# Patient Record
Sex: FEMALE | Race: WHITE | NOT HISPANIC OR LATINO | Employment: UNEMPLOYED | URBAN - METROPOLITAN AREA
[De-identification: names, ages, dates, MRNs, and addresses within clinical notes are randomized per-mention and may not be internally consistent; named-entity substitution may affect disease eponyms.]

---

## 2019-12-26 ENCOUNTER — HOSPITAL ENCOUNTER (INPATIENT)
Facility: HOSPITAL | Age: 64
LOS: 12 days | Discharge: HOME HEALTH | DRG: 493 | End: 2020-01-08
Attending: EMERGENCY MEDICINE
Payer: COMMERCIAL

## 2019-12-26 DIAGNOSIS — E87.5 HYPERKALEMIA: ICD-10-CM

## 2019-12-26 DIAGNOSIS — N17.9 AKI (ACUTE KIDNEY INJURY): ICD-10-CM

## 2019-12-26 DIAGNOSIS — S82.841A BIMALLEOLAR FRACTURE, RIGHT, CLOSED, INITIAL ENCOUNTER: ICD-10-CM

## 2019-12-26 DIAGNOSIS — M25.571 RIGHT ANKLE PAIN: ICD-10-CM

## 2019-12-26 DIAGNOSIS — S82.851A TRIMALLEOLAR FRACTURE OF ANKLE, CLOSED, RIGHT, INITIAL ENCOUNTER: Primary | ICD-10-CM

## 2019-12-26 DIAGNOSIS — Z01.818 PRE-OP EVALUATION: ICD-10-CM

## 2019-12-26 LAB
ALBUMIN SERPL BCP-MCNC: 2.5 G/DL (ref 3.5–5.2)
ALP SERPL-CCNC: 108 U/L (ref 55–135)
ALT SERPL W/O P-5'-P-CCNC: 10 U/L (ref 10–44)
ANION GAP SERPL CALC-SCNC: 13 MMOL/L (ref 8–16)
AST SERPL-CCNC: 8 U/L (ref 10–40)
BILIRUB SERPL-MCNC: 0.3 MG/DL (ref 0.1–1)
BUN SERPL-MCNC: 53 MG/DL (ref 8–23)
CALCIUM SERPL-MCNC: 8.5 MG/DL (ref 8.7–10.5)
CHLORIDE SERPL-SCNC: 110 MMOL/L (ref 95–110)
CO2 SERPL-SCNC: 17 MMOL/L (ref 23–29)
CREAT SERPL-MCNC: 2.8 MG/DL (ref 0.5–1.4)
ERYTHROCYTE [DISTWIDTH] IN BLOOD BY AUTOMATED COUNT: 16.9 % (ref 11.5–14.5)
EST. GFR  (AFRICAN AMERICAN): 20 ML/MIN/1.73 M^2
EST. GFR  (NON AFRICAN AMERICAN): 18 ML/MIN/1.73 M^2
GLUCOSE SERPL-MCNC: 188 MG/DL (ref 70–110)
HCT VFR BLD AUTO: 34.1 % (ref 37–48.5)
HGB BLD-MCNC: 10.5 G/DL (ref 12–16)
MCH RBC QN AUTO: 26.3 PG (ref 27–31)
MCHC RBC AUTO-ENTMCNC: 30.8 G/DL (ref 32–36)
MCV RBC AUTO: 86 FL (ref 82–98)
PLATELET # BLD AUTO: 482 K/UL (ref 150–350)
PMV BLD AUTO: 10.2 FL (ref 9.2–12.9)
POCT GLUCOSE: 156 MG/DL (ref 70–110)
POTASSIUM SERPL-SCNC: 5.6 MMOL/L (ref 3.5–5.1)
PROT SERPL-MCNC: 6.4 G/DL (ref 6–8.4)
RBC # BLD AUTO: 3.99 M/UL (ref 4–5.4)
SODIUM SERPL-SCNC: 140 MMOL/L (ref 136–145)
WBC # BLD AUTO: 6 K/UL (ref 3.9–12.7)

## 2019-12-26 PROCEDURE — G0378 HOSPITAL OBSERVATION PER HR: HCPCS

## 2019-12-26 PROCEDURE — 25000003 PHARM REV CODE 250: Performed by: EMERGENCY MEDICINE

## 2019-12-26 PROCEDURE — 99900035 HC TECH TIME PER 15 MIN (STAT)

## 2019-12-26 PROCEDURE — 82962 GLUCOSE BLOOD TEST: CPT

## 2019-12-26 PROCEDURE — 80053 COMPREHEN METABOLIC PANEL: CPT

## 2019-12-26 PROCEDURE — 93010 ELECTROCARDIOGRAM REPORT: CPT | Mod: ,,, | Performed by: INTERNAL MEDICINE

## 2019-12-26 PROCEDURE — 27810 TREATMENT OF ANKLE FRACTURE: CPT | Mod: RT

## 2019-12-26 PROCEDURE — 96374 THER/PROPH/DIAG INJ IV PUSH: CPT

## 2019-12-26 PROCEDURE — 99285 EMERGENCY DEPT VISIT HI MDM: CPT | Mod: 25

## 2019-12-26 PROCEDURE — 63600175 PHARM REV CODE 636 W HCPCS: Performed by: EMERGENCY MEDICINE

## 2019-12-26 PROCEDURE — 93010 EKG 12-LEAD: ICD-10-PCS | Mod: ,,, | Performed by: INTERNAL MEDICINE

## 2019-12-26 PROCEDURE — 85027 COMPLETE CBC AUTOMATED: CPT

## 2019-12-26 PROCEDURE — 93005 ELECTROCARDIOGRAM TRACING: CPT

## 2019-12-26 RX ORDER — PROPOFOL 10 MG/ML
30 VIAL (ML) INTRAVENOUS
Status: COMPLETED | OUTPATIENT
Start: 2019-12-26 | End: 2019-12-26

## 2019-12-26 RX ORDER — KETAMINE HCL IN 0.9 % NACL 50 MG/5 ML
30 SYRINGE (ML) INTRAVENOUS
Status: COMPLETED | OUTPATIENT
Start: 2019-12-26 | End: 2019-12-26

## 2019-12-26 RX ORDER — MORPHINE SULFATE 2 MG/ML
2 INJECTION, SOLUTION INTRAMUSCULAR; INTRAVENOUS
Status: COMPLETED | OUTPATIENT
Start: 2019-12-26 | End: 2019-12-26

## 2019-12-26 RX ADMIN — Medication 25 MG: at 08:12

## 2019-12-26 RX ADMIN — PROPOFOL 30 MG: 10 INJECTION, EMULSION INTRAVENOUS at 08:12

## 2019-12-26 RX ADMIN — MORPHINE SULFATE 2 MG: 2 INJECTION, SOLUTION INTRAMUSCULAR; INTRAVENOUS at 11:12

## 2019-12-26 NOTE — ED NOTES
Patient presents to ER broke ankle 1 week ago, fell 12/24/19 at home, seen Dr. Sotomayor Orthopedic today and was told to come to ER for further eval, pt took Ibuprofen this am with minimal relief, 6/10 on pain scale. Patient has a cast wrapped with ace warp noted. Patient denies CP and SOB

## 2019-12-26 NOTE — ED NOTES
APPEARANCE: Alert, oriented and in no acute distress.  CARDIAC: Normal rate and rhythm, no murmur heard.   PERIPHERAL VASCULAR: peripheral pulses present. Normal cap refill. No edema. Warm to touch.    RESPIRATORY:Normal rate and effort, breath sounds clear bilaterally throughout chest. Respirations are equal and unlabored no obvious signs of distress.  GASTRO: soft, bowel sounds normal, no tenderness, no abdominal distention.  MUSC: Full ROM. bony tenderness and soft tissue tenderness to right lower extremity.  SKIN: Skin is warm and dry, normal skin turgor, mucous membranes moist.  NEURO: 5/5 strength major flexors/extensors bilaterally. Sensory intact to light touch bilaterally. Stephanie coma scale: eyes open spontaneously-4, oriented & converses-5, obeys commands-6. No neurological abnormalities.   MENTAL STATUS: awake, alert and aware of environment.  EYE: PERRL, both eyes: pupils brisk and reactive to light. Normal size.  ENT: EARS: no obvious drainage. NOSE: no active bleeding.

## 2019-12-26 NOTE — ED PROVIDER NOTES
"Encounter Date: 12/26/2019    SCRIBE #1 NOTE: I, Michelle Ahumada, am scribing for, and in the presence of,  Dr. Garcia. I have scribed the entire note.       History     Chief Complaint   Patient presents with    Foot Pain     pt states she broke right foot 1 week ago and unable to get in to see ortho, came to ER for pain control and to get " booked for ortho surgery"      Bina Daniels is a 63 y.o. female who  has a past medical history of Cancer and Diabetes mellitus.    The patient presents to the ED due to R ankle pain after fall 1 week ago. She reports she lost her balance and twisted her ankle. She thought it was just sprained, but was evaluated by Dr. Sotomayor at [facility] today, and was referred to the ED for further evaluation for a fracture. She presents with a splint in place. She endorses persistent pain and tingling to the R foot, but denies any additional injury, pain to leg, knee, or hip, or any other complaints. She has been taking pain medication at home with only some improvement.     The history is provided by the patient.     Review of patient's allergies indicates:   Allergen Reactions    Sulfa (sulfonamide antibiotics) Hives     Past Medical History:   Diagnosis Date    Cancer     Diabetes mellitus      Past Surgical History:   Procedure Laterality Date    BREAST SURGERY Left     lumpectomy    CHOLECYSTECTOMY      HYSTERECTOMY      TONSILLECTOMY       Family History   Problem Relation Age of Onset    Breast cancer Maternal Grandmother      Social History     Tobacco Use    Smoking status: Former Smoker     Start date: 7/8/2003    Smokeless tobacco: Never Used   Substance Use Topics    Alcohol use: No    Drug use: No     Review of Systems   Constitutional: Negative for chills and fever.   HENT: Negative for sore throat.    Respiratory: Negative for cough and shortness of breath.    Cardiovascular: Negative for chest pain.   Gastrointestinal: Negative for nausea and " vomiting.   Genitourinary: Negative for dysuria, frequency and urgency.   Musculoskeletal: Positive for arthralgias and myalgias. Negative for back pain, neck pain and neck stiffness.   Skin: Negative for rash and wound.   Neurological: Negative for syncope and weakness.   Hematological: Does not bruise/bleed easily.   Psychiatric/Behavioral: Negative for agitation, behavioral problems and confusion.   All other systems reviewed and are negative.      Physical Exam     Initial Vitals   BP Pulse Resp Temp SpO2   12/26/19 1619 12/26/19 1619 12/26/19 1619 12/26/19 1618 12/26/19 1619   (!) 154/75 88 18 98.7 °F (37.1 °C) 99 %      MAP       --                Physical Exam    Nursing note and vitals reviewed.  Constitutional: She appears well-developed and well-nourished. She is not diaphoretic. No distress.   HENT:   Head: Normocephalic and atraumatic.   Mouth/Throat: Oropharynx is clear and moist.   Eyes: EOM are normal. Pupils are equal, round, and reactive to light.   Neck: No tracheal deviation present.   Cardiovascular: Normal rate, regular rhythm, normal heart sounds and intact distal pulses.   Diminished DP and PT pulses to the right foot.   Pulmonary/Chest: Breath sounds normal. No stridor. No respiratory distress. She has no wheezes.   Abdominal: Soft. Bowel sounds are normal. She exhibits no distension and no mass. There is no tenderness.   Musculoskeletal: She exhibits edema.        Right ankle: She exhibits decreased range of motion, swelling and deformity. She exhibits no laceration and normal pulse. Tenderness. Lateral malleolus and medial malleolus tenderness found.   Obvious right ankle deformity. Pitting edema to the right foot.   Neurological: She is alert and oriented to person, place, and time. She has normal strength. No cranial nerve deficit or sensory deficit.   Skin: Skin is warm and dry. Capillary refill takes less than 2 seconds. No pallor.   Capillary refill brisk to right foot.   Psychiatric:  She has a normal mood and affect. Her behavior is normal. Thought content normal.         ED Course   Orthopedic Injury  Date/Time: 2019 6:11 PM  Performed by: Torey Garcia MD  Authorized by: Torey Garcia MD     Consent Done?:  Yes  Universal Protocol:     Verbal consent obtained?: Yes      Written consent obtained?: Yes      Risks and benefits: Risks, benefits and alternatives were discussed      Consent given by:  Patient    Patient states understanding of procedure being performed: Yes      Patient's understanding of procedure matches consent: Yes      Procedure consent matches procedure scheduled: Yes      Relevant documents present and verified: Yes      Test results available and properly labeled: Yes      Imaging studies available: Yes      Required items: Required blood products, implants, devices and special equipment avialable      Patient identity confirmed:  , MRN and name    Time Out: Immediately prior to the procedure a time out was called    Injury:     Injury location:  Ankle    Injury type:  Fracture-dislocation    Fracture type: bimalleolar        Pre-procedure assessment:     Neurovascular status: Neurovascularly intact      Distal perfusion: normal      Neurological function: normal      Range of motion: reduced      Patient sedated?: Yes      ASA Class:  Class 2 - Mild Illness without functional impairment.    Mallampati Score:  Class 1 - Visualization of the soft palate, fauces, uvula, and anterior/posterior pillars.  Date/Time of last solid:  2019 11:00 AM    Date/Time of last fluid:  2019 11:00 AM    Patient/Family history of anesthesia or sedation complications: No      Sedation type: moderate (conscious) sedation      Sedation:  Propofol    Analgesia:  Ketamine    Sedation start:  2019 8:26 PM    Sedation end:  2019 8:40 PM    Vital signs: Vital signs monitored during sedation        Selections made in this section will also lock the Injury type  section above.:     Manipulation performed?: Yes      Skeletal traction used?: Yes      Reduction successful?: No      Confirmation: Reduction confirmed by x-ray      Immobilization:  Splint    Splint type:  Ankle stirrup (stirrup and posterior slab)  Post-procedure assessment:     Distal perfusion: normal      Neurological function: normal      Range of motion: splinted      Patient tolerance:  Patient tolerated the procedure well with no immediate complications     Improved alignment but persistent deformity on repeat x-ray.  Splint Application  Date/Time: 12/26/2019 8:42 PM  Performed by: Torey Garcia MD  Authorized by: Torey Garcia MD   Consent Done: Yes  Location details: right leg  Splint type: ankle stirrup (stirrup and posterior slab)  Supplies used: plaster  Post-procedure: The splinted body part was neurovascularly unchanged following the procedure.  Patient tolerance: Patient tolerated the procedure well with no immediate complications        Labs Reviewed   CBC WITHOUT DIFFERENTIAL - Abnormal; Notable for the following components:       Result Value    RBC 3.99 (*)     Hemoglobin 10.5 (*)     Hematocrit 34.1 (*)     Mean Corpuscular Hemoglobin 26.3 (*)     Mean Corpuscular Hemoglobin Conc 30.8 (*)     RDW 16.9 (*)     Platelets 482 (*)     All other components within normal limits   COMPREHENSIVE METABOLIC PANEL - Abnormal; Notable for the following components:    Potassium 5.6 (*)     CO2 17 (*)     Glucose 188 (*)     BUN, Bld 53 (*)     Creatinine 2.8 (*)     Calcium 8.5 (*)     Albumin 2.5 (*)     AST 8 (*)     eGFR if  20 (*)     eGFR if non  18 (*)     All other components within normal limits   POCT GLUCOSE - Abnormal; Notable for the following components:    POCT Glucose 156 (*)     All other components within normal limits     EKG Readings: (Independently Interpreted)   Initial Reading: No STEMI. Rhythm: Normal Sinus Rhythm.   Normal sinus rhythm, rate 95,  ST depressions inferior and lateral leads, no reciprocal elevations or other signs of ischemia, normal intervals.  No prior EKG for comparison.     ECG Results          EKG 12-lead (Final result)  Result time 12/27/19 10:23:29    Final result by Interface, Lab In [facility] (12/27/19 10:23:29)                 Narrative:    Test Reason : Z01.818,    Vent. Rate : 095 BPM     Atrial Rate : 095 BPM     P-R Int : 138 ms          QRS Dur : 076 ms      QT Int : 346 ms       P-R-T Axes : 056 033 161 degrees     QTc Int : 434 ms    Normal sinus rhythm  Septal infarct ,age undetermined  T wave abnormality, consider lateral ischemia  Abnormal ECG  No previous ECGs available  Confirmed by Lalo Evangelista MD ([id number]) on 12/27/2019 10:23:21 AM    Referred By: JASMEET   SELF           Confirmed By:Lalo Evangelista MD                            X-Rays:   Independently Interpreted Readings:   Other Readings:  Reviewed by myself, read by radiology.    Imaging Results          X-Ray Chest AP Portable (Final result)  Result time 12/26/19 23:26:10    Final result by Brigida Maldonado MD (12/26/19 23:26:10)                 Impression:      No acute intrathoracic abnormality detected.      Electronically signed by: Brigida Maldonado  Date:    12/26/2019  Time:    23:26             Narrative:    EXAMINATION:  AP PORTABLE CHEST    CLINICAL HISTORY:  pre op evaluation;    TECHNIQUE:  AP portable chest radiograph was submitted.    COMPARISON:  None.    FINDINGS:  AP portable chest radiograph demonstrates a cardiac silhouette within normal limits.  There is no focal consolidation, pneumothorax, or pleural effusion.  There is tracheal chondrocalcinosis.                               CT Ankle (Including Hindfoot) Without Contrast Right (Final result)  Result time 12/26/19 23:18:24    Final result by Brigida Maldonado MD (12/26/19 23:18:24)                 Impression:      As above described.      Electronically signed by: Brigida  Joel  Date:    12/26/2019  Time:    23:18             Narrative:    EXAMINATION:  CT RIGHT ANKLE    CLINICAL HISTORY:  Fracture, ankle;    TECHNIQUE:  1.25 mm axial images were obtained through the right ankle.  Coronal sagittal reformatted images are provided.    COMPARISON:  None.    FINDINGS:  There is overlying cast material.  The foot is edematous.  Posterior tibiotalar dislocation is noted.  The talus demonstrates lateral tilt.  There is a acute comminuted fracture of the distal fibula with the largest distal fragment posteriorly angulated with overlap.  There is a comminuted fracture of the medial malleolus.  There are fractures involving the posterior malleolus and the anterior lip of the tibia.  Bones are diffusely osteopenic.                               X-Ray Ankle Complete Right (Final result)  Result time 12/26/19 22:27:04    Final result by Brigida Maldonado MD (12/26/19 22:27:04)                 Impression:      Improvement in distal fibular fracture.  Persistent posterior dislocation at the tibiotalar joint.  Acute medial malleolar fracture.      Electronically signed by: Brigida Maldonado  Date:    12/26/2019  Time:    22:27             Narrative:    EXAMINATION:  THREE VIEWS OF THE RIGHT ANKLE    CLINICAL HISTORY:  Displaced bimalleolar fracture of right lower leg, initial encounter for closed fracture    TECHNIQUE:  AP, lateral and oblique views of the right ankle    COMPARISON:  12/26/2018 at 18:06    FINDINGS:  Overlying cast material obscures bony detail.  Three views of the right ankle demonstrate improvement in position and alignment of a distal fibular fracture.  There is persistent posterior displacement the tibial talar joint.  There is redemonstration of a medial malleolar fracture.  The fragmentation the anterior aspect of the anterior tibial lip is not well seen through the cast like material.                               X-Ray Ankle Complete Right (Final result)  Result time  12/26/19 18:10:50    Final result by Roverto Parada MD (12/26/19 18:10:50)                 Impression:      1. Distal tibial and fibular fractures with associated tibiotalar disruption as above.      Electronically signed by: Roverto Parada MD  Date:    12/26/2019  Time:    18:10             Narrative:    EXAMINATION:  XR ANKLE COMPLETE 3 VIEW RIGHT    CLINICAL HISTORY:  Pain in right ankle and joints of right foot    TECHNIQUE:  AP, lateral, and oblique images of the right ankle were performed.    COMPARISON:  None    FINDINGS:  Three views.    There is a comminuted displaced and angulated fracture of the distal right fibula.  There is a displaced comminuted fracture of the medial malleolus.  There is subluxation of the tibiotalar articulation posteromedially.  The talus appears grossly intact.  There is diffuse edema about the ankle.  The posterior aspect of the tibia appears intact although somewhat limited evaluation.  There is fragmentation of the anterior aspect of the tibia.  There is vascular calcification.                              Medical Decision Making:   History:   Old Medical Records: I decided to obtain old medical records.  Old Records Summarized: other records.  Initial Assessment:   62 yo F with ankle pain after fall 1 week ago.   Exam with obvious deformity.  Will obtain x-rays and anticipate need for conscious sedation and closed reduction.  Differential Diagnosis:   Differential Diagnosis includes, but is not limited to:  Fracture, dislocation, compartment syndrome, nerve injury/palsy, vascular injury, rhabdomyolysis, hemarthrosis, septic joint, bursitis, muscle strain, ligament tear/sprain, laceration with foreign body, abrasion, soft tissue contusion, osteoarthritis.  Clinical Tests:   Radiological Study: Ordered and Reviewed  ED Management:  Conscious sedation, closed reduction, splint application performed at bedside by ED physician.  Patient admitted to Orthopedic Surgery service  for operative repair.    Pre-op screening labs, EKG ordered.  Labs with Cr 2.8, K 5.6, unknown baseline.  Hospitalist service consulted for medical management.    On re-evaluation, the patient's status has remained stable.  At this time, I believe the patient should be admitted to the hospital for further evaluation and management of bimalleolar fracture.  Orthopedic Surgery service was contacted and the case was discussed.   The consulting physician/team agrees with plan and will admit under their service.   The patient and family were updated with test results, overall impression, and further plan of care. All questions were answered. The patient expressed understanding and agrees with the current plan.                     ED Course as of Dec 27 0039   Thu Dec 26, 2019   1853 X-ray reveals bimalleolar fracture/dislocation.  Will consent for conscious sedation and closed reduction at bedside.    [SS]   2225 Repeat post-reduction x-ray with persistent malalignment, concerning for unstable ankle fracture.  Discussed with Dr. Medina, Orthopedics, who agrees with plan for admission and surgical repair.   [SS]      ED Course User Index  [SS] Torey Garcia MD                Clinical Impression:     1. Hyperkalemia    2. Right ankle pain    3. Bimalleolar fracture, right, closed, initial encounter    4. Pre-op evaluation    5. JENNI (acute kidney injury)    6. Trimalleolar fracture of ankle, closed, right, initial encounter        Disposition:   Disposition: Placed in Observation  Condition: Stable      I, Dr. Torey Garcia, personally performed the services described in this documentation. All medical record entries made by the scribe were at my direction and in my presence.  I have reviewed the chart and agree that the record reflects my personal performance and is accurate and complete.     Torey Garcia MD.           Torey Garcia MD  12/28/19 0223

## 2019-12-27 ENCOUNTER — ANESTHESIA EVENT (OUTPATIENT)
Dept: SURGERY | Facility: HOSPITAL | Age: 64
DRG: 493 | End: 2019-12-27
Payer: COMMERCIAL

## 2019-12-27 ENCOUNTER — ANESTHESIA (OUTPATIENT)
Dept: SURGERY | Facility: HOSPITAL | Age: 64
DRG: 493 | End: 2019-12-27
Payer: COMMERCIAL

## 2019-12-27 PROBLEM — N18.30 ANEMIA DUE TO STAGE 3 CHRONIC KIDNEY DISEASE: Status: ACTIVE | Noted: 2019-12-27

## 2019-12-27 PROBLEM — D63.1 ANEMIA DUE TO STAGE 3 CHRONIC KIDNEY DISEASE: Status: ACTIVE | Noted: 2019-12-27

## 2019-12-27 PROBLEM — N18.4 CKD (CHRONIC KIDNEY DISEASE), STAGE IV: Chronic | Status: ACTIVE | Noted: 2019-12-27

## 2019-12-27 PROBLEM — E13.40 NEUROPATHY DUE TO SECONDARY DIABETES MELLITUS: Status: ACTIVE | Noted: 2019-12-27

## 2019-12-27 PROBLEM — E11.49 TYPE 2 DIABETES MELLITUS WITH NEUROLOGIC COMPLICATION, WITHOUT LONG-TERM CURRENT USE OF INSULIN: Status: ACTIVE | Noted: 2019-12-27

## 2019-12-27 PROBLEM — E13.40 NEUROPATHY DUE TO SECONDARY DIABETES MELLITUS: Chronic | Status: ACTIVE | Noted: 2019-12-27

## 2019-12-27 PROBLEM — S82.851A TRIMALLEOLAR FRACTURE OF ANKLE, CLOSED, RIGHT, INITIAL ENCOUNTER: Status: ACTIVE | Noted: 2019-12-26

## 2019-12-27 PROBLEM — S93.04XA ANKLE DISLOCATION, RIGHT, INITIAL ENCOUNTER: Status: ACTIVE | Noted: 2019-12-27

## 2019-12-27 PROBLEM — I25.10 CORONARY ARTERY DISEASE INVOLVING NATIVE HEART WITHOUT ANGINA PECTORIS: Chronic | Status: ACTIVE | Noted: 2019-12-27

## 2019-12-27 PROBLEM — I25.10 CORONARY ARTERY DISEASE INVOLVING NATIVE HEART WITHOUT ANGINA PECTORIS: Status: ACTIVE | Noted: 2019-12-27

## 2019-12-27 PROBLEM — E11.49 TYPE 2 DIABETES MELLITUS WITH NEUROLOGIC COMPLICATION, WITHOUT LONG-TERM CURRENT USE OF INSULIN: Chronic | Status: ACTIVE | Noted: 2019-12-27

## 2019-12-27 PROBLEM — N18.4 CKD (CHRONIC KIDNEY DISEASE), STAGE IV: Status: ACTIVE | Noted: 2019-12-27

## 2019-12-27 LAB
ABO + RH BLD: NORMAL
ANION GAP SERPL CALC-SCNC: 11 MMOL/L (ref 8–16)
APTT BLDCRRT: 30.6 SEC (ref 21–32)
BASOPHILS # BLD AUTO: 0.02 K/UL (ref 0–0.2)
BASOPHILS # BLD AUTO: 0.02 K/UL (ref 0–0.2)
BASOPHILS NFR BLD: 0.2 % (ref 0–1.9)
BASOPHILS NFR BLD: 0.3 % (ref 0–1.9)
BLD GP AB SCN CELLS X3 SERPL QL: NORMAL
BUN SERPL-MCNC: 49 MG/DL (ref 8–23)
CALCIUM SERPL-MCNC: 7.8 MG/DL (ref 8.7–10.5)
CHLORIDE SERPL-SCNC: 114 MMOL/L (ref 95–110)
CO2 SERPL-SCNC: 14 MMOL/L (ref 23–29)
CREAT SERPL-MCNC: 2.4 MG/DL (ref 0.5–1.4)
DIFFERENTIAL METHOD: ABNORMAL
DIFFERENTIAL METHOD: ABNORMAL
EOSINOPHIL # BLD AUTO: 0.1 K/UL (ref 0–0.5)
EOSINOPHIL # BLD AUTO: 0.1 K/UL (ref 0–0.5)
EOSINOPHIL NFR BLD: 1.1 % (ref 0–8)
EOSINOPHIL NFR BLD: 1.3 % (ref 0–8)
ERYTHROCYTE [DISTWIDTH] IN BLOOD BY AUTOMATED COUNT: 16.5 % (ref 11.5–14.5)
ERYTHROCYTE [DISTWIDTH] IN BLOOD BY AUTOMATED COUNT: 17 % (ref 11.5–14.5)
EST. GFR  (AFRICAN AMERICAN): 24 ML/MIN/1.73 M^2
EST. GFR  (NON AFRICAN AMERICAN): 21 ML/MIN/1.73 M^2
GLUCOSE SERPL-MCNC: 164 MG/DL (ref 70–110)
HCT VFR BLD AUTO: 23.3 % (ref 37–48.5)
HCT VFR BLD AUTO: 30.1 % (ref 37–48.5)
HGB BLD-MCNC: 7 G/DL (ref 12–16)
HGB BLD-MCNC: 9.2 G/DL (ref 12–16)
INR PPP: 1.2 (ref 0.8–1.2)
LACTATE SERPL-SCNC: 0.9 MMOL/L (ref 0.5–2.2)
LYMPHOCYTES # BLD AUTO: 0.9 K/UL (ref 1–4.8)
LYMPHOCYTES # BLD AUTO: 1.2 K/UL (ref 1–4.8)
LYMPHOCYTES NFR BLD: 10.2 % (ref 18–48)
LYMPHOCYTES NFR BLD: 20.4 % (ref 18–48)
MCH RBC QN AUTO: 25.5 PG (ref 27–31)
MCH RBC QN AUTO: 26.2 PG (ref 27–31)
MCHC RBC AUTO-ENTMCNC: 30 G/DL (ref 32–36)
MCHC RBC AUTO-ENTMCNC: 30.6 G/DL (ref 32–36)
MCV RBC AUTO: 85 FL (ref 82–98)
MCV RBC AUTO: 86 FL (ref 82–98)
MONOCYTES # BLD AUTO: 0.7 K/UL (ref 0.3–1)
MONOCYTES # BLD AUTO: 0.9 K/UL (ref 0.3–1)
MONOCYTES NFR BLD: 11.4 % (ref 4–15)
MONOCYTES NFR BLD: 9.8 % (ref 4–15)
NEUTROPHILS # BLD AUTO: 4 K/UL (ref 1.8–7.7)
NEUTROPHILS # BLD AUTO: 7.1 K/UL (ref 1.8–7.7)
NEUTROPHILS NFR BLD: 66.6 % (ref 38–73)
NEUTROPHILS NFR BLD: 78.7 % (ref 38–73)
PLATELET # BLD AUTO: 410 K/UL (ref 150–350)
PLATELET # BLD AUTO: 445 K/UL (ref 150–350)
PMV BLD AUTO: 9.4 FL (ref 9.2–12.9)
PMV BLD AUTO: 9.8 FL (ref 9.2–12.9)
POCT GLUCOSE: 117 MG/DL (ref 70–110)
POCT GLUCOSE: 161 MG/DL (ref 70–110)
POCT GLUCOSE: 163 MG/DL (ref 70–110)
POCT GLUCOSE: 163 MG/DL (ref 70–110)
POTASSIUM SERPL-SCNC: 5 MMOL/L (ref 3.5–5.1)
PROTHROMBIN TIME: 12.7 SEC (ref 9–12.5)
RBC # BLD AUTO: 2.75 M/UL (ref 4–5.4)
RBC # BLD AUTO: 3.51 M/UL (ref 4–5.4)
SODIUM SERPL-SCNC: 139 MMOL/L (ref 136–145)
WBC # BLD AUTO: 5.97 K/UL (ref 3.9–12.7)
WBC # BLD AUTO: 8.98 K/UL (ref 3.9–12.7)

## 2019-12-27 PROCEDURE — 63600175 PHARM REV CODE 636 W HCPCS: Performed by: ORTHOPAEDIC SURGERY

## 2019-12-27 PROCEDURE — 85610 PROTHROMBIN TIME: CPT

## 2019-12-27 PROCEDURE — 63600175 PHARM REV CODE 636 W HCPCS: Performed by: ANESTHESIOLOGY

## 2019-12-27 PROCEDURE — 85730 THROMBOPLASTIN TIME PARTIAL: CPT

## 2019-12-27 PROCEDURE — 94761 N-INVAS EAR/PLS OXIMETRY MLT: CPT

## 2019-12-27 PROCEDURE — 36000709 HC OR TIME LEV III EA ADD 15 MIN

## 2019-12-27 PROCEDURE — 64445 NJX AA&/STRD SCIATIC NRV IMG: CPT | Performed by: STUDENT IN AN ORGANIZED HEALTH CARE EDUCATION/TRAINING PROGRAM

## 2019-12-27 PROCEDURE — 63600175 PHARM REV CODE 636 W HCPCS: Performed by: NURSE PRACTITIONER

## 2019-12-27 PROCEDURE — 80048 BASIC METABOLIC PNL TOTAL CA: CPT

## 2019-12-27 PROCEDURE — 37000009 HC ANESTHESIA EA ADD 15 MINS

## 2019-12-27 PROCEDURE — 11000001 HC ACUTE MED/SURG PRIVATE ROOM

## 2019-12-27 PROCEDURE — 25000003 PHARM REV CODE 250

## 2019-12-27 PROCEDURE — P9021 RED BLOOD CELLS UNIT: HCPCS

## 2019-12-27 PROCEDURE — 86901 BLOOD TYPING SEROLOGIC RH(D): CPT

## 2019-12-27 PROCEDURE — 25000242 PHARM REV CODE 250 ALT 637 W/ HCPCS: Performed by: NURSE PRACTITIONER

## 2019-12-27 PROCEDURE — 36415 COLL VENOUS BLD VENIPUNCTURE: CPT

## 2019-12-27 PROCEDURE — 63600175 PHARM REV CODE 636 W HCPCS

## 2019-12-27 PROCEDURE — C1713 ANCHOR/SCREW BN/BN,TIS/BN: HCPCS

## 2019-12-27 PROCEDURE — 36000708 HC OR TIME LEV III 1ST 15 MIN

## 2019-12-27 PROCEDURE — 27201423 OPTIME MED/SURG SUP & DEVICES STERILE SUPPLY

## 2019-12-27 PROCEDURE — 25000003 PHARM REV CODE 250: Performed by: NURSE PRACTITIONER

## 2019-12-27 PROCEDURE — 37000008 HC ANESTHESIA 1ST 15 MINUTES

## 2019-12-27 PROCEDURE — 83605 ASSAY OF LACTIC ACID: CPT

## 2019-12-27 PROCEDURE — 36430 TRANSFUSION BLD/BLD COMPNT: CPT

## 2019-12-27 PROCEDURE — 96376 TX/PRO/DX INJ SAME DRUG ADON: CPT

## 2019-12-27 PROCEDURE — 71000033 HC RECOVERY, INTIAL HOUR

## 2019-12-27 PROCEDURE — 86920 COMPATIBILITY TEST SPIN: CPT

## 2019-12-27 PROCEDURE — 94640 AIRWAY INHALATION TREATMENT: CPT

## 2019-12-27 PROCEDURE — C1769 GUIDE WIRE: HCPCS

## 2019-12-27 PROCEDURE — 85025 COMPLETE CBC W/AUTO DIFF WBC: CPT

## 2019-12-27 DEVICE — SCREW CANN 4.0MM 30MM.: Type: IMPLANTABLE DEVICE | Site: ANKLE | Status: FUNCTIONAL

## 2019-12-27 DEVICE — SCREW CORTEX 3.5 X 30: Type: IMPLANTABLE DEVICE | Site: ANKLE | Status: FUNCTIONAL

## 2019-12-27 DEVICE — PLATE W COLLAR: Type: IMPLANTABLE DEVICE | Site: ANKLE | Status: FUNCTIONAL

## 2019-12-27 DEVICE — SCREW CORTEX 3.5MM X 12MM: Type: IMPLANTABLE DEVICE | Site: ANKLE | Status: FUNCTIONAL

## 2019-12-27 DEVICE — SCREW CANN 4.0MM 40MM: Type: IMPLANTABLE DEVICE | Site: ANKLE | Status: FUNCTIONAL

## 2019-12-27 DEVICE — SCREW LOCKING 3.5MM X 14MM.: Type: IMPLANTABLE DEVICE | Site: ANKLE | Status: FUNCTIONAL

## 2019-12-27 DEVICE — SCREW LOCKING 3.5MM X 16MM.: Type: IMPLANTABLE DEVICE | Site: ANKLE | Status: FUNCTIONAL

## 2019-12-27 RX ORDER — LISINOPRIL 20 MG/1
20 TABLET ORAL DAILY
Status: DISCONTINUED | OUTPATIENT
Start: 2019-12-28 | End: 2020-01-05

## 2019-12-27 RX ORDER — HYDROMORPHONE HYDROCHLORIDE 2 MG/ML
0.2 INJECTION, SOLUTION INTRAMUSCULAR; INTRAVENOUS; SUBCUTANEOUS
Status: DISCONTINUED | OUTPATIENT
Start: 2019-12-27 | End: 2020-01-04

## 2019-12-27 RX ORDER — MIDAZOLAM HYDROCHLORIDE 1 MG/ML
INJECTION, SOLUTION INTRAMUSCULAR; INTRAVENOUS
Status: DISCONTINUED | OUTPATIENT
Start: 2019-12-27 | End: 2019-12-27

## 2019-12-27 RX ORDER — SODIUM CHLORIDE, SODIUM LACTATE, POTASSIUM CHLORIDE, CALCIUM CHLORIDE 600; 310; 30; 20 MG/100ML; MG/100ML; MG/100ML; MG/100ML
INJECTION, SOLUTION INTRAVENOUS CONTINUOUS PRN
Status: DISCONTINUED | OUTPATIENT
Start: 2019-12-27 | End: 2019-12-27

## 2019-12-27 RX ORDER — LISINOPRIL 20 MG/1
20 TABLET ORAL DAILY
Status: ON HOLD | COMMUNITY
End: 2020-01-08 | Stop reason: HOSPADM

## 2019-12-27 RX ORDER — FLUTICASONE PROPIONATE 50 MCG
1 SPRAY, SUSPENSION (ML) NASAL 2 TIMES DAILY
Status: DISCONTINUED | OUTPATIENT
Start: 2019-12-27 | End: 2020-01-08 | Stop reason: HOSPADM

## 2019-12-27 RX ORDER — ESCITALOPRAM OXALATE 10 MG/1
10 TABLET ORAL DAILY
COMMUNITY

## 2019-12-27 RX ORDER — OXYCODONE HYDROCHLORIDE 5 MG/1
5 TABLET ORAL EVERY 4 HOURS PRN
Status: DISCONTINUED | OUTPATIENT
Start: 2019-12-27 | End: 2020-01-08 | Stop reason: HOSPADM

## 2019-12-27 RX ORDER — CEFAZOLIN SODIUM 2 G/50ML
2 SOLUTION INTRAVENOUS
Status: COMPLETED | OUTPATIENT
Start: 2019-12-27 | End: 2019-12-27

## 2019-12-27 RX ORDER — NAPROXEN SODIUM 220 MG/1
81 TABLET, FILM COATED ORAL DAILY
COMMUNITY

## 2019-12-27 RX ORDER — GLYBURIDE-METFORMIN HYDROCHLORIDE 2.5; 5 MG/1; MG/1
1 TABLET ORAL
Status: DISCONTINUED | OUTPATIENT
Start: 2019-12-28 | End: 2019-12-27

## 2019-12-27 RX ORDER — METFORMIN HYDROCHLORIDE 500 MG/1
500 TABLET ORAL
Status: DISCONTINUED | OUTPATIENT
Start: 2019-12-28 | End: 2019-12-28

## 2019-12-27 RX ORDER — HYDROCODONE BITARTRATE AND ACETAMINOPHEN 500; 5 MG/1; MG/1
TABLET ORAL
Status: DISCONTINUED | OUTPATIENT
Start: 2019-12-27 | End: 2019-12-29

## 2019-12-27 RX ORDER — ROPIVACAINE HYDROCHLORIDE 5 MG/ML
INJECTION, SOLUTION EPIDURAL; INFILTRATION; PERINEURAL
Status: DISCONTINUED | OUTPATIENT
Start: 2019-12-27 | End: 2019-12-27

## 2019-12-27 RX ORDER — METOPROLOL TARTRATE 50 MG/1
50 TABLET ORAL 2 TIMES DAILY
Status: ON HOLD | COMMUNITY
End: 2020-02-16 | Stop reason: HOSPADM

## 2019-12-27 RX ORDER — PANTOPRAZOLE SODIUM 40 MG/1
40 TABLET, DELAYED RELEASE ORAL DAILY
Status: DISCONTINUED | OUTPATIENT
Start: 2019-12-28 | End: 2020-01-08 | Stop reason: HOSPADM

## 2019-12-27 RX ORDER — PHENYLEPHRINE HYDROCHLORIDE 10 MG/ML
INJECTION INTRAVENOUS
Status: DISCONTINUED | OUTPATIENT
Start: 2019-12-27 | End: 2019-12-27

## 2019-12-27 RX ORDER — SODIUM CHLORIDE, SODIUM LACTATE, POTASSIUM CHLORIDE, CALCIUM CHLORIDE 600; 310; 30; 20 MG/100ML; MG/100ML; MG/100ML; MG/100ML
INJECTION, SOLUTION INTRAVENOUS CONTINUOUS
Status: DISCONTINUED | OUTPATIENT
Start: 2019-12-27 | End: 2019-12-28

## 2019-12-27 RX ORDER — ACETAMINOPHEN 325 MG/1
650 TABLET ORAL EVERY 4 HOURS PRN
Status: DISCONTINUED | OUTPATIENT
Start: 2019-12-27 | End: 2020-01-08 | Stop reason: HOSPADM

## 2019-12-27 RX ORDER — PROPOFOL 10 MG/ML
VIAL (ML) INTRAVENOUS CONTINUOUS PRN
Status: DISCONTINUED | OUTPATIENT
Start: 2019-12-27 | End: 2019-12-27

## 2019-12-27 RX ORDER — GLUCAGON 1 MG
1 KIT INJECTION
Status: DISCONTINUED | OUTPATIENT
Start: 2019-12-27 | End: 2019-12-31

## 2019-12-27 RX ORDER — SODIUM CHLORIDE 0.9 % (FLUSH) 0.9 %
10 SYRINGE (ML) INJECTION
Status: DISCONTINUED | OUTPATIENT
Start: 2019-12-27 | End: 2020-01-08 | Stop reason: HOSPADM

## 2019-12-27 RX ORDER — IBUPROFEN 800 MG/1
800 TABLET ORAL EVERY 6 HOURS PRN
Status: ON HOLD | COMMUNITY
End: 2020-01-08 | Stop reason: HOSPADM

## 2019-12-27 RX ORDER — ESCITALOPRAM OXALATE 10 MG/1
10 TABLET ORAL DAILY
Status: DISCONTINUED | OUTPATIENT
Start: 2019-12-28 | End: 2020-01-08 | Stop reason: HOSPADM

## 2019-12-27 RX ORDER — GLYBURIDE 2.5 MG/1
2.5 TABLET ORAL
Status: DISCONTINUED | OUTPATIENT
Start: 2019-12-28 | End: 2019-12-28

## 2019-12-27 RX ORDER — HYDROCODONE BITARTRATE AND ACETAMINOPHEN 7.5; 325 MG/1; MG/1
1 TABLET ORAL EVERY 6 HOURS PRN
Status: ON HOLD | COMMUNITY
End: 2020-01-08 | Stop reason: HOSPADM

## 2019-12-27 RX ORDER — ONDANSETRON 2 MG/ML
4 INJECTION INTRAMUSCULAR; INTRAVENOUS EVERY 6 HOURS PRN
Status: DISCONTINUED | OUTPATIENT
Start: 2019-12-27 | End: 2020-01-08 | Stop reason: HOSPADM

## 2019-12-27 RX ORDER — MORPHINE SULFATE 2 MG/ML
2 INJECTION, SOLUTION INTRAMUSCULAR; INTRAVENOUS EVERY 4 HOURS PRN
Status: DISCONTINUED | OUTPATIENT
Start: 2019-12-27 | End: 2020-01-01

## 2019-12-27 RX ORDER — METOPROLOL TARTRATE 50 MG/1
50 TABLET ORAL 2 TIMES DAILY
Status: DISCONTINUED | OUTPATIENT
Start: 2019-12-27 | End: 2020-01-08 | Stop reason: HOSPADM

## 2019-12-27 RX ORDER — ALBUTEROL SULFATE 2.5 MG/.5ML
10 SOLUTION RESPIRATORY (INHALATION) ONCE
Status: COMPLETED | OUTPATIENT
Start: 2019-12-27 | End: 2019-12-27

## 2019-12-27 RX ORDER — LIDOCAINE HCL/PF 100 MG/5ML
SYRINGE (ML) INTRAVENOUS
Status: DISCONTINUED | OUTPATIENT
Start: 2019-12-27 | End: 2019-12-27

## 2019-12-27 RX ORDER — PROPOFOL 10 MG/ML
VIAL (ML) INTRAVENOUS
Status: DISCONTINUED | OUTPATIENT
Start: 2019-12-27 | End: 2019-12-27

## 2019-12-27 RX ADMIN — METOPROLOL TARTRATE 50 MG: 50 TABLET ORAL at 01:12

## 2019-12-27 RX ADMIN — SODIUM CHLORIDE, SODIUM LACTATE, POTASSIUM CHLORIDE, AND CALCIUM CHLORIDE: .6; .31; .03; .02 INJECTION, SOLUTION INTRAVENOUS at 12:12

## 2019-12-27 RX ADMIN — MORPHINE SULFATE 2 MG: 2 INJECTION, SOLUTION INTRAMUSCULAR; INTRAVENOUS at 07:12

## 2019-12-27 RX ADMIN — PROPOFOL 50 MCG/KG/MIN: 10 INJECTION, EMULSION INTRAVENOUS at 12:12

## 2019-12-27 RX ADMIN — PROPOFOL 30 MG: 10 INJECTION, EMULSION INTRAVENOUS at 01:12

## 2019-12-27 RX ADMIN — FLUTICASONE PROPIONATE 50 MCG: 50 SPRAY, METERED NASAL at 06:12

## 2019-12-27 RX ADMIN — LIDOCAINE HYDROCHLORIDE 80 MG: 20 INJECTION, SOLUTION INTRAVENOUS at 12:12

## 2019-12-27 RX ADMIN — PROPOFOL 30 MG: 10 INJECTION, EMULSION INTRAVENOUS at 12:12

## 2019-12-27 RX ADMIN — PHENYLEPHRINE HYDROCHLORIDE 100 MCG: 10 INJECTION INTRAVENOUS at 01:12

## 2019-12-27 RX ADMIN — OXYCODONE HYDROCHLORIDE 5 MG: 5 TABLET ORAL at 08:12

## 2019-12-27 RX ADMIN — FLUTICASONE PROPIONATE 50 MCG: 50 SPRAY, METERED NASAL at 08:12

## 2019-12-27 RX ADMIN — MIDAZOLAM 3 MG: 1 INJECTION INTRAMUSCULAR; INTRAVENOUS at 12:12

## 2019-12-27 RX ADMIN — METOPROLOL TARTRATE 50 MG: 50 TABLET ORAL at 08:12

## 2019-12-27 RX ADMIN — CEFAZOLIN SODIUM 2 G: 2 SOLUTION INTRAVENOUS at 12:12

## 2019-12-27 RX ADMIN — ALBUTEROL SULFATE 10 MG: 2.5 SOLUTION RESPIRATORY (INHALATION) at 07:12

## 2019-12-27 RX ADMIN — SODIUM CHLORIDE, SODIUM LACTATE, POTASSIUM CHLORIDE, AND CALCIUM CHLORIDE: .6; .31; .03; .02 INJECTION, SOLUTION INTRAVENOUS at 10:12

## 2019-12-27 RX ADMIN — ROPIVACAINE HYDROCHLORIDE 40 ML: 5 INJECTION, SOLUTION EPIDURAL; INFILTRATION; PERINEURAL at 01:12

## 2019-12-27 NOTE — PLAN OF CARE
Progress notes reviewed. Evening rounds completed. Introduced self as VN for this shift. Admit questions and med rec completed . Educated pt on VN's role in patient's care.  Plan of care reviewed with patient. Opportunity given for pt's questions. No questions or concerns expressed at this time. VN to continue to monitor.  Hospitalist paged for additional orders awaiting call back.

## 2019-12-27 NOTE — ED NOTES
Pt. Using bed pan to urinate. Tolerating well. Perineal care performed. Pt. Placed in hospital gown and belongings placed in pt. Belonging bag and at the bedside.

## 2019-12-27 NOTE — ED NOTES
Patient awake and verbal. Nurse asked how she was feeling and pain level. Patient shook head no to pain but did state she felt stinging just a little bit.

## 2019-12-27 NOTE — TRANSFER OF CARE
"Anesthesia Transfer of Care Note    Patient: Bina Daniels    Procedure(s) Performed: Procedure(s) (LRB):  OPEN REDUCTION INTERNAL FIXATION-ANKLE (Right)    Patient location: PACU    Anesthesia Type: MAC and regional    Transport from OR: Transported from OR on room air with adequate spontaneous ventilation    Post pain: adequate analgesia    Post assessment: no apparent anesthetic complications and tolerated procedure well    Post vital signs: stable    Level of consciousness: awake, alert and oriented    Nausea/Vomiting: no nausea/vomiting    Complications: none    Transfer of care protocol was followed      Last vitals:   Visit Vitals  BP (!) 72/49   Pulse 66   Temp 36.3 °C (97.3 °F)   Resp 16   Ht 5' 4" (1.626 m)   Wt 59.6 kg (131 lb 6.3 oz)   SpO2 100%   Breastfeeding? No   BMI 22.55 kg/m²     "

## 2019-12-27 NOTE — ANESTHESIA POSTPROCEDURE EVALUATION
Anesthesia Post Evaluation    Patient: Bina Daniels    Procedure(s) Performed: Procedure(s) (LRB):  OPEN REDUCTION INTERNAL FIXATION-ANKLE (Right)    Final Anesthesia Type: regional    Patient location during evaluation: OPS  Patient participation: Yes- Able to Participate  Level of consciousness: awake and alert and oriented  Post-procedure vital signs: reviewed and stable  Pain management: adequate  Airway patency: patent    PONV status at discharge: No PONV  Anesthetic complications: no      Cardiovascular status: blood pressure returned to baseline  Respiratory status: unassisted  Hydration status: euvolemic  Follow-up not needed.          Vitals Value Taken Time   /57 12/27/2019  2:56 PM   Temp 36.3 °C (97.3 °F) 12/27/2019  2:15 PM   Pulse 66 12/27/2019  3:00 PM   Resp 14 12/27/2019  3:00 PM   SpO2 97 % 12/27/2019  2:59 PM   Vitals shown include unvalidated device data.      No case tracking events are documented in the log.      Pain/Gloria Score: Pain Rating Prior to Med Admin: 4 (12/27/2019  7:09 AM)  Gloria Score: 8 (12/27/2019  2:25 PM)

## 2019-12-27 NOTE — ED NOTES
Pt. Has a skin tear to the left hand and left forearm. Vasoline gauze and kerlex applied to site.

## 2019-12-27 NOTE — ED TRIAGE NOTES
Pt. Care assumed. Pt. Is awake, alert and oriented. Skin is PWD. Pt. C/o having a fall on 12/12 and was seen in urgent care today and was diagnosed with a broken ankle. Swelling and bruising noted to right ankle. Pt. States she has been in bed for the past two weeks trying to stay off her ankle. Bed in the low position, side rails elevated and call light at the bedside. Will continue to monitor.

## 2019-12-27 NOTE — ED NOTES
Sedation start. Dr. Garcia pushed 25 mg each of Propofol and Ketamine. Catalina with Respiratory and 2 nurses (BOONE Mcgregor) at bedside to assist. Patient stable.

## 2019-12-27 NOTE — BRIEF OP NOTE
[facility]  Brief Operative Note    SUMMARY     Surgery Date: 12/27/2019     Surgeon(s) and Role:     * Cooper Medina MD - Primary    Assisting Surgeon: None    Pre-op Diagnosis:  Trimalleolar fracture of ankle, closed, right, initial encounter [S82.851A]  Closed dislocation of ankle, right, initial encounter [S93.04XA]    Post-op Diagnosis:  Post-Op Diagnosis Codes:     * Trimalleolar fracture of ankle, closed, right, initial encounter [S82.851A]     * Closed dislocation of ankle, right, initial encounter [S93.04XA]    Procedure(s) (LRB):  OPEN REDUCTION INTERNAL FIXATION-ANKLE (Right)    Anesthesia: Spinal/Epidural    Description of Procedure: ORIF bimalleolar ankle fracture dislocation    Description of the findings of the procedure: Marked intra-osseous bleeding in spite of increasing tourniquet  Surgery aborted and wound packed open    Estimated Blood Loss: 500 mL         Specimens:   Specimen (12h ago, onward)    None

## 2019-12-27 NOTE — ANESTHESIA PREPROCEDURE EVALUATION
12/27/2019  Bina Daniels is a 63 y.o., female with right ankle fracture here for repair    Past Medical History:   Diagnosis Date    Cancer     Diabetes mellitus      Past Surgical History:   Procedure Laterality Date    BREAST SURGERY Left     lumpectomy    CHOLECYSTECTOMY      HYSTERECTOMY      TONSILLECTOMY       Lab Results   Component Value Date    WBC 6.00 12/26/2019    HGB 10.5 (L) 12/26/2019    HCT 34.1 (L) 12/26/2019     (H) 12/26/2019    ALT 10 12/26/2019    AST 8 (L) 12/26/2019     12/27/2019    K 5.0 12/27/2019     (H) 12/27/2019    CREATININE 2.4 (H) 12/27/2019    BUN 49 (H) 12/27/2019    CO2 14 (L) 12/27/2019         Anesthesia Evaluation    I have reviewed the Patient Summary Reports.    I have reviewed the Nursing Notes.   I have reviewed the Medications.     Review of Systems  Anesthesia Hx:  No problems with previous Anesthesia Denies Hx of Anesthetic complications  Denies Family Hx of Anesthesia complications.   Denies Personal Hx of Anesthesia complications.   Social:  Non-Smoker, No Alcohol Use    Hematology/Oncology:         -- Cancer in past history:   EENT/Dental:EENT/Dental Normal   Cardiovascular:  Cardiovascular Normal     Pulmonary:  Pulmonary Normal    Renal/:  Renal/ Normal     Hepatic/GI:  Hepatic/GI Normal    Neurological:  Neurology Normal    Endocrine:   Diabetes    Psych:  Psychiatric Normal           Physical Exam  General:  Well nourished    Airway/Jaw/Neck:  Airway Findings: Mouth Opening: Normal Tongue: Normal  Mallampati: II  TM Distance: Normal, at least 6 cm  Jaw/Neck Findings:  Neck ROM: Normal ROM      Dental:  Dental Findings: Periodontal disease, Severe   Chest/Lungs:  Chest/Lungs Findings: Clear to auscultation     Heart/Vascular:  Heart Findings: Rate: Normal  Rhythm: Regular Rhythm  Sounds: Normal        Mental Status:  Mental  Status Findings:  Alert and Oriented, Cooperative         Anesthesia Plan  Type of Anesthesia, risks & benefits discussed:  Anesthesia Type:  MAC, regional, general  Patient's Preference:   Intra-op Monitoring Plan: standard ASA monitors  Intra-op Monitoring Plan Comments:   Post Op Pain Control Plan: per primary service following discharge from PACU  Post Op Pain Control Plan Comments:   Induction:   IV  Beta Blocker:         Informed Consent: Patient understands risks and agrees with Anesthesia plan.  Questions answered. Anesthesia consent signed with patient.  ASA Score: 2     Day of Surgery Review of History & Physical:            Ready For Surgery From Anesthesia Perspective.

## 2019-12-27 NOTE — ANESTHESIA PROCEDURE NOTES
Peripheral Block    Patient location during procedure: pre-op   Block not for primary anesthetic.  Reason for block: at surgeon's request and post-op pain management   Post-op Pain Location: Right Ankle   Start time: 12/27/2019 12:25 PM  Timeout: 12/27/2019 12:22 PM   End time: 12/27/2019 12:32 PM    Staffing  Authorizing Provider: Genaro Solares MD  Performing Provider: Torey Sainz MD    Preanesthetic Checklist  Completed: patient identified, site marked, surgical consent, pre-op evaluation, timeout performed, IV checked, risks and benefits discussed and monitors and equipment checked  Peripheral Block  Patient position: supine  Prep: ChloraPrep  Patient monitoring: heart rate, continuous pulse ox and frequent blood pressure checks  Block type: popliteal and saphenous  Laterality: right  Injection technique: single shot  Needle  Needle type: Echogenic   Needle gauge: 21 G  Needle length: 5 in  Needle localization: ultrasound guidance     Assessment  Injection assessment: negative aspiration and negative parasthesia  Paresthesia pain: none  Heart rate change: no  Slow fractionated injection: yes

## 2019-12-27 NOTE — H&P
CHIEF COMPLAINT:  Right ankle pain.    HISTORY OF PRESENT ILLNESS:  Ms. Daniels is a 63-year-old woman who presented   to [facility] last night with a deformed painful right ankle   fracture that she apparently sustained 6 to 7 days ago.  She states she thought   that she had simply sprained it; however, she did not present to a physician   immediately.  She states she fell again on Justice day and developed more   deformity.  She presented to her orthopedic surgeon's office on 12/26/2019   afternoon.  His name is Dr. Sotomayor.  She states that he attempted to reduce her   ankle, was unable to and told her to go to the Emergency Room immediately to   have another orthopedic surgeon to take care of her.  After presenting to   [facility], Dr. Coleman attempted to reduce the fracture with some   improvement.  He placed her in a splint and call me for consultation.  He also   told me that the patient had no significant other medical problems and was   healthy.  I asked him to keep her splinted, elevate, admit and keep her n.p.o.   and I would see her this morning.  Upon my assessment this morning, the patient   is lying in the hospital bed, comfortable, in no significant distress and family   members at bedside.  She is not complaining of any numbness or tingling.  Pain   is minimal as long as she does not move her ankle.  She does report history of   hypertension, diabetes, coronary artery disease and she states she has diabetic   peripheral neuropathy and has a small ulcer on the back of her leg.  When   questioned why she did not present for further treatment sooner, she states she   did not think it was that bad.    PAST MEDICAL HISTORY:  As noted above is positive for diabetes mellitus type 2,   history of cancer, hypertension, coronary artery disease with history of   coronary stents.    PAST SURGICAL HISTORY:  Include lumpectomy of breast cancer, cholecystectomy,   hysterectomy and  tonsillectomy.    FAMILY HISTORY:  Positive for breast cancer with her grandmother.    SOCIAL HISTORY:  She has a history of smoking, states she stopped 10 years ago.    She does not drink any alcohol.  She has no history of social diseases such as   HIV or hepatitis and reports no drug use.    HOME MEDICATIONS:  Include aspirin, atorvastatin, BuSpar, Prolia, Lexapro,   Neurontin, Glucovance, Norco, Levsin, Motrin, Zestril, Cozaar, Zofran, Protonix,   Toujeo and Ultram.    REVIEW OF SYSTEMS:  CONSTITUTIONAL:  No recent weight gain, weight loss, fever or chills.  HEENT:  Poor dentition, otherwise normal.  RESPIRATORY:  No cough or shortness of breath.  CARDIOVASCULAR:  No chest pain.  GASTROINTESTINAL:  No nausea, vomiting or diarrhea.  GENITOURINARY:  No dysuria or hematuria.  MUSCULOSKELETAL:  Generalized joint aches including right ankle.  SKIN:  She notes a wound about the right ankle and heel.  NEUROLOGIC:  Generalized numbness in lower extremities.  HEMATOLOGY:  No bruising or bleeding.  PSYCH:  No homicidal or suicidal ideations.  ENDOCRINE:  Positive for diabetes.    PHYSICAL EXAMINATION:  VITAL SIGNS:  Temperature 98.2, pulse 80, respirations 18, blood pressure   147/73.  GENERAL:  The patient is a pleasant, older than stated age appearing, in no   significant distress, lying in the hospital bed.  HEENT:  Poor dentition.  NECK:  Supple, full range of motion, nontender.  No carotid bruits.  No   thyromegaly.  HEART:  Regular.  No murmurs or gallops.  LUNGS:  Clear.  ABDOMEN:  Soft, nontender.  EXTREMITIES:  Full range of motion of all joints, both upper and left lower   extremity with no pain or deformity.  Right lower extremity, nontender about the   hip, mild tenderness medial joint line and tibia.  No gross instability or   deformity of the right knee or swelling.  Right lower extremity in a splint.    Dorsalis pedis and posterior tibial are 2+.  She is able to wiggle her toes   without pain.  Sensory is  intact to light touch and pinch.  She has decreased   position sense, normal capillary refill to the toes.    X-rays of the ankle, pre and post-reduction are reviewed.  The patient has a   trimalleolar right ankle fracture dislocation, which is displaced.    Post-reduction films show partial reduction of the dislocation.    CT scan of the ankles are reviewed.  There are similar findings to the x-ray   with impaction of the talus in the posterior tibia at the area of the   dislocation.    LABORATORY DATA:  BMP, potassium 5.0, chloride 114, CO2 14, glucose 164, BUN   elevated at 49, creatinine elevated at 2.4, calcium is diminished at 7.8.  GFR   is 21, glucose at 5:24 a.m. was 163.  CBC:  White blood cell count 6, hemoglobin   10.5, hematocrit 34.1, platelets 482.    EKG, normal sinus rhythm, history of septal infarct, age undetermined, T-wave   abnormality consistent with lateral ischemia.    ASSESSMENT:  1.  Unstable trimalleolar right displaced closed ankle fracture dislocation,   approximately 7 days in age.  2.  Diabetes mellitus.  3.  Diabetic peripheral neuropathy.  4.  Coronary artery disease.  5.  Hypertension.  6.  Chronic renal insufficiency.  7.  Anemia.  8.  Thrombocytosis.    PLAN:  Given the age of this fracture, I recommend open reduction and internal   fixation as soon as possible.  I have consulted the hospitalist to determine if   anything medical needs to be done preop.  We will plan on surgery as soon as   possible this afternoon if cleared by the hospitalist.  She will need further   medical care of her other multiple medical problems, some of which appear to be   untreated.      SAMAN/IN  dd: 12/27/2019 09:36:40 (CST)  td: 12/27/2019 13:48:16 (CST)  Doc ID   #[id number]  Job ID #[id number]    CC:

## 2019-12-27 NOTE — ANESTHESIA PREPROCEDURE EVALUATION
12/27/2019  Bina Daniels is a 63 y.o., female with right ankle fracture s/p attempted repair however there was marked intraosseous bleeding so procedure was aborted. Here for repeat attempt    Underwent ORIF of ankle 12/27/19 under popliteal/saphenous block with NAAC    Past Medical History:   Diagnosis Date    Cancer     Diabetes mellitus      Past Surgical History:   Procedure Laterality Date    BREAST SURGERY Left     lumpectomy    CHOLECYSTECTOMY      HYSTERECTOMY      TONSILLECTOMY       Lab Results   Component Value Date    WBC 5.97 12/27/2019    HGB 7.0 (L) 12/27/2019    HCT 23.3 (L) 12/27/2019     (H) 12/27/2019    ALT 10 12/26/2019    AST 8 (L) 12/26/2019     12/27/2019    K 5.0 12/27/2019     (H) 12/27/2019    CREATININE 2.4 (H) 12/27/2019    BUN 49 (H) 12/27/2019    CO2 14 (L) 12/27/2019         Anesthesia Evaluation    I have reviewed the Patient Summary Reports.    I have reviewed the Nursing Notes.   I have reviewed the Medications.     Review of Systems  Anesthesia Hx:  No problems with previous Anesthesia Denies Hx of Anesthetic complications  Denies Family Hx of Anesthesia complications.   Denies Personal Hx of Anesthesia complications.   Social:  Non-Smoker, No Alcohol Use    Hematology/Oncology:         -- Cancer in past history:   EENT/Dental:EENT/Dental Normal   Cardiovascular:  Cardiovascular Normal CAD     Coronary Artery Disease: S/P Percutaneous Coronary Intervention (PCI) coronary stent, unknown type    Pulmonary:  Pulmonary Normal    Renal/:   Chronic Renal Disease    Hepatic/GI:  Hepatic/GI Normal    Neurological:  Neurology Normal    Endocrine:   Diabetes    Psych:  Psychiatric Normal           Physical Exam  General:  Well nourished    Airway/Jaw/Neck:  Airway Findings: Mouth Opening: Normal Tongue: Normal  Mallampati: II  TM Distance: Normal, at  least 6 cm  Jaw/Neck Findings:  Neck ROM: Normal ROM      Dental:  Dental Findings: Periodontal disease, Severe    Chest/Lungs:  Chest/Lungs Findings: Clear to auscultation     Heart/Vascular:  Heart Findings: Rate: Normal  Rhythm: Regular Rhythm  Sounds: Normal        Mental Status:  Mental Status Findings:  Alert and Oriented, Cooperative         Anesthesia Plan  Type of Anesthesia, risks & benefits discussed:  Anesthesia Type:  MAC, regional, general  Patient's Preference:   Intra-op Monitoring Plan: standard ASA monitors  Intra-op Monitoring Plan Comments:   Post Op Pain Control Plan: per primary service following discharge from PACU  Post Op Pain Control Plan Comments:   Induction:   IV  Beta Blocker:  Patient is on a Beta-Blocker and has received one dose within the past 24 hours (No further documentation required).       Informed Consent: Patient understands risks and agrees with Anesthesia plan.  Questions answered.   ASA Score: 3     Day of Surgery Review of History & Physical:        Anesthesia Plan Notes: preop copied over from 12/27/19. Needs repeat consent         Ready For Surgery From Anesthesia Perspective.

## 2019-12-27 NOTE — PLAN OF CARE
Pt AAOx4, son at bedside throughout shift. Pt complains of minimal pain but denies n/v/d, and SOB. Pt remains NPO for anticipated surgery in the morning. RLE in cast padding and ace wrap, neuro checks completed. Pt voiding per bed pan and maintained bed rest. Safety maintained, bed alarm on - will cont to monitor.

## 2019-12-27 NOTE — ED NOTES
Dr. Garcia at the bedside speaking to pt. And family about admission to the hospital for surgery in the am.

## 2019-12-28 LAB
ANION GAP SERPL CALC-SCNC: 9 MMOL/L (ref 8–16)
BASOPHILS # BLD AUTO: 0.03 K/UL (ref 0–0.2)
BASOPHILS NFR BLD: 0.4 % (ref 0–1.9)
BUN SERPL-MCNC: 44 MG/DL (ref 8–23)
CALCIUM SERPL-MCNC: 7.3 MG/DL (ref 8.7–10.5)
CHLORIDE SERPL-SCNC: 115 MMOL/L (ref 95–110)
CO2 SERPL-SCNC: 14 MMOL/L (ref 23–29)
CREAT SERPL-MCNC: 2.3 MG/DL (ref 0.5–1.4)
DIFFERENTIAL METHOD: ABNORMAL
EOSINOPHIL # BLD AUTO: 0.1 K/UL (ref 0–0.5)
EOSINOPHIL NFR BLD: 1.1 % (ref 0–8)
ERYTHROCYTE [DISTWIDTH] IN BLOOD BY AUTOMATED COUNT: 16.3 % (ref 11.5–14.5)
EST. GFR  (AFRICAN AMERICAN): 26 ML/MIN/1.73 M^2
EST. GFR  (NON AFRICAN AMERICAN): 22 ML/MIN/1.73 M^2
ESTIMATED AVG GLUCOSE: 237 MG/DL (ref 68–131)
GLUCOSE SERPL-MCNC: 152 MG/DL (ref 70–110)
HBA1C MFR BLD HPLC: 9.9 % (ref 4–5.6)
HCT VFR BLD AUTO: 26.4 % (ref 37–48.5)
HGB BLD-MCNC: 8.1 G/DL (ref 12–16)
LYMPHOCYTES # BLD AUTO: 1 K/UL (ref 1–4.8)
LYMPHOCYTES NFR BLD: 14.2 % (ref 18–48)
MCH RBC QN AUTO: 26.1 PG (ref 27–31)
MCHC RBC AUTO-ENTMCNC: 30.7 G/DL (ref 32–36)
MCV RBC AUTO: 85 FL (ref 82–98)
MONOCYTES # BLD AUTO: 0.9 K/UL (ref 0.3–1)
MONOCYTES NFR BLD: 12 % (ref 4–15)
NEUTROPHILS # BLD AUTO: 5.3 K/UL (ref 1.8–7.7)
NEUTROPHILS NFR BLD: 72.3 % (ref 38–73)
PLATELET # BLD AUTO: 356 K/UL (ref 150–350)
PMV BLD AUTO: 9.6 FL (ref 9.2–12.9)
POCT GLUCOSE: 138 MG/DL (ref 70–110)
POCT GLUCOSE: 155 MG/DL (ref 70–110)
POCT GLUCOSE: 200 MG/DL (ref 70–110)
POCT GLUCOSE: 231 MG/DL (ref 70–110)
POTASSIUM SERPL-SCNC: 5.2 MMOL/L (ref 3.5–5.1)
RBC # BLD AUTO: 3.1 M/UL (ref 4–5.4)
SODIUM SERPL-SCNC: 138 MMOL/L (ref 136–145)
WBC # BLD AUTO: 7.34 K/UL (ref 3.9–12.7)

## 2019-12-28 PROCEDURE — 63600175 PHARM REV CODE 636 W HCPCS

## 2019-12-28 PROCEDURE — 83036 HEMOGLOBIN GLYCOSYLATED A1C: CPT

## 2019-12-28 PROCEDURE — 25000003 PHARM REV CODE 250: Performed by: HOSPITALIST

## 2019-12-28 PROCEDURE — 94761 N-INVAS EAR/PLS OXIMETRY MLT: CPT

## 2019-12-28 PROCEDURE — 80074 ACUTE HEPATITIS PANEL: CPT

## 2019-12-28 PROCEDURE — 85025 COMPLETE CBC W/AUTO DIFF WBC: CPT

## 2019-12-28 PROCEDURE — 25000003 PHARM REV CODE 250

## 2019-12-28 PROCEDURE — 25000003 PHARM REV CODE 250: Performed by: NURSE PRACTITIONER

## 2019-12-28 PROCEDURE — 36415 COLL VENOUS BLD VENIPUNCTURE: CPT

## 2019-12-28 PROCEDURE — 80048 BASIC METABOLIC PNL TOTAL CA: CPT

## 2019-12-28 PROCEDURE — 11000001 HC ACUTE MED/SURG PRIVATE ROOM

## 2019-12-28 RX ORDER — GLUCAGON 1 MG
1 KIT INJECTION
Status: DISCONTINUED | OUTPATIENT
Start: 2019-12-28 | End: 2020-01-08 | Stop reason: HOSPADM

## 2019-12-28 RX ORDER — CETIRIZINE HYDROCHLORIDE 5 MG/1
5 TABLET ORAL DAILY PRN
Status: DISCONTINUED | OUTPATIENT
Start: 2019-12-28 | End: 2020-01-08 | Stop reason: HOSPADM

## 2019-12-28 RX ORDER — INSULIN ASPART 100 [IU]/ML
0-5 INJECTION, SOLUTION INTRAVENOUS; SUBCUTANEOUS EVERY 6 HOURS PRN
Status: DISCONTINUED | OUTPATIENT
Start: 2019-12-28 | End: 2020-01-08 | Stop reason: HOSPADM

## 2019-12-28 RX ORDER — TRANEXAMIC ACID 100 MG/ML
1000 INJECTION, SOLUTION INTRAVENOUS
Status: DISCONTINUED | OUTPATIENT
Start: 2019-12-29 | End: 2019-12-29

## 2019-12-28 RX ORDER — CEFAZOLIN SODIUM 2 G/50ML
2 SOLUTION INTRAVENOUS
Status: DISCONTINUED | OUTPATIENT
Start: 2019-12-28 | End: 2019-12-29

## 2019-12-28 RX ADMIN — PANTOPRAZOLE SODIUM 40 MG: 40 TABLET, DELAYED RELEASE ORAL at 08:12

## 2019-12-28 RX ADMIN — METOPROLOL TARTRATE 50 MG: 50 TABLET ORAL at 08:12

## 2019-12-28 RX ADMIN — CETIRIZINE HYDROCHLORIDE 5 MG: 5 TABLET ORAL at 05:12

## 2019-12-28 RX ADMIN — MORPHINE SULFATE 2 MG: 2 INJECTION, SOLUTION INTRAMUSCULAR; INTRAVENOUS at 07:12

## 2019-12-28 RX ADMIN — METFORMIN HYDROCHLORIDE 500 MG: 500 TABLET ORAL at 08:12

## 2019-12-28 RX ADMIN — CEFAZOLIN SODIUM 2 G: 2 SOLUTION INTRAVENOUS at 10:12

## 2019-12-28 RX ADMIN — OXYCODONE HYDROCHLORIDE 5 MG: 5 TABLET ORAL at 03:12

## 2019-12-28 RX ADMIN — FLUTICASONE PROPIONATE 50 MCG: 50 SPRAY, METERED NASAL at 08:12

## 2019-12-28 RX ADMIN — HYDROMORPHONE HYDROCHLORIDE 0.2 MG: 2 INJECTION, SOLUTION INTRAMUSCULAR; INTRAVENOUS; SUBCUTANEOUS at 12:12

## 2019-12-28 RX ADMIN — GLYBURIDE 2.5 MG: 2.5 TABLET ORAL at 08:12

## 2019-12-28 RX ADMIN — LISINOPRIL 20 MG: 20 TABLET ORAL at 08:12

## 2019-12-28 RX ADMIN — CEFAZOLIN SODIUM 2 G: 2 SOLUTION INTRAVENOUS at 11:12

## 2019-12-28 RX ADMIN — ESCITALOPRAM OXALATE 10 MG: 10 TABLET ORAL at 08:12

## 2019-12-28 NOTE — SUBJECTIVE & OBJECTIVE
Past Medical History:   Diagnosis Date    Cancer     Diabetes mellitus        Past Surgical History:   Procedure Laterality Date    BREAST SURGERY Left     lumpectomy    CHOLECYSTECTOMY      HYSTERECTOMY      TONSILLECTOMY         Review of patient's allergies indicates:   Allergen Reactions    Sulfa (sulfonamide antibiotics) Hives       No current facility-administered medications on file prior to encounter.      Current Outpatient Medications on File Prior to Encounter   Medication Sig    aspirin 81 MG Chew Take 81 mg by mouth once daily.    busPIRone (BUSPAR) 5 MG Tab Take 5 mg by mouth 2 (two) times daily.    escitalopram oxalate (LEXAPRO) 10 MG tablet Take 10 mg by mouth once daily.    glyBURIDE-metformin 2.5-500 mg (GLUCOVANCE) 2.5-500 mg per tablet 1 tablet once daily.    HYDROcodone-acetaminophen (NORCO) 7.5-325 mg per tablet Take 1 tablet by mouth every 6 (six) hours as needed for Pain.    ibuprofen (ADVIL,MOTRIN) 800 MG tablet Take 800 mg by mouth every 6 (six) hours as needed for Pain.    lisinopril (PRINIVIL,ZESTRIL) 20 MG tablet Take 20 mg by mouth once daily.    metoprolol tartrate (LOPRESSOR) 50 MG tablet Take 50 mg by mouth 2 (two) times daily.    pantoprazole (PROTONIX) 40 MG tablet Take 40 mg by mouth once daily.    atorvastatin (LIPITOR) 10 MG tablet     denosumab (PROLIA) 60 mg/mL Syrg Inject 60 mg into the skin every 6 (six) months.    gabapentin (NEURONTIN) 300 MG capsule     hyoscyamine (ANASPAZ,LEVSIN) 0.125 mg Tab Take 125 mcg by mouth every 4 (four) hours as needed.    losartan (COZAAR) 50 MG tablet     ondansetron (ZOFRAN) 4 MG tablet Take 8 mg by mouth 2 (two) times daily.    TOUJEO SOLOSTAR 300 unit/mL (1.5 mL) InPn pen     traMADol (ULTRAM) 50 mg tablet      Family History     Problem Relation (Age of Onset)    Breast cancer Maternal Grandmother        Tobacco Use    Smoking status: Former Smoker     Start date: 7/8/2003    Smokeless tobacco: Never Used    Substance and Sexual Activity    Alcohol use: No    Drug use: No    Sexual activity: Not Currently     Review of Systems  Objective:     Vital Signs (Most Recent):  Temp: 96.8 °F (36 °C) (12/27/19 1838)  Pulse: 72 (12/27/19 1838)  Resp: 18 (12/27/19 1838)  BP: (!) 142/71 (12/27/19 1838)  SpO2: 99 % (12/27/19 1604) Vital Signs (24h Range):  Temp:  [96 °F (35.6 °C)-98.3 °F (36.8 °C)] 96.8 °F (36 °C)  Pulse:  [65-97] 72  Resp:  [13-23] 18  SpO2:  [95 %-100 %] 99 %  BP: ()/(49-98) 142/71     Weight: 59.6 kg (131 lb 6.3 oz)  Body mass index is 22.55 kg/m².    Physical Exam    Significant Labs:   Recent Labs   Lab 12/26/19 2222 12/27/19  1419   WBC 6.00 5.97   HGB 10.5* 7.0*   HCT 34.1* 23.3*   * 410*     Recent Labs   Lab 12/26/19 2222 12/27/19  0610    139   K 5.6* 5.0    114*   CO2 17* 14*   BUN 53* 49*   CREATININE 2.8* 2.4*   * 164*   CALCIUM 8.5* 7.8*     Recent Labs   Lab 12/26/19 2222   ALKPHOS 108   ALT 10   AST 8*   ALBUMIN 2.5*   PROT 6.4   BILITOT 0.3      No results for input(s): CPK, CPKMB, MB, TROPONINI in the last 72 hours.  Recent Labs   Lab 12/26/19  2156 12/27/19  0524 12/27/19  1122 12/27/19  1642   POCTGLUCOSE 156* 163* 161* 117*     No results found for: HGBA1C  Scheduled Meds:   [START ON 12/28/2019] escitalopram oxalate  10 mg Oral Daily    fluticasone propionate  1 spray Each Nostril BID    [START ON 12/28/2019] glyBURIDE  2.5 mg Oral Daily with breakfast    And    [START ON 12/28/2019] metFORMIN  500 mg Oral Daily with breakfast    [START ON 12/28/2019] lisinopril  20 mg Oral Daily    metoprolol tartrate  50 mg Oral BID    [START ON 12/28/2019] pantoprazole  40 mg Oral Daily     Continuous Infusions:   lactated ringers Stopped (12/27/19 1615)     As Needed: sodium chloride, acetaminophen, Dextrose 10% Bolus, glucagon (human recombinant), HYDROmorphone, influenza, morphine, ondansetron, oxyCODONE, promethazine (PHENERGAN) IVPB, sodium chloride  0.9%  Significant Imaging:   X-Ray Ankle Complete Right  Narrative: EXAMINATION:  XR ANKLE COMPLETE 3 VIEW RIGHT    CLINICAL HISTORY:  post op ORIF ankle fracture;    TECHNIQUE:  AP, lateral, and oblique images of the right ankle were performed.    COMPARISON:  12/26/2019 at 20:50 hours.    FINDINGS:  There is an overlying cast in place.  There has been interval open reduction internal fixation involving fractures of the lateral and medial malleoli.  There is a lateral buttressing plate along the right distal fibula with a long screw transfixing the distal tibia-fibular syndesmosis.  There are two Buchanan screws transfixing the medial malleolus fracture.  No periprosthetic fracture is identified.    There is improvement in the alignment of the right ankle compared to the preoperative examination.  The previous posterior dislocation of the right ankle has been reduced with satisfactory alignment.  The continues to be minimal medial offset of the distal tibia relative to the talus.  There is also mild widening the lateral clear space.  No new fractures identified.  Impression: As above.    Electronically signed by: Flaquito Powell MD  Date:    12/27/2019  Time:    16:46  SURG [place] Surgery Fluoro Usage  See OP Notes for results.     IMPRESSION: See OP Notes for results.     This procedure was auto-finalized by: Virtual Radiologist

## 2019-12-28 NOTE — HPI
The pt is a 63 yeasrs old female with PMH significant for DMII, neuropathy, breast cancer, and decrease mobility. She was walking in the house using a walker and tripped on a towel, fell down, and sustained injury to her right foot.  She was admitted to ortho service, had a 1st stage of ORIF on Thursday, and will have a second repeat on Sunday morning.  Pt is clinically stable not having any complaints.  She is S/P 1 unit PRBC transfusion yesterday.

## 2019-12-28 NOTE — SUBJECTIVE & OBJECTIVE
Past Medical History:   Diagnosis Date    Cancer     Diabetes mellitus        Past Surgical History:   Procedure Laterality Date    BREAST SURGERY Left     lumpectomy    CHOLECYSTECTOMY      HYSTERECTOMY      TONSILLECTOMY         Review of patient's allergies indicates:   Allergen Reactions    Sulfa (sulfonamide antibiotics) Hives       No current facility-administered medications on file prior to encounter.      Current Outpatient Medications on File Prior to Encounter   Medication Sig    aspirin 81 MG Chew Take 81 mg by mouth once daily.    busPIRone (BUSPAR) 5 MG Tab Take 5 mg by mouth 2 (two) times daily.    escitalopram oxalate (LEXAPRO) 10 MG tablet Take 10 mg by mouth once daily.    glyBURIDE-metformin 2.5-500 mg (GLUCOVANCE) 2.5-500 mg per tablet 1 tablet once daily.    HYDROcodone-acetaminophen (NORCO) 7.5-325 mg per tablet Take 1 tablet by mouth every 6 (six) hours as needed for Pain.    ibuprofen (ADVIL,MOTRIN) 800 MG tablet Take 800 mg by mouth every 6 (six) hours as needed for Pain.    lisinopril (PRINIVIL,ZESTRIL) 20 MG tablet Take 20 mg by mouth once daily.    metoprolol tartrate (LOPRESSOR) 50 MG tablet Take 50 mg by mouth 2 (two) times daily.    pantoprazole (PROTONIX) 40 MG tablet Take 40 mg by mouth once daily.    atorvastatin (LIPITOR) 10 MG tablet     denosumab (PROLIA) 60 mg/mL Syrg Inject 60 mg into the skin every 6 (six) months.    gabapentin (NEURONTIN) 300 MG capsule     hyoscyamine (ANASPAZ,LEVSIN) 0.125 mg Tab Take 125 mcg by mouth every 4 (four) hours as needed.    losartan (COZAAR) 50 MG tablet     ondansetron (ZOFRAN) 4 MG tablet Take 8 mg by mouth 2 (two) times daily.    TOUJEO SOLOSTAR 300 unit/mL (1.5 mL) InPn pen     traMADol (ULTRAM) 50 mg tablet      Family History     Problem Relation (Age of Onset)    Breast cancer Maternal Grandmother        Tobacco Use    Smoking status: Former Smoker     Start date: 7/8/2003    Smokeless tobacco: Never Used    Substance and Sexual Activity    Alcohol use: No    Drug use: No    Sexual activity: Not Currently     Review of Systems   Constitutional: Positive for activity change. Negative for appetite change and chills.   Respiratory: Negative for cough and shortness of breath.    Cardiovascular: Negative for chest pain.   Gastrointestinal: Negative for abdominal distention, abdominal pain, constipation, nausea and vomiting.   Musculoskeletal: Positive for arthralgias (site of cast LE).   Neurological: Negative for dizziness and weakness.   Psychiatric/Behavioral: Negative for agitation. The patient is not nervous/anxious.      Objective:     Vital Signs (Most Recent):  Temp: 98 °F (36.7 °C) (12/28/19 1605)  Pulse: 77 (12/28/19 1605)  Resp: 18 (12/28/19 1605)  BP: (!) 142/65 (12/28/19 1605)  SpO2: 96 % (12/28/19 0804) Vital Signs (24h Range):  Temp:  [96.8 °F (36 °C)-99.9 °F (37.7 °C)] 98 °F (36.7 °C)  Pulse:  [72-79] 77  Resp:  [16-20] 18  SpO2:  [96 %-98 %] 96 %  BP: (126-152)/(59-78) 142/65     Weight: 61.4 kg (135 lb 5.8 oz)  Body mass index is 23.23 kg/m².    Physical Exam   Constitutional: She appears well-developed and well-nourished.   HENT:   Head: Normocephalic and atraumatic.   Eyes: EOM are normal.   Pulmonary/Chest: Effort normal. No respiratory distress.   Abdominal: Soft.   Musculoskeletal: She exhibits deformity (cast on RLE).   Skin: Skin is warm and dry.   Psychiatric: She has a normal mood and affect. Her behavior is normal. Judgment and thought content normal.   Nursing note and vitals reviewed.      Significant Labs:   Recent Labs   Lab 12/27/19  1419 12/27/19  1957/19  0336   WBC 5.97 8.98 7.34   HGB 7.0* 9.2* 8.1*   HCT 23.3* 30.1* 26.4*   * 445* 356*     Recent Labs   Lab 12/26/19  2222 12/27/19  0610 12/28/19  0336    139 138   K 5.6* 5.0 5.2*    114* 115*   CO2 17* 14* 14*   BUN 53* 49* 44*   CREATININE 2.8* 2.4* 2.3*   * 164* 152*   CALCIUM 8.5* 7.8* 7.3*      Recent Labs   Lab 12/26/19  2222 12/27/19  2032   ALKPHOS 108  --    ALT 10  --    AST 8*  --    ALBUMIN 2.5*  --    PROT 6.4  --    BILITOT 0.3  --    INR  --  1.2      No results for input(s): CPK, CPKMB, MB, TROPONINI in the last 72 hours.  Recent Labs   Lab 12/27/19  1122 12/27/19  1642 12/27/19  2344 12/28/19  0533 12/28/19  1112 12/28/19  1604   POCTGLUCOSE 161* 117* 163* 138* 200* 155*     No results found for: HGBA1C  Scheduled Meds:   ceFAZolin (ANCEF) IVPB  2 g Intravenous Q12H    escitalopram oxalate  10 mg Oral Daily    fluticasone propionate  1 spray Each Nostril BID    glyBURIDE  2.5 mg Oral Daily with breakfast    And    metFORMIN  500 mg Oral Daily with breakfast    lisinopril  20 mg Oral Daily    metoprolol tartrate  50 mg Oral BID    pantoprazole  40 mg Oral Daily    [START ON 12/29/2019] tranexamic acid  1,000 mg Intravenous 30 Min Pre-Op     Continuous Infusions:  As Needed: sodium chloride, acetaminophen, cetirizine, Dextrose 10% Bolus, glucagon (human recombinant), HYDROmorphone, influenza, morphine, ondansetron, oxyCODONE, promethazine (PHENERGAN) IVPB, sodium chloride 0.9%    Significant Imaging:   X-Ray Ankle Complete Right  Narrative: EXAMINATION:  XR ANKLE COMPLETE 3 VIEW RIGHT    CLINICAL HISTORY:  post op ORIF ankle fracture;    TECHNIQUE:  AP, lateral, and oblique images of the right ankle were performed.    COMPARISON:  12/26/2019 at 20:50 hours.    FINDINGS:  There is an overlying cast in place.  There has been interval open reduction internal fixation involving fractures of the lateral and medial malleoli.  There is a lateral buttressing plate along the right distal fibula with a long screw transfixing the distal tibia-fibular syndesmosis.  There are two Buchanan screws transfixing the medial malleolus fracture.  No periprosthetic fracture is identified.    There is improvement in the alignment of the right ankle compared to the preoperative examination.  The previous  posterior dislocation of the right ankle has been reduced with satisfactory alignment.  The continues to be minimal medial offset of the distal tibia relative to the talus.  There is also mild widening the lateral clear space.  No new fractures identified.  Impression: As above.    Electronically signed by: Flaquito Powell MD  Date:    12/27/2019  Time:    16:46  SUR[facility] [place] Surgery Fluoro Usage  See OP Notes for results.     IMPRESSION: See OP Notes for results.     This procedure was auto-finalized by: Virtual Radiologist

## 2019-12-28 NOTE — NURSING
Dr. Gaona secure chatted and informed patient requesting benadryl. Dr. Gaona replied he will take care of it.

## 2019-12-28 NOTE — PLAN OF CARE
VN rounds:  VN cued into pt's room with pt's permission.  Pt resting in bed in low position with bed alarm in place, call bell at side, family with pt. Fall risk protocol discussed with pt.  VN instructed to call for assistance.  Pt aware and agreeable.  Pt denies pain, anxiety or nausea.  No acute distress noted.  Pt's chart, labs and vital signs reviewed.  Allowed time for questions.  Will continue to be available and intervene as needed.      12/28/19 1126   Type of Frequent Check   Type Patient Rounds   Safety/Activity   Patient Rounds bed in low position;call light in patient/parent reach;placement of personal items at bedside;visualized patient;toileting offered   Safety Promotion/Fall Prevention assistive device/personal item within reach;bed alarm set;Fall Risk reviewed with patient/family;side rails raised x 3;instructed to call staff for mobility   Safety Precautions emergency equipment at bedside   Positioning   Body Position supine   Head of Bed (HOB) HOB at 30-45 degrees   Positioning/Transfer Devices pillows   Pain/Comfort/Sleep   Preferred Pain Scale number (Numeric Rating Pain Scale)   Comfort/Acceptable Pain Level 0   Pain Rating (0-10): Rest 0   POSS ([place] Opioid-Induced Sed Scale) 1 - Awake and alert   Nausea/Vomiting   Nausea/Vomiting No Nausea and Vomiting   Assessments (Pre/Post)   Level of Consciousness (AVPU) alert

## 2019-12-28 NOTE — CONSULTS
"[facility] Hospital Medicine  Consult Note    Patient Name: Bina Daniels  MRN: [id number]  Admission Date: 12/26/2019  Hospital Length of Stay: 0 days  Attending Physician: Aleksandar Gaona DO  Primary Care Provider: Tho Haro MD           Patient information was obtained from patient, relative(s) and ER records.     Consults  Subjective:     Principal Problem: Trimalleolar fracture of ankle, closed, right, initial encounter    Chief Complaint:   Chief Complaint   Patient presents with    Foot Pain     pt states she broke right foot 1 week ago and unable to get in to see ortho, came to ER for pain control and to get " booked for ortho surgery"         HPI: The pt was seen, dscussed with her and her daughter at bedside,  Full consult to follow    Past Medical History:   Diagnosis Date    Cancer     Diabetes mellitus        Past Surgical History:   Procedure Laterality Date    BREAST SURGERY Left     lumpectomy    CHOLECYSTECTOMY      HYSTERECTOMY      TONSILLECTOMY         Review of patient's allergies indicates:   Allergen Reactions    Sulfa (sulfonamide antibiotics) Hives       No current facility-administered medications on file prior to encounter.      Current Outpatient Medications on File Prior to Encounter   Medication Sig    aspirin 81 MG Chew Take 81 mg by mouth once daily.    busPIRone (BUSPAR) 5 MG Tab Take 5 mg by mouth 2 (two) times daily.    escitalopram oxalate (LEXAPRO) 10 MG tablet Take 10 mg by mouth once daily.    glyBURIDE-metformin 2.5-500 mg (GLUCOVANCE) 2.5-500 mg per tablet 1 tablet once daily.    HYDROcodone-acetaminophen (NORCO) 7.5-325 mg per tablet Take 1 tablet by mouth every 6 (six) hours as needed for Pain.    ibuprofen (ADVIL,MOTRIN) 800 MG tablet Take 800 mg by mouth every 6 (six) hours as needed for Pain.    lisinopril (PRINIVIL,ZESTRIL) 20 MG tablet Take 20 mg by mouth once daily.    metoprolol tartrate (LOPRESSOR) 50 MG tablet Take 50 mg by " mouth 2 (two) times daily.    pantoprazole (PROTONIX) 40 MG tablet Take 40 mg by mouth once daily.    atorvastatin (LIPITOR) 10 MG tablet     denosumab (PROLIA) 60 mg/mL Syrg Inject 60 mg into the skin every 6 (six) months.    gabapentin (NEURONTIN) 300 MG capsule     hyoscyamine (ANASPAZ,LEVSIN) 0.125 mg Tab Take 125 mcg by mouth every 4 (four) hours as needed.    losartan (COZAAR) 50 MG tablet     ondansetron (ZOFRAN) 4 MG tablet Take 8 mg by mouth 2 (two) times daily.    TOUJEO SOLOSTAR 300 unit/mL (1.5 mL) InPn pen     traMADol (ULTRAM) 50 mg tablet      Family History     Problem Relation (Age of Onset)    Breast cancer Maternal Grandmother        Tobacco Use    Smoking status: Former Smoker     Start date: 7/8/2003    Smokeless tobacco: Never Used   Substance and Sexual Activity    Alcohol use: No    Drug use: No    Sexual activity: Not Currently     Review of Systems  Objective:     Vital Signs (Most Recent):  Temp: 96.8 °F (36 °C) (12/27/19 1838)  Pulse: 72 (12/27/19 1838)  Resp: 18 (12/27/19 1838)  BP: (!) 142/71 (12/27/19 1838)  SpO2: 99 % (12/27/19 1604) Vital Signs (24h Range):  Temp:  [96 °F (35.6 °C)-98.3 °F (36.8 °C)] 96.8 °F (36 °C)  Pulse:  [65-97] 72  Resp:  [13-23] 18  SpO2:  [95 %-100 %] 99 %  BP: ()/(49-98) 142/71     Weight: 59.6 kg (131 lb 6.3 oz)  Body mass index is 22.55 kg/m².    Physical Exam    Significant Labs:   Recent Labs   Lab 12/26/19 2222 12/27/19  1419   WBC 6.00 5.97   HGB 10.5* 7.0*   HCT 34.1* 23.3*   * 410*     Recent Labs   Lab 12/26/19 2222 12/27/19  0610    139   K 5.6* 5.0    114*   CO2 17* 14*   BUN 53* 49*   CREATININE 2.8* 2.4*   * 164*   CALCIUM 8.5* 7.8*     Recent Labs   Lab 12/26/19  2222   ALKPHOS 108   ALT 10   AST 8*   ALBUMIN 2.5*   PROT 6.4   BILITOT 0.3      No results for input(s): CPK, CPKMB, MB, TROPONINI in the last 72 hours.  Recent Labs   Lab 12/26/19  2156 12/27/19  0524 12/27/19  1122 12/27/19  1642    POCTGLUCOSE 156* 163* 161* 117*     No results found for: HGBA1C  Scheduled Meds:   [START ON 12/28/2019] escitalopram oxalate  10 mg Oral Daily    fluticasone propionate  1 spray Each Nostril BID    [START ON 12/28/2019] glyBURIDE  2.5 mg Oral Daily with breakfast    And    [START ON 12/28/2019] metFORMIN  500 mg Oral Daily with breakfast    [START ON 12/28/2019] lisinopril  20 mg Oral Daily    metoprolol tartrate  50 mg Oral BID    [START ON 12/28/2019] pantoprazole  40 mg Oral Daily     Continuous Infusions:   lactated ringers Stopped (12/27/19 1615)     As Needed: sodium chloride, acetaminophen, Dextrose 10% Bolus, glucagon (human recombinant), HYDROmorphone, influenza, morphine, ondansetron, oxyCODONE, promethazine (PHENERGAN) IVPB, sodium chloride 0.9%  Significant Imaging:   X-Ray Ankle Complete Right  Narrative: EXAMINATION:  XR ANKLE COMPLETE 3 VIEW RIGHT    CLINICAL HISTORY:  post op ORIF ankle fracture;    TECHNIQUE:  AP, lateral, and oblique images of the right ankle were performed.    COMPARISON:  12/26/2019 at 20:50 hours.    FINDINGS:  There is an overlying cast in place.  There has been interval open reduction internal fixation involving fractures of the lateral and medial malleoli.  There is a lateral buttressing plate along the right distal fibula with a long screw transfixing the distal tibia-fibular syndesmosis.  There are two Buchanan screws transfixing the medial malleolus fracture.  No periprosthetic fracture is identified.    There is improvement in the alignment of the right ankle compared to the preoperative examination.  The previous posterior dislocation of the right ankle has been reduced with satisfactory alignment.  The continues to be minimal medial offset of the distal tibia relative to the talus.  There is also mild widening the lateral clear space.  No new fractures identified.  Impression: As above.    Electronically signed by: Flaquito Powell  MD  Date:    12/27/2019  Time:    16:46  SURG [place] Surgery Fluoro Usage  See OP Notes for results.     IMPRESSION: See OP Notes for results.     This procedure was auto-finalized by: Virtual Radiologist        Assessment/Plan:     No notes have been filed under this hospital service.  Service: Hospital Medicine    VTE Risk Mitigation (From admission, onward)         Ordered     IP VTE HIGH RISK PATIENT  Once      12/27/19 1602     Place sequential compression device  Until discontinued      12/27/19 1602                    Thank you for your consult. I will follow-up with patient. Please contact us if you have any additional questions.    Aleksandar Gaona DO  Department of Hospital Medicine   [facility]

## 2019-12-28 NOTE — PLAN OF CARE
Pt AAOx4, son at bedside throughout shift. Pt complains of intermittent ankle pain but denies n/v/d and SOB. Regular diet tolerated fairly, IVF infusing per MAR to PIV. Right foot elevated on one pillow per order, dressing CDI. Pt voiding without difficulties. Blood glucose checks continued. Safety maintained, bed alarm on - will cont to monitor.

## 2019-12-28 NOTE — HOSPITAL COURSE
12/28 ptis doing fine, some pain and itchiness in the cast area.  12/29 pt is S/P stage 2 of ORIF right ankle. Ding ok, placing her on DM diet  12/30 pt is POD #2 for the second ORIF right ankle, doing ok, might go to rehab vs snf soon,. Her K is 5.3 as per pt she takes lasix 40 mg po BID at home. We will start on 40 mg po daily and dc fluid, pt is eating and tolerating PO intake. Re-introduce other home medsgradually  1/7 pt seen, awaiting the nephro consult regarding her JENNI on CKD. The pt otherwise is asymptomatic  1/8 no acute events, awaiting final recs by nephro.

## 2019-12-28 NOTE — OP NOTE
DATE OF PROCEDURE:  12/27/2019    PREOPERATIVE DIAGNOSIS:  Unstable, displaced, closed right ankle fracture   dislocation.    POSTOPERATIVE DIAGNOSIS:  Unstable, displaced, closed right ankle fracture   dislocation.    PROCEDURE PERFORMED:  Open reduction and internal fixation trimalleolar ankle   fracture.    ANESTHESIA:  Spinal.    ESTIMATED BLOOD LOSS:  500 mL.    COMPLICATIONS:  Inability to complete reduction and wound closure due to   significant intraoperative hemorrhage.    INDICATIONS:  Ms. Daniels is a 63-year-old woman who presented to the hospital   on 12/26/2019 p.m. with an ankle fracture dislocation from approximately 6 to 7   days ago.  She had undergone attempted reduction in her orthopedic surgeon's   office and then once again in the Emergency Room and then admitted to my   service.  I discussed risks, benefits, and alternatives of surgery as noted on   the consent form with the patient and surgery was scheduled.    PROCEDURE IN DETAIL:  The patient was taken to the Operating Room, placed on the   operating table in supine position.  After an adequate level of anesthesia was   obtained, timeout was performed.  The right lower extremity was then prepped and   draped in usual sterile fashion.  Preoperative prophylactic IV Ancef was given.    Leg was exsanguinated and tourniquet about the proximal thigh inflated to 250   mmHg initially.  Incisions were made medial and lateral ankle and due to   significant persistent bleeding, the tourniquet was deflated and then reinflated   to 275 mmHg.  The incisions were carried sharply through subcutaneous tissue   down to the level of the bone.  Once again, due to inability to maintain   hemostasis including with Bovie anticoagulation compression, the tourniquet was   once again deflated and the leg was re-exsanguinated and tourniquet reinflated   to 300 mmHg.  According to Anesthesia, the patient's blood pressure was no   higher than 140/70s throughout the  procedure; however, she continued to bleed   from the skin edges, depths of the wound as well as from the bone.  Attempt   first to reduce the dislocation and then reduction of the medial malleolus was   performed.  The dislocation was reduced and internal fixation of the medial   malleolus was performed using two 4-0 cannulated screws.  Next, attention was   turned to the lateral malleolus.  During reduction of the lateral malleolus,   reduction medially was lost due to poor bone quality.  In order to attempt to   stabilize the ankle, completion of the medial malleolus was performed with a   7-hole one-third tubular plate in compression mode along the posterior medial   fibula.  During drilling of holes for insertion of the screws, the patient   continued to have significant bleeding through the screw holes.  Therefore, at   this point, I decided to abort the procedure.  The wounds were all packed open   and tourniquet deflated and a well-padded posterior stirrup plaster splint was   applied.  The patient was taken to Recovery Room where stat CBC revealed her   hematocrit had drifted to 23 from the preoperative level of 36.  The patient's   type had been typed and screened and then two orders of packed red blood cells   were ordered and orders were given to transfer these.  There was no obvious   bleeding through the patient's bandage in Recovery Room.  I discussed the   findings with the patient's family as well as the patient.  They understood the   need to discontinue the procedure prior to completion in order to stabilize the   patient with plans for return to the Operating Room within 36 hours for wound   closure and completion of the procedure.      SAMAN/IN  dd: 12/28/2019 10:01:33 (CST)  td: 12/28/2019 10:52:46 (CST)  Doc ID   #[id number]  Job ID #[id number]    CC:

## 2019-12-28 NOTE — NURSING
Notified Dr. Lui of post transfusion H&H after 1st unit or PRBC (9.3/30); ok to hold 2nd unit of PRBC.

## 2019-12-28 NOTE — CONSULTS
"[facility] Hospital Medicine  Consult Note    Patient Name: Bina Daniels  MRN: [id number]  Admission Date: 12/26/2019  Hospital Length of Stay: 1 days  Attending Physician: Aleksandar Gaona DO  Primary Care Provider: Tho Haro MD           Patient information was obtained from patient and ER records.     Consults  Subjective:     Principal Problem: Trimalleolar fracture of ankle, closed, right, initial encounter    Chief Complaint:   Chief Complaint   Patient presents with    Foot Pain     pt states she broke right foot 1 week ago and unable to get in to see ortho, came to ER for pain control and to get " booked for ortho surgery"         HPI: The pt is a 63 yeasrs old female with PMH significant for DMII, neuropathy, breast cancer, and decrease mobility. She was walking in the house using a walker and tripped on a towel, fell down, and sustained injury to her right foot.  She was admitted to ortho service, had a 1st stage of ORIF on Thursday, and will have a second repeat on Sunday morning.  Pt is clinically stable not having any complaints.  She is S/P 1 unit PRBC transfusion yesterday.    Past Medical History:   Diagnosis Date    Cancer     Diabetes mellitus        Past Surgical History:   Procedure Laterality Date    BREAST SURGERY Left     lumpectomy    CHOLECYSTECTOMY      HYSTERECTOMY      TONSILLECTOMY         Review of patient's allergies indicates:   Allergen Reactions    Sulfa (sulfonamide antibiotics) Hives       No current facility-administered medications on file prior to encounter.      Current Outpatient Medications on File Prior to Encounter   Medication Sig    aspirin 81 MG Chew Take 81 mg by mouth once daily.    busPIRone (BUSPAR) 5 MG Tab Take 5 mg by mouth 2 (two) times daily.    escitalopram oxalate (LEXAPRO) 10 MG tablet Take 10 mg by mouth once daily.    glyBURIDE-metformin 2.5-500 mg (GLUCOVANCE) 2.5-500 mg per tablet 1 tablet once daily.    " HYDROcodone-acetaminophen (NORCO) 7.5-325 mg per tablet Take 1 tablet by mouth every 6 (six) hours as needed for Pain.    ibuprofen (ADVIL,MOTRIN) 800 MG tablet Take 800 mg by mouth every 6 (six) hours as needed for Pain.    lisinopril (PRINIVIL,ZESTRIL) 20 MG tablet Take 20 mg by mouth once daily.    metoprolol tartrate (LOPRESSOR) 50 MG tablet Take 50 mg by mouth 2 (two) times daily.    pantoprazole (PROTONIX) 40 MG tablet Take 40 mg by mouth once daily.    atorvastatin (LIPITOR) 10 MG tablet     denosumab (PROLIA) 60 mg/mL Syrg Inject 60 mg into the skin every 6 (six) months.    gabapentin (NEURONTIN) 300 MG capsule     hyoscyamine (ANASPAZ,LEVSIN) 0.125 mg Tab Take 125 mcg by mouth every 4 (four) hours as needed.    losartan (COZAAR) 50 MG tablet     ondansetron (ZOFRAN) 4 MG tablet Take 8 mg by mouth 2 (two) times daily.    TOUJEO SOLOSTAR 300 unit/mL (1.5 mL) InPn pen     traMADol (ULTRAM) 50 mg tablet      Family History     Problem Relation (Age of Onset)    Breast cancer Maternal Grandmother        Tobacco Use    Smoking status: Former Smoker     Start date: 7/8/2003    Smokeless tobacco: Never Used   Substance and Sexual Activity    Alcohol use: No    Drug use: No    Sexual activity: Not Currently     Review of Systems   Constitutional: Positive for activity change. Negative for appetite change and chills.   Respiratory: Negative for cough and shortness of breath.    Cardiovascular: Negative for chest pain.   Gastrointestinal: Negative for abdominal distention, abdominal pain, constipation, nausea and vomiting.   Musculoskeletal: Positive for arthralgias (site of cast LE).   Neurological: Negative for dizziness and weakness.   Psychiatric/Behavioral: Negative for agitation. The patient is not nervous/anxious.      Objective:     Vital Signs (Most Recent):  Temp: 98 °F (36.7 °C) (12/28/19 1605)  Pulse: 77 (12/28/19 1605)  Resp: 18 (12/28/19 1605)  BP: (!) 142/65 (12/28/19 1605)  SpO2: 96 %  (12/28/19 0804) Vital Signs (24h Range):  Temp:  [96.8 °F (36 °C)-99.9 °F (37.7 °C)] 98 °F (36.7 °C)  Pulse:  [72-79] 77  Resp:  [16-20] 18  SpO2:  [96 %-98 %] 96 %  BP: (126-152)/(59-78) 142/65     Weight: 61.4 kg (135 lb 5.8 oz)  Body mass index is 23.23 kg/m².    Physical Exam   Constitutional: She appears well-developed and well-nourished.   HENT:   Head: Normocephalic and atraumatic.   Eyes: EOM are normal.   Pulmonary/Chest: Effort normal. No respiratory distress.   Abdominal: Soft.   Musculoskeletal: She exhibits deformity (cast on RLE).   Skin: Skin is warm and dry.   Psychiatric: She has a normal mood and affect. Her behavior is normal. Judgment and thought content normal.   Nursing note and vitals reviewed.      Significant Labs:   Recent Labs   Lab 12/27/19  1419 12/1957 12/28/19  0336   WBC 5.97 8.98 7.34   HGB 7.0* 9.2* 8.1*   HCT 23.3* 30.1* 26.4*   * 445* 356*     Recent Labs   Lab 12/26/19 2222 12/27/19  0610 12/28/19  0336    139 138   K 5.6* 5.0 5.2*    114* 115*   CO2 17* 14* 14*   BUN 53* 49* 44*   CREATININE 2.8* 2.4* 2.3*   * 164* 152*   CALCIUM 8.5* 7.8* 7.3*     Recent Labs   Lab 12/26/19 2222 12/27/19  2032   ALKPHOS 108  --    ALT 10  --    AST 8*  --    ALBUMIN 2.5*  --    PROT 6.4  --    BILITOT 0.3  --    INR  --  1.2      No results for input(s): CPK, CPKMB, MB, TROPONINI in the last 72 hours.  Recent Labs   Lab 12/27/19  1122 12/27/19  1642 12/27/19  2344 12/28/19  0533 12/28/19  1112 12/28/19  1604   POCTGLUCOSE 161* 117* 163* 138* 200* 155*     No results found for: HGBA1C  Scheduled Meds:   ceFAZolin (ANCEF) IVPB  2 g Intravenous Q12H    escitalopram oxalate  10 mg Oral Daily    fluticasone propionate  1 spray Each Nostril BID    glyBURIDE  2.5 mg Oral Daily with breakfast    And    metFORMIN  500 mg Oral Daily with breakfast    lisinopril  20 mg Oral Daily    metoprolol tartrate  50 mg Oral BID    pantoprazole  40 mg Oral Daily    [START  ON 12/29/2019] tranexamic acid  1,000 mg Intravenous 30 Min Pre-Op     Continuous Infusions:  As Needed: sodium chloride, acetaminophen, cetirizine, Dextrose 10% Bolus, glucagon (human recombinant), HYDROmorphone, influenza, morphine, ondansetron, oxyCODONE, promethazine (PHENERGAN) IVPB, sodium chloride 0.9%    Significant Imaging:   X-Ray Ankle Complete Right  Narrative: EXAMINATION:  XR ANKLE COMPLETE 3 VIEW RIGHT    CLINICAL HISTORY:  post op ORIF ankle fracture;    TECHNIQUE:  AP, lateral, and oblique images of the right ankle were performed.    COMPARISON:  12/26/2019 at 20:50 hours.    FINDINGS:  There is an overlying cast in place.  There has been interval open reduction internal fixation involving fractures of the lateral and medial malleoli.  There is a lateral buttressing plate along the right distal fibula with a long screw transfixing the distal tibia-fibular syndesmosis.  There are two Buchanan screws transfixing the medial malleolus fracture.  No periprosthetic fracture is identified.    There is improvement in the alignment of the right ankle compared to the preoperative examination.  The previous posterior dislocation of the right ankle has been reduced with satisfactory alignment.  The continues to be minimal medial offset of the distal tibia relative to the talus.  There is also mild widening the lateral clear space.  No new fractures identified.  Impression: As above.    Electronically signed by: Flaquito Powell MD  Date:    12/27/2019  Time:    16:46  SURG [place] Surgery Fluoro Usage  See OP Notes for results.     IMPRESSION: See OP Notes for results.     This procedure was auto-finalized by: Virtual Radiologist        Assessment/Plan:     * Trimalleolar fracture of ankle, closed, right, initial encounter  Per primary team      Type 2 diabetes mellitus with neurologic complication, without long-term current use of insulin  Pt home meds are metformin and glyburide  I will recommend stopping them while  inpatient and pt getting to be NPO,  I will place pt on ISS      Neuropathy due to secondary diabetes mellitus  No meds needed.  monitor      CKD (chronic kidney disease), stage IV    Will monitor bun/cr.  Good urine output    Ankle dislocation, right, initial encounter  Per primary team      Coronary artery disease involving native heart without angina pectoris  Continue asa and metoprolol      Anemia due to stage 3 chronic kidney disease  Stable   S/P 1 unit PRBC tranfusion        VTE Risk Mitigation (From admission, onward)         Ordered     IP VTE HIGH RISK PATIENT  Once      12/27/19 1602     Place sequential compression device  Until discontinued      12/27/19 1602                    Thank you for your consult. I will follow-up with patient. Please contact us if you have any additional questions.    Aleksandar Gaona,   Department of Hospital Medicine   [facility]

## 2019-12-28 NOTE — PROGRESS NOTES
[facility]  Orthopedics  Progress Note    Patient Name: Bina Daniels  MRN: [id number]  Admission Date: 12/26/2019  Hospital Length of Stay: 1 days  Attending Provider: Cooper Medina MD  Primary Care Provider: Tho Haro MD  Follow-up For: Procedure(s) (LRB):  OPEN REDUCTION INTERNAL FIXATION-ANKLE (Right)    Post-Operative Day: 1 Day Post-Op  Subjective:     Principal Problem:Trimalleolar fracture of ankle, closed, right, initial encounter    Principal Orthopedic Problem: Post op Attempted ORIF rt ankle fracture with discontinue procedure due top excess blood loss and inability to maintain reduction    Interval History: Stable overnight post-op.  Tolerated transfusion 2 units PRBCs for treatment per-op hemorrhage    Review of patient's allergies indicates:   Allergen Reactions    Sulfa (sulfonamide antibiotics) Hives       Current Facility-Administered Medications   Medication    0.9%  NaCl infusion (for blood administration)    acetaminophen tablet 650 mg    cefazolin (ANCEF) 2 gram in dextrose 5% 50 mL IVPB (premix)    dextrose 10% (D10W) Bolus    escitalopram oxalate tablet 10 mg    fluticasone propionate 50 mcg/actuation nasal spray 50 mcg    glucagon (human recombinant) injection 1 mg    glyBURIDE tablet 2.5 mg    And    metFORMIN tablet 500 mg    hydromorphone (PF) injection 0.2 mg    influenza (QUADRIVALENT PF) vaccine 0.5 mL    lisinopril tablet 20 mg    metoprolol tartrate (LOPRESSOR) tablet 50 mg    morphine injection 2 mg    ondansetron injection 4 mg    oxyCODONE immediate release tablet 5 mg    pantoprazole EC tablet 40 mg    promethazine (PHENERGAN) 6.25 mg in dextrose 5 % 50 mL IVPB    sodium chloride 0.9% flush 10 mL    [START ON 12/29/2019] tranexamic acid injection Soln 1,000 mg     Objective:     Vital Signs (Most Recent):  Temp: 98.9 °F (37.2 °C) (12/28/19 0805)  Pulse: 79 (12/28/19 0805)  Resp: 16 (12/28/19 0805)  BP: (!) 152/71 (12/28/19  "0805)  SpO2: 96 % (12/28/19 0804) Vital Signs (24h Range):  Temp:  [96 °F (35.6 °C)-98.9 °F (37.2 °C)] 98.9 °F (37.2 °C)  Pulse:  [65-83] 79  Resp:  [13-20] 16  SpO2:  [95 %-100 %] 96 %  BP: ()/(49-85) 152/71     Weight: 61.4 kg (135 lb 5.8 oz)  Height: 5' 4" (162.6 cm)  Body mass index is 23.23 kg/m².      Intake/Output Summary (Last 24 hours) at 12/28/2019 0940  Last data filed at 12/28/2019 0600  Gross per 24 hour   Intake 1315.42 ml   Output 1700 ml   Net -384.58 ml       Ortho/SPM Exam   Dressing RLE intact.  NVI.   Heart-rrr, Lungs-clear  Abd benign    Significant Labs:   BMP:   Recent Labs   Lab 12/28/19  0336   *      K 5.2*   *   CO2 14*   BUN 44*   CREATININE 2.3*   CALCIUM 7.3*     CBC:   Recent Labs   Lab 12/27/19  1419 12/27/19  1957/19  0336   WBC 5.97 8.98 7.34   HGB 7.0* 9.2* 8.1*   HCT 23.3* 30.1* 26.4*   * 445* 356*     Lactic Acid:   Recent Labs   Lab 12/27/19  2032   LACTATE 0.9     All pertinent labs within the past 24 hours have been reviewed.    Significant Imaging: X-Ray: I have reviewed all pertinent results/findings and my personal findings are:  Ankle films chow persistent posterior partial subluxation ankle joint, but improved  I have reviewed and interpreted all pertinent imaging results/findings.    Assessment/Plan:     Active Diagnoses:    Diagnosis Date Noted POA    PRINCIPAL PROBLEM:  Trimalleolar fracture of ankle, closed, right, initial encounter [S82.851A] 12/26/2019 Yes    Neuropathy due to secondary diabetes mellitus [E13.40] 12/27/2019 Yes     Chronic    Anemia due to stage 3 chronic kidney disease [N18.3, D63.1] 12/27/2019 Yes    CKD (chronic kidney disease), stage IV [N18.4] 12/27/2019 Yes     Chronic    Type 2 diabetes mellitus with neurologic complication, without long-term current use of insulin [E11.49] 12/27/2019 Yes     Chronic    Coronary artery disease involving native heart without angina pectoris [I25.10] 12/27/2019 Yes    "  Chronic    Ankle dislocation, right, initial encounter [S93.04XA] 12/27/2019 Yes      Problems Resolved During this Admission:   Post op Attempted ORIF rt ankle fracture with discontinue procedure due top excess blood loss and inability to maintain reduction   Anemia related to yvonne-op blood loss- stable post-op transfusion    Plan return to OR tomorrow for completion ORIF and wound closure      Cooper Medina MD  Orthopedics  [facility]

## 2019-12-29 ENCOUNTER — ANESTHESIA (OUTPATIENT)
Dept: SURGERY | Facility: HOSPITAL | Age: 64
DRG: 493 | End: 2019-12-29
Payer: COMMERCIAL

## 2019-12-29 LAB
ANION GAP SERPL CALC-SCNC: 10 MMOL/L (ref 8–16)
BASOPHILS # BLD AUTO: 0.05 K/UL (ref 0–0.2)
BASOPHILS NFR BLD: 0.6 % (ref 0–1.9)
BUN SERPL-MCNC: 45 MG/DL (ref 8–23)
CALCIUM SERPL-MCNC: 7.3 MG/DL (ref 8.7–10.5)
CHLORIDE SERPL-SCNC: 112 MMOL/L (ref 95–110)
CO2 SERPL-SCNC: 15 MMOL/L (ref 23–29)
CREAT SERPL-MCNC: 2.4 MG/DL (ref 0.5–1.4)
DIFFERENTIAL METHOD: ABNORMAL
EOSINOPHIL # BLD AUTO: 0.2 K/UL (ref 0–0.5)
EOSINOPHIL NFR BLD: 2.8 % (ref 0–8)
ERYTHROCYTE [DISTWIDTH] IN BLOOD BY AUTOMATED COUNT: 17 % (ref 11.5–14.5)
EST. GFR  (AFRICAN AMERICAN): 24 ML/MIN/1.73 M^2
EST. GFR  (NON AFRICAN AMERICAN): 21 ML/MIN/1.73 M^2
GLUCOSE SERPL-MCNC: 177 MG/DL (ref 70–110)
HCT VFR BLD AUTO: 24.7 % (ref 37–48.5)
HGB BLD-MCNC: 7.6 G/DL (ref 12–16)
LYMPHOCYTES # BLD AUTO: 1.3 K/UL (ref 1–4.8)
LYMPHOCYTES NFR BLD: 15.4 % (ref 18–48)
MCH RBC QN AUTO: 26.8 PG (ref 27–31)
MCHC RBC AUTO-ENTMCNC: 30.8 G/DL (ref 32–36)
MCV RBC AUTO: 87 FL (ref 82–98)
MONOCYTES # BLD AUTO: 1.2 K/UL (ref 0.3–1)
MONOCYTES NFR BLD: 14.9 % (ref 4–15)
NEUTROPHILS # BLD AUTO: 5.4 K/UL (ref 1.8–7.7)
NEUTROPHILS NFR BLD: 66.3 % (ref 38–73)
PLATELET # BLD AUTO: 353 K/UL (ref 150–350)
PMV BLD AUTO: 10.2 FL (ref 9.2–12.9)
POCT GLUCOSE: 162 MG/DL (ref 70–110)
POCT GLUCOSE: 167 MG/DL (ref 70–110)
POCT GLUCOSE: 189 MG/DL (ref 70–110)
POCT GLUCOSE: 204 MG/DL (ref 70–110)
POTASSIUM SERPL-SCNC: 4.7 MMOL/L (ref 3.5–5.1)
RBC # BLD AUTO: 2.84 M/UL (ref 4–5.4)
SODIUM SERPL-SCNC: 137 MMOL/L (ref 136–145)
WBC # BLD AUTO: 8.11 K/UL (ref 3.9–12.7)

## 2019-12-29 PROCEDURE — 80048 BASIC METABOLIC PNL TOTAL CA: CPT

## 2019-12-29 PROCEDURE — 25000003 PHARM REV CODE 250: Performed by: HEALTH CARE PROVIDER

## 2019-12-29 PROCEDURE — 37000009 HC ANESTHESIA EA ADD 15 MINS

## 2019-12-29 PROCEDURE — 63600175 PHARM REV CODE 636 W HCPCS

## 2019-12-29 PROCEDURE — 94761 N-INVAS EAR/PLS OXIMETRY MLT: CPT

## 2019-12-29 PROCEDURE — 63600175 PHARM REV CODE 636 W HCPCS: Performed by: HEALTH CARE PROVIDER

## 2019-12-29 PROCEDURE — 25000003 PHARM REV CODE 250

## 2019-12-29 PROCEDURE — 36415 COLL VENOUS BLD VENIPUNCTURE: CPT

## 2019-12-29 PROCEDURE — 25000003 PHARM REV CODE 250: Performed by: NURSE PRACTITIONER

## 2019-12-29 PROCEDURE — 97530 THERAPEUTIC ACTIVITIES: CPT

## 2019-12-29 PROCEDURE — 36000708 HC OR TIME LEV III 1ST 15 MIN

## 2019-12-29 PROCEDURE — 27201423 OPTIME MED/SURG SUP & DEVICES STERILE SUPPLY

## 2019-12-29 PROCEDURE — 85025 COMPLETE CBC W/AUTO DIFF WBC: CPT

## 2019-12-29 PROCEDURE — 27000221 HC OXYGEN, UP TO 24 HOURS

## 2019-12-29 PROCEDURE — 71000033 HC RECOVERY, INTIAL HOUR

## 2019-12-29 PROCEDURE — 11000001 HC ACUTE MED/SURG PRIVATE ROOM

## 2019-12-29 PROCEDURE — 63600175 PHARM REV CODE 636 W HCPCS: Performed by: HOSPITALIST

## 2019-12-29 PROCEDURE — 37000008 HC ANESTHESIA 1ST 15 MINUTES

## 2019-12-29 PROCEDURE — C1713 ANCHOR/SCREW BN/BN,TIS/BN: HCPCS

## 2019-12-29 PROCEDURE — 97165 OT EVAL LOW COMPLEX 30 MIN: CPT

## 2019-12-29 PROCEDURE — 36000709 HC OR TIME LEV III EA ADD 15 MIN

## 2019-12-29 DEVICE — SCREW LOCKING 3.5MM X 34MM: Type: IMPLANTABLE DEVICE | Site: ANKLE | Status: FUNCTIONAL

## 2019-12-29 DEVICE — IMPLANTABLE DEVICE: Type: IMPLANTABLE DEVICE | Site: ANKLE | Status: FUNCTIONAL

## 2019-12-29 DEVICE — SCREW CORTEX 3.5MM X 28MM: Type: IMPLANTABLE DEVICE | Site: ANKLE | Status: FUNCTIONAL

## 2019-12-29 DEVICE — ANCHOR 2-0 FIBERWIRE 2.4X8.5MM: Type: IMPLANTABLE DEVICE | Site: ANKLE | Status: FUNCTIONAL

## 2019-12-29 DEVICE — SCREW CORTEX 3.5 32M: Type: IMPLANTABLE DEVICE | Site: ANKLE | Status: FUNCTIONAL

## 2019-12-29 DEVICE — SCREW LOCKING 3.5MM X 12MM: Type: IMPLANTABLE DEVICE | Site: ANKLE | Status: FUNCTIONAL

## 2019-12-29 DEVICE — KIT KNTLS T-ROPE SYNDSMOS DRVR: Type: IMPLANTABLE DEVICE | Site: ANKLE | Status: FUNCTIONAL

## 2019-12-29 RX ORDER — SODIUM CHLORIDE 0.9 % (FLUSH) 0.9 %
10 SYRINGE (ML) INJECTION
Status: DISCONTINUED | OUTPATIENT
Start: 2019-12-29 | End: 2020-01-08 | Stop reason: HOSPADM

## 2019-12-29 RX ORDER — FERROUS SULFATE 325(65) MG
325 TABLET, DELAYED RELEASE (ENTERIC COATED) ORAL 2 TIMES DAILY
Status: DISCONTINUED | OUTPATIENT
Start: 2019-12-29 | End: 2020-01-08 | Stop reason: HOSPADM

## 2019-12-29 RX ORDER — ONDANSETRON 2 MG/ML
4 INJECTION INTRAMUSCULAR; INTRAVENOUS DAILY PRN
Status: DISCONTINUED | OUTPATIENT
Start: 2019-12-29 | End: 2019-12-30

## 2019-12-29 RX ORDER — TRANEXAMIC ACID 100 MG/ML
1000 INJECTION, SOLUTION INTRAVENOUS
Status: DISCONTINUED | OUTPATIENT
Start: 2019-12-29 | End: 2019-12-29

## 2019-12-29 RX ORDER — PROPOFOL 10 MG/ML
VIAL (ML) INTRAVENOUS
Status: DISCONTINUED | OUTPATIENT
Start: 2019-12-29 | End: 2019-12-29

## 2019-12-29 RX ORDER — CEFAZOLIN SODIUM 1 G/3ML
INJECTION, POWDER, FOR SOLUTION INTRAMUSCULAR; INTRAVENOUS
Status: DISCONTINUED | OUTPATIENT
Start: 2019-12-29 | End: 2019-12-29

## 2019-12-29 RX ORDER — DOCUSATE SODIUM 100 MG/1
100 CAPSULE, LIQUID FILLED ORAL 2 TIMES DAILY
Status: DISCONTINUED | OUTPATIENT
Start: 2019-12-29 | End: 2020-01-08 | Stop reason: HOSPADM

## 2019-12-29 RX ORDER — ACETAMINOPHEN 10 MG/ML
INJECTION, SOLUTION INTRAVENOUS
Status: DISCONTINUED | OUTPATIENT
Start: 2019-12-29 | End: 2019-12-29

## 2019-12-29 RX ORDER — HYDROMORPHONE HYDROCHLORIDE 2 MG/ML
0.2 INJECTION, SOLUTION INTRAMUSCULAR; INTRAVENOUS; SUBCUTANEOUS EVERY 5 MIN PRN
Status: DISCONTINUED | OUTPATIENT
Start: 2019-12-29 | End: 2019-12-30

## 2019-12-29 RX ORDER — HYDROMORPHONE HYDROCHLORIDE 1 MG/ML
0.5 INJECTION, SOLUTION INTRAMUSCULAR; INTRAVENOUS; SUBCUTANEOUS EVERY 5 MIN PRN
Status: DISCONTINUED | OUTPATIENT
Start: 2019-12-29 | End: 2020-01-08 | Stop reason: HOSPADM

## 2019-12-29 RX ORDER — SODIUM CHLORIDE 0.9 % (FLUSH) 0.9 %
10 SYRINGE (ML) INJECTION
Status: DISCONTINUED | OUTPATIENT
Start: 2019-12-29 | End: 2019-12-30

## 2019-12-29 RX ORDER — MIDAZOLAM HYDROCHLORIDE 1 MG/ML
INJECTION, SOLUTION INTRAMUSCULAR; INTRAVENOUS
Status: DISCONTINUED | OUTPATIENT
Start: 2019-12-29 | End: 2019-12-29

## 2019-12-29 RX ORDER — SODIUM CHLORIDE, SODIUM LACTATE, POTASSIUM CHLORIDE, CALCIUM CHLORIDE 600; 310; 30; 20 MG/100ML; MG/100ML; MG/100ML; MG/100ML
INJECTION, SOLUTION INTRAVENOUS CONTINUOUS
Status: DISCONTINUED | OUTPATIENT
Start: 2019-12-29 | End: 2019-12-30

## 2019-12-29 RX ORDER — HYDROMORPHONE HYDROCHLORIDE 1 MG/ML
0.5 INJECTION, SOLUTION INTRAMUSCULAR; INTRAVENOUS; SUBCUTANEOUS EVERY 5 MIN PRN
Status: DISCONTINUED | OUTPATIENT
Start: 2019-12-29 | End: 2019-12-29

## 2019-12-29 RX ORDER — VASOPRESSIN 20 [USP'U]/ML
INJECTION, SOLUTION INTRAMUSCULAR; SUBCUTANEOUS
Status: DISCONTINUED | OUTPATIENT
Start: 2019-12-29 | End: 2019-12-29

## 2019-12-29 RX ORDER — ONDANSETRON 2 MG/ML
INJECTION INTRAMUSCULAR; INTRAVENOUS
Status: DISCONTINUED | OUTPATIENT
Start: 2019-12-29 | End: 2019-12-29

## 2019-12-29 RX ORDER — NAPROXEN SODIUM 220 MG/1
81 TABLET, FILM COATED ORAL DAILY
Status: DISCONTINUED | OUTPATIENT
Start: 2019-12-30 | End: 2020-01-08 | Stop reason: HOSPADM

## 2019-12-29 RX ORDER — LIDOCAINE HCL/PF 100 MG/5ML
SYRINGE (ML) INTRAVENOUS
Status: DISCONTINUED | OUTPATIENT
Start: 2019-12-29 | End: 2019-12-29

## 2019-12-29 RX ORDER — SODIUM CHLORIDE 9 MG/ML
INJECTION, SOLUTION INTRAVENOUS CONTINUOUS PRN
Status: DISCONTINUED | OUTPATIENT
Start: 2019-12-29 | End: 2019-12-29

## 2019-12-29 RX ORDER — TRAMADOL HYDROCHLORIDE 50 MG/1
50 TABLET ORAL EVERY 8 HOURS PRN
Status: DISCONTINUED | OUTPATIENT
Start: 2019-12-29 | End: 2020-01-08 | Stop reason: HOSPADM

## 2019-12-29 RX ORDER — PHENYLEPHRINE HYDROCHLORIDE 10 MG/ML
INJECTION INTRAVENOUS
Status: DISCONTINUED | OUTPATIENT
Start: 2019-12-29 | End: 2019-12-29

## 2019-12-29 RX ORDER — CEFAZOLIN SODIUM 2 G/50ML
2 SOLUTION INTRAVENOUS
Status: COMPLETED | OUTPATIENT
Start: 2019-12-29 | End: 2019-12-30

## 2019-12-29 RX ORDER — FENTANYL CITRATE 50 UG/ML
INJECTION, SOLUTION INTRAMUSCULAR; INTRAVENOUS
Status: DISCONTINUED | OUTPATIENT
Start: 2019-12-29 | End: 2019-12-29

## 2019-12-29 RX ORDER — OXYCODONE HYDROCHLORIDE 5 MG/1
5 TABLET ORAL
Status: DISCONTINUED | OUTPATIENT
Start: 2019-12-29 | End: 2019-12-30

## 2019-12-29 RX ADMIN — INSULIN ASPART 1 UNITS: 100 INJECTION, SOLUTION INTRAVENOUS; SUBCUTANEOUS at 12:12

## 2019-12-29 RX ADMIN — HYDROMORPHONE HYDROCHLORIDE 0.5 MG: 2 INJECTION, SOLUTION INTRAMUSCULAR; INTRAVENOUS; SUBCUTANEOUS at 10:12

## 2019-12-29 RX ADMIN — VASOPRESSIN 1 UNITS: 20 INJECTION, SOLUTION INTRAMUSCULAR; SUBCUTANEOUS at 08:12

## 2019-12-29 RX ADMIN — FERROUS SULFATE TAB EC 325 MG (65 MG FE EQUIVALENT) 325 MG: 325 (65 FE) TABLET DELAYED RESPONSE at 08:12

## 2019-12-29 RX ADMIN — FERROUS SULFATE TAB EC 325 MG (65 MG FE EQUIVALENT) 325 MG: 325 (65 FE) TABLET DELAYED RESPONSE at 11:12

## 2019-12-29 RX ADMIN — HYDROMORPHONE HYDROCHLORIDE 0.5 MG: 2 INJECTION, SOLUTION INTRAMUSCULAR; INTRAVENOUS; SUBCUTANEOUS at 09:12

## 2019-12-29 RX ADMIN — SODIUM CHLORIDE, SODIUM LACTATE, POTASSIUM CHLORIDE, AND CALCIUM CHLORIDE: .6; .31; .03; .02 INJECTION, SOLUTION INTRAVENOUS at 11:12

## 2019-12-29 RX ADMIN — OXYCODONE HYDROCHLORIDE 5 MG: 5 TABLET ORAL at 10:12

## 2019-12-29 RX ADMIN — LISINOPRIL 20 MG: 20 TABLET ORAL at 11:12

## 2019-12-29 RX ADMIN — MIDAZOLAM 2 MG: 1 INJECTION INTRAMUSCULAR; INTRAVENOUS at 07:12

## 2019-12-29 RX ADMIN — OXYCODONE HYDROCHLORIDE 5 MG: 5 TABLET ORAL at 06:12

## 2019-12-29 RX ADMIN — FLUTICASONE PROPIONATE 50 MCG: 50 SPRAY, METERED NASAL at 08:12

## 2019-12-29 RX ADMIN — ACETAMINOPHEN 1000 MG: 10 INJECTION, SOLUTION INTRAVENOUS at 07:12

## 2019-12-29 RX ADMIN — PHENYLEPHRINE HYDROCHLORIDE 100 MCG: 10 INJECTION INTRAVENOUS at 07:12

## 2019-12-29 RX ADMIN — ONDANSETRON 4 MG: 2 INJECTION, SOLUTION INTRAMUSCULAR; INTRAVENOUS at 08:12

## 2019-12-29 RX ADMIN — FENTANYL CITRATE 100 MCG: 50 INJECTION, SOLUTION INTRAMUSCULAR; INTRAVENOUS at 07:12

## 2019-12-29 RX ADMIN — ESCITALOPRAM OXALATE 10 MG: 10 TABLET ORAL at 11:12

## 2019-12-29 RX ADMIN — SODIUM CHLORIDE: 0.9 INJECTION, SOLUTION INTRAVENOUS at 07:12

## 2019-12-29 RX ADMIN — PROPOFOL 100 MG: 10 INJECTION, EMULSION INTRAVENOUS at 07:12

## 2019-12-29 RX ADMIN — FLUTICASONE PROPIONATE 50 MCG: 50 SPRAY, METERED NASAL at 11:12

## 2019-12-29 RX ADMIN — METOPROLOL TARTRATE 50 MG: 50 TABLET ORAL at 11:12

## 2019-12-29 RX ADMIN — PHENYLEPHRINE HYDROCHLORIDE 100 MCG: 10 INJECTION INTRAVENOUS at 08:12

## 2019-12-29 RX ADMIN — CEFAZOLIN 2 G: 330 INJECTION, POWDER, FOR SOLUTION INTRAMUSCULAR; INTRAVENOUS at 07:12

## 2019-12-29 RX ADMIN — PANTOPRAZOLE SODIUM 40 MG: 40 TABLET, DELAYED RELEASE ORAL at 11:12

## 2019-12-29 RX ADMIN — CEFAZOLIN SODIUM 2 G: 2 SOLUTION INTRAVENOUS at 05:12

## 2019-12-29 RX ADMIN — CETIRIZINE HYDROCHLORIDE 5 MG: 5 TABLET ORAL at 10:12

## 2019-12-29 RX ADMIN — SODIUM CHLORIDE, SODIUM LACTATE, POTASSIUM CHLORIDE, AND CALCIUM CHLORIDE: .6; .31; .03; .02 INJECTION, SOLUTION INTRAVENOUS at 10:12

## 2019-12-29 RX ADMIN — ACETAMINOPHEN 650 MG: 325 TABLET ORAL at 11:12

## 2019-12-29 RX ADMIN — LIDOCAINE HYDROCHLORIDE 100 MG: 20 INJECTION, SOLUTION INTRAVENOUS at 07:12

## 2019-12-29 RX ADMIN — DOCUSATE SODIUM 100 MG: 100 CAPSULE, LIQUID FILLED ORAL at 10:12

## 2019-12-29 RX ADMIN — OXYCODONE HYDROCHLORIDE 5 MG: 5 TABLET ORAL at 01:12

## 2019-12-29 RX ADMIN — TRANEXAMIC ACID 1000 MG: 100 INJECTION, SOLUTION INTRAVENOUS at 06:12

## 2019-12-29 RX ADMIN — VASOPRESSIN 2 UNITS: 20 INJECTION, SOLUTION INTRAMUSCULAR; SUBCUTANEOUS at 08:12

## 2019-12-29 RX ADMIN — METOPROLOL TARTRATE 50 MG: 50 TABLET ORAL at 08:12

## 2019-12-29 NOTE — PLAN OF CARE
VN rounds:  VN cued into pt's room with pt's permission.  Pt resting in bed in low position with call bell at side, bed alarm in place, family at bedside.  Fall risk protocol discussed with pt.  VN instructed to call for assistance.  Pt aware and agreeable.   No acute distress noted.  Pt's chart, labs and vital signs reviewed.  Allowed time for questions.  Will continue to be available and intervene as needed.      12/29/19 2938   Type of Frequent Check   Type Patient Rounds   Safety/Activity   Patient Rounds bed in low position;call light in patient/parent reach;placement of personal items at bedside;toileting offered;visualized patient   Safety Promotion/Fall Prevention assistive device/personal item within reach;bed alarm set;Fall Risk reviewed with patient/family;medications reviewed;side rails raised x 2;instructed to call staff for mobility   Safety Precautions emergency equipment at bedside   Positioning   Body Position supine   Head of Bed (HOB) HOB at 45 degrees   Positioning/Transfer Devices pillows   Pain/Comfort/Sleep   Preferred Pain Scale number (Numeric Rating Pain Scale)   Comfort/Acceptable Pain Level 0   Pain Rating (0-10): Rest 0   POSS (Pasero Opioid-Induced Sed Scale) 1 - Awake and alert   Nausea/Vomiting   Nausea/Vomiting No Nausea and Vomiting   Assessments (Pre/Post)   Level of Consciousness (AVPU) alert

## 2019-12-29 NOTE — PLAN OF CARE
Pt AAOx4, son at bedside throughout shift. Pt complains of moderate ankle pain but denies n/v/d and SOB. Pt remains NPO after midnight. Right foot elevated on two pillows, dressing CDI. Blood glucose checks continued. Safety maintained, bed alarm on - will cont to monitor.

## 2019-12-29 NOTE — ANESTHESIA POSTPROCEDURE EVALUATION
Anesthesia Post Evaluation    Patient: Bina Daniels    Procedure(s) Performed: Procedure(s) (LRB):  OPEN REDUCTION INTERNAL FIXATION-ANKLE (Right)  CLOSURE, WOUND (Right)    Final Anesthesia Type: general    Patient location during evaluation: PACU  Patient participation: Yes- Able to Participate  Level of consciousness: awake and alert  Post-procedure vital signs: reviewed and stable  Pain management: adequate  Airway patency: patent    PONV status at discharge: No PONV  Anesthetic complications: no      Cardiovascular status: blood pressure returned to baseline  Respiratory status: unassisted and nasal cannula  Hydration status: euvolemic  Follow-up not needed.          Vitals Value Taken Time   /71 12/29/2019 10:51 AM   Temp 36.6 °C (97.8 °F) 12/29/2019 10:51 AM   Pulse 70 12/29/2019 10:51 AM   Resp 18 12/29/2019 10:30 AM   SpO2 99 % 12/29/2019  1:00 PM         Event Time     Out of Recovery 12/29/2019 10:37:47          Pain/Gloria Score: Pain Rating Prior to Med Admin: 4 (12/29/2019 11:07 AM)  Pain Rating Post Med Admin: 1 (12/28/2019  4:11 PM)  Gloria Score: 9 (12/29/2019 10:22 AM)

## 2019-12-29 NOTE — CONSULTS
"[facility] Hospital Medicine  Consult Note    Patient Name: Bina Daniels  MRN: [id number]  Admission Date: 12/26/2019  Hospital Length of Stay: 2 days  Attending Physician: Aleksandar Gaona DO  Primary Care Provider: Tho Haro MD           Patient information was obtained from patient, relative(s) and ER records.     Consults  Subjective:     Principal Problem: Trimalleolar fracture of ankle, closed, right, initial encounter    Chief Complaint:   Chief Complaint   Patient presents with    Foot Pain     pt states she broke right foot 1 week ago and unable to get in to see ortho, came to ER for pain control and to get " booked for ortho surgery"         HPI: The pt is a 63 yeasrs old female with PMH significant for DMII, neuropathy, breast cancer, and decrease mobility. She was walking in the house using a walker and tripped on a towel, fell down, and sustained injury to her right foot.  She was admitted to ortho service, had a 1st stage of ORIF on Thursday, and will have a second repeat on Sunday morning.  Pt is clinically stable not having any complaints.  She is S/P 1 unit PRBC transfusion yesterday.    Past Medical History:   Diagnosis Date    Cancer     Diabetes mellitus        Past Surgical History:   Procedure Laterality Date    BREAST SURGERY Left     lumpectomy    CHOLECYSTECTOMY      HYSTERECTOMY      TONSILLECTOMY         Review of patient's allergies indicates:   Allergen Reactions    Sulfa (sulfonamide antibiotics) Hives       No current facility-administered medications on file prior to encounter.      Current Outpatient Medications on File Prior to Encounter   Medication Sig    aspirin 81 MG Chew Take 81 mg by mouth once daily.    busPIRone (BUSPAR) 5 MG Tab Take 5 mg by mouth 2 (two) times daily.    escitalopram oxalate (LEXAPRO) 10 MG tablet Take 10 mg by mouth once daily.    glyBURIDE-metformin 2.5-500 mg (GLUCOVANCE) 2.5-500 mg per tablet 1 tablet once daily.    " HYDROcodone-acetaminophen (NORCO) 7.5-325 mg per tablet Take 1 tablet by mouth every 6 (six) hours as needed for Pain.    ibuprofen (ADVIL,MOTRIN) 800 MG tablet Take 800 mg by mouth every 6 (six) hours as needed for Pain.    lisinopril (PRINIVIL,ZESTRIL) 20 MG tablet Take 20 mg by mouth once daily.    metoprolol tartrate (LOPRESSOR) 50 MG tablet Take 50 mg by mouth 2 (two) times daily.    pantoprazole (PROTONIX) 40 MG tablet Take 40 mg by mouth once daily.    atorvastatin (LIPITOR) 10 MG tablet     denosumab (PROLIA) 60 mg/mL Syrg Inject 60 mg into the skin every 6 (six) months.    gabapentin (NEURONTIN) 300 MG capsule     hyoscyamine (ANASPAZ,LEVSIN) 0.125 mg Tab Take 125 mcg by mouth every 4 (four) hours as needed.    losartan (COZAAR) 50 MG tablet     ondansetron (ZOFRAN) 4 MG tablet Take 8 mg by mouth 2 (two) times daily.    TOUJEO SOLOSTAR 300 unit/mL (1.5 mL) InPn pen     traMADol (ULTRAM) 50 mg tablet      Family History     Problem Relation (Age of Onset)    Breast cancer Maternal Grandmother        Tobacco Use    Smoking status: Former Smoker     Start date: 7/8/2003    Smokeless tobacco: Never Used   Substance and Sexual Activity    Alcohol use: No    Drug use: No    Sexual activity: Not Currently     Review of Systems   Constitutional: Positive for activity change. Negative for appetite change and chills.   Respiratory: Negative for cough and shortness of breath.    Cardiovascular: Negative for chest pain.   Gastrointestinal: Negative for abdominal distention, abdominal pain, constipation, nausea and vomiting.   Musculoskeletal: Positive for arthralgias (pain controlled right ankle).   Neurological: Negative for dizziness and weakness.   Psychiatric/Behavioral: Negative for agitation. The patient is not nervous/anxious.      Objective:     Vital Signs (Most Recent):  Temp: 97.8 °F (36.6 °C) (12/29/19 1051)  Pulse: 70 (12/29/19 1051)  Resp: 18 (12/29/19 1030)  BP: (!) 143/71 (12/29/19  1051)  SpO2: 99 % (12/29/19 1300) Vital Signs (24h Range):  Temp:  [97.5 °F (36.4 °C)-98 °F (36.7 °C)] 97.8 °F (36.6 °C)  Pulse:  [68-80] 70  Resp:  [17-25] 18  SpO2:  [92 %-100 %] 99 %  BP: (127-150)/(61-79) 143/71     Weight: 63.4 kg (139 lb 12.4 oz)  Body mass index is 23.99 kg/m².    Physical Exam   Constitutional: She appears well-developed and well-nourished.   HENT:   Head: Normocephalic and atraumatic.   Eyes: EOM are normal.   Pulmonary/Chest: Effort normal. No respiratory distress.   Abdominal: Soft.   Musculoskeletal: She exhibits deformity (r ankle, s/p Post op).   Skin: Skin is warm and dry.   Psychiatric: She has a normal mood and affect. Her behavior is normal. Judgment and thought content normal.   Nursing note and vitals reviewed.      Significant Labs:   Recent Labs   Lab 12/1957 12/28/19  0336 12/29/19  0430   WBC 8.98 7.34 8.11   HGB 9.2* 8.1* 7.6*   HCT 30.1* 26.4* 24.7*   * 356* 353*     Recent Labs   Lab 12/27/19  0610 12/28/19  0336 12/29/19  0429    138 137   K 5.0 5.2* 4.7   * 115* 112*   CO2 14* 14* 15*   BUN 49* 44* 45*   CREATININE 2.4* 2.3* 2.4*   * 152* 177*   CALCIUM 7.8* 7.3* 7.3*     Recent Labs   Lab 12/26/19 2222 12/27/19 2032   ALKPHOS 108  --    ALT 10  --    AST 8*  --    ALBUMIN 2.5*  --    PROT 6.4  --    BILITOT 0.3  --    INR  --  1.2      No results for input(s): CPK, CPKMB, MB, TROPONINI in the last 72 hours.  Recent Labs   Lab 12/28/19  0533 12/28/19  1112 12/28/19  1604 12/28/19  2336 12/29/19  0515 12/29/19  1105   POCTGLUCOSE 138* 200* 155* 231* 167* 204*     Hemoglobin A1C   Date Value Ref Range Status   12/28/2019 9.9 (H) 4.0 - 5.6 % Final     Comment:     ADA Screening Guidelines:  5.7-6.4%  Consistent with prediabetes  >or=6.5%  Consistent with diabetes  High levels of fetal hemoglobin interfere with the HbA1C  assay. Heterozygous hemoglobin variants (HbS, HgC, etc)do  not significantly interfere with this assay.   However,  presence of multiple variants may affect accuracy.       Scheduled Meds:   [START ON 12/30/2019] aspirin  81 mg Oral Daily    ceFAZolin (ANCEF) IVPB  2 g Intravenous Q8H    escitalopram oxalate  10 mg Oral Daily    ferrous sulfate  325 mg Oral BID    fluticasone propionate  1 spray Each Nostril BID    lisinopril  20 mg Oral Daily    metoprolol tartrate  50 mg Oral BID    pantoprazole  40 mg Oral Daily     Continuous Infusions:   lactated ringers 75 mL/hr at 12/29/19 1126     As Needed: acetaminophen, cetirizine, Dextrose 10% Bolus, glucagon (human recombinant), glucagon (human recombinant), HYDROmorphone, HYDROmorphone, HYDROmorphone, insulin aspart U-100, morphine, ondansetron, ondansetron, oxyCODONE, oxyCODONE, promethazine (PHENERGAN) IVPB, sodium chloride 0.9%, sodium chloride 0.9%, sodium chloride 0.9%, sodium chloride 0.9%, traMADol    Significant Imaging:   X-Ray Ankle 2 View Right  Narrative: EXAMINATION:  XR ANKLE 2 VIEW RIGHT    CLINICAL HISTORY:  post op ORIF rt ankle fracture;    TECHNIQUE:  AP and lateral radiographs of the right ankle were submitted.    COMPARISON:  12/27/2019 and 12/26/2019    FINDINGS:  There is an been interval placement of plate and screw construct about the medial malleolus.  The internal fixation of the avulsed portion of the medial malleolus is unchanged.  The internal fixation of the distal fibula and syndesmosis has been revised.  The syndesmosis screw has been removed.  The ankle mortise does appear to be more intact on these images.  Impression: Interval revision of the internal fixation of the syndesmosis and medial malleolus.  No complications identified.    Electronically signed by: Zoie Kumar MD  Date:    12/29/2019  Time:    11:04  SURG FL Surgery Fluoro Usage  See OP Notes for results.     IMPRESSION: See OP Notes for results.     This procedure was auto-finalized by: Virtual Radiologist        Assessment/Plan:     * Trimalleolar fracture of ankle,  closed, right, initial encounter  Per primary team  12/29 s/p OR ORIF today    Type 2 diabetes mellitus with neurologic complication, without long-term current use of insulin  Pt home meds are metformin and glyburide  I will recommend stopping them while inpatient and pt getting to be NPO,  I will place pt on ISS  12/29 place pt on dm diet, post op care    Neuropathy due to secondary diabetes mellitus  No meds needed.  monitor      CKD (chronic kidney disease), stage IV    Will monitor bun/cr.  Good urine output  12/29 stable bmp, renal function    Ankle dislocation, right, initial encounter  Per primary team      Coronary artery disease involving native heart without angina pectoris  Continue asa and metoprolol      Anemia due to stage 3 chronic kidney disease  Stable   S/P 1 unit PRBC tranfusion        VTE Risk Mitigation (From admission, onward)         Ordered     IP VTE HIGH RISK PATIENT  Once      12/29/19 1112     Place sequential compression device  Until discontinued      12/29/19 1112     Place sequential compression device  Until discontinued      12/27/19 1602                    Thank you for your consult. I will follow-up with patient. Please contact us if you have any additional questions.    Aleksandar Gaona,   Department of Hospital Medicine   [facility]

## 2019-12-29 NOTE — OP NOTE
DATE OF PROCEDURE:  12/29/2019.    CURRENT TIME:  9:53 a.m.    PREOPERATIVE DIAGNOSES:  1.  Right trimalleolar ankle fracture with tibiotalar dislocation.  2.  Status post attempted open reduction and internal fixation of right   trimalleolar ankle fracture.    PROCEDURE:  Completion of open reduction and internal fixation of right   trimalleolar fracture and distal syndesmosis repair and wound closure.    SURGEON:  Cooper Medina M.D.    ANESTHESIA:  General endotracheal.    ESTIMATED BLOOD LOSS:  20 mL    SPECIMENS:  None.    COMPLICATIONS:  None.    TOURNIQUET TIME:  Zero minutes.    INDICATIONS:  Ms. Daniels is a 63-year-old woman brought to surgery 2 days   ago.  During the procedure, she had significant blood loss, extensive swelling   and inability to maintain reduction of her medial malleolus.  Therefore, her   wound was packed open, splint applied and plan was to return to the Operating   Room today for attempted completion of ORIF as well as wound closure.  Informed   consent was obtained and discussed risks, benefits, and alternatives of surgery   as noted on the consent form.  Surgical site marking was performed in the   holding area prior to anesthesia.  Timeout was performed in the Operating Room   prior to making skin incision.    PROCEDURE IN DETAIL:  The patient was taken to the Operating Room, placed on the   operating table in supine position.  After an adequate level of anesthesia was   obtained, the splint on the right lower extremity was removed.  The right lower   extremity was then prepped and draped in usual sterile fashion.  The patient had   minimal venous oozing from the wound.  Therefore, I decided not to inflate the   tourniquet.  The wounds were copiously irrigated with saline and then   syndesmosis screw along the lateral aspect of the ankle was removed.  The medial   malleolus and posterior malleoli were then reduced.  The dislocation was noted   to be reduced and a Synthes 3.5  mm medial hook plate was applied across the   medial malleolus compressing the medial malleolar fracture in place and held in   place with 3.5 cortical and locking screws.  One 4.0 cancellous bone screw was   inserted through the distal portion of the plate through the medial malleolus   and then another 4.0 cancellous bone screw was inserted through the posterior   malleolar fracture fragment stabilizing all of the wound medially.  Next,   attention was turned to repair of the syndesmosis.  Using the Arthrex TightRope   suture anchor device, one drill hole was placed through the second to last screw   hole in the plate, maintaining the distal tib-fib syndesmosis in a reduced   position.  The TightRope device was inserted through the fibula into the tibia   and the suture anchor was tightened with the foot in 0 degrees of dorsiflexion.    Another TightRope anchor device was inserted after predrilling holes this time   just posterior to the plate along the distal fibula and anterior to the plate   along the medial malleolus.  This resulted in excellent closure of the   syndesmosis.  The ankle could be brought through range of motion and was stable   and did not dislocate further at this point.  Next, the anterior capsule was   repaired using an Arthrex 3 mm Bio suture anchor first predrilled into the bone   along the anterior medial distal tibia and then suturing the capsule to the   bone.  The wounds were then all copiously irrigated.  Fascia laterally was   closed with 2-0 Vicryl, subQ was closed with 2-0 Vicryl and the skin laterally   with staples.  The skin medially with 3-0 nylon in horizontal mattress-type   suture as the wound was tight medially.  The pressure area along the medial   malleolus that had been present from the time of the patient's presentation to   the hospital appear to be stable; however, mildly erythematous.  Sterile bandage   was then applied and a well-padded posterior stirrup plaster  splint was   applied.  The patient was awakened, moved to stretcher and taken to Recovery   Room in stable condition.  No complications.    PLAN:  Ice, elevation, ambulation with physical therapy and occupational   therapy, nonweightbearing with walker, monitoring neurovascular status as well   as pain control.  Plan will be discharged home when stable and comfortable with   [facility] and home health physical therapy.      WSPAVAN/RYAN  dd: 12/29/2019 09:59:28 (CST)  td: 12/29/2019 14:44:51 (CST)  Doc ID   #[id number]  Job ID #[id number]    CC:

## 2019-12-29 NOTE — SUBJECTIVE & OBJECTIVE
Past Medical History:   Diagnosis Date    Cancer     Diabetes mellitus        Past Surgical History:   Procedure Laterality Date    BREAST SURGERY Left     lumpectomy    CHOLECYSTECTOMY      HYSTERECTOMY      TONSILLECTOMY         Review of patient's allergies indicates:   Allergen Reactions    Sulfa (sulfonamide antibiotics) Hives       No current facility-administered medications on file prior to encounter.      Current Outpatient Medications on File Prior to Encounter   Medication Sig    aspirin 81 MG Chew Take 81 mg by mouth once daily.    busPIRone (BUSPAR) 5 MG Tab Take 5 mg by mouth 2 (two) times daily.    escitalopram oxalate (LEXAPRO) 10 MG tablet Take 10 mg by mouth once daily.    glyBURIDE-metformin 2.5-500 mg (GLUCOVANCE) 2.5-500 mg per tablet 1 tablet once daily.    HYDROcodone-acetaminophen (NORCO) 7.5-325 mg per tablet Take 1 tablet by mouth every 6 (six) hours as needed for Pain.    ibuprofen (ADVIL,MOTRIN) 800 MG tablet Take 800 mg by mouth every 6 (six) hours as needed for Pain.    lisinopril (PRINIVIL,ZESTRIL) 20 MG tablet Take 20 mg by mouth once daily.    metoprolol tartrate (LOPRESSOR) 50 MG tablet Take 50 mg by mouth 2 (two) times daily.    pantoprazole (PROTONIX) 40 MG tablet Take 40 mg by mouth once daily.    atorvastatin (LIPITOR) 10 MG tablet     denosumab (PROLIA) 60 mg/mL Syrg Inject 60 mg into the skin every 6 (six) months.    gabapentin (NEURONTIN) 300 MG capsule     hyoscyamine (ANASPAZ,LEVSIN) 0.125 mg Tab Take 125 mcg by mouth every 4 (four) hours as needed.    losartan (COZAAR) 50 MG tablet     ondansetron (ZOFRAN) 4 MG tablet Take 8 mg by mouth 2 (two) times daily.    TOUJEO SOLOSTAR 300 unit/mL (1.5 mL) InPn pen     traMADol (ULTRAM) 50 mg tablet      Family History     Problem Relation (Age of Onset)    Breast cancer Maternal Grandmother        Tobacco Use    Smoking status: Former Smoker     Start date: 7/8/2003    Smokeless tobacco: Never Used    Substance and Sexual Activity    Alcohol use: No    Drug use: No    Sexual activity: Not Currently     Review of Systems   Constitutional: Positive for activity change. Negative for appetite change and chills.   Respiratory: Negative for cough and shortness of breath.    Cardiovascular: Negative for chest pain.   Gastrointestinal: Negative for abdominal distention, abdominal pain, constipation, nausea and vomiting.   Musculoskeletal: Positive for arthralgias (pain controlled right ankle).   Neurological: Negative for dizziness and weakness.   Psychiatric/Behavioral: Negative for agitation. The patient is not nervous/anxious.      Objective:     Vital Signs (Most Recent):  Temp: 97.8 °F (36.6 °C) (12/29/19 1051)  Pulse: 70 (12/29/19 1051)  Resp: 18 (12/29/19 1030)  BP: (!) 143/71 (12/29/19 1051)  SpO2: 99 % (12/29/19 1300) Vital Signs (24h Range):  Temp:  [97.5 °F (36.4 °C)-98 °F (36.7 °C)] 97.8 °F (36.6 °C)  Pulse:  [68-80] 70  Resp:  [17-25] 18  SpO2:  [92 %-100 %] 99 %  BP: (127-150)/(61-79) 143/71     Weight: 63.4 kg (139 lb 12.4 oz)  Body mass index is 23.99 kg/m².    Physical Exam   Constitutional: She appears well-developed and well-nourished.   HENT:   Head: Normocephalic and atraumatic.   Eyes: EOM are normal.   Pulmonary/Chest: Effort normal. No respiratory distress.   Abdominal: Soft.   Musculoskeletal: She exhibits deformity (r ankle, s/p Post op).   Skin: Skin is warm and dry.   Psychiatric: She has a normal mood and affect. Her behavior is normal. Judgment and thought content normal.   Nursing note and vitals reviewed.      Significant Labs:   Recent Labs   Lab 12/27/19  1957/19  0336 12/29/19  0430   WBC 8.98 7.34 8.11   HGB 9.2* 8.1* 7.6*   HCT 30.1* 26.4* 24.7*   * 356* 353*     Recent Labs   Lab 12/27/19  0610 12/28/19  0336 12/29/19  0429    138 137   K 5.0 5.2* 4.7   * 115* 112*   CO2 14* 14* 15*   BUN 49* 44* 45*   CREATININE 2.4* 2.3* 2.4*   * 152* 177*    CALCIUM 7.8* 7.3* 7.3*     Recent Labs   Lab 12/26/19  2222 12/27/19  2032   ALKPHOS 108  --    ALT 10  --    AST 8*  --    ALBUMIN 2.5*  --    PROT 6.4  --    BILITOT 0.3  --    INR  --  1.2      No results for input(s): CPK, CPKMB, MB, TROPONINI in the last 72 hours.  Recent Labs   Lab 12/28/19  0533 12/28/19  1112 12/28/19  1604 12/28/19  2336 12/29/19  0515 12/29/19  1105   POCTGLUCOSE 138* 200* 155* 231* 167* 204*     Hemoglobin A1C   Date Value Ref Range Status   12/28/2019 9.9 (H) 4.0 - 5.6 % Final     Comment:     ADA Screening Guidelines:  5.7-6.4%  Consistent with prediabetes  >or=6.5%  Consistent with diabetes  High levels of fetal hemoglobin interfere with the HbA1C  assay. Heterozygous hemoglobin variants (HbS, HgC, etc)do  not significantly interfere with this assay.   However, presence of multiple variants may affect accuracy.       Scheduled Meds:   [START ON 12/30/2019] aspirin  81 mg Oral Daily    ceFAZolin (ANCEF) IVPB  2 g Intravenous Q8H    escitalopram oxalate  10 mg Oral Daily    ferrous sulfate  325 mg Oral BID    fluticasone propionate  1 spray Each Nostril BID    lisinopril  20 mg Oral Daily    metoprolol tartrate  50 mg Oral BID    pantoprazole  40 mg Oral Daily     Continuous Infusions:   lactated ringers 75 mL/hr at 12/29/19 1126     As Needed: acetaminophen, cetirizine, Dextrose 10% Bolus, glucagon (human recombinant), glucagon (human recombinant), HYDROmorphone, HYDROmorphone, HYDROmorphone, insulin aspart U-100, morphine, ondansetron, ondansetron, oxyCODONE, oxyCODONE, promethazine (PHENERGAN) IVPB, sodium chloride 0.9%, sodium chloride 0.9%, sodium chloride 0.9%, sodium chloride 0.9%, traMADol    Significant Imaging:   X-Ray Ankle 2 View Right  Narrative: EXAMINATION:  XR ANKLE 2 VIEW RIGHT    CLINICAL HISTORY:  post op ORIF rt ankle fracture;    TECHNIQUE:  AP and lateral radiographs of the right ankle were submitted.    COMPARISON:  12/27/2019 and  12/26/2019    FINDINGS:  There is an been interval placement of plate and screw construct about the medial malleolus.  The internal fixation of the avulsed portion of the medial malleolus is unchanged.  The internal fixation of the distal fibula and syndesmosis has been revised.  The syndesmosis screw has been removed.  The ankle mortise does appear to be more intact on these images.  Impression: Interval revision of the internal fixation of the syndesmosis and medial malleolus.  No complications identified.    Electronically signed by: Zoie Kumar MD  Date:    12/29/2019  Time:    11:04  SURG [place] Surgery Fluoro Usage  See OP Notes for results.     IMPRESSION: See OP Notes for results.     This procedure was auto-finalized by: Virtual Radiologist

## 2019-12-29 NOTE — ASSESSMENT & PLAN NOTE
Pt home meds are metformin and glyburide  I will recommend stopping them while inpatient and pt getting to be NPO,  I will place pt on ISS  12/29 place pt on dm diet, post op care

## 2019-12-29 NOTE — TRANSFER OF CARE
"Anesthesia Transfer of Care Note    Patient: Bina Daniels    Procedure(s) Performed: Procedure(s) (LRB):  OPEN REDUCTION INTERNAL FIXATION-ANKLE (Right)  CLOSURE, WOUND (Right)    Patient location: PACU    Anesthesia Type: general    Transport from OR: Transported from OR on 6-10 L/min O2 by face mask with adequate spontaneous ventilation    Post pain: adequate analgesia    Post assessment: no apparent anesthetic complications    Post vital signs: stable    Level of consciousness: awake and alert    Nausea/Vomiting: no nausea/vomiting    Complications: none    Transfer of care protocol was followed      Last vitals:   Visit Vitals  /72 (BP Location: Right arm, Patient Position: Lying)   Pulse 74   Temp 36.5 °C (97.7 °F) (Oral)   Resp 17   Ht 5' 4" (1.626 m)   Wt 63.4 kg (139 lb 12.4 oz)   SpO2 97%   Breastfeeding? No   BMI 23.99 kg/m²     "

## 2019-12-29 NOTE — PLAN OF CARE
"VSS. States pain is getting better. "Ok to release to room", per Dr Jenikns. Report called to pt's nurse Savanna, with time allotted for questions.   "

## 2019-12-30 LAB
ANION GAP SERPL CALC-SCNC: 13 MMOL/L (ref 8–16)
BASOPHILS # BLD AUTO: 0.03 K/UL (ref 0–0.2)
BASOPHILS NFR BLD: 0.4 % (ref 0–1.9)
BUN SERPL-MCNC: 43 MG/DL (ref 8–23)
CALCIUM SERPL-MCNC: 7.4 MG/DL (ref 8.7–10.5)
CHLORIDE SERPL-SCNC: 114 MMOL/L (ref 95–110)
CO2 SERPL-SCNC: 10 MMOL/L (ref 23–29)
CREAT SERPL-MCNC: 2.5 MG/DL (ref 0.5–1.4)
DIFFERENTIAL METHOD: ABNORMAL
EOSINOPHIL # BLD AUTO: 0.3 K/UL (ref 0–0.5)
EOSINOPHIL NFR BLD: 3.1 % (ref 0–8)
ERYTHROCYTE [DISTWIDTH] IN BLOOD BY AUTOMATED COUNT: 16.9 % (ref 11.5–14.5)
EST. GFR  (AFRICAN AMERICAN): 23 ML/MIN/1.73 M^2
EST. GFR  (NON AFRICAN AMERICAN): 20 ML/MIN/1.73 M^2
GLUCOSE SERPL-MCNC: 98 MG/DL (ref 70–110)
HAV IGM SERPL QL IA: NEGATIVE
HBV CORE IGM SERPL QL IA: NEGATIVE
HBV SURFACE AG SERPL QL IA: NEGATIVE
HCT VFR BLD AUTO: 23.6 % (ref 37–48.5)
HCV AB SERPL QL IA: NEGATIVE
HGB BLD-MCNC: 7.2 G/DL (ref 12–16)
LYMPHOCYTES # BLD AUTO: 1.2 K/UL (ref 1–4.8)
LYMPHOCYTES NFR BLD: 14 % (ref 18–48)
MCH RBC QN AUTO: 26.3 PG (ref 27–31)
MCHC RBC AUTO-ENTMCNC: 30.5 G/DL (ref 32–36)
MCV RBC AUTO: 86 FL (ref 82–98)
MONOCYTES # BLD AUTO: 1.3 K/UL (ref 0.3–1)
MONOCYTES NFR BLD: 14.8 % (ref 4–15)
NEUTROPHILS # BLD AUTO: 5.7 K/UL (ref 1.8–7.7)
NEUTROPHILS NFR BLD: 67.6 % (ref 38–73)
PLATELET # BLD AUTO: 331 K/UL (ref 150–350)
PMV BLD AUTO: 9.6 FL (ref 9.2–12.9)
POCT GLUCOSE: 201 MG/DL (ref 70–110)
POCT GLUCOSE: 221 MG/DL (ref 70–110)
POCT GLUCOSE: 254 MG/DL (ref 70–110)
POCT GLUCOSE: 92 MG/DL (ref 70–110)
POTASSIUM SERPL-SCNC: 5.3 MMOL/L (ref 3.5–5.1)
RBC # BLD AUTO: 2.74 M/UL (ref 4–5.4)
SODIUM SERPL-SCNC: 137 MMOL/L (ref 136–145)
WBC # BLD AUTO: 8.49 K/UL (ref 3.9–12.7)

## 2019-12-30 PROCEDURE — 63600175 PHARM REV CODE 636 W HCPCS

## 2019-12-30 PROCEDURE — 80048 BASIC METABOLIC PNL TOTAL CA: CPT

## 2019-12-30 PROCEDURE — 97110 THERAPEUTIC EXERCISES: CPT

## 2019-12-30 PROCEDURE — 11000001 HC ACUTE MED/SURG PRIVATE ROOM

## 2019-12-30 PROCEDURE — 27000221 HC OXYGEN, UP TO 24 HOURS

## 2019-12-30 PROCEDURE — 85025 COMPLETE CBC W/AUTO DIFF WBC: CPT

## 2019-12-30 PROCEDURE — 94799 UNLISTED PULMONARY SVC/PX: CPT

## 2019-12-30 PROCEDURE — 25000003 PHARM REV CODE 250: Performed by: NURSE PRACTITIONER

## 2019-12-30 PROCEDURE — 97164 PT RE-EVAL EST PLAN CARE: CPT

## 2019-12-30 PROCEDURE — 94761 N-INVAS EAR/PLS OXIMETRY MLT: CPT

## 2019-12-30 PROCEDURE — 25000003 PHARM REV CODE 250

## 2019-12-30 PROCEDURE — 36415 COLL VENOUS BLD VENIPUNCTURE: CPT

## 2019-12-30 PROCEDURE — 97530 THERAPEUTIC ACTIVITIES: CPT

## 2019-12-30 PROCEDURE — 97161 PT EVAL LOW COMPLEX 20 MIN: CPT

## 2019-12-30 RX ORDER — FUROSEMIDE 40 MG/1
40 TABLET ORAL DAILY
Status: DISCONTINUED | OUTPATIENT
Start: 2019-12-31 | End: 2020-01-04

## 2019-12-30 RX ORDER — METFORMIN HYDROCHLORIDE 500 MG/1
500 TABLET ORAL
Status: DISCONTINUED | OUTPATIENT
Start: 2019-12-31 | End: 2020-01-01

## 2019-12-30 RX ORDER — GLYBURIDE-METFORMIN HYDROCHLORIDE 2.5; 5 MG/1; MG/1
1 TABLET ORAL
Status: DISCONTINUED | OUTPATIENT
Start: 2019-12-31 | End: 2019-12-30

## 2019-12-30 RX ORDER — GLYBURIDE 2.5 MG/1
2.5 TABLET ORAL
Status: DISCONTINUED | OUTPATIENT
Start: 2019-12-31 | End: 2020-01-01

## 2019-12-30 RX ADMIN — METOPROLOL TARTRATE 50 MG: 50 TABLET ORAL at 08:12

## 2019-12-30 RX ADMIN — PANTOPRAZOLE SODIUM 40 MG: 40 TABLET, DELAYED RELEASE ORAL at 08:12

## 2019-12-30 RX ADMIN — HYDROMORPHONE HYDROCHLORIDE 0.2 MG: 2 INJECTION, SOLUTION INTRAMUSCULAR; INTRAVENOUS; SUBCUTANEOUS at 09:12

## 2019-12-30 RX ADMIN — DOCUSATE SODIUM 100 MG: 100 CAPSULE, LIQUID FILLED ORAL at 08:12

## 2019-12-30 RX ADMIN — CEFAZOLIN SODIUM 2 G: 2 SOLUTION INTRAVENOUS at 01:12

## 2019-12-30 RX ADMIN — FLUTICASONE PROPIONATE 50 MCG: 50 SPRAY, METERED NASAL at 08:12

## 2019-12-30 RX ADMIN — CETIRIZINE HYDROCHLORIDE 5 MG: 5 TABLET ORAL at 08:12

## 2019-12-30 RX ADMIN — OXYCODONE HYDROCHLORIDE 5 MG: 5 TABLET ORAL at 05:12

## 2019-12-30 RX ADMIN — LISINOPRIL 20 MG: 20 TABLET ORAL at 08:12

## 2019-12-30 RX ADMIN — ASPIRIN 81 MG 81 MG: 81 TABLET ORAL at 08:12

## 2019-12-30 RX ADMIN — FERROUS SULFATE TAB EC 325 MG (65 MG FE EQUIVALENT) 325 MG: 325 (65 FE) TABLET DELAYED RESPONSE at 08:12

## 2019-12-30 RX ADMIN — MORPHINE SULFATE 2 MG: 2 INJECTION, SOLUTION INTRAMUSCULAR; INTRAVENOUS at 12:12

## 2019-12-30 RX ADMIN — INSULIN ASPART 2 UNITS: 100 INJECTION, SOLUTION INTRAVENOUS; SUBCUTANEOUS at 05:12

## 2019-12-30 RX ADMIN — INSULIN ASPART 1 UNITS: 100 INJECTION, SOLUTION INTRAVENOUS; SUBCUTANEOUS at 09:12

## 2019-12-30 RX ADMIN — OXYCODONE HYDROCHLORIDE 5 MG: 5 TABLET ORAL at 06:12

## 2019-12-30 RX ADMIN — ESCITALOPRAM OXALATE 10 MG: 10 TABLET ORAL at 08:12

## 2019-12-30 NOTE — PLAN OF CARE
Problem: Adult Inpatient Plan of Care  Goal: Plan of Care Review  Outcome: Ongoing, Progressing   Pt AAOx4. Safety precautions maintained. Tolerating diet well. Neuro intact RLE, reassessing q4 hours. RLE elevated above heart and cold therapy provided. Pain medications administered PRN. Will continue to monitor and report to next shift.

## 2019-12-30 NOTE — ASSESSMENT & PLAN NOTE
Will monitor bun/cr.  Good urine output  12/29 stable bmp, renal function  12/30 restart lasix at 40 mg po daily

## 2019-12-30 NOTE — CONSULTS
"[facility] Hospital Medicine  Consult Note    Patient Name: Bina Daniels  MRN: [id number]  Admission Date: 12/26/2019  Hospital Length of Stay: 3 days  Attending Physician: Aleksandar Gaona DO  Primary Care Provider: Tho Haro MD           Patient information was obtained from patient, relative(s) and ER records.     Consults  Subjective:     Principal Problem: Trimalleolar fracture of ankle, closed, right, initial encounter    Chief Complaint:   Chief Complaint   Patient presents with    Foot Pain     pt states she broke right foot 1 week ago and unable to get in to see ortho, came to ER for pain control and to get " booked for ortho surgery"         HPI: The pt is a 63 yeasrs old female with PMH significant for DMII, neuropathy, breast cancer, and decrease mobility. She was walking in the house using a walker and tripped on a towel, fell down, and sustained injury to her right foot.  She was admitted to ortho service, had a 1st stage of ORIF on Thursday, and will have a second repeat on Sunday morning.  Pt is clinically stable not having any complaints.  She is S/P 1 unit PRBC transfusion yesterday.    Past Medical History:   Diagnosis Date    Cancer     Diabetes mellitus        Past Surgical History:   Procedure Laterality Date    BREAST SURGERY Left     lumpectomy    CHOLECYSTECTOMY      CLOSURE OF WOUND Right 12/29/2019    Procedure: CLOSURE, WOUND;  Surgeon: Cooper Medina MD;  Location: [facility] OR;  Service: Orthopedics;  Laterality: Right;    HYSTERECTOMY      OPEN REDUCTION AND INTERNAL FIXATION (ORIF) OF INJURY OF ANKLE Right 12/29/2019    Procedure: OPEN REDUCTION INTERNAL FIXATION-ANKLE;  Surgeon: Cooper Medina MD;  Location: [facility] OR;  Service: Orthopedics;  Laterality: Right;  C-arm, Synthes small frag set, cannulated screws notified confirmed with Mohit /synthesis   Arthrex, tightrope anchors/ [place] notified and confirmed /xray notified KB    OPEN REDUCTION " AND INTERNAL FIXATION (ORIF) OF INJURY OF ANKLE Right 12/27/2019    Procedure: OPEN REDUCTION INTERNAL FIXATION-ANKLE;  Surgeon: Cooper Medina MD;  Location: [facility];  Service: Orthopedics;  Laterality: Right;  C-arm, Synthes small frag set, cannulated screws, C-arm    TONSILLECTOMY         Review of patient's allergies indicates:   Allergen Reactions    Sulfa (sulfonamide antibiotics) Hives       No current facility-administered medications on file prior to encounter.      Current Outpatient Medications on File Prior to Encounter   Medication Sig    aspirin 81 MG Chew Take 81 mg by mouth once daily.    busPIRone (BUSPAR) 5 MG Tab Take 5 mg by mouth 2 (two) times daily.    escitalopram oxalate (LEXAPRO) 10 MG tablet Take 10 mg by mouth once daily.    glyBURIDE-metformin 2.5-500 mg (GLUCOVANCE) 2.5-500 mg per tablet 1 tablet once daily.    HYDROcodone-acetaminophen (NORCO) 7.5-325 mg per tablet Take 1 tablet by mouth every 6 (six) hours as needed for Pain.    ibuprofen (ADVIL,MOTRIN) 800 MG tablet Take 800 mg by mouth every 6 (six) hours as needed for Pain.    lisinopril (PRINIVIL,ZESTRIL) 20 MG tablet Take 20 mg by mouth once daily.    metoprolol tartrate (LOPRESSOR) 50 MG tablet Take 50 mg by mouth 2 (two) times daily.    pantoprazole (PROTONIX) 40 MG tablet Take 40 mg by mouth once daily.    atorvastatin (LIPITOR) 10 MG tablet     denosumab (PROLIA) 60 mg/mL Syrg Inject 60 mg into the skin every 6 (six) months.    gabapentin (NEURONTIN) 300 MG capsule     hyoscyamine (ANASPAZ,LEVSIN) 0.125 mg Tab Take 125 mcg by mouth every 4 (four) hours as needed.    losartan (COZAAR) 50 MG tablet     ondansetron (ZOFRAN) 4 MG tablet Take 8 mg by mouth 2 (two) times daily.    TOUJEO SOLOSTAR 300 unit/mL (1.5 mL) InPn pen     traMADol (ULTRAM) 50 mg tablet      Family History     Problem Relation (Age of Onset)    Breast cancer Maternal Grandmother        Tobacco Use    Smoking status: Former Smoker      Start date: 7/8/2003    Smokeless tobacco: Never Used   Substance and Sexual Activity    Alcohol use: No    Drug use: No    Sexual activity: Not Currently     Review of Systems   Constitutional: Positive for activity change. Negative for appetite change and chills.   Respiratory: Negative for cough and shortness of breath.    Cardiovascular: Negative for chest pain.   Gastrointestinal: Negative for abdominal distention, abdominal pain, constipation, nausea and vomiting.   Musculoskeletal: Positive for arthralgias (pain controlled right ankle).   Neurological: Negative for dizziness and weakness.   Psychiatric/Behavioral: Negative for agitation. The patient is not nervous/anxious.      Objective:     Vital Signs (Most Recent):  Temp: 97.9 °F (36.6 °C) (12/30/19 1050)  Pulse: 74 (12/30/19 1050)  Resp: 18 (12/30/19 0713)  BP: 136/63 (12/30/19 1050)  SpO2: 95 % (12/30/19 1539) Vital Signs (24h Range):  Temp:  [97.6 °F (36.4 °C)-98.5 °F (36.9 °C)] 97.9 °F (36.6 °C)  Pulse:  [66-74] 74  Resp:  [17-18] 18  SpO2:  [92 %-96 %] 95 %  BP: (121-136)/(57-72) 136/63     Weight: 64.7 kg (142 lb 10.2 oz)  Body mass index is 24.48 kg/m².    Physical Exam   Constitutional: She appears well-developed and well-nourished.   HENT:   Head: Normocephalic and atraumatic.   Eyes: EOM are normal.   Pulmonary/Chest: Effort normal. No respiratory distress.   Abdominal: Soft.   Musculoskeletal: She exhibits deformity (r ankle, s/p Post op).   Skin: Skin is warm and dry.   Psychiatric: She has a normal mood and affect. Her behavior is normal. Judgment and thought content normal.   Nursing note and vitals reviewed.      Significant Labs:   Recent Labs   Lab 12/28/19 0336 12/29/19  0430 12/30/19  0452   WBC 7.34 8.11 8.49   HGB 8.1* 7.6* 7.2*   HCT 26.4* 24.7* 23.6*   * 353* 331     Recent Labs   Lab 12/28/19  0336 12/29/19  0429 12/30/19  0452    137 137   K 5.2* 4.7 5.3*   * 112* 114*   CO2 14* 15* 10*   BUN 44* 45* 43*    CREATININE 2.3* 2.4* 2.5*   * 177* 98   CALCIUM 7.3* 7.3* 7.4*     Recent Labs   Lab 12/26/19  2222 12/27/19 2032   ALKPHOS 108  --    ALT 10  --    AST 8*  --    ALBUMIN 2.5*  --    PROT 6.4  --    BILITOT 0.3  --    INR  --  1.2      No results for input(s): CPK, CPKMB, MB, TROPONINI in the last 72 hours.  Recent Labs   Lab 12/29/19  0515 12/29/19  1105 12/29/19  1612 12/29/19  2309 12/30/19  0543 12/30/19  1048   POCTGLUCOSE 167* 204* 189* 162* 92 201*     Hemoglobin A1C   Date Value Ref Range Status   12/28/2019 9.9 (H) 4.0 - 5.6 % Final     Comment:     ADA Screening Guidelines:  5.7-6.4%  Consistent with prediabetes  >or=6.5%  Consistent with diabetes  High levels of fetal hemoglobin interfere with the HbA1C  assay. Heterozygous hemoglobin variants (HbS, HgC, etc)do  not significantly interfere with this assay.   However, presence of multiple variants may affect accuracy.       Scheduled Meds:   aspirin  81 mg Oral Daily    docusate sodium  100 mg Oral BID    escitalopram oxalate  10 mg Oral Daily    ferrous sulfate  325 mg Oral BID    fluticasone propionate  1 spray Each Nostril BID    lisinopril  20 mg Oral Daily    metoprolol tartrate  50 mg Oral BID    pantoprazole  40 mg Oral Daily     Continuous Infusions:   lactated ringers 75 mL/hr at 12/29/19 2207     As Needed: acetaminophen, cetirizine, Dextrose 10% Bolus, glucagon (human recombinant), glucagon (human recombinant), HYDROmorphone, HYDROmorphone, insulin aspart U-100, morphine, ondansetron, oxyCODONE, promethazine (PHENERGAN) IVPB, sodium chloride 0.9%, sodium chloride 0.9%, sodium chloride 0.9%, sodium chloride 0.9%, traMADol    Significant Imaging:   X-Ray Ankle 2 View Right  Narrative: EXAMINATION:  XR ANKLE 2 VIEW RIGHT    CLINICAL HISTORY:  post op ORIF rt ankle fracture;    TECHNIQUE:  AP and lateral radiographs of the right ankle were submitted.    COMPARISON:  12/27/2019 and 12/26/2019    FINDINGS:  There is an been interval  placement of plate and screw construct about the medial malleolus.  The internal fixation of the avulsed portion of the medial malleolus is unchanged.  The internal fixation of the distal fibula and syndesmosis has been revised.  The syndesmosis screw has been removed.  The ankle mortise does appear to be more intact on these images.  Impression: Interval revision of the internal fixation of the syndesmosis and medial malleolus.  No complications identified.    Electronically signed by: Zoie Kumar MD  Date:    12/29/2019  Time:    11:04  SURG [place] Surgery Fluoro Usage  See OP Notes for results.     IMPRESSION: See OP Notes for results.     This procedure was auto-finalized by: Virtual Radiologist        Assessment/Plan:     * Trimalleolar fracture of ankle, closed, right, initial encounter  Per primary team  12/29 s/p OR ORIF today    Type 2 diabetes mellitus with neurologic complication, without long-term current use of insulin  Pt home meds are metformin and glyburide  I will recommend stopping them while inpatient and pt getting to be NPO,  I will place pt on ISS  12/29 place pt on dm diet, post op care  12/30 restart metformin and glyburide gradually    Neuropathy due to secondary diabetes mellitus  No meds needed.  monitor      CKD (chronic kidney disease), stage IV    Will monitor bun/cr.  Good urine output  12/29 stable bmp, renal function  12/30 restart lasix at 40 mg po daily    Ankle dislocation, right, initial encounter  Per primary team      Coronary artery disease involving native heart without angina pectoris  Continue asa and metoprolol      Anemia due to stage 3 chronic kidney disease  Stable   S/P 1 unit PRBC tranfusion        VTE Risk Mitigation (From admission, onward)         Ordered     IP VTE HIGH RISK PATIENT  Once      12/29/19 1112     Place sequential compression device  Until discontinued      12/29/19 1112     Place sequential compression device  Until discontinued      12/27/19 1602                     Thank you for your consult. I will follow-up with patient. Please contact us if you have any additional questions.    Aleksandar Gaona DO  Department of Hospital Medicine   [facility]

## 2019-12-30 NOTE — ASSESSMENT & PLAN NOTE
Pt home meds are metformin and glyburide  I will recommend stopping them while inpatient and pt getting to be NPO,  I will place pt on ISS  12/29 place pt on dm diet, post op care  12/30 restart metformin and glyburide gradually

## 2019-12-30 NOTE — PLAN OF CARE
Problem: Physical Therapy Goal  Goal: Physical Therapy Goal  Description  Goals to be met by: 2020     Patient will increase functional independence with mobility by performin. Supine to sit with Supervision/SBA  2. Sit to supine with Supervision/SBA  3. Sit to stand transfer with Stand-by Assistance and Contact Guard Assistance using RW NWB RLE  4. Bed to chair transfer with Stand-by Assistance and Contact Guard Assistance using Rolling Walker, or slideboard NWB RLE  5. Gait  x 10-30 feet with Contact Guard Assistance and Minimal Assistance using Rolling Walker as able NWB RLE.   6. Wheelchair propulsion x  feet with Supervision using bilateral upper extremities  7. Lower extremity exercise program x10 x 3 reps with independence     Outcome: Ongoing, Progressing   Patient evaluated; full report to follow; pt OOB to w/c using beasy board; will cont with POC; recommend SNF to maximize functional status.

## 2019-12-30 NOTE — PROGRESS NOTES
[facility]  Orthopedics  Progress Note    Patient Name: Bina Daniels  MRN: [id number]  Admission Date: 12/26/2019  Hospital Length of Stay: 3 days  Attending Provider: Cooper Medina MD  Primary Care Provider: Tho Haro MD  Follow-up For: Procedure(s) (LRB):  OPEN REDUCTION INTERNAL FIXATION-ANKLE (Right)  CLOSURE, WOUND (Right)    Post-Operative Day: 1 Day Post-Op  Subjective:     Principal Problem:Trimalleolar fracture of ankle, closed, right, initial encounter    Principal Orthopedic Problem: Post op ORIF treiamlleolar ankle fracture dislocation and wound closure    Interval History: Medically stable. Poor progress with therapy.  Mod pain    Review of patient's allergies indicates:   Allergen Reactions    Sulfa (sulfonamide antibiotics) Hives       Current Facility-Administered Medications   Medication    acetaminophen tablet 650 mg    aspirin chewable tablet 81 mg    cetirizine tablet 5 mg    dextrose 10% (D10W) Bolus    docusate sodium capsule 100 mg    escitalopram oxalate tablet 10 mg    ferrous sulfate EC tablet 325 mg    fluticasone propionate 50 mcg/actuation nasal spray 50 mcg    glucagon (human recombinant) injection 1 mg    glucagon (human recombinant) injection 1 mg    hydromorphone (PF) injection 0.2 mg    HYDROmorphone injection 0.5 mg    insulin aspart U-100 pen 0-5 Units    lactated ringers infusion    lisinopril tablet 20 mg    metoprolol tartrate (LOPRESSOR) tablet 50 mg    morphine injection 2 mg    ondansetron injection 4 mg    oxyCODONE immediate release tablet 5 mg    pantoprazole EC tablet 40 mg    promethazine (PHENERGAN) 6.25 mg in dextrose 5 % 50 mL IVPB    sodium chloride 0.9% flush 10 mL    sodium chloride 0.9% flush 10 mL    sodium chloride 0.9% flush 10 mL    sodium chloride 0.9% flush 10 mL    traMADol tablet 50 mg     Objective:     Vital Signs (Most Recent):  Temp: 97.9 °F (36.6 °C) (12/30/19 1050)  Pulse: 74 (12/30/19  "1050)  Resp: 18 (12/30/19 0713)  BP: 136/63 (12/30/19 1050)  SpO2: 95 % (12/30/19 1539) Vital Signs (24h Range):  Temp:  [97.6 °F (36.4 °C)-98.5 °F (36.9 °C)] 97.9 °F (36.6 °C)  Pulse:  [66-74] 74  Resp:  [17-18] 18  SpO2:  [92 %-96 %] 95 %  BP: (121-136)/(57-72) 136/63     Weight: 64.7 kg (142 lb 10.2 oz)  Height: 5' 4" (162.6 cm)  Body mass index is 24.48 kg/m².      Intake/Output Summary (Last 24 hours) at 12/30/2019 1551  Last data filed at 12/30/2019 1100  Gross per 24 hour   Intake 2512.5 ml   Output 1150 ml   Net 1362.5 ml       Ortho/SPM Exam   Heart-rrr, Lungs clear, Abd benign   NVI rle.  Splint intact    Significant Labs:   BMP:   Recent Labs   Lab 12/30/19  0452   GLU 98      K 5.3*   *   CO2 10*   BUN 43*   CREATININE 2.5*   CALCIUM 7.4*     CBC:   Recent Labs   Lab 12/29/19  0430 12/30/19  0452   WBC 8.11 8.49   HGB 7.6* 7.2*   HCT 24.7* 23.6*   * 331     Recent Lab Results       12/30/19  1048   12/30/19  0543   12/30/19  0452   12/29/19  2309   12/29/19  1612        Anion Gap     13         Baso #     0.03         Basophil%     0.4         BUN, Bld     43         Calcium     7.4         Chloride     114         CO2     10         Creatinine     2.5         Differential Method     Automated         eGFR if      23         eGFR if non      20  Comment:  Calculation used to obtain the estimated glomerular filtration  rate (eGFR) is the CKD-EPI equation.            Eos #     0.3         Eosinophil%     3.1         Glucose     98         Gran # (ANC)     5.7         Gran%     67.6         Hematocrit     23.6         Hemoglobin     7.2         Lymph #     1.2         Lymph%     14.0         MCH     26.3         MCHC     30.5         MCV     86         Mono #     1.3         Mono%     14.8         MPV     9.6         Platelets     331         POCT Glucose 201 92   162 189     Potassium     5.3         RBC     2.74         RDW     16.9         Sodium     137 "         WBC     8.49                            All pertinent labs within the past 24 hours have been reviewed.    Significant Imaging: X-Ray: I have reviewed all pertinent results/findings and my personal findings are:  Post-op x-rays shows good fx alignment    Assessment/Plan:     Active Diagnoses:    Diagnosis Date Noted POA    PRINCIPAL PROBLEM:  Trimalleolar fracture of ankle, closed, right, initial encounter [S82.851A] 12/26/2019 Yes    Neuropathy due to secondary diabetes mellitus [E13.40] 12/27/2019 Yes     Chronic    Anemia due to stage 3 chronic kidney disease [N18.3, D63.1] 12/27/2019 Yes    CKD (chronic kidney disease), stage IV [N18.4] 12/27/2019 Yes     Chronic    Type 2 diabetes mellitus with neurologic complication, without long-term current use of insulin [E11.49] 12/27/2019 Yes     Chronic    Coronary artery disease involving native heart without angina pectoris [I25.10] 12/27/2019 Yes     Chronic    Ankle dislocation, right, initial encounter [S93.04XA] 12/27/2019 Yes      Problems Resolved During this Admission:   Post op ORIF treiamlleolar ankle fracture dislocation and wound closure-stable  Anemia unchanged    Plan- continue to mobilize, pain control.    May need SNF if does not progress with ambulation      Cooper Medina MD  Orthopedics  [facility]

## 2019-12-30 NOTE — PLAN OF CARE
Problem: Fall Injury Risk  Goal: Absence of Fall and Fall-Related Injury  Outcome: Ongoing, Progressing  Intervention: Identify and Manage Contributors to Fall Injury Risk  Flowsheets (Taken 12/29/2019 2054)  Self-Care Promotion: independence encouraged; BADL personal objects within reach; BADL personal routines maintained  Medication Review/Management: medications reviewed; high risk medications identified  Intervention: Promote Injury-Free Environment  Flowsheets (Taken 12/29/2019 2054)  Safety Promotion/Fall Prevention: assistive device/personal item within reach; bed alarm set; diversional activities provided; Fall Risk reviewed with patient/family; Fall Risk signage in place; high risk medications identified; medications reviewed; nonskid shoes/socks when out of bed; room near unit station; side rails raised x 3; supervised activity; instructed to call staff for mobility; family to remain at bedside  Environmental Safety Modification: assistive device/personal items within reach; clutter free environment maintained; room near unit station; room organization consistent     Problem: Adult Inpatient Plan of Care  Goal: Plan of Care Review  Outcome: Ongoing, Progressing  Flowsheets (Taken 12/29/2019 2054)  Plan of Care Reviewed With: patient; grandchild(jo-ann)     Problem: Bowel Elimination Impaired (Orthopaedic Fracture)  Goal: Effective Bowel Elimination  Outcome: Ongoing, Progressing  Intervention: Promote Effective Bowel Elimination  Flowsheets (Taken 12/29/2019 2054)  Bowel Dysfunction Management: toileting offered  Bowel Elimination Promotion: adequate fluid intake promoted; commode/bedpan at bedside     Problem: Pain (Orthopaedic Fracture)  Goal: Acceptable Pain Control  Outcome: Ongoing, Progressing  Intervention: Manage Acute Orthopaedic-Related Pain  Flowsheets (Taken 12/29/2019 2054)  Diversional Activities: television  Pain Management Interventions: care clustered; diversional activity provided; pain  management plan reviewed with patient/caregiver     Cued into patient's room.  Permission received per patient to turn camera to view patient.  Introduced as VN for night shift that will be working with floor nurse and nursing assistant.  Family member at bedside.  Educated patient on VN's role in patient care. Plan of care reviewed with patient. Education per flowsheet.  Opportunity given for questions and questions answered.  States pain to right ankle is 2/10; refused offered pain medication.  Denies n/v and sob.  Instructed to call for assistance.  Will cont to monitor.    Labs, notes, and orders reviewed.

## 2019-12-30 NOTE — PLAN OF CARE
Pt AAOx4. Family at the bedside. Medications administered per order. Pain to the RLE managed with PRN oxy IR and morphine. IVFs infusing. Encouraged to call with questions/concerns/assistance. Will continue to monitor. Safety maintained.

## 2019-12-30 NOTE — PLAN OF CARE
Problem: Occupational Therapy Goal  Goal: Occupational Therapy Goal  Description  Goals to be met by: 01/29/20     Patient will increase functional independence with ADLs by performing:    UE Dressing with Modified [place].  LE Dressing with Modified [place].  Toileting from bedside commode with Contact Guard Assistance for hygiene and clothing management.   Toilet transfer to bedside commode with Minimal Assistance while maintaining NWB to R LE.  Upper extremity exercise program 3 x12 reps per handout, with supervision to improve UB strength/endurance for improved transfers.     Outcome: Ongoing, Progressing     Patient tolerated activity and tx session well, but demos increased difficulty /c standing attempt. Difficulty using LLE and BUEs on RW to push up to stand. Able to complete SB t/f EOB <> w/c Ricky of 2. Patient tolerated ~2 hours in w/c well. Patient will benefit from SNF upon D/C to maximize strength and endurance.

## 2019-12-30 NOTE — PLAN OF CARE
Problem: Occupational Therapy Goal  Goal: Occupational Therapy Goal  Description  Goals to be met by: 01/29/20     Patient will increase functional independence with ADLs by performing:    UE Dressing with Modified [place].  LE Dressing with Modified [place].  Toileting from bedside commode with Contact Guard Assistance for hygiene and clothing management.   Toilet transfer to bedside commode with Minimal Assistance while maintaining NWB to R LE.  Upper extremity exercise program 3 x12 reps per handout, with supervision to improve UB strength/endurance for improved transfers.     Outcome: Ongoing, Progressing    Pt was agreeable to OT evaluation.  Goals established to assist pt with returning to OF regarding ADLs and func mobility.  Pt will benefit from skilled OT services in order to increase her level of safety and independence with ADLs and mobility.      Mindy Lea, OT  12/29/2019

## 2019-12-30 NOTE — PLAN OF CARE
VN cued into room.  Pt sitting in wheelchair call light and personal belongings within reach, right foot elevated.  NADN.  Family at bedside assisting pt.  Pt reports no pain at this time.  No other needs or complaints at this time.  Reminded pt to use call light as needed.  VN will continue to follow and be available as needed.

## 2019-12-30 NOTE — PT/OT/SLP PROGRESS
Occupational Therapy   Treatment    Name: Bina Daniels  MRN: [id number]  Admitting Diagnosis:  Trimalleolar fracture of ankle, closed, right, initial encounter  1 Day Post-Op    Recommendations:     Discharge Recommendations: nursing facility, skilled  Discharge Equipment Recommendations:  (Defer to SNF; poss slide board and w/c)  Barriers to discharge:  Inaccessible home environment    Assessment:   Patient tolerated activity and tx session well, but demos increased difficulty /c standing attempt. Difficulty using LLE and BUEs on RW to push up to stand.  Becomes fearful and hyperventilates and requires VCs for PLB. Able to complete SB t/f EOB <> w/c Ricky of 2. Patient tolerated ~2 hours in w/c well. Patient will benefit from SNF upon D/C to maximize strength and endurance.     Bina Daniels is a 64 y.o. female with a medical diagnosis of Trimalleolar fracture of ankle, closed, right, initial encounter. Performance deficits affecting function are weakness, impaired endurance, impaired self care skills, impaired functional mobilty, gait instability, decreased coordination, decreased upper extremity function, decreased lower extremity function, decreased safety awareness, impaired balance, pain, impaired coordination, edema, orthopedic precautions.     Rehab Prognosis:  Good and Fair; patient would benefit from acute skilled OT services to address these deficits and reach maximum level of function.       Plan:     Patient to be seen 5 x/week to address the above listed problems via self-care/home management, therapeutic activities, therapeutic exercises  · Plan of Care Expires: 01/28/20  · Plan of Care Reviewed with: patient, family    Subjective     Pain/Comfort:  · Pain Rating 1: 0/10  · Location - Side 1: Right  · Location - Orientation 1: lower  · Location 1: leg  · Pain Addressed 1: Reposition  · Pain Rating Post-Intervention 1: 2/10    Objective:     Communicated with: Carol grijalva prior to session.  Patient  found HOB elevated with peripheral IV, oxygen upon OT entry to room.    General Precautions: Standard, fall   Orthopedic Precautions:RLE non weight bearing   Braces: (cast on R lower leg)     Bed Mobility:    · Patient completed Rolling/Turning to Left with  contact guard assistance and with side rail  · Patient completed Scooting/Bridging with minimum assistance  · Patient completed Supine to Sit with minimum assistance and for RLE  · Patient completed Sit to Supine with minimum assistance and with leg lift     Functional Mobility/Transfers:  · Patient completed Sit <> Stand Transfer with maximal assistance and of 2 persons  with  rolling walker   · Patient completed Bed <> Chair Transfer using Slide Board technique with minimum assistance and of 2 persons with wheelchair    Activities of Daily Living:  · Feeding:  modified independence from chair level  · Lower Body Dressing: maximal assistance to petty L sock    [facility] 6 Click ADL: 16    Treatment & Education:  Patient /c good ability to transition to EOB. Instructed in sit <> stand using RW --maxA of 2 persons, but patient /c difficulty pushing through LLE and BUEs to come upright (x 3 trials). Bed height elevated and attempted 2 more standing trials. Patient unable to maintain NWB status to RLE. Difficulty coming upright. Patient then instructed on SB t/f to chair -- completed /c Beasy Board. Sat in chair ~2 hours /c RLE elevated using elevated leg rest. Patient Sharan of 2 to return to EOB via Beasy Board.     Patient left HOB elevated with all lines intact, call button in reach, bed alarm on and nsg notifiedEducation:      GOALS:   Multidisciplinary Problems     Occupational Therapy Goals        Problem: Occupational Therapy Goal    Goal Priority Disciplines Outcome Interventions   Occupational Therapy Goal     OT, PT/OT Ongoing, Progressing    Description:  Goals to be met by: 01/29/20     Patient will increase functional independence with ADLs by  performing:    UE Dressing with Modified [place].  LE Dressing with Modified [place].  Toileting from bedside commode with Contact Guard Assistance for hygiene and clothing management.   Toilet transfer to bedside commode with Minimal Assistance while maintaining NWB to R LE.  Upper extremity exercise program 3 x12 reps per handout, with supervision to improve UB strength/endurance for improved transfers.                      Time Tracking:     OT Date of Treatment: 12/30/19  OT Start Time: 1117   [id number]: 14 mins  OT Stop Time: 1206  OT Total Time (min): 49 mins + 14 mins co-tx /c PT    Billable Minutes:Therapeutic Activity 19 + 14    HOLDEN [place]  12/30/2019

## 2019-12-30 NOTE — PT/OT/SLP EVAL
Physical Therapy Evaluation/Treatment    Patient Name:  Bina Daniels   MRN:  [id number]    Recommendations:     Discharge Recommendations:  nursing facility, skilled   Discharge Equipment Recommendations: (per SNF; possibly sliding board, RW, w/c)   Barriers to discharge: Inaccessible home    Assessment:     Bina Daniels is a 64 y.o. female admitted with a medical diagnosis of Trimalleolar fracture of ankle, closed, right, initial encounter.  She presents with the following impairments/functional limitations:  weakness, impaired endurance, impaired sensation, impaired self care skills, impaired functional mobilty, gait instability, impaired balance, decreased lower extremity function, decreased upper extremity function, decreased coordination, decreased safety awareness, pain, impaired skin, impaired coordination, orthopedic precautions; pt with LE/UE weakness; OOB to w/c using beasy board; will cont with POC; recommend SNF to maximize functional status..    Rehab Prognosis: Good; patient would benefit from acute skilled PT services to address these deficits and reach maximum level of function.    Recent Surgery: Procedure(s) (LRB):  OPEN REDUCTION INTERNAL FIXATION-ANKLE (Right)  CLOSURE, WOUND (Right) 1 Day Post-Op    Plan:     During this hospitalization, patient to be seen daily to address the identified rehab impairments via gait training, therapeutic activities, therapeutic exercises, neuromuscular re-education, wheelchair management/training and progress toward the following goals:    · Plan of Care Expires:  01/30/20    Subjective     Chief Complaint: Fear of falling  Patient/Family Comments/goals: to improve movement, independence  Pain/Comfort:  · Pain Rating 1: 0/10  · Location - Side 1: Right  · Location - Orientation 1: lower  · Location 1: leg(ankle)  · Pain Addressed 1: Reposition  · Pain Rating Post-Intervention 1: 2/10    Patients cultural, spiritual, Quaker conflicts given the current  situation: yes    Living Environment:  Pt lives with her son in a raised [facility] - 4 steps to enter with no rails - tub/shower combo.  Pt states her son is currently building a ramp at her home for her to get into her home easily.  Previous level of function: mod I with ADLs, mod I with ambulation, min A with tub transfes  Roles and Routines: mother, grandmother  Equipment Used at Home:  rollator, bedside commode, shower chair  Assistance upon Discharge: son will be available    Objective:     Communicated with nurse prior to session.  Patient found with peripheral IV, oxygen  upon PT entry to room.    General Precautions: Standard, fall   Orthopedic Precautions:RLE non weight bearing   Braces: (cast on R foot)     Exams:  · Cognitive Exam:  Patient is oriented to Person, Place, Time, Situation and follows one and two step commands  · Gross Motor Coordination:  impaired 2/2 weakness  · Postural Exam:  Patient presented with the following abnormalities:    · -       Rounded shoulders  · Sensation: pt endorses longstanding neuropathy BLEs  · Skin Integrity/Edema:      · -       Skin integrity: Bruising of L arm and PI plantar R grt toe appears to be healing - nurse aware and present to observe  · -       Edema: Mild RLE  · RLE ROM: WFL passively  · RLE Strength: hip~2+; knee/ankle~3+ to 4- grossly  · LLE ROM: WFL  · LLE Strength: hip~2+; knee/ankle~3+ to 4-    Functional Mobility:  · Bed Mobility:     · Rolling Left:  stand by assistance and contact guard assistance  · Scooting: minimum assistance and to EOB with assist for leg lift  · Supine to Sit: minimum assistance and use of bed rail  · Sit to Supine: minimum assistance and with leg ift  · Transfers:     · Sit to Stand: 3 attempts sit to stand but only able to relieve pressure off the bed; increased height of bed and pt stood 2x erect with maxA x 2 to accomplish - pt stood~5-8 seconds; maxA to keep RLE NWB  · Bed <> Chair: minimum assistance and of 2 persons with use  of beasy board    · Balance: sit~good; stand~poor      Therapeutic Activities and Exercises:   Patient performed AAROM to BLEs 12-15 reps APs L, heel slides, hip abd/add, R SLR, GS, FAQ; pt transitioned to EOB with Sharan; sit to stand using RW with VCs for effective technique; pt had difficulty pushing through LLE and BIEs to come to erect stance x 3 trials; elevated height of bed for 2 more standing trials; pt still needed maxA x 2 to achieve erect stance; also needed maxA to keep NWB RLE; instructed in use of beasy board from bed to w/c and sat in w/c ~2 hrs with RLE elevated using elevating leg rest and ace wrapping it to the leg rest to avoid leg falling off the leg rest; Sharan x 2 to use beasy board back to bed.    AM-PAC 6 CLICK MOBILITY  Total Score:14     Patient left up in chair with all lines intact, call button in reach, nurse notified and family present.    GOALS:   Multidisciplinary Problems     Physical Therapy Goals        Problem: Physical Therapy Goal    Goal Priority Disciplines Outcome Goal Variances Interventions   Physical Therapy Goal     PT, PT/OT Ongoing, Progressing     Description:  Goals to be met by: 2020     Patient will increase functional independence with mobility by performin. Supine to sit with Supervision/SBA  2. Sit to supine with Supervision/SBA  3. Sit to stand transfer with Stand-by Assistance and Contact Guard Assistance using RW NWB RLE  4. Bed to chair transfer with Stand-by Assistance and Contact Guard Assistance using Rolling Walker, or slideboard NWB RLE  5. Gait  x 10-30 feet with Contact Guard Assistance and Minimal Assistance using Rolling Walker as able NWB RLE.   6. Wheelchair propulsion x  feet with Supervision using bilateral upper extremities  7. Lower extremity exercise program x10 x 3 reps with independence                      History:     Past Medical History:   Diagnosis Date    Cancer     Diabetes mellitus        Past Surgical History:    Procedure Laterality Date    BREAST SURGERY Left     lumpectomy    CHOLECYSTECTOMY      CLOSURE OF WOUND Right 12/29/2019    Procedure: CLOSURE, WOUND;  Surgeon: Cooper Medina MD;  Location: [facility] OR;  Service: Orthopedics;  Laterality: Right;    HYSTERECTOMY      OPEN REDUCTION AND INTERNAL FIXATION (ORIF) OF INJURY OF ANKLE Right 12/29/2019    Procedure: OPEN REDUCTION INTERNAL FIXATION-ANKLE;  Surgeon: Cooper Medina MD;  Location: [facility] OR;  Service: Orthopedics;  Laterality: Right;  C-arm, Synthes small frag set, cannulated screws notified confirmed with [employer]   [employer], tightrope anchors/ [place] notified and confirmed /xray notified KB    OPEN REDUCTION AND INTERNAL FIXATION (ORIF) OF INJURY OF ANKLE Right 12/27/2019    Procedure: OPEN REDUCTION INTERNAL FIXATION-ANKLE;  Surgeon: Cooper Medina MD;  Location: [facility] OR;  Service: Orthopedics;  Laterality: Right;  C-arm, Synthes small frag set, cannulated screws, C-arm    TONSILLECTOMY         Time Tracking:     PT Received On: 12/30/19  PT Start Time: 1117   (1400)  PT Stop Time: 1205    (1414)  PT Total Time (min): 48 min +14 in pm with OT    Billable Minutes: Evaluation 15 minutes and Therapeutic Exercise 14 minutes       yNasia Hernandez, PT  12/30/2019

## 2019-12-30 NOTE — PT/OT/SLP EVAL
Occupational Therapy   Evaluation    Name: Bina Daniels  MRN: [id number]  Admitting Diagnosis:  Trimalleolar fracture of ankle, closed, right, initial encounter Day of Surgery    Recommendations:     Discharge Recommendations: nursing facility, skilled  Discharge Equipment Recommendations:  walker, rolling, wheelchair  Barriers to discharge:  Inaccessible home environment    Assessment:     Bina Daniels is a 63 y.o. female with a medical diagnosis of Trimalleolar fracture of ankle, closed, right, initial encounter.  She presents with the following performance deficits affecting function: weakness, impaired endurance, impaired sensation, impaired self care skills, impaired functional mobilty, decreased coordination, impaired balance, gait instability, decreased lower extremity function, decreased safety awareness, pain, edema, impaired skin, impaired coordination, orthopedic precautions.  Pt was agreeable to OT evaluation.  Goals established to assist pt with returning to OF regarding ADLs and func mobility.  Pt reports that she was able to perform her ADLs on her own and walked with rollator within her home prior to injury.  She required assistance from her son to get in/out of tub. Currently, pt requires set up to min A to perform ADLs at bed level.  Pt requires increased assistance with transfers and is unable to lift her R LE in order to perform standing or stand pivot transfers while maintaining NWB on R LE. Pt will benefit from skilled OT services in order to increase her level of safety and independence with ADLs and mobility.        Rehab Prognosis: Good; patient would benefit from acute skilled OT services to address these deficits and reach maximum level of function.       Plan:     Patient to be seen 5 x/week to address the above listed problems via self-care/home management, therapeutic activities, therapeutic exercises  · Plan of Care Expires: 01/28/20  · Plan of Care Reviewed with: patient,  family    Subjective     Chief Complaint: pain in R leg when off of bed  Patient/Family Comments/goals: improve safety and independence    Occupational Profile:  Living Environment: Pt lives with her son in a raised H - 4 steps to enter with no rails - tub/shower combo.  Pt states her son is currently building a ramp at her home for her to get into her home easily.  Previous level of function: mod I with ADLs, mod I with ambulation, min A with tub transfes  Roles and Routines: mother, grandmother  Equipment Used at Home:  rollator, bedside commode, shower chair  Assistance upon Discharge: son will be available    Pain/Comfort:  · Pain Rating 1: 0/10(just itching)  · Location - Side 1: Right  · Location - Orientation 1: lower  · Location 1: (pain only when leg in dependent position - dangling off side of bed)  · Pain Addressed 1: Pre-medicate for activity, Reposition  · Pain Rating Post-Intervention 1: 0/10    Patients cultural, spiritual, Yazidi conflicts given the current situation:      Objective:     Communicated with: nurse prior to session.  Patient found HOB elevated with peripheral IV, oxygen upon OT entry to room.    General Precautions: Standard, fall   Orthopedic Precautions:RLE non weight bearing   Braces: (cast on R foot)     Occupational Performance:    Bed Mobility:    · Patient completed Scooting/Bridging with minimum assistance  · Patient completed Supine to Sit with minimum assistance    Functional Mobility/Transfers:  · Patient completed Sit <> Stand Transfer with moderate assistance  with  rolling walker   · Functional Mobility: Pt required lifting assistance to stand - attempted to lift leg for NWB while standing, but pt unable to lift leg to prevent placing weight on R LE.    Activities of Daily Living:  · Grooming: supervision set up  · Upper Body Dressing: supervision set up with gown  · Lower Body Dressing: supervision set up to petty/doff sock on L foot only    Cognitive/Visual  Perceptual:  Cognitive/Psychosocial Skills:     -       Oriented to: Person, Place, Time and Situation   -       Follows Commands/attention:Follows two-step commands  -       Communication: clear/fluent  -       Memory: No Deficits noted  -       Safety awareness/insight to disability: impaired   -       Mood/Affect/Coping skills/emotional control: Appropriate to situation  Visual/Perceptual:  -Intact      Physical Exam:  Balance: -       good sitting; poor standing while attempting NWB on R LE  Postural examination/scapula alignment:    -       Rounded shoulders  Skin integrity: bruising and scratches on L arm; dry skin at feet  Edema:  Mild R LE  Sensation: -       Impaired  impaired sensation on R LE from long standing neuropathy - states occasional decreased sensation on L LE but not all of the time  Dominant hand: -       right  Upper Extremity Range of Motion:     -       Right Upper Extremity: WFL  -       Left Upper Extremity: WFL  Upper Extremity Strength:    -       Right Upper Extremity: WFL  -       Left Upper Extremity: WFL   Strength:    -       Right Upper Extremity: WFL  -       Left Upper Extremity: WFL  Fine Motor Coordination: -       Intact  Gross motor coordination: impaired at LEs    AMPAC 6 Click ADL:  AMPAC Total Score: 18    Treatment & Education:  · Pt completed ADLs and func mobility activities for tx session as noted above  · OT reviewed WB precautions on R LE - pt is NWB at this time and is unable to lift leg off of floor when attempting standing to prevent weight on foot due to LE weakness - UE strength was good, but pt and daughter reports that UE endurance is poor  · Pt educated on discharge recommendation for SNF due to pt needing to learn how to transfer without placing weight on R LE and to improve UE strength to accommodate for R LE NWB.  Pt wants to go home but agrees to consider SNF before returning home.   · Pt educated on role of OT and POC    Education:    Patient left  HOB elevated with all lines intact, call button in reach and daughter and granddaughter present    GOALS:   Multidisciplinary Problems     Occupational Therapy Goals        Problem: Occupational Therapy Goal    Goal Priority Disciplines Outcome Interventions   Occupational Therapy Goal     OT, PT/OT Ongoing, Progressing    Description:  Goals to be met by: 01/29/20     Patient will increase functional independence with ADLs by performing:    UE Dressing with Modified [place].  LE Dressing with Modified [place].  Toileting from bedside commode with Contact Guard Assistance for hygiene and clothing management.   Toilet transfer to bedside commode with Minimal Assistance while maintaining NWB to R LE.  Upper extremity exercise program 3 x12 reps per handout, with supervision to improve UB strength/endurance for improved transfers.                      History:     Past Medical History:   Diagnosis Date    Cancer     Diabetes mellitus        Past Surgical History:   Procedure Laterality Date    BREAST SURGERY Left     lumpectomy    CHOLECYSTECTOMY      HYSTERECTOMY      TONSILLECTOMY         Time Tracking:     OT Date of Treatment: 12/29/19  OT Start Time: 1703  OT Stop Time: 1731  OT Total Time (min): 28 min    Billable Minutes:Evaluation 12  Therapeutic Activity 16    Mindy Lea OT  12/29/2019

## 2019-12-30 NOTE — SUBJECTIVE & OBJECTIVE
Past Medical History:   Diagnosis Date    Cancer     Diabetes mellitus        Past Surgical History:   Procedure Laterality Date    BREAST SURGERY Left     lumpectomy    CHOLECYSTECTOMY      CLOSURE OF WOUND Right 12/29/2019    Procedure: CLOSURE, WOUND;  Surgeon: Cooper Medina MD;  Location: [facility] OR;  Service: Orthopedics;  Laterality: Right;    HYSTERECTOMY      OPEN REDUCTION AND INTERNAL FIXATION (ORIF) OF INJURY OF ANKLE Right 12/29/2019    Procedure: OPEN REDUCTION INTERNAL FIXATION-ANKLE;  Surgeon: Cooper Medina MD;  Location: [facility] OR;  Service: Orthopedics;  Laterality: Right;  C-arm, Synthes small frag set, cannulated screws notified confirmed with Mohit /synthesis   Arthrex, tightrope anchors/ [place] notified and confirmed /xray notified KB    OPEN REDUCTION AND INTERNAL FIXATION (ORIF) OF INJURY OF ANKLE Right 12/27/2019    Procedure: OPEN REDUCTION INTERNAL FIXATION-ANKLE;  Surgeon: Cooper Medina MD;  Location: [facility] OR;  Service: Orthopedics;  Laterality: Right;  C-arm, Synthes small frag set, cannulated screws, C-arm    TONSILLECTOMY         Review of patient's allergies indicates:   Allergen Reactions    Sulfa (sulfonamide antibiotics) Hives       No current facility-administered medications on file prior to encounter.      Current Outpatient Medications on File Prior to Encounter   Medication Sig    aspirin 81 MG Chew Take 81 mg by mouth once daily.    busPIRone (BUSPAR) 5 MG Tab Take 5 mg by mouth 2 (two) times daily.    escitalopram oxalate (LEXAPRO) 10 MG tablet Take 10 mg by mouth once daily.    glyBURIDE-metformin 2.5-500 mg (GLUCOVANCE) 2.5-500 mg per tablet 1 tablet once daily.    HYDROcodone-acetaminophen (NORCO) 7.5-325 mg per tablet Take 1 tablet by mouth every 6 (six) hours as needed for Pain.    ibuprofen (ADVIL,MOTRIN) 800 MG tablet Take 800 mg by mouth every 6 (six) hours as needed for Pain.    lisinopril (PRINIVIL,ZESTRIL) 20 MG tablet Take 20 mg by  mouth once daily.    metoprolol tartrate (LOPRESSOR) 50 MG tablet Take 50 mg by mouth 2 (two) times daily.    pantoprazole (PROTONIX) 40 MG tablet Take 40 mg by mouth once daily.    atorvastatin (LIPITOR) 10 MG tablet     denosumab (PROLIA) 60 mg/mL Syrg Inject 60 mg into the skin every 6 (six) months.    gabapentin (NEURONTIN) 300 MG capsule     hyoscyamine (ANASPAZ,LEVSIN) 0.125 mg Tab Take 125 mcg by mouth every 4 (four) hours as needed.    losartan (COZAAR) 50 MG tablet     ondansetron (ZOFRAN) 4 MG tablet Take 8 mg by mouth 2 (two) times daily.    TOUJEO SOLOSTAR 300 unit/mL (1.5 mL) InPn pen     traMADol (ULTRAM) 50 mg tablet      Family History     Problem Relation (Age of Onset)    Breast cancer Maternal Grandmother        Tobacco Use    Smoking status: Former Smoker     Start date: 7/8/2003    Smokeless tobacco: Never Used   Substance and Sexual Activity    Alcohol use: No    Drug use: No    Sexual activity: Not Currently     Review of Systems   Constitutional: Positive for activity change. Negative for appetite change and chills.   Respiratory: Negative for cough and shortness of breath.    Cardiovascular: Negative for chest pain.   Gastrointestinal: Negative for abdominal distention, abdominal pain, constipation, nausea and vomiting.   Musculoskeletal: Positive for arthralgias (pain controlled right ankle).   Neurological: Negative for dizziness and weakness.   Psychiatric/Behavioral: Negative for agitation. The patient is not nervous/anxious.      Objective:     Vital Signs (Most Recent):  Temp: 97.9 °F (36.6 °C) (12/30/19 1050)  Pulse: 74 (12/30/19 1050)  Resp: 18 (12/30/19 0713)  BP: 136/63 (12/30/19 1050)  SpO2: 95 % (12/30/19 1539) Vital Signs (24h Range):  Temp:  [97.6 °F (36.4 °C)-98.5 °F (36.9 °C)] 97.9 °F (36.6 °C)  Pulse:  [66-74] 74  Resp:  [17-18] 18  SpO2:  [92 %-96 %] 95 %  BP: (121-136)/(57-72) 136/63     Weight: 64.7 kg (142 lb 10.2 oz)  Body mass index is 24.48  kg/m².    Physical Exam   Constitutional: She appears well-developed and well-nourished.   HENT:   Head: Normocephalic and atraumatic.   Eyes: EOM are normal.   Pulmonary/Chest: Effort normal. No respiratory distress.   Abdominal: Soft.   Musculoskeletal: She exhibits deformity (r ankle, s/p Post op).   Skin: Skin is warm and dry.   Psychiatric: She has a normal mood and affect. Her behavior is normal. Judgment and thought content normal.   Nursing note and vitals reviewed.      Significant Labs:   Recent Labs   Lab 12/28/19  0336 12/29/19  0430 12/30/19  0452   WBC 7.34 8.11 8.49   HGB 8.1* 7.6* 7.2*   HCT 26.4* 24.7* 23.6*   * 353* 331     Recent Labs   Lab 12/28/19  0336 12/29/19  0429 12/30/19  0452    137 137   K 5.2* 4.7 5.3*   * 112* 114*   CO2 14* 15* 10*   BUN 44* 45* 43*   CREATININE 2.3* 2.4* 2.5*   * 177* 98   CALCIUM 7.3* 7.3* 7.4*     Recent Labs   Lab 12/26/19  2222 12/27/19  2032   ALKPHOS 108  --    ALT 10  --    AST 8*  --    ALBUMIN 2.5*  --    PROT 6.4  --    BILITOT 0.3  --    INR  --  1.2      No results for input(s): CPK, CPKMB, MB, TROPONINI in the last 72 hours.  Recent Labs   Lab 12/29/19  0515 12/29/19  1105 12/29/19  1612 12/29/19  2309 12/30/19  0543 12/30/19  1048   POCTGLUCOSE 167* 204* 189* 162* 92 201*     Hemoglobin A1C   Date Value Ref Range Status   12/28/2019 9.9 (H) 4.0 - 5.6 % Final     Comment:     ADA Screening Guidelines:  5.7-6.4%  Consistent with prediabetes  >or=6.5%  Consistent with diabetes  High levels of fetal hemoglobin interfere with the HbA1C  assay. Heterozygous hemoglobin variants (HbS, HgC, etc)do  not significantly interfere with this assay.   However, presence of multiple variants may affect accuracy.       Scheduled Meds:   aspirin  81 mg Oral Daily    docusate sodium  100 mg Oral BID    escitalopram oxalate  10 mg Oral Daily    ferrous sulfate  325 mg Oral BID    fluticasone propionate  1 spray Each Nostril BID    lisinopril   20 mg Oral Daily    metoprolol tartrate  50 mg Oral BID    pantoprazole  40 mg Oral Daily     Continuous Infusions:   lactated ringers 75 mL/hr at 12/29/19 2207     As Needed: acetaminophen, cetirizine, Dextrose 10% Bolus, glucagon (human recombinant), glucagon (human recombinant), HYDROmorphone, HYDROmorphone, insulin aspart U-100, morphine, ondansetron, oxyCODONE, promethazine (PHENERGAN) IVPB, sodium chloride 0.9%, sodium chloride 0.9%, sodium chloride 0.9%, sodium chloride 0.9%, traMADol    Significant Imaging:   X-Ray Ankle 2 View Right  Narrative: EXAMINATION:  XR ANKLE 2 VIEW RIGHT    CLINICAL HISTORY:  post op ORIF rt ankle fracture;    TECHNIQUE:  AP and lateral radiographs of the right ankle were submitted.    COMPARISON:  12/27/2019 and 12/26/2019    FINDINGS:  There is an been interval placement of plate and screw construct about the medial malleolus.  The internal fixation of the avulsed portion of the medial malleolus is unchanged.  The internal fixation of the distal fibula and syndesmosis has been revised.  The syndesmosis screw has been removed.  The ankle mortise does appear to be more intact on these images.  Impression: Interval revision of the internal fixation of the syndesmosis and medial malleolus.  No complications identified.    Electronically signed by: Zoie Kumar MD  Date:    12/29/2019  Time:    11:04  SURG FL Surgery Fluoro Usage  See OP Notes for results.     IMPRESSION: See OP Notes for results.     This procedure was auto-finalized by: Virtual Radiologist

## 2019-12-31 LAB
ABO + RH BLD: NORMAL
ANION GAP SERPL CALC-SCNC: 12 MMOL/L (ref 8–16)
BASOPHILS # BLD AUTO: 0.01 K/UL (ref 0–0.2)
BASOPHILS NFR BLD: 0.1 % (ref 0–1.9)
BLD GP AB SCN CELLS X3 SERPL QL: NORMAL
BLD PROD TYP BPU: NORMAL
BLOOD UNIT EXPIRATION DATE: NORMAL
BLOOD UNIT TYPE CODE: 5100
BLOOD UNIT TYPE: NORMAL
BUN SERPL-MCNC: 43 MG/DL (ref 8–23)
CALCIUM SERPL-MCNC: 7.5 MG/DL (ref 8.7–10.5)
CHLORIDE SERPL-SCNC: 113 MMOL/L (ref 95–110)
CO2 SERPL-SCNC: 15 MMOL/L (ref 23–29)
CODING SYSTEM: NORMAL
CREAT SERPL-MCNC: 2.5 MG/DL (ref 0.5–1.4)
DIFFERENTIAL METHOD: ABNORMAL
DISPENSE STATUS: NORMAL
EOSINOPHIL # BLD AUTO: 0.2 K/UL (ref 0–0.5)
EOSINOPHIL NFR BLD: 2.5 % (ref 0–8)
ERYTHROCYTE [DISTWIDTH] IN BLOOD BY AUTOMATED COUNT: 17.2 % (ref 11.5–14.5)
EST. GFR  (AFRICAN AMERICAN): 23 ML/MIN/1.73 M^2
EST. GFR  (NON AFRICAN AMERICAN): 20 ML/MIN/1.73 M^2
GLUCOSE SERPL-MCNC: 131 MG/DL (ref 70–110)
HCT VFR BLD AUTO: 22.4 % (ref 37–48.5)
HGB BLD-MCNC: 6.9 G/DL (ref 12–16)
LYMPHOCYTES # BLD AUTO: 1 K/UL (ref 1–4.8)
LYMPHOCYTES NFR BLD: 12.8 % (ref 18–48)
MCH RBC QN AUTO: 26.4 PG (ref 27–31)
MCHC RBC AUTO-ENTMCNC: 30.8 G/DL (ref 32–36)
MCV RBC AUTO: 86 FL (ref 82–98)
MONOCYTES # BLD AUTO: 1.2 K/UL (ref 0.3–1)
MONOCYTES NFR BLD: 15.4 % (ref 4–15)
NEUTROPHILS # BLD AUTO: 5.2 K/UL (ref 1.8–7.7)
NEUTROPHILS NFR BLD: 69.2 % (ref 38–73)
PLATELET # BLD AUTO: 320 K/UL (ref 150–350)
PMV BLD AUTO: 9.7 FL (ref 9.2–12.9)
POCT GLUCOSE: 162 MG/DL (ref 70–110)
POCT GLUCOSE: 173 MG/DL (ref 70–110)
POCT GLUCOSE: 178 MG/DL (ref 70–110)
POCT GLUCOSE: 198 MG/DL (ref 70–110)
POTASSIUM SERPL-SCNC: 4.8 MMOL/L (ref 3.5–5.1)
RBC # BLD AUTO: 2.61 M/UL (ref 4–5.4)
SODIUM SERPL-SCNC: 140 MMOL/L (ref 136–145)
TRANS ERYTHROCYTES VOL PATIENT: NORMAL ML
WBC # BLD AUTO: 7.58 K/UL (ref 3.9–12.7)

## 2019-12-31 PROCEDURE — 25000003 PHARM REV CODE 250

## 2019-12-31 PROCEDURE — 63600175 PHARM REV CODE 636 W HCPCS: Performed by: FAMILY MEDICINE

## 2019-12-31 PROCEDURE — 11000001 HC ACUTE MED/SURG PRIVATE ROOM

## 2019-12-31 PROCEDURE — 63600175 PHARM REV CODE 636 W HCPCS

## 2019-12-31 PROCEDURE — 97530 THERAPEUTIC ACTIVITIES: CPT

## 2019-12-31 PROCEDURE — 86901 BLOOD TYPING SEROLOGIC RH(D): CPT

## 2019-12-31 PROCEDURE — P9021 RED BLOOD CELLS UNIT: HCPCS

## 2019-12-31 PROCEDURE — 36415 COLL VENOUS BLD VENIPUNCTURE: CPT

## 2019-12-31 PROCEDURE — 25000003 PHARM REV CODE 250: Performed by: HOSPITALIST

## 2019-12-31 PROCEDURE — 25000003 PHARM REV CODE 250: Performed by: NURSE PRACTITIONER

## 2019-12-31 PROCEDURE — 86920 COMPATIBILITY TEST SPIN: CPT

## 2019-12-31 PROCEDURE — 36430 TRANSFUSION BLD/BLD COMPNT: CPT

## 2019-12-31 PROCEDURE — 94761 N-INVAS EAR/PLS OXIMETRY MLT: CPT

## 2019-12-31 PROCEDURE — 80048 BASIC METABOLIC PNL TOTAL CA: CPT

## 2019-12-31 PROCEDURE — 85025 COMPLETE CBC W/AUTO DIFF WBC: CPT

## 2019-12-31 RX ORDER — HYDROCODONE BITARTRATE AND ACETAMINOPHEN 500; 5 MG/1; MG/1
TABLET ORAL
Status: DISCONTINUED | OUTPATIENT
Start: 2019-12-31 | End: 2020-01-08 | Stop reason: HOSPADM

## 2019-12-31 RX ADMIN — OXYCODONE HYDROCHLORIDE 5 MG: 5 TABLET ORAL at 06:12

## 2019-12-31 RX ADMIN — FLUTICASONE PROPIONATE 50 MCG: 50 SPRAY, METERED NASAL at 08:12

## 2019-12-31 RX ADMIN — FLUTICASONE PROPIONATE 50 MCG: 50 SPRAY, METERED NASAL at 09:12

## 2019-12-31 RX ADMIN — GLYBURIDE 2.5 MG: 2.5 TABLET ORAL at 08:12

## 2019-12-31 RX ADMIN — FERROUS SULFATE TAB EC 325 MG (65 MG FE EQUIVALENT) 325 MG: 325 (65 FE) TABLET DELAYED RESPONSE at 09:12

## 2019-12-31 RX ADMIN — SODIUM CHLORIDE: 0.9 INJECTION, SOLUTION INTRAVENOUS at 03:12

## 2019-12-31 RX ADMIN — METOPROLOL TARTRATE 50 MG: 50 TABLET ORAL at 08:12

## 2019-12-31 RX ADMIN — LISINOPRIL 20 MG: 20 TABLET ORAL at 08:12

## 2019-12-31 RX ADMIN — DOCUSATE SODIUM 100 MG: 100 CAPSULE, LIQUID FILLED ORAL at 08:12

## 2019-12-31 RX ADMIN — MORPHINE SULFATE 2 MG: 2 INJECTION, SOLUTION INTRAMUSCULAR; INTRAVENOUS at 02:12

## 2019-12-31 RX ADMIN — FERROUS SULFATE TAB EC 325 MG (65 MG FE EQUIVALENT) 325 MG: 325 (65 FE) TABLET DELAYED RESPONSE at 08:12

## 2019-12-31 RX ADMIN — PANTOPRAZOLE SODIUM 40 MG: 40 TABLET, DELAYED RELEASE ORAL at 08:12

## 2019-12-31 RX ADMIN — OXYCODONE HYDROCHLORIDE 5 MG: 5 TABLET ORAL at 09:12

## 2019-12-31 RX ADMIN — METOPROLOL TARTRATE 50 MG: 50 TABLET ORAL at 09:12

## 2019-12-31 RX ADMIN — METFORMIN HYDROCHLORIDE 500 MG: 500 TABLET ORAL at 08:12

## 2019-12-31 RX ADMIN — OXYCODONE HYDROCHLORIDE 5 MG: 5 TABLET ORAL at 01:12

## 2019-12-31 RX ADMIN — FUROSEMIDE 40 MG: 40 TABLET ORAL at 08:12

## 2019-12-31 RX ADMIN — DOCUSATE SODIUM 100 MG: 100 CAPSULE, LIQUID FILLED ORAL at 09:12

## 2019-12-31 RX ADMIN — ESCITALOPRAM OXALATE 10 MG: 10 TABLET ORAL at 08:12

## 2019-12-31 RX ADMIN — ASPIRIN 81 MG 81 MG: 81 TABLET ORAL at 08:12

## 2019-12-31 NOTE — PROGRESS NOTES
[facility]  Orthopedics  Progress Note    Patient Name: Bina Daniels  MRN: [id number]  Admission Date: 12/26/2019  Hospital Length of Stay: 4 days  Attending Provider: Cooper Medina MD  Primary Care Provider: Tho Haro MD  Follow-up For: Procedure(s) (LRB):  OPEN REDUCTION INTERNAL FIXATION-ANKLE (Right)  CLOSURE, WOUND (Right)    Post-Operative Day: 2 Days Post-Op  Subjective:     Principal Problem:Trimalleolar fracture of ankle, closed, right, initial encounter    Principal Orthopedic Problem: Post-op ORIF rt hip trimalleolar fracture dislocation    Interval History: Pain improved, but poor progress with PT and OT. OT recommended discharge to SNF as she is unable to ambulate NWB rle with walker. Difficulty with transfers and ambulation    Review of patient's allergies indicates:   Allergen Reactions    Sulfa (sulfonamide antibiotics) Hives       Current Facility-Administered Medications   Medication    0.9%  NaCl infusion (for blood administration)    acetaminophen tablet 650 mg    aspirin chewable tablet 81 mg    cetirizine tablet 5 mg    docusate sodium capsule 100 mg    escitalopram oxalate tablet 10 mg    ferrous sulfate EC tablet 325 mg    fluticasone propionate 50 mcg/actuation nasal spray 50 mcg    furosemide tablet 40 mg    glucagon (human recombinant) injection 1 mg    glyBURIDE tablet 2.5 mg    And    metFORMIN tablet 500 mg    hydromorphone (PF) injection 0.2 mg    HYDROmorphone injection 0.5 mg    influenza (QUADRIVALENT PF) vaccine 0.5 mL    insulin aspart U-100 pen 0-5 Units    lisinopril tablet 20 mg    metoprolol tartrate (LOPRESSOR) tablet 50 mg    morphine injection 2 mg    ondansetron injection 4 mg    oxyCODONE immediate release tablet 5 mg    pantoprazole EC tablet 40 mg    promethazine (PHENERGAN) 6.25 mg in dextrose 5 % 50 mL IVPB    sodium chloride 0.9% flush 10 mL    sodium chloride 0.9% flush 10 mL    traMADol tablet 50 mg  "    Objective:     Vital Signs (Most Recent):  Temp: 98.2 °F (36.8 °C) (12/31/19 1626)  Pulse: 72 (12/31/19 1626)  Resp: 15 (12/31/19 1626)  BP: (!) 143/65 (12/31/19 1626)  SpO2: 98 % (12/31/19 1539) Vital Signs (24h Range):  Temp:  [97.4 °F (36.3 °C)-98.3 °F (36.8 °C)] 98.2 °F (36.8 °C)  Pulse:  [66-77] 72  Resp:  [15-20] 15  SpO2:  [95 %-98 %] 98 %  BP: (119-157)/(58-70) 143/65     Weight: 64.7 kg (142 lb 10.2 oz)  Height: 5' 4" (162.6 cm)  Body mass index is 24.48 kg/m².      Intake/Output Summary (Last 24 hours) at 12/31/2019 1715  Last data filed at 12/31/2019 0600  Gross per 24 hour   Intake 180 ml   Output 450 ml   Net -270 ml       Ortho/SPM Exam   Dressing RLU stable.  NVI.   Abd-soft, NT  Heart-rrr, Lungs clear    Significant Labs:   BMP:   Recent Labs   Lab 12/31/19  0538   *      K 4.8   *   CO2 15*   BUN 43*   CREATININE 2.5*   CALCIUM 7.5*     CBC:   Recent Labs   Lab 12/30/19  0452 12/31/19  0538   WBC 8.49 7.58   HGB 7.2* 6.9*   HCT 23.6* 22.4*    320     All pertinent labs within the past 24 hours have been reviewed.    Significant Imaging: None    Assessment/Plan:     Active Diagnoses:    Diagnosis Date Noted POA    PRINCIPAL PROBLEM:  Trimalleolar fracture of ankle, closed, right, initial encounter [S82.281G] 12/26/2019 Yes    Neuropathy due to secondary diabetes mellitus [E13.40] 12/27/2019 Yes     Chronic    Anemia due to stage 3 chronic kidney disease [N18.3, D63.1] 12/27/2019 Yes    CKD (chronic kidney disease), stage IV [N18.4] 12/27/2019 Yes     Chronic    Type 2 diabetes mellitus with neurologic complication, without long-term current use of insulin [E11.49] 12/27/2019 Yes     Chronic    Coronary artery disease involving native heart without angina pectoris [I25.10] 12/27/2019 Yes     Chronic    Ankle dislocation, right, initial encounter [S93.04XA] 12/27/2019 Yes      Problems Resolved During this Admission:   Post-op ORIF rt hip trimalleolar fracture " dislocation  Anemia- slight worsening today    Plan- cont PT and OT  Consider transfusion tomorrow if Hct continues to drop      Cooper Medina MD  Orthopedics  [facility]

## 2019-12-31 NOTE — PT/OT/SLP PROGRESS
Physical Therapy Treatment    Patient Name:  Bina Daniels   MRN:  [id number]    Recommendations:     Discharge Recommendations:  nursing facility, skilled   Discharge Equipment Recommendations: (Per SNF (possibly slide board, RW, w/c)   Barriers to discharge: Inaccessible home    Assessment:     Bina Daniels is a 64 y.o. female admitted with a medical diagnosis of Trimalleolar fracture of ankle, closed, right, initial encounter.  She presents with the following impairments/functional limitations:  weakness, impaired endurance, impaired self care skills, impaired functional mobilty, gait instability, impaired balance, decreased coordination, decreased upper extremity function, decreased lower extremity function, decreased safety awareness, edema, pain, decreased ROM, impaired coordination, orthopedic precautions. AM session:  Pt able to perform 2 sit to stand transfers with min A of 2 people, RW, and NWB RLE. Not able to adhere to 100% of NWB status.    PM session: Pt able to transfer from W/C to EOB with RW, NWB RLE, and mod A of 2 using stand pivot. Initially did not adhere 100% to NWB status then with verbal cueing pt able to adhere 100% to RLE NWB for rest of transfer. Would benefit from continued PT services to increase pt's functional mobility addressing all impairments listed a above.    Rehab Prognosis: Good; patient would benefit from acute skilled PT services to address these deficits and reach maximum level of function.    Recent Surgery: Procedure(s) (LRB):  OPEN REDUCTION INTERNAL FIXATION-ANKLE (Right)  CLOSURE, WOUND (Right) 2 Days Post-Op    Plan:     During this hospitalization, patient to be seen daily to address the identified rehab impairments via gait training, therapeutic activities, therapeutic exercises, neuromuscular re-education, wheelchair management/training and progress toward the following goals:    · Plan of Care Expires:  01/30/20    Subjective     Chief Complaint: None  Expressed  Patient/Family Comments/goals: None Expressed  Pain/Comfort:  · Pain Rating 1: (Did not complain of pain)  · Pain Rating Post-Intervention 1: 0/10      Objective:     Communicated with  nurse prior to session.  Patient found supine with peripheral IV upon PT entry to room.     General Precautions: Standard, fall   Orthopedic Precautions:RLE non weight bearing   Braces: (Cast on right lower leg)     Functional Mobility:  · Bed Mobility:     · Rolling Left:  stand by assistance  · Scooting: stand by assistance and  To EOB  · Supine to Sit: stand by assistance  · Transfers:     · Sit to Stand:  minimum assistance and of  2 persons with rolling walker  · Bed to Chair: moderate assistance and of 2 persons with  rolling walker  using  Stand Pivot      AM-PAC 6 CLICK MOBILITY  Turning over in bed (including adjusting bedclothes, sheets and blankets)?: 4  Sitting down on and standing up from a chair with arms (e.g., wheelchair, bedside commode, etc.): 2  Moving from lying on back to sitting on the side of the bed?: 3  Moving to and from a bed to a chair (including a wheelchair)?: 2  Need to walk in hospital room?: 1  Climbing 3-5 steps with a railing?: 1  Basic Mobility Total Score: 13       Therapeutic Activities and Exercises:  AM session: Pt able to perform 2 sit to stand trials with RW and min A of 2 and NWB RLE. Pt unable to adhere to 100% of NWB status. Transferring from EOB to B/S chair performing a stand pivot with RW and mod A of 2 with pt requiring assistance to advance/turn RW and verbal cueing to pivot/slide/scoot left foot. Pt is fearful and lacks confidence to perform transfer.    PM session: Pt able to perform transfer training from W/C to EOB with RW, RLE NWB, and mod A of 2. Initially unable to adhere 100% to NWB status then with verbal cueing able to adhere 100% to NWB status. Does require reassuring as pt lacks confidence in her ability to perform transfers. Requires extra time to complete  transfers with increased encouragement.    Patient left up in chair with all lines intact, call button in reach and  nurse notified..    GOALS:   Multidisciplinary Problems     Physical Therapy Goals        Problem: Physical Therapy Goal    Goal Priority Disciplines Outcome Goal Variances Interventions   Physical Therapy Goal     PT, PT/OT Ongoing, Progressing     Description:  Goals to be met by: 2020     Patient will increase functional independence with mobility by performin. Supine to sit with Supervision/SBA  2. Sit to supine with Supervision/SBA  3. Sit to stand transfer with Stand-by Assistance and Contact Guard Assistance using RW NWB RLE  4. Bed to chair transfer with Stand-by Assistance and Contact Guard Assistance using Rolling Walker, or slideboard NWB RLE  5. Gait  x 10-30 feet with Contact Guard Assistance and Minimal Assistance using Rolling Walker as able NWB RLE.   6. Wheelchair propulsion x  feet with Supervision using bilateral upper extremities  7. Lower extremity exercise program x10 x 3 reps with independence                      Time Tracking:     PT Received On: 19  PT Start Time: 1129(Overlap with OT)   PM: 1408  PT Stop Time: 1152(Overlap with OT)   PM: 1433  PT Total Time (min): 23 min    PM: 25 min    Billable Minutes: Therapeutic Activity AM = 10     PM= 13    Treatment Type: Treatment  PT/PTA: PTA     PTA Visit Number: 1     Alayna Garibay, PTA  2019

## 2019-12-31 NOTE — PLAN OF CARE
Problem: Occupational Therapy Goal  Goal: Occupational Therapy Goal  Description  Goals to be met by: 01/29/20     Patient will increase functional independence with ADLs by performing:    UE Dressing with Modified [place].  LE Dressing with Modified [place].  Toileting from bedside commode with Contact Guard Assistance for hygiene and clothing management.   Toilet transfer to bedside commode with Minimal Assistance while maintaining NWB to R LE.  Upper extremity exercise program 3 x12 reps per handout, with supervision to improve UB strength/endurance for improved transfers.     Outcome: Ongoing, Progressing     Patient demonstrated improved ability to come to standing this date. Still /c difficulty adhering to NWB status of RLE and requires max VCs and (A) to maintain R foot elevated or propped on therapist's foot during stands and transfers. Completed EOB <> W/C SPT this date (vs SB t/f yesterday). Patient unable to complete chair press-ups from w/c level (though has good arm strength). Patient demos increased fear and anxiety /c movement and further limits/immobilizes herself, when she could do more to (A) /c stand. Patient will benefit from SNF upon D/C to maximize strength and endurance for ADL tasks and functional transfers.

## 2019-12-31 NOTE — PLAN OF CARE
TN met with patient at bedside. Currently, patient lives at home with her son and grandson. DME used prior to admission includes a BSC, TTB and RW.  No other DME or HH noted.  Patient states that she would have help at home once discharge from her son and grandson. Per therapy recommendations, SNF is recommended at discharge prior to patient going home. Patient is in agreement with recommendations.  Patient is currently uninsured but states that she will have insurance coverage starting Jan 1st with [employer]. TN will contact patient's daughter Chandni, [phone] for more information regarding patient's insurance coverage.      TN  updated whiteboard with contact information. Blue discharge folder and discharge brochure given to patient. TN instructed patient to contact TN if she has any further questions or concerns. TN will continue to follow.         12/31/19 1031   Discharge Assessment   Assessment Type Discharge Planning Assessment   Confirmed/corrected address and phone number on facesheet? Yes   Assessment information obtained from? Patient;Medical Record   Expected Length of Stay (days) 6   Communicated expected length of stay with patient/caregiver yes   Prior to hospitilization cognitive status: Alert/Oriented   Prior to hospitalization functional status: Assistive Equipment   Current cognitive status: Alert/Oriented   Current Functional Status: Assistive Equipment;Needs Assistance   Lives With child(jo-ann), adult   Able to Return to Prior Arrangements no   Is patient able to care for self after discharge? No   Patient's perception of discharge disposition skilled nursing facility   Readmission Within the Last 30 Days no previous admission in last 30 days   Patient currently being followed by outpatient case management? No   Patient currently receives any other outside agency services? No   Equipment Currently Used at Home bath bench;commode;walker, rolling   Do you have any problems affording  any of your prescribed medications? No   Is the patient taking medications as prescribed? yes   Does the patient have transportation home? Yes   Transportation Anticipated family or friend will provide   Does the patient receive services at [facility]? No   Discharge Plan A Skilled Nursing Facility   Discharge Plan B Home with family;Home Health   DME Needed Upon Discharge  other (see comments)  (TBD)   Patient/Family in Agreement with Plan yes   Does the patient have transportation to healthcare appointments? Yes   Readmission Questionnaire   Have you felt down, depressed, or hopeless? 0   Have you felt little interest or pleasure in doing things? 0

## 2019-12-31 NOTE — PLAN OF CARE
Patient medicated for pain with PRN medication. Right leg elevated on pillow. SCD's to left leg in use. Weight shifted in bed using pillows. Remains free from falls, bed alarm in use.

## 2019-12-31 NOTE — PLAN OF CARE
Problem: Physical Therapy Goal  Goal: Physical Therapy Goal  Description  Goals to be met by: 2020     Patient will increase functional independence with mobility by performin. Supine to sit with Supervision/SBA  2. Sit to supine with Supervision/SBA  3. Sit to stand transfer with Stand-by Assistance and Contact Guard Assistance using RW NWB RLE  4. Bed to chair transfer with Stand-by Assistance and Contact Guard Assistance using Rolling Walker, or slideboard NWB RLE  5. Gait  x 10-30 feet with Contact Guard Assistance and Minimal Assistance using Rolling Walker as able NWB RLE.   6. Wheelchair propulsion x  feet with Supervision using bilateral upper extremities  7. Lower extremity exercise program x10 x 3 reps with independence     Outcome: Ongoing, Progressing   Pt continues to work toward established goals.

## 2019-12-31 NOTE — PT/OT/SLP PROGRESS
Occupational Therapy   Treatment    Name: Bina Daniels  MRN: [id number]  Admitting Diagnosis:  Trimalleolar fracture of ankle, closed, right, initial encounter  2 Days Post-Op    Recommendations:     Discharge Recommendations: nursing facility, skilled  Discharge Equipment Recommendations:  (Defer to SNF; poss slide board and w/c)  Barriers to discharge:  Inaccessible home environment    Assessment:   Patient demonstrated improved ability to come to standing this date. Still /c difficulty adhering to NWB status of RLE and requires max VCs and (A) to maintain R foot elevated or propped on therapist's foot during stands and transfers. Completed EOB <> W/C SPT this date (vs SB t/f yesterday). Patient unable to complete chair press-ups from w/c level (though has good arm strength). Patient demos increased fear and anxiety /c movement and further limits/immobilizes herself, when she could do more to (A) /c stand. Patient will benefit from SNF upon D/C to maximize strength and endurance for ADL tasks and functional transfers.    Bina Daniels is a 64 y.o. female with a medical diagnosis of Trimalleolar fracture of ankle, closed, right, initial encounter. Performance deficits affecting function are weakness, impaired endurance, impaired self care skills, impaired functional mobilty, gait instability, impaired balance, decreased coordination, decreased upper extremity function, decreased lower extremity function, pain, decreased ROM, orthopedic precautions, edema.     Rehab Prognosis:  Good; patient would benefit from acute skilled OT services to address these deficits and reach maximum level of function.       Plan:     Patient to be seen 5 x/week to address the above listed problems via self-care/home management, therapeutic activities, therapeutic exercises  · Plan of Care Expires: 01/28/20  · Plan of Care Reviewed with: patient    Subjective     Pain/Comfort:  · Pain Rating 1: 0/10  · Pain Rating Post-Intervention 1:  "3/10    Objective:     Communicated with: nurseTommy prior to session.  Patient found HOB elevated with peripheral IV upon OT entry to room.    General Precautions: Standard, fall   Orthopedic Precautions:RLE non weight bearing   Braces: (cast on RLE)     Bed Mobility:    · Patient completed Rolling/Turning to Left with  stand by assistance and with side rail  · Patient completed Scooting/Bridging with stand by assistance  · Patient completed Supine to Sit with stand by assistance and with side rail  · Patient completed Sit to Supine with contact guard assistance     Functional Mobility/Transfers:  · Patient completed Sit <> Stand Transfer with maximal assistance and of 2 persons  with  rolling walker   · Patient completed Bed <> Chair Transfer using Stand Pivot technique with maximal assistance and of 2 persons with rolling walker and wheelchair    Activities of Daily Living:  · Feeding:  modified independence      [facility] 6 Click ADL: 16    Treatment & Education:  Patient /c bed mob as noted above. Instructed in sit <> stand using RW -- maxA of 2 persons, difficulty maintaining NWB status, but was able to come upright on first attempt. Increased fear and anxiety upon standing -- "I'm going to fall!". Patient /c seated rest break before standing a 2nd time and maxA of 2 persons SPT to w/c /c max VCs throughout. Patient set-up for lunch in w/c and RLE elevated on leg rest. Patient sat up ~2 hours before requesting to return to bed.  Upon returning in PM, patient unable to complete chair press-ups despite max visual and verbal cues/demos, though when completing MMT patient /c good muscle strength in bis/susu. Patient is self-limited by fear/anxiety. MaxA of 2 using RW to SPT to EOB. Patient /c improved ability to  RLE when cued during t/f. Educated on importance of BUE strengthening.     Patient left HOB elevated with all lines intact, call button in reach, bed alarm on and nsg notifiedEducation:      GOALS: "   Multidisciplinary Problems     Occupational Therapy Goals        Problem: Occupational Therapy Goal    Goal Priority Disciplines Outcome Interventions   Occupational Therapy Goal     OT, PT/OT Ongoing, Progressing    Description:  Goals to be met by: 01/29/20     Patient will increase functional independence with ADLs by performing:    UE Dressing with Modified [place].  LE Dressing with Modified [place].  Toileting from bedside commode with Contact Guard Assistance for hygiene and clothing management.   Toilet transfer to bedside commode with Minimal Assistance while maintaining NWB to R LE.  Upper extremity exercise program 3 x12 reps per handout, with supervision to improve UB strength/endurance for improved transfers.                      Time Tracking:     OT Date of Treatment: 12/31/19  OT Start Time: 1112 [id number]: 25 mins co-tx /c PTA  OT Stop Time: 1153  OT Total Time (min): 38 mins co-tx /c PTA    Billable Minutes:Therapeutic Activity 23 + 12    SONIA Lynn  12/31/2019

## 2020-01-01 LAB
ANION GAP SERPL CALC-SCNC: 11 MMOL/L (ref 8–16)
BASOPHILS # BLD AUTO: 0.02 K/UL (ref 0–0.2)
BASOPHILS NFR BLD: 0.3 % (ref 0–1.9)
BUN SERPL-MCNC: 45 MG/DL (ref 8–23)
CALCIUM SERPL-MCNC: 7.5 MG/DL (ref 8.7–10.5)
CHLORIDE SERPL-SCNC: 112 MMOL/L (ref 95–110)
CO2 SERPL-SCNC: 15 MMOL/L (ref 23–29)
CREAT SERPL-MCNC: 2.7 MG/DL (ref 0.5–1.4)
D DIMER PPP IA.FEU-MCNC: 1 MG/L FEU
DIFFERENTIAL METHOD: ABNORMAL
EOSINOPHIL # BLD AUTO: 0.2 K/UL (ref 0–0.5)
EOSINOPHIL NFR BLD: 2.7 % (ref 0–8)
ERYTHROCYTE [DISTWIDTH] IN BLOOD BY AUTOMATED COUNT: 16.3 % (ref 11.5–14.5)
EST. GFR  (AFRICAN AMERICAN): 21 ML/MIN/1.73 M^2
EST. GFR  (NON AFRICAN AMERICAN): 18 ML/MIN/1.73 M^2
GLUCOSE SERPL-MCNC: 161 MG/DL (ref 70–110)
HCT VFR BLD AUTO: 29.5 % (ref 37–48.5)
HGB BLD-MCNC: 9.1 G/DL (ref 12–16)
LYMPHOCYTES # BLD AUTO: 0.9 K/UL (ref 1–4.8)
LYMPHOCYTES NFR BLD: 13.1 % (ref 18–48)
MCH RBC QN AUTO: 26.4 PG (ref 27–31)
MCHC RBC AUTO-ENTMCNC: 30.8 G/DL (ref 32–36)
MCV RBC AUTO: 86 FL (ref 82–98)
MONOCYTES # BLD AUTO: 1.1 K/UL (ref 0.3–1)
MONOCYTES NFR BLD: 16.4 % (ref 4–15)
NEUTROPHILS # BLD AUTO: 4.6 K/UL (ref 1.8–7.7)
NEUTROPHILS NFR BLD: 67.5 % (ref 38–73)
PLATELET # BLD AUTO: 320 K/UL (ref 150–350)
PMV BLD AUTO: 9.7 FL (ref 9.2–12.9)
POCT GLUCOSE: 151 MG/DL (ref 70–110)
POCT GLUCOSE: 160 MG/DL (ref 70–110)
POCT GLUCOSE: 171 MG/DL (ref 70–110)
POCT GLUCOSE: 176 MG/DL (ref 70–110)
POTASSIUM SERPL-SCNC: 4.6 MMOL/L (ref 3.5–5.1)
RBC # BLD AUTO: 3.45 M/UL (ref 4–5.4)
SODIUM SERPL-SCNC: 138 MMOL/L (ref 136–145)
WBC # BLD AUTO: 6.78 K/UL (ref 3.9–12.7)

## 2020-01-01 PROCEDURE — 36415 COLL VENOUS BLD VENIPUNCTURE: CPT

## 2020-01-01 PROCEDURE — 25000003 PHARM REV CODE 250

## 2020-01-01 PROCEDURE — C9399 UNCLASSIFIED DRUGS OR BIOLOG: HCPCS | Performed by: FAMILY MEDICINE

## 2020-01-01 PROCEDURE — 80048 BASIC METABOLIC PNL TOTAL CA: CPT

## 2020-01-01 PROCEDURE — 63600175 PHARM REV CODE 636 W HCPCS

## 2020-01-01 PROCEDURE — 99900035 HC TECH TIME PER 15 MIN (STAT)

## 2020-01-01 PROCEDURE — 63600175 PHARM REV CODE 636 W HCPCS: Performed by: FAMILY MEDICINE

## 2020-01-01 PROCEDURE — 85025 COMPLETE CBC W/AUTO DIFF WBC: CPT

## 2020-01-01 PROCEDURE — 11000001 HC ACUTE MED/SURG PRIVATE ROOM

## 2020-01-01 PROCEDURE — 97110 THERAPEUTIC EXERCISES: CPT

## 2020-01-01 PROCEDURE — 94761 N-INVAS EAR/PLS OXIMETRY MLT: CPT

## 2020-01-01 PROCEDURE — 25000003 PHARM REV CODE 250: Performed by: HOSPITALIST

## 2020-01-01 PROCEDURE — 25000003 PHARM REV CODE 250: Performed by: NURSE PRACTITIONER

## 2020-01-01 PROCEDURE — 85379 FIBRIN DEGRADATION QUANT: CPT

## 2020-01-01 RX ORDER — ENOXAPARIN SODIUM 100 MG/ML
30 INJECTION SUBCUTANEOUS EVERY 12 HOURS
Status: DISCONTINUED | OUTPATIENT
Start: 2020-01-01 | End: 2020-01-01

## 2020-01-01 RX ORDER — ENOXAPARIN SODIUM 100 MG/ML
30 INJECTION SUBCUTANEOUS EVERY 24 HOURS
Status: DISCONTINUED | OUTPATIENT
Start: 2020-01-01 | End: 2020-01-02

## 2020-01-01 RX ADMIN — FLUTICASONE PROPIONATE 50 MCG: 50 SPRAY, METERED NASAL at 09:01

## 2020-01-01 RX ADMIN — DOCUSATE SODIUM 100 MG: 100 CAPSULE, LIQUID FILLED ORAL at 09:01

## 2020-01-01 RX ADMIN — METFORMIN HYDROCHLORIDE 500 MG: 500 TABLET ORAL at 09:01

## 2020-01-01 RX ADMIN — FUROSEMIDE 40 MG: 40 TABLET ORAL at 09:01

## 2020-01-01 RX ADMIN — ENOXAPARIN SODIUM 30 MG: 100 INJECTION SUBCUTANEOUS at 05:01

## 2020-01-01 RX ADMIN — FERROUS SULFATE TAB EC 325 MG (65 MG FE EQUIVALENT) 325 MG: 325 (65 FE) TABLET DELAYED RESPONSE at 09:01

## 2020-01-01 RX ADMIN — LISINOPRIL 20 MG: 20 TABLET ORAL at 09:01

## 2020-01-01 RX ADMIN — OXYCODONE HYDROCHLORIDE 5 MG: 5 TABLET ORAL at 07:01

## 2020-01-01 RX ADMIN — ASPIRIN 81 MG 81 MG: 81 TABLET ORAL at 09:01

## 2020-01-01 RX ADMIN — INSULIN DETEMIR 10 UNITS: 100 INJECTION, SOLUTION SUBCUTANEOUS at 09:01

## 2020-01-01 RX ADMIN — METOPROLOL TARTRATE 50 MG: 50 TABLET ORAL at 09:01

## 2020-01-01 RX ADMIN — GLYBURIDE 2.5 MG: 2.5 TABLET ORAL at 09:01

## 2020-01-01 RX ADMIN — PANTOPRAZOLE SODIUM 40 MG: 40 TABLET, DELAYED RELEASE ORAL at 09:01

## 2020-01-01 RX ADMIN — ESCITALOPRAM OXALATE 10 MG: 10 TABLET ORAL at 09:01

## 2020-01-01 RX ADMIN — OXYCODONE HYDROCHLORIDE 5 MG: 5 TABLET ORAL at 05:01

## 2020-01-01 NOTE — PLAN OF CARE
"  Problem: Occupational Therapy Goal  Goal: Occupational Therapy Goal  Description  Goals to be met by: 01/29/20     Patient will increase functional independence with ADLs by performing:    UE Dressing with Modified [place].  LE Dressing with Modified [place].  Toileting from bedside commode with Contact Guard Assistance for hygiene and clothing management.   Toilet transfer to bedside commode with Minimal Assistance while maintaining NWB to R LE.  Upper extremity exercise program 3 x12 reps per handout, with supervision to improve UB strength/endurance for improved transfers.     Outcome: Ongoing, Progressing   Pt progressing slowly towards goals, remains unable to maintain RLE NWB during scooting laterally seated EOB. Pt demonstrates increased frustration during therex and therapeutic activities stating, "This is all just hard." Cont OT POC  "

## 2020-01-01 NOTE — PROGRESS NOTES
Pharmacist Renal Dose Adjustment Note    Bina Daniels is a 64 y.o. female being treated with the medication lovenox    Patient Data:    Vital Signs (Most Recent):  Temp: 97.1 °F (36.2 °C) (01/01/20 1133)  Pulse: 69 (01/01/20 1133)  Resp: 18 (01/01/20 1133)  BP: (!) 181/74 (01/01/20 1133)  SpO2: 97 % (01/01/20 1515)   Vital Signs (72h Range):  Temp:  [96.5 °F (35.8 °C)-98.5 °F (36.9 °C)]   Pulse:  [66-82]   Resp:  [13-20]   BP: (119-181)/(57-77)   SpO2:  [92 %-98 %]      Recent Labs   Lab 12/30/19  0452 12/31/19  0538 01/01/20  0422   CREATININE 2.5* 2.5* 2.7*     Serum creatinine: 2.7 mg/dL (H) 01/01/20 0422  Estimated creatinine clearance: 18.7 mL/min (A)    Medication:lovenox dose: 30 mg frequency q12h will be changed to medication:lovenox dose:30 mg frequency:q24h    Pharmacist's Name: Sania Albert  Pharmacist's Extension: [phone]

## 2020-01-01 NOTE — PROGRESS NOTES
[facility] Hospital Medicine  Progress Note    Patient Name: Bina Daniels  MRN: [id number]  Patient Class: IP- Inpatient   Admission Date: 12/26/2019  Length of Stay: 4 days  Attending Physician: Cooper Medina MD  Primary Care Provider: Tho Haro MD        Subjective:     Principal Problem:Trimalleolar fracture of ankle, closed, right, initial encounter        HPI:  The pt is a 63 yeasrs old female with PMH significant for DMII, neuropathy, breast cancer, and decrease mobility. She was walking in the house using a walker and tripped on a towel, fell down, and sustained injury to her right foot.  She was admitted to ortho service, had a 1st stage of ORIF on Thursday, and will have a second repeat on Sunday morning.  Pt is clinically stable not having any complaints.  She is S/P 1 unit PRBC transfusion yesterday.    Overview/Hospital Course:  12/28 ptis doing fine, some pain and itchiness in the cast area.  12/29 pt is S/P stage 2 of ORIF right ankle. Ding ok, placing her on DM diet  12/30 pt is POD #2 for the second ORIF right ankle, doing ok, might go to rehab vs snf soon,. Her K is 5.3 as per pt she takes lasix 40 mg po BID at home. We will start on 40 mg po daily and dc fluid, pt is eating and tolerating PO intake. Re-introduce other home medsgradually    Interval History: awake and alert,sitting up on the chair.   Discussed low H/H with patient - transfuse with 1 U prbc    Review of Systems   Constitutional: Positive for activity change. Negative for appetite change and chills.   Respiratory: Negative for cough and shortness of breath.    Cardiovascular: Negative for chest pain.   Gastrointestinal: Negative for abdominal distention, abdominal pain, constipation, nausea and vomiting.   Musculoskeletal: Positive for arthralgias (pain controlled right ankle).   Neurological: Negative for dizziness and weakness.   Psychiatric/Behavioral: Negative for agitation. The patient is not  nervous/anxious.      Objective:     Vital Signs (Most Recent):  Temp: 98.2 °F (36.8 °C) (12/31/19 1626)  Pulse: 72 (12/31/19 1626)  Resp: 15 (12/31/19 1626)  BP: (!) 143/65 (12/31/19 1626)  SpO2: 98 % (12/31/19 1539) Vital Signs (24h Range):  Temp:  [97.4 °F (36.3 °C)-98.3 °F (36.8 °C)] 98.2 °F (36.8 °C)  Pulse:  [66-77] 72  Resp:  [15-20] 15  SpO2:  [95 %-98 %] 98 %  BP: (119-157)/(58-70) 143/65     Weight: 64.7 kg (142 lb 10.2 oz)  Body mass index is 24.48 kg/m².    Intake/Output Summary (Last 24 hours) at 12/31/2019 1909  Last data filed at 12/31/2019 0600  Gross per 24 hour   Intake 180 ml   Output 450 ml   Net -270 ml      Physical Exam   Constitutional: She appears well-developed and well-nourished.   HENT:   Head: Normocephalic and atraumatic.   Eyes: EOM are normal.   Pulmonary/Chest: Effort normal. No respiratory distress.   Abdominal: Soft.   Musculoskeletal: She exhibits deformity (r ankle, s/p Post op).   Skin: Skin is warm and dry.   Psychiatric: She has a normal mood and affect. Her behavior is normal. Judgment and thought content normal.   Nursing note and vitals reviewed.      Significant Labs:   Blood Culture: No results for input(s): LABBLOO in the last 48 hours.  CBC:   Recent Labs   Lab 12/30/19  0452 12/31/19  0538   WBC 8.49 7.58   HGB 7.2* 6.9*   HCT 23.6* 22.4*    320     CMP:   Recent Labs   Lab 12/30/19  0452 12/31/19  0538    140   K 5.3* 4.8   * 113*   CO2 10* 15*   GLU 98 131*   BUN 43* 43*   CREATININE 2.5* 2.5*   CALCIUM 7.4* 7.5*   ANIONGAP 13 12   EGFRNONAA 20* 20*     Coagulation: No results for input(s): PT, INR, APTT in the last 48 hours.  TSH: No results for input(s): TSH in the last 4320 hours.  Urine Culture: No results for input(s): LABURIN in the last 48 hours.  Urine Studies: No results for input(s): COLORU, APPEARANCEUA, PHUR, SPECGRAV, PROTEINUA, GLUCUA, KETONESU, BILIRUBINUA, OCCULTUA, NITRITE, UROBILINOGEN, LEUKOCYTESUR, RBCUA, WBCUA, BACTERIA,  SQUAMEPITHEL, HYALINECASTS in the last 48 hours.    Invalid input(s): SLICK    Significant Imaging: I have reviewed all pertinent imaging results/findings within the past 24 hours.      Assessment/Plan:      * Trimalleolar fracture of ankle, closed, right, initial encounter  Per primary team  12/29 s/p OR ORIF today    Ankle dislocation, right, initial encounter  Per primary team      Coronary artery disease involving native heart without angina pectoris  Continue asa and metoprolol      Type 2 diabetes mellitus with neurologic complication, without long-term current use of insulin  Pt home meds are metformin and glyburide  I will recommend stopping them while inpatient and pt getting to be NPO,  I will place pt on ISS  12/29 place pt on dm diet, post op care  12/30 restart metformin and glyburide gradually    CKD (chronic kidney disease), stage IV    Will monitor bun/cr.  Good urine output  12/29 stable bmp, renal function  12/30 restart lasix at 40 mg po daily    Anemia due to stage 3 chronic kidney disease  Stable   S/P 1 unit PRBC tranfusion      Neuropathy due to secondary diabetes mellitus  No meds needed.  monitor        VTE Risk Mitigation (From admission, onward)         Ordered     IP VTE HIGH RISK PATIENT  Once      12/29/19 1112     Place sequential compression device  Until discontinued      12/29/19 1112                      Monica Carbone MD  Department of Hospital Medicine   [facility]

## 2020-01-01 NOTE — PLAN OF CARE
Pts safety maintained. Meds given per MAR, RLE elevated on the pillow. Pain relieved with PRN Oxycodone. Upper thigh swollen, notified NP, and tried calling MD. No N/V, SOB, distress. Vitals stable through the night.

## 2020-01-01 NOTE — NURSING
Progress notes reviewed. Rounds completed. Introduced self as VN for this shift. Educated pt on VN's role in pt care. Educated pt about VTE and fall precautions.  Opportunity given for pt's questions. No questions or concerns expressed at this time. Will continue to monitor       12/31/19 2027   Type of Frequent Check   Type Patient Rounds  (VN Rounds)   Safety/Activity   Patient Rounds bed in low position;bed wheels locked;visualized patient;call light in patient/parent reach   Safety Promotion/Fall Prevention assistive device/personal item within reach;bed alarm set;Fall Risk reviewed with patient/family   Activity Management activity adjusted per tolerance   Positioning   Body Position positioned/repositioned independently;sitting up in bed   Head of Bed (HOB) HOB at 45 degrees   Positioning/Transfer Devices pillows;in use   Pain/Comfort/Sleep   Preferred Pain Scale number (Numeric Rating Pain Scale)   Comfort/Acceptable Pain Level 0   Pain Rating (0-10): Rest 2   POSS ([place] Opioid-Induced Sed Scale) 1 - Awake and alert

## 2020-01-01 NOTE — PT/OT/SLP PROGRESS
Occupational Therapy   Treatment    Name: Bina Daniels  MRN: [id number]  Admitting Diagnosis:  Trimalleolar fracture of ankle, closed, right, initial encounter  3 Days Post-Op    Recommendations:     Discharge Recommendations: nursing facility, skilled  Discharge Equipment Recommendations:  (Defer to SNF; poss slide board and w/c)  Barriers to discharge:  Inaccessible home environment    Assessment:     Bina Daniels is a 64 y.o. female with a medical diagnosis of Trimalleolar fracture of ankle, closed, right, initial encounter.  She presents with deconditioning, increased assist required to maintain WB precautions RLE in lateral scooting seated EOB. Performance deficits affecting function are weakness, impaired endurance, impaired self care skills, impaired functional mobilty, gait instability, impaired balance, decreased upper extremity function, decreased lower extremity function, pain, impaired skin, orthopedic precautions.     Rehab Prognosis:  Good; patient would benefit from acute skilled OT services to address these deficits and reach maximum level of function.       Plan:     Patient to be seen 5 x/week to address the above listed problems via self-care/home management, therapeutic exercises, therapeutic activities  · Plan of Care Expires: 01/28/20  · Plan of Care Reviewed with: patient, sibling    Subjective     Pain/Comfort:  · Pain Rating 1: 0/10  · Location - Side 1: Right  · Location - Orientation 1: generalized  · Location 1: ankle  · Pain Addressed 1: Reposition, Distraction, Cessation of Activity, Nurse notified  · Pain Rating Post-Intervention 1: 3/10    Objective:     Communicated with: nsshirley prior to session.  Patient found HOB elevated with peripheral IV, bed alarm upon OT entry to room.    General Precautions: Standard, fall   Orthopedic Precautions:RLE non weight bearing   Braces: (RLE casted)     Occupational Performance:     Bed Mobility:    · Patient completed Rolling/Turning to Left with   "stand by assistance  · Patient completed Scooting/Bridging with contact guard assistance  · Patient completed Supine to Sit with minimum assistance  · Patient completed Sit to Supine with minimum assistance     Functional Mobility/Transfers:  · NT this date in leiu of lateral seated scoots for increased strength to maintain RLE NWB precautions and seated therex  · Functional Mobility: Pt with fair+ dynamic seated balance though demonstrated poor ability to initiate lateral scoot, mod A required to partially clear buttocks from bed to initiate scoot. Mod v/c for proper body mechanics; pt vocalizing during trials but stated not 2/2 pain, stated "It's because this is hard."    Activities of Daily Living:  · Upper Body Dressing: minimum assistance to don/doff gown as robe seated EOB  · Lower Body Dressing: stand by assistance to don L sock seated EOB  · Toileting: moderate assistance to place bed pan under pt but pt able to complete pericare for self this date.      [facility] 6 Click ADL: 16    Treatment & Education:  Pt educated on role of OT and POC.   Pt performing skills as listed above.   Pt performed 2x5 reps UE therex for shoulder horizontal ab/adduction, shoulder flex/ext and bicep flex/ext. Pt required max v/c for proper body mechanics and min/mod physical cues to complete therex appropriately using Yellow theraband. Pt educated to complete HEP in bed without yellow theraband at this time as pt will require continued education for proper body mechanics to perform HEP independently. Pt completed x3 lateral scoots towards R and x5 lateral scoots L towards HOB, required moderately increased time and seated rest breaks provided throughout session 2/2 increased WOB during therex trials. Pt with noted redness on sacrum; RN notified and OT assisted to apply mepilex to sacrum. Pt positioned in L sidelying with purple postioning wedge and pillows placed to ensure comfort and appropriate alignment for improved sleep hygiene. "     Patient left left sidelying with all lines intact, call button in reach, bed alarm on and nsg notifiedEducation:      GOALS:   Multidisciplinary Problems     Occupational Therapy Goals        Problem: Occupational Therapy Goal    Goal Priority Disciplines Outcome Interventions   Occupational Therapy Goal     OT, PT/OT Ongoing, Progressing    Description:  Goals to be met by: 01/29/20     Patient will increase functional independence with ADLs by performing:    UE Dressing with Modified [place].  LE Dressing with Modified [place].  Toileting from bedside commode with Contact Guard Assistance for hygiene and clothing management.   Toilet transfer to bedside commode with Minimal Assistance while maintaining NWB to R LE.  Upper extremity exercise program 3 x12 reps per handout, with supervision to improve UB strength/endurance for improved transfers.                      Time Tracking:     OT Date of Treatment: 01/01/20  OT Start Time: 1645  OT Stop Time: 1715  OT Total Time (min): 30 min    Billable Minutes:Therapeutic Exercise 30    Lianne Allen OT  1/1/2020

## 2020-01-01 NOTE — PLAN OF CARE
Pain medication given after sitting up in chair for a while.  Patient tolerated w/c for an extended time without difficulty with right extremity elevated.  No concerns stated.

## 2020-01-01 NOTE — PROGRESS NOTES
[facility]  Orthopedics  Progress Note    Patient Name: Bina Daniels  MRN: [id number]  Admission Date: 12/26/2019  Hospital Length of Stay: 5 days  Attending Provider: Cooper Medina MD  Primary Care Provider: Tho Haro MD  Follow-up For: Procedure(s) (LRB):  OPEN REDUCTION INTERNAL FIXATION-ANKLE (Right)  CLOSURE, WOUND (Right)    Post-Operative Day: 3 Days Post-Op  Subjective:     Principal Problem:Trimalleolar fracture of ankle, closed, right, initial encounter    Principal Orthopedic Problem: Post op ORIF trimalleolar Rt Ankle fracture dislocation    Interval History: Nurse noted swelling rt thigh.   The patient thinks it is from lying on her side.     No increased pain, SOB or CP    Review of patient's allergies indicates:   Allergen Reactions    Sulfa (sulfonamide antibiotics) Hives       Current Facility-Administered Medications   Medication    0.9%  NaCl infusion (for blood administration)    acetaminophen tablet 650 mg    aspirin chewable tablet 81 mg    cetirizine tablet 5 mg    docusate sodium capsule 100 mg    escitalopram oxalate tablet 10 mg    ferrous sulfate EC tablet 325 mg    fluticasone propionate 50 mcg/actuation nasal spray 50 mcg    furosemide tablet 40 mg    glucagon (human recombinant) injection 1 mg    glyBURIDE tablet 2.5 mg    And    metFORMIN tablet 500 mg    hydromorphone (PF) injection 0.2 mg    HYDROmorphone injection 0.5 mg    influenza (QUADRIVALENT PF) vaccine 0.5 mL    insulin aspart U-100 pen 0-5 Units    lisinopril tablet 20 mg    metoprolol tartrate (LOPRESSOR) tablet 50 mg    ondansetron injection 4 mg    oxyCODONE immediate release tablet 5 mg    pantoprazole EC tablet 40 mg    promethazine (PHENERGAN) 6.25 mg in dextrose 5 % 50 mL IVPB    sodium chloride 0.9% flush 10 mL    sodium chloride 0.9% flush 10 mL    traMADol tablet 50 mg     Objective:     Vital Signs (Most Recent):  Temp: 96.5 °F (35.8 °C) (01/01/20  "0810)  Pulse: 75 (01/01/20 0810)  Resp: 18 (01/01/20 0810)  BP: (!) 144/70 (01/01/20 0810)  SpO2: 97 % (01/01/20 0745) Vital Signs (24h Range):  Temp:  [96.5 °F (35.8 °C)-98.3 °F (36.8 °C)] 96.5 °F (35.8 °C)  Pulse:  [68-82] 75  Resp:  [13-20] 18  SpO2:  [95 %-98 %] 97 %  BP: (132-157)/(65-77) 144/70     Weight: 62.8 kg (138 lb 7.2 oz)  Height: 5' 4" (162.6 cm)  Body mass index is 23.76 kg/m².      Intake/Output Summary (Last 24 hours) at 1/1/2020 1122  Last data filed at 1/1/2020 0900  Gross per 24 hour   Intake 1070 ml   Output 1000 ml   Net 70 ml       Ortho/SPM Exam   Splint RLE intact, Homans neg.     Edema rt thigh.  No tenderness along deep or superficial veins  Heart-rrr, Lungs clear, Abd benign    Significant Labs:   BMP:   Recent Labs   Lab 01/01/20  0422   *      K 4.6   *   CO2 15*   BUN 45*   CREATININE 2.7*   CALCIUM 7.5*     CBC:   Recent Labs   Lab 12/31/19  0538 01/01/20  0422   WBC 7.58 6.78   HGB 6.9* 9.1*   HCT 22.4* 29.5*    320     All pertinent labs within the past 24 hours have been reviewed.    Significant Imaging: None    Assessment/Plan:     Active Diagnoses:    Diagnosis Date Noted POA    PRINCIPAL PROBLEM:  Trimalleolar fracture of ankle, closed, right, initial encounter [S82.851A] 12/26/2019 Yes    Neuropathy due to secondary diabetes mellitus [E13.40] 12/27/2019 Yes     Chronic    Anemia due to stage 3 chronic kidney disease [N18.3, D63.1] 12/27/2019 Yes    CKD (chronic kidney disease), stage IV [N18.4] 12/27/2019 Yes     Chronic    Type 2 diabetes mellitus with neurologic complication, without long-term current use of insulin [E11.49] 12/27/2019 Yes     Chronic    Coronary artery disease involving native heart without angina pectoris [I25.10] 12/27/2019 Yes     Chronic    Ankle dislocation, right, initial encounter [S93.04XA] 12/27/2019 Yes      Problems Resolved During this Admission:   Post op ORIF trimalleolar Rt Ankle fracture " dislocation-stable  THigh edema- R/o DVT with recent trauma.  Anemia improved post transfusion yest    Plan- cont PT, after venous U/S to R/O DVT.   SNF referral in progress        Cooper Medina MD  Orthopedics  [facility]

## 2020-01-01 NOTE — SUBJECTIVE & OBJECTIVE
Interval History: awake and alert,sitting up on the chair.   Discussed low H/H with patient - transfuse with 1 U prbc    Review of Systems   Constitutional: Positive for activity change. Negative for appetite change and chills.   Respiratory: Negative for cough and shortness of breath.    Cardiovascular: Negative for chest pain.   Gastrointestinal: Negative for abdominal distention, abdominal pain, constipation, nausea and vomiting.   Musculoskeletal: Positive for arthralgias (pain controlled right ankle).   Neurological: Negative for dizziness and weakness.   Psychiatric/Behavioral: Negative for agitation. The patient is not nervous/anxious.      Objective:     Vital Signs (Most Recent):  Temp: 98.2 °F (36.8 °C) (12/31/19 1626)  Pulse: 72 (12/31/19 1626)  Resp: 15 (12/31/19 1626)  BP: (!) 143/65 (12/31/19 1626)  SpO2: 98 % (12/31/19 1539) Vital Signs (24h Range):  Temp:  [97.4 °F (36.3 °C)-98.3 °F (36.8 °C)] 98.2 °F (36.8 °C)  Pulse:  [66-77] 72  Resp:  [15-20] 15  SpO2:  [95 %-98 %] 98 %  BP: (119-157)/(58-70) 143/65     Weight: 64.7 kg (142 lb 10.2 oz)  Body mass index is 24.48 kg/m².    Intake/Output Summary (Last 24 hours) at 12/31/2019 1909  Last data filed at 12/31/2019 0600  Gross per 24 hour   Intake 180 ml   Output 450 ml   Net -270 ml      Physical Exam   Constitutional: She appears well-developed and well-nourished.   HENT:   Head: Normocephalic and atraumatic.   Eyes: EOM are normal.   Pulmonary/Chest: Effort normal. No respiratory distress.   Abdominal: Soft.   Musculoskeletal: She exhibits deformity (r ankle, s/p Post op).   Skin: Skin is warm and dry.   Psychiatric: She has a normal mood and affect. Her behavior is normal. Judgment and thought content normal.   Nursing note and vitals reviewed.      Significant Labs:   Blood Culture: No results for input(s): LABBLOO in the last 48 hours.  CBC:   Recent Labs   Lab 12/30/19  0452 12/31/19  0538   WBC 8.49 7.58   HGB 7.2* 6.9*   HCT 23.6* 22.4*   PLT  331 320     CMP:   Recent Labs   Lab 12/30/19  0452 12/31/19  0538    140   K 5.3* 4.8   * 113*   CO2 10* 15*   GLU 98 131*   BUN 43* 43*   CREATININE 2.5* 2.5*   CALCIUM 7.4* 7.5*   ANIONGAP 13 12   EGFRNONAA 20* 20*     Coagulation: No results for input(s): PT, INR, APTT in the last 48 hours.  TSH: No results for input(s): TSH in the last 4320 hours.  Urine Culture: No results for input(s): LABURIN in the last 48 hours.  Urine Studies: No results for input(s): COLORU, APPEARANCEUA, PHUR, SPECGRAV, PROTEINUA, GLUCUA, KETONESU, BILIRUBINUA, OCCULTUA, NITRITE, UROBILINOGEN, LEUKOCYTESUR, RBCUA, WBCUA, BACTERIA, SQUAMEPITHEL, HYALINECASTS in the last 48 hours.    Invalid input(s): SLICK    Significant Imaging: I have reviewed all pertinent imaging results/findings within the past 24 hours.

## 2020-01-01 NOTE — PLAN OF CARE
VN rounds:  VN cued into pt's room with pt's permission.  Pt resting in bed in low position with bed alarm in place, call bell at side.  Fall risk protocol discussed with pt.  VN instructed to call for assistance.  Pt aware and agreeable.   Pt denies pain, nausea, No acute distress noted.  Pt's chart, labs and vital signs reviewed.  Allowed time for questions.  Will continue to be available and intervene as needed.      01/01/20 1115   Type of Frequent Check   Type Patient Rounds   Safety/Activity   Patient Rounds bed in low position;call light in patient/parent reach;placement of personal items at bedside;toileting offered;visualized patient   Safety Promotion/Fall Prevention assistive device/personal item within reach;bed alarm set;Fall Risk reviewed with patient/family;medications reviewed;side rails raised x 2;instructed to call staff for mobility   Safety Precautions emergency equipment at bedside   Positioning   Body Position side-lying, right   Head of Bed (HOB) HOB at 30-45 degrees   Positioning/Transfer Devices pillows   Pain/Comfort/Sleep   Preferred Pain Scale number (Numeric Rating Pain Scale)   Comfort/Acceptable Pain Level 0   Pain Rating (0-10): Rest 0   POSS ([place] Opioid-Induced Sed Scale) 1 - Awake and alert   Nausea/Vomiting   Nausea/Vomiting No Nausea and Vomiting   Assessments (Pre/Post)   Level of Consciousness (AVPU) alert

## 2020-01-01 NOTE — PLAN OF CARE
Right lower extremity supported by soft cast.  Extremity warm to touch.  Patient able to wiggle toes.  Patient encouraged to move lower extremity as much as possible while in bed.  Denies pain to affected limb while in bed or moving toes.  Encourage to notify nurse of any discomfort.

## 2020-01-02 LAB
ANION GAP SERPL CALC-SCNC: 14 MMOL/L (ref 8–16)
BASOPHILS # BLD AUTO: 0.03 K/UL (ref 0–0.2)
BASOPHILS NFR BLD: 0.4 % (ref 0–1.9)
BUN SERPL-MCNC: 48 MG/DL (ref 8–23)
CALCIUM SERPL-MCNC: 8.1 MG/DL (ref 8.7–10.5)
CHLORIDE SERPL-SCNC: 112 MMOL/L (ref 95–110)
CO2 SERPL-SCNC: 13 MMOL/L (ref 23–29)
CREAT SERPL-MCNC: 2.6 MG/DL (ref 0.5–1.4)
DIFFERENTIAL METHOD: ABNORMAL
EOSINOPHIL # BLD AUTO: 0.2 K/UL (ref 0–0.5)
EOSINOPHIL NFR BLD: 2.7 % (ref 0–8)
ERYTHROCYTE [DISTWIDTH] IN BLOOD BY AUTOMATED COUNT: 16.7 % (ref 11.5–14.5)
EST. GFR  (AFRICAN AMERICAN): 22 ML/MIN/1.73 M^2
EST. GFR  (NON AFRICAN AMERICAN): 19 ML/MIN/1.73 M^2
GLUCOSE SERPL-MCNC: 34 MG/DL (ref 70–110)
HCT VFR BLD AUTO: 34 % (ref 37–48.5)
HGB BLD-MCNC: 10.8 G/DL (ref 12–16)
LYMPHOCYTES # BLD AUTO: 0.9 K/UL (ref 1–4.8)
LYMPHOCYTES NFR BLD: 11.7 % (ref 18–48)
MCH RBC QN AUTO: 27.3 PG (ref 27–31)
MCHC RBC AUTO-ENTMCNC: 31.8 G/DL (ref 32–36)
MCV RBC AUTO: 86 FL (ref 82–98)
MONOCYTES # BLD AUTO: 1.2 K/UL (ref 0.3–1)
MONOCYTES NFR BLD: 14.9 % (ref 4–15)
NEUTROPHILS # BLD AUTO: 5.5 K/UL (ref 1.8–7.7)
NEUTROPHILS NFR BLD: 70.3 % (ref 38–73)
OB PNL STL: POSITIVE
PLATELET # BLD AUTO: 529 K/UL (ref 150–350)
PMV BLD AUTO: 10.4 FL (ref 9.2–12.9)
POCT GLUCOSE: 125 MG/DL (ref 70–110)
POCT GLUCOSE: 151 MG/DL (ref 70–110)
POCT GLUCOSE: 199 MG/DL (ref 70–110)
POCT GLUCOSE: 221 MG/DL (ref 70–110)
POCT GLUCOSE: 42 MG/DL (ref 70–110)
POTASSIUM SERPL-SCNC: 4.4 MMOL/L (ref 3.5–5.1)
RBC # BLD AUTO: 3.96 M/UL (ref 4–5.4)
SODIUM SERPL-SCNC: 139 MMOL/L (ref 136–145)
WBC # BLD AUTO: 7.8 K/UL (ref 3.9–12.7)

## 2020-01-02 PROCEDURE — 82272 OCCULT BLD FECES 1-3 TESTS: CPT

## 2020-01-02 PROCEDURE — 36415 COLL VENOUS BLD VENIPUNCTURE: CPT

## 2020-01-02 PROCEDURE — 85025 COMPLETE CBC W/AUTO DIFF WBC: CPT

## 2020-01-02 PROCEDURE — 25000003 PHARM REV CODE 250: Performed by: NURSE PRACTITIONER

## 2020-01-02 PROCEDURE — 63600175 PHARM REV CODE 636 W HCPCS

## 2020-01-02 PROCEDURE — 97530 THERAPEUTIC ACTIVITIES: CPT | Mod: CQ

## 2020-01-02 PROCEDURE — 25000003 PHARM REV CODE 250: Performed by: HOSPITALIST

## 2020-01-02 PROCEDURE — 11000001 HC ACUTE MED/SURG PRIVATE ROOM

## 2020-01-02 PROCEDURE — 25000003 PHARM REV CODE 250

## 2020-01-02 PROCEDURE — 97110 THERAPEUTIC EXERCISES: CPT | Mod: CQ

## 2020-01-02 PROCEDURE — 94761 N-INVAS EAR/PLS OXIMETRY MLT: CPT

## 2020-01-02 PROCEDURE — 97530 THERAPEUTIC ACTIVITIES: CPT

## 2020-01-02 PROCEDURE — 80048 BASIC METABOLIC PNL TOTAL CA: CPT

## 2020-01-02 RX ORDER — ENOXAPARIN SODIUM 100 MG/ML
30 INJECTION SUBCUTANEOUS EVERY 24 HOURS
Status: DISCONTINUED | OUTPATIENT
Start: 2020-01-02 | End: 2020-01-04

## 2020-01-02 RX ORDER — AMOXICILLIN 250 MG
1 CAPSULE ORAL 2 TIMES DAILY PRN
Status: DISCONTINUED | OUTPATIENT
Start: 2020-01-02 | End: 2020-01-08 | Stop reason: HOSPADM

## 2020-01-02 RX ORDER — ENOXAPARIN SODIUM 100 MG/ML
40 INJECTION SUBCUTANEOUS EVERY 24 HOURS
Status: DISCONTINUED | OUTPATIENT
Start: 2020-01-02 | End: 2020-01-02 | Stop reason: DRUGHIGH

## 2020-01-02 RX ORDER — IBUPROFEN 200 MG
16 TABLET ORAL
Status: DISCONTINUED | OUTPATIENT
Start: 2020-01-02 | End: 2020-01-08 | Stop reason: HOSPADM

## 2020-01-02 RX ORDER — BISACODYL 10 MG
10 SUPPOSITORY, RECTAL RECTAL ONCE
Status: COMPLETED | OUTPATIENT
Start: 2020-01-02 | End: 2020-01-02

## 2020-01-02 RX ORDER — IBUPROFEN 200 MG
24 TABLET ORAL
Status: DISCONTINUED | OUTPATIENT
Start: 2020-01-02 | End: 2020-01-08 | Stop reason: HOSPADM

## 2020-01-02 RX ADMIN — PANTOPRAZOLE SODIUM 40 MG: 40 TABLET, DELAYED RELEASE ORAL at 08:01

## 2020-01-02 RX ADMIN — CETIRIZINE HYDROCHLORIDE 5 MG: 5 TABLET ORAL at 09:01

## 2020-01-02 RX ADMIN — ESCITALOPRAM OXALATE 10 MG: 10 TABLET ORAL at 08:01

## 2020-01-02 RX ADMIN — ASPIRIN 81 MG 81 MG: 81 TABLET ORAL at 08:01

## 2020-01-02 RX ADMIN — BISACODYL 10 MG: 10 SUPPOSITORY RECTAL at 09:01

## 2020-01-02 RX ADMIN — DEXTROSE 250 ML: 10 SOLUTION INTRAVENOUS at 05:01

## 2020-01-02 RX ADMIN — FERROUS SULFATE TAB EC 325 MG (65 MG FE EQUIVALENT) 325 MG: 325 (65 FE) TABLET DELAYED RESPONSE at 09:01

## 2020-01-02 RX ADMIN — FUROSEMIDE 40 MG: 40 TABLET ORAL at 08:01

## 2020-01-02 RX ADMIN — FLUTICASONE PROPIONATE 50 MCG: 50 SPRAY, METERED NASAL at 08:01

## 2020-01-02 RX ADMIN — DOCUSATE SODIUM 100 MG: 100 CAPSULE, LIQUID FILLED ORAL at 08:01

## 2020-01-02 RX ADMIN — OXYCODONE HYDROCHLORIDE 5 MG: 5 TABLET ORAL at 12:01

## 2020-01-02 RX ADMIN — OXYCODONE HYDROCHLORIDE 5 MG: 5 TABLET ORAL at 11:01

## 2020-01-02 RX ADMIN — ONDANSETRON 4 MG: 2 INJECTION INTRAMUSCULAR; INTRAVENOUS at 03:01

## 2020-01-02 RX ADMIN — ENOXAPARIN SODIUM 30 MG: 100 INJECTION SUBCUTANEOUS at 05:01

## 2020-01-02 RX ADMIN — PROMETHAZINE HYDROCHLORIDE 6.25 MG: 25 INJECTION INTRAMUSCULAR; INTRAVENOUS at 06:01

## 2020-01-02 RX ADMIN — LISINOPRIL 20 MG: 20 TABLET ORAL at 08:01

## 2020-01-02 RX ADMIN — FERROUS SULFATE TAB EC 325 MG (65 MG FE EQUIVALENT) 325 MG: 325 (65 FE) TABLET DELAYED RESPONSE at 08:01

## 2020-01-02 RX ADMIN — FLUTICASONE PROPIONATE 50 MCG: 50 SPRAY, METERED NASAL at 09:01

## 2020-01-02 NOTE — PLAN OF CARE
called [facility] at [phone], spoke with Ary, completed LOCET. SW faxed PASRR, awaiting form 142 from State.       01/02/20 9217   Post-Acute Status   Post-Acute Authorization Placement   Post-Acute Placement Status Pending State Certification

## 2020-01-02 NOTE — PLAN OF CARE
Problem: Adult Inpatient Plan of Care  Goal: Plan of Care Review  Outcome: Ongoing, Progressing    Problem: Diabetes Comorbidity  Goal: Blood Glucose Level Within Desired Range  Outcome: Ongoing, Progressing  Ms. Daniels is resting and medications were given per orders. Blood glucose monitoring continued. No complaints of PAIN during shift. Complaint of nausea x1; prn medication given. Cast remained CDI. Safety maintained

## 2020-01-02 NOTE — PROGRESS NOTES
TN attempted to reach patient's son , Abhijeet [phone] in reference to patient's insurance coverage. TN left son VM to contact TN. Also attempted to contact patient's daughter, Chandni [phone]. Patient's daughter did not pick and TN was unable to leave a VM.

## 2020-01-02 NOTE — PROGRESS NOTES
Permission attained to enter room via virtual system.  Virtual rounds completed as documented.  Today's lab values, notes, and vital signs up to now have been reviewed. Educated regarding keeping right leg elevated   Patient reports no discomfort at this time  She is to receive supplemental laxative today for no bm reported since Zaira

## 2020-01-02 NOTE — PROGRESS NOTES
Pharmacist Renal Dose Adjustment Note    Bina Daniels is a 64 y.o. female being treated with the medication Lovenox    Patient Data:    Vital Signs (Most Recent):  Temp: 98 °F (36.7 °C) (01/01/20 2339)  Pulse: (!) 52 (01/02/20 0740)  Resp: 18 (01/02/20 0740)  BP: (!) 124/56 (01/02/20 0740)  SpO2: 97 % (01/02/20 0436)   Vital Signs (72h Range):  Temp:  [96.5 °F (35.8 °C)-98.3 °F (36.8 °C)]   Pulse:  [52-82]   Resp:  [13-20]   BP: (118-181)/(56-78)   SpO2:  [95 %-98 %]      Recent Labs   Lab 12/31/19  0538 01/01/20  0422 01/02/20  0500   CREATININE 2.5* 2.7* 2.6*     Serum creatinine: 2.6 mg/dL (H) 01/02/20 0500  Estimated creatinine clearance: 19.4 mL/min (A) (CrCl <30)    Medication: Lovenox 40mg SQ Q24H will be changed to medication: Lovenox 30mg SQ Q24H, per protocol.      Pharmacist's Name: Mary Carlos  Pharmacist's Extension: [phone]

## 2020-01-02 NOTE — PLAN OF CARE
called [facility] at [phone], unable to complete LOCET, high call volume at this time, awaiting call back to complete.        01/02/20 1157   Post-Acute Status   Post-Acute Authorization Placement   Post-Acute Placement Status Pending State Direction

## 2020-01-02 NOTE — SUBJECTIVE & OBJECTIVE
Interval History: awake and alert,sitting up on the chair.   H/H stable -  S/p transfusion with 1 U prbc  Possible d/c to SNF in AM  Hold home po diabetic meds  Start low dose SSI     Review of Systems   Constitutional: Positive for activity change. Negative for appetite change and chills.   Respiratory: Negative for cough and shortness of breath.    Cardiovascular: Negative for chest pain.   Gastrointestinal: Negative for abdominal distention, abdominal pain, constipation, nausea and vomiting.   Musculoskeletal: Positive for arthralgias (pain controlled right ankle).   Neurological: Negative for dizziness and weakness.   Psychiatric/Behavioral: Negative for agitation. The patient is not nervous/anxious.      Objective:     Vital Signs (Most Recent):  Temp: 97 °F (36.1 °C) (01/01/20 1736)  Pulse: 74 (01/01/20 1736)  Resp: 18 (01/01/20 1736)  BP: (!) 140/64 (01/01/20 1736)  SpO2: 97 % (01/01/20 1515) Vital Signs (24h Range):  Temp:  [96.5 °F (35.8 °C)-97.9 °F (36.6 °C)] 97 °F (36.1 °C)  Pulse:  [69-78] 74  Resp:  [13-18] 18  SpO2:  [95 %-97 %] 97 %  BP: (132-181)/(64-74) 140/64     Weight: 62.8 kg (138 lb 7.2 oz)  Body mass index is 23.76 kg/m².    Intake/Output Summary (Last 24 hours) at 1/1/2020 1918  Last data filed at 1/1/2020 1741  Gross per 24 hour   Intake 1550 ml   Output 1650 ml   Net -100 ml      Physical Exam   Constitutional: She appears well-developed and well-nourished.   HENT:   Head: Normocephalic and atraumatic.   Eyes: EOM are normal.   Pulmonary/Chest: Effort normal. No respiratory distress.   Abdominal: Soft.   Musculoskeletal: She exhibits deformity (r ankle, s/p Post op).   Skin: Skin is warm and dry.   Psychiatric: She has a normal mood and affect. Her behavior is normal. Judgment and thought content normal.   Nursing note and vitals reviewed.      Significant Labs:   Blood Culture: No results for input(s): LABBLOO in the last 48 hours.  CBC:   Recent Labs   Lab 12/31/19  0538 01/01/20  0422    WBC 7.58 6.78   HGB 6.9* 9.1*   HCT 22.4* 29.5*    320     CMP:   Recent Labs   Lab 12/31/19  0538 01/01/20  0422    138   K 4.8 4.6   * 112*   CO2 15* 15*   * 161*   BUN 43* 45*   CREATININE 2.5* 2.7*   CALCIUM 7.5* 7.5*   ANIONGAP 12 11   EGFRNONAA 20* 18*     Coagulation: No results for input(s): PT, INR, APTT in the last 48 hours.  TSH: No results for input(s): TSH in the last 4320 hours.  Urine Culture: No results for input(s): LABURIN in the last 48 hours.  Urine Studies: No results for input(s): COLORU, APPEARANCEUA, PHUR, SPECGRAV, PROTEINUA, GLUCUA, KETONESU, BILIRUBINUA, OCCULTUA, NITRITE, UROBILINOGEN, LEUKOCYTESUR, RBCUA, WBCUA, BACTERIA, SQUAMEPITHEL, HYALINECASTS in the last 48 hours.    Invalid input(s): SLICK    Significant Imaging: I have reviewed all pertinent imaging results/findings within the past 24 hours.

## 2020-01-02 NOTE — PT/OT/SLP PROGRESS
Occupational Therapy   Treatment    Name: Bina Daniels  MRN: [id number]  Admitting Diagnosis:  Trimalleolar fracture of ankle, closed, right, initial encounter  4 Days Post-Op    Recommendations:     Discharge Recommendations: nursing facility, skilled  Discharge Equipment Recommendations:  wheelchair, slide board  Barriers to discharge:  Inaccessible home environment    Assessment:   Patient /c increased difficulty during tx session this date 2/2 exhaustion/fatigue from earlier test (patient stated she had to t/f to w/c > table for U/S > w/c > back to bed). Patient will benefit from SNF upon D/C to maximize strength and endurance for ADL tasks.    Bina Daniels is a 64 y.o. female with a medical diagnosis of Trimalleolar fracture of ankle, closed, right, initial encounter. Performance deficits affecting function are weakness, impaired endurance, impaired self care skills, impaired functional mobilty, gait instability, impaired balance, decreased upper extremity function, decreased lower extremity function, decreased coordination, decreased safety awareness, decreased ROM, pain, orthopedic precautions.     Rehab Prognosis:  Fair+; patient would benefit from acute skilled OT services to address these deficits and reach maximum level of function.       Plan:     Patient to be seen 5 x/week to address the above listed problems via self-care/home management, therapeutic activities, therapeutic exercises  · Plan of Care Expires: 01/28/20  · Plan of Care Reviewed with: patient    Subjective     Pain/Comfort:  · Pain Rating 1: 3/10  · Location - Side 1: Right  · Location - Orientation 1: generalized  · Location 1: ankle  · Pain Addressed 1: Reposition, Distraction, Cessation of Activity, Nurse notified  · Pain Rating Post-Intervention 1: 3/10    Objective:     Communicated with: Jason grijalva prior to session.  Patient found HOB elevated with bed alarm upon OT entry to room.    General Precautions: Standard, fall  "  Orthopedic Precautions:RLE non weight bearing   Braces: (RLE CAM boot - NWB)     Bed Mobility:    · Patient completed Rolling/Turning to Right with contact guard assistance and with side rail  · Patient completed Scooting/Bridging with SBA to EOB; MaxA of 2 to HOB via drawsheet  · Patient completed Supine to Sit with contact guard assistance and for CAM boot 2/2 weight  · Patient completed Sit to Supine with maximal assistance and 2 persons     Functional Mobility/Transfers:  · Patient completed Sit <> Stand Transfer with moderate assistance, maximal assistance and of 2 persons  with  rolling walker     Activities of Daily Living:  · Lower Body Dressing: total assistance petty L sock    [facility] 6 Click ADL: 16    Treatment & Education:  Patient initially declining therapy 2/2 increased fatigue and "exhaustion" from U/S test earlier. Patient reported the nurses picked her up and placed her in w/c, but had to t/f numerous times further fatiguing her. Bed mob as noted above. Patient instructed in sit <> stand using RW /c modA of 2 persons (PTA keeping RLE out in front to adhere to NWB status). Patient maintained stand ~20 seconds --unable to move L foot back towards EOB. Patient became fearful and demo'd posterior leaning/pushing when attempting to sit back down. Required totalA to scoot back on bed to maintain safety. Patient stood x2 more trials /c maxA of 2 persons and increased fear and panic each time. Upon returning to EOB, patient unable to scoot back or laterally to HOB. MaxA of 2 to return supine. Nsg notified that patient must utilize stretchers for all tests.     Patient left HOB elevated with all lines intact, call button in reach, nsg notified and Rhonda SHELBY presentEducation:      GOALS:   Multidisciplinary Problems     Occupational Therapy Goals        Problem: Occupational Therapy Goal    Goal Priority Disciplines Outcome Interventions   Occupational Therapy Goal     OT, PT/OT Ongoing, Progressing  "   Description:  Goals to be met by: 01/29/20     Patient will increase functional independence with ADLs by performing:    UE Dressing with Modified [place].  LE Dressing with Modified [place].  Toileting from bedside commode with Contact Guard Assistance for hygiene and clothing management.   Toilet transfer to bedside commode with Minimal Assistance while maintaining NWB to R LE.  Upper extremity exercise program 3 x12 reps per handout, with supervision to improve UB strength/endurance for improved transfers.                      Time Tracking:     OT Date of Treatment: 01/02/20  OT Start Time: 1123  OT Stop Time: 1150  OT Total Time (min): 27 mins co-tx /c PTA    Billable Minutes:Therapeutic Activity 14    HOLDEN [place]  1/2/2020

## 2020-01-02 NOTE — PLAN OF CARE
Problem: Occupational Therapy Goal  Goal: Occupational Therapy Goal  Description  Goals to be met by: 01/29/20     Patient will increase functional independence with ADLs by performing:    UE Dressing with Modified [place].  LE Dressing with Modified [place].  Toileting from bedside commode with Contact Guard Assistance for hygiene and clothing management.   Toilet transfer to bedside commode with Minimal Assistance while maintaining NWB to R LE.  Upper extremity exercise program 3 x12 reps per handout, with supervision to improve UB strength/endurance for improved transfers.     Outcome: Ongoing, Progressing     Patient /c increased difficulty during tx session this date 2/2 exhaustion/fatigue from earlier test (patient stated she had to t/f to w/c > table for U/S > w/c > back to bed). Patient will benefit from SNF upon D/C to maximize strength and endurance for ADL tasks.

## 2020-01-02 NOTE — PROGRESS NOTES
[facility] Hospital Medicine  Progress Note    Patient Name: Bina Daniels  MRN: [id number]  Patient Class: IP- Inpatient   Admission Date: 12/26/2019  Length of Stay: 5 days  Attending Physician: Cooper Medina MD  Primary Care Provider: Tho Haro MD        Subjective:     Principal Problem:Trimalleolar fracture of ankle, closed, right, initial encounter        HPI:  The pt is a 63 yeasrs old female with PMH significant for DMII, neuropathy, breast cancer, and decrease mobility. She was walking in the house using a walker and tripped on a towel, fell down, and sustained injury to her right foot.  She was admitted to ortho service, had a 1st stage of ORIF on Thursday, and will have a second repeat on Sunday morning.  Pt is clinically stable not having any complaints.  She is S/P 1 unit PRBC transfusion yesterday.    Overview/Hospital Course:  12/28 ptis doing fine, some pain and itchiness in the cast area.  12/29 pt is S/P stage 2 of ORIF right ankle. Ding ok, placing her on DM diet  12/30 pt is POD #2 for the second ORIF right ankle, doing ok, might go to rehab vs snf soon,. Her K is 5.3 as per pt she takes lasix 40 mg po BID at home. We will start on 40 mg po daily and dc fluid, pt is eating and tolerating PO intake. Re-introduce other home medsgradually    Interval History: awake and alert,sitting up on the chair.   H/H stable -  S/p transfusion with 1 U prbc  Possible d/c to SNF in AM  Hold home po diabetic meds  Start low dose SSI     Review of Systems   Constitutional: Positive for activity change. Negative for appetite change and chills.   Respiratory: Negative for cough and shortness of breath.    Cardiovascular: Negative for chest pain.   Gastrointestinal: Negative for abdominal distention, abdominal pain, constipation, nausea and vomiting.   Musculoskeletal: Positive for arthralgias (pain controlled right ankle).   Neurological: Negative for dizziness and weakness.    Psychiatric/Behavioral: Negative for agitation. The patient is not nervous/anxious.      Objective:     Vital Signs (Most Recent):  Temp: 97 °F (36.1 °C) (01/01/20 1736)  Pulse: 74 (01/01/20 1736)  Resp: 18 (01/01/20 1736)  BP: (!) 140/64 (01/01/20 1736)  SpO2: 97 % (01/01/20 1515) Vital Signs (24h Range):  Temp:  [96.5 °F (35.8 °C)-97.9 °F (36.6 °C)] 97 °F (36.1 °C)  Pulse:  [69-78] 74  Resp:  [13-18] 18  SpO2:  [95 %-97 %] 97 %  BP: (132-181)/(64-74) 140/64     Weight: 62.8 kg (138 lb 7.2 oz)  Body mass index is 23.76 kg/m².    Intake/Output Summary (Last 24 hours) at 1/1/2020 1918  Last data filed at 1/1/2020 1741  Gross per 24 hour   Intake 1550 ml   Output 1650 ml   Net -100 ml      Physical Exam   Constitutional: She appears well-developed and well-nourished.   HENT:   Head: Normocephalic and atraumatic.   Eyes: EOM are normal.   Pulmonary/Chest: Effort normal. No respiratory distress.   Abdominal: Soft.   Musculoskeletal: She exhibits deformity (r ankle, s/p Post op).   Skin: Skin is warm and dry.   Psychiatric: She has a normal mood and affect. Her behavior is normal. Judgment and thought content normal.   Nursing note and vitals reviewed.      Significant Labs:   Blood Culture: No results for input(s): LABBLOO in the last 48 hours.  CBC:   Recent Labs   Lab 12/31/19  0538 01/01/20  0422   WBC 7.58 6.78   HGB 6.9* 9.1*   HCT 22.4* 29.5*    320     CMP:   Recent Labs   Lab 12/31/19  0538 01/01/20  0422    138   K 4.8 4.6   * 112*   CO2 15* 15*   * 161*   BUN 43* 45*   CREATININE 2.5* 2.7*   CALCIUM 7.5* 7.5*   ANIONGAP 12 11   EGFRNONAA 20* 18*     Coagulation: No results for input(s): PT, INR, APTT in the last 48 hours.  TSH: No results for input(s): TSH in the last 4320 hours.  Urine Culture: No results for input(s): LABURIN in the last 48 hours.  Urine Studies: No results for input(s): COLORU, APPEARANCEUA, PHUR, SPECGRAV, PROTEINUA, GLUCUA, KETONESU, BILIRUBINUA, OCCULTUA,  NITRITE, UROBILINOGEN, LEUKOCYTESUR, RBCUA, WBCUA, BACTERIA, SQUAMEPITHEL, HYALINECASTS in the last 48 hours.    Invalid input(s): SLICK    Significant Imaging: I have reviewed all pertinent imaging results/findings within the past 24 hours.      Assessment/Plan:      * Trimalleolar fracture of ankle, closed, right, initial encounter  Per primary team  12/29 s/p OR ORIF today    Ankle dislocation, right, initial encounter  Per primary team      Coronary artery disease involving native heart without angina pectoris  Continue asa and metoprolol      Type 2 diabetes mellitus with neurologic complication, without long-term current use of insulin  Pt home meds are metformin and glyburide  I will recommend stopping them while inpatient and pt getting to be NPO,  I will place pt on ISS  12/29 place pt on dm diet, post op care  12/30 restart metformin and glyburide gradually    CKD (chronic kidney disease), stage IV    Will monitor bun/cr.  Good urine output  12/29 stable bmp, renal function  12/30 restart lasix at 40 mg po daily    Anemia due to stage 3 chronic kidney disease  Stable   S/P 1 unit PRBC tranfusion      Neuropathy due to secondary diabetes mellitus  No meds needed.  monitor        VTE Risk Mitigation (From admission, onward)         Ordered     enoxaparin injection 30 mg  Daily      01/01/20 1652     IP VTE HIGH RISK PATIENT  Once      12/29/19 1112     Place sequential compression device  Until discontinued      12/29/19 1112                      Monica Carbone MD  Department of Hospital Medicine   [facility]

## 2020-01-02 NOTE — PROGRESS NOTES
[facility]  Orthopedics  Progress Note    Patient Name: Bina Daniels  MRN: [id number]  Admission Date: 12/26/2019  Hospital Length of Stay: 6 days  Attending Provider: Cooper Medina MD  Primary Care Provider: Tho Haro MD  Follow-up For: Procedure(s) (LRB):  OPEN REDUCTION INTERNAL FIXATION-ANKLE (Right)  CLOSURE, WOUND (Right)    Post-Operative Day: 4 Days Post-Op  Subjective:     Principal Problem:Trimalleolar fracture of ankle, closed, right, initial encounter    Principal Orthopedic Problem: Post op ORIF rt ankle fracture dislocation    Interval History: Low BS last PM, ok now.  No ankle pain, swelling thigh resolved. Venous ultrasound has not been performed    Review of patient's allergies indicates:   Allergen Reactions    Sulfa (sulfonamide antibiotics) Hives       Current Facility-Administered Medications   Medication    0.9%  NaCl infusion (for blood administration)    acetaminophen tablet 650 mg    aspirin chewable tablet 81 mg    cetirizine tablet 5 mg    dextrose 10% (D10W) Bolus    dextrose 10% (D10W) Bolus    docusate sodium capsule 100 mg    enoxaparin injection 30 mg    escitalopram oxalate tablet 10 mg    ferrous sulfate EC tablet 325 mg    fluticasone propionate 50 mcg/actuation nasal spray 50 mcg    furosemide tablet 40 mg    glucagon (human recombinant) injection 1 mg    glucose chewable tablet 16 g    glucose chewable tablet 24 g    hydromorphone (PF) injection 0.2 mg    HYDROmorphone injection 0.5 mg    influenza (QUADRIVALENT PF) vaccine 0.5 mL    insulin aspart U-100 pen 0-5 Units    insulin detemir U-100 pen 10 Units    lisinopril tablet 20 mg    metoprolol tartrate (LOPRESSOR) tablet 50 mg    ondansetron injection 4 mg    oxyCODONE immediate release tablet 5 mg    pantoprazole EC tablet 40 mg    promethazine (PHENERGAN) 6.25 mg in dextrose 5 % 50 mL IVPB    senna-docusate 8.6-50 mg per tablet 1 tablet    sodium chloride 0.9% flush  "10 mL    sodium chloride 0.9% flush 10 mL    traMADol tablet 50 mg     Objective:     Vital Signs (Most Recent):  Temp: 98 °F (36.7 °C) (01/01/20 2339)  Pulse: (!) 52 (01/02/20 0740)  Resp: 18 (01/02/20 0740)  BP: (!) 124/56 (01/02/20 0740)  SpO2: 97 % (01/02/20 0436) Vital Signs (24h Range):  Temp:  [97 °F (36.1 °C)-98.1 °F (36.7 °C)] 98 °F (36.7 °C)  Pulse:  [52-74] 52  Resp:  [17-18] 18  SpO2:  [97 %-98 %] 97 %  BP: (118-181)/(56-78) 124/56     Weight: 64.6 kg (142 lb 6.6 oz)  Height: 5' 4" (162.6 cm)  Body mass index is 24.45 kg/m².      Intake/Output Summary (Last 24 hours) at 1/2/2020 1005  Last data filed at 1/2/2020 0542  Gross per 24 hour   Intake 805 ml   Output 1548 ml   Net -743 ml       Ortho/SPM Exam   Splint removed, incisions intact, mild erythema, no drainage.  Homans neg, calf NT.    Significant Labs:   BMP:   Recent Labs   Lab 01/02/20  0500   GLU 34*      K 4.4   *   CO2 13*   BUN 48*   CREATININE 2.6*   CALCIUM 8.1*     CBC:   Recent Labs   Lab 01/01/20  0422 01/02/20  0500   WBC 6.78 7.80   HGB 9.1* 10.8*   HCT 29.5* 34.0*    529*     Recent Lab Results       01/02/20  0639   01/02/20  0538   01/02/20  0500   01/01/20  1950   01/01/20  1540        Anion Gap     14         Baso #     0.03         Basophil%     0.4         BUN, Bld     48         Calcium     8.1         Chloride     112         CO2     13         Creatinine     2.6         D-Dimer               Differential Method     Automated         eGFR if      22         eGFR if non      19  Comment:  Calculation used to obtain the estimated glomerular filtration  rate (eGFR) is the CKD-EPI equation.            Eos #     0.2         Eosinophil%     2.7         Glucose     34  Comment:  GLU critical result(s) called and verbal readback obtained from CINDY Snell RN by BENJAMIN 01/02/2020 06:12           Gran # (ANC)     5.5         Gran%     70.3         Hematocrit     34.0         Hemoglobin     " 10.8         Lymph #     0.9         Lymph%     11.7         MCH     27.3         MCHC     31.8         MCV     86         Mono #     1.2         Mono%     14.9         MPV     10.4         Platelets     529  Comment:  Results confirmed, test repeated         POCT Glucose 221 42   176 171     Potassium     4.4         RBC     3.96         RDW     16.7         Sodium     139         WBC     7.80                          01/01/20  1335   01/01/20  1132        Anion Gap         Baso #         Basophil%         BUN, Bld         Calcium         Chloride         CO2         Creatinine         D-Dimer 1.00  Comment:  The quantitative D-dimer assay should be used as an aid in   the diagnosis of deep vein thrombosis and pulmonary embolism  in patients with the appropriate presentation and clinical  history. The upper limit of the reference interval and the clinical   cut off   point are identical. Causes of a positive (>0.50 mg/L FEU) D-Dimer   test  include, but are not limited to: DVT, PE, DIC, thrombolytic   therapy, anticoagulant therapy, recent surgery, trauma, or   pregnancy, disseminated malignancy, aortic aneurysm, cirrhosis,  and severe infection. False negative results may occur in   patients with distal DVT.         Differential Method         eGFR if          eGFR if non          Eos #         Eosinophil%         Glucose         Gran # (ANC)         Gran%         Hematocrit         Hemoglobin         Lymph #         Lymph%         MCH         MCHC         MCV         Mono #         Mono%         MPV         Platelets         POCT Glucose   160     Potassium         RBC         RDW         Sodium         WBC             All pertinent labs within the past 24 hours have been reviewed.    Significant Imaging: None    Assessment/Plan:     Active Diagnoses:    Diagnosis Date Noted POA    PRINCIPAL PROBLEM:  Trimalleolar fracture of ankle, closed, right, initial encounter [O34.657J]  12/26/2019 Yes    Neuropathy due to secondary diabetes mellitus [E13.40] 12/27/2019 Yes     Chronic    Anemia due to stage 3 chronic kidney disease [N18.3, D63.1] 12/27/2019 Yes    CKD (chronic kidney disease), stage IV [N18.4] 12/27/2019 Yes     Chronic    Type 2 diabetes mellitus with neurologic complication, without long-term current use of insulin [E11.49] 12/27/2019 Yes     Chronic    Coronary artery disease involving native heart without angina pectoris [I25.10] 12/27/2019 Yes     Chronic    Ankle dislocation, right, initial encounter [S93.04XA] 12/27/2019 Yes      Problems Resolved During this Admission:   Post op ORIF rt ankle fracture dislocation-stable  Leg pain and edema improved with Abnormal D-dimer, Venous U/S pending to R/O DVT, on Lovenox    Plan-cont PT and OT- NWB gait.  Awaiting placement   Hospitalist treating medical problems.  Needs diabetic med adjustment      Cooper Medina MD  Orthopedics  [facility]

## 2020-01-02 NOTE — PLAN OF CARE
sent SNF referral to 1) [facility], 2[facility], per patient's preference. SW will continue to follow up pending approval/denial.       01/02/20 1226   Post-Acute Status   Post-Acute Authorization Placement   Post-Acute Placement Status Referrals Sent

## 2020-01-02 NOTE — SUBJECTIVE & OBJECTIVE
Interval History: awake and alert, eating no new complaint.   Creatinine elevated- monitor , hold Lisinopril and Lasix, Bolus fluid.       Start levemir and continue low dose SSI     Review of Systems   Constitutional: Positive for activity change. Negative for appetite change and chills.   Respiratory: Negative for cough and shortness of breath.    Cardiovascular: Negative for chest pain.   Gastrointestinal: Negative for abdominal pain, constipation, nausea and vomiting.   Musculoskeletal: Positive for arthralgias (pain controlled right ankle).   Neurological: Negative for dizziness and weakness.   Psychiatric/Behavioral: Negative for agitation. The patient is not nervous/anxious.      Objective:     Vital Signs (Most Recent):  Temp: 97.4 °F (36.3 °C) (01/02/20 1607)  Pulse: 67 (01/02/20 1607)  Resp: 16 (01/02/20 1607)  BP: (!) 143/65 (01/02/20 1607)  SpO2: 97 % (01/02/20 0436) Vital Signs (24h Range):  Temp:  [97 °F (36.1 °C)-98.1 °F (36.7 °C)] 97.4 °F (36.3 °C)  Pulse:  [52-74] 67  Resp:  [16-20] 16  SpO2:  [97 %-98 %] 97 %  BP: (118-169)/(56-78) 143/65     Weight: 64.6 kg (142 lb 6.6 oz)  Body mass index is 24.45 kg/m².    Intake/Output Summary (Last 24 hours) at 1/2/2020 1709  Last data filed at 1/2/2020 1300  Gross per 24 hour   Intake 565 ml   Output 1299 ml   Net -734 ml      Physical Exam   Constitutional: She appears well-developed and well-nourished.   HENT:   Head: Normocephalic and atraumatic.   Eyes: EOM are normal.   Pulmonary/Chest: Effort normal. No respiratory distress.   Abdominal: Soft.   Musculoskeletal: She exhibits deformity (r ankle, s/p Post op).   Skin: Skin is warm and dry.   Psychiatric: She has a normal mood and affect. Her behavior is normal. Judgment and thought content normal.   Nursing note and vitals reviewed.      Significant Labs:   Blood Culture: No results for input(s): LABBLOO in the last 48 hours.  CBC:   Recent Labs   Lab 01/01/20  0422 01/02/20  0500   WBC 6.78 7.80   HGB  9.1* 10.8*   HCT 29.5* 34.0*    529*     CMP:   Recent Labs   Lab 01/01/20  0422 01/02/20  0500    139   K 4.6 4.4   * 112*   CO2 15* 13*   * 34*   BUN 45* 48*   CREATININE 2.7* 2.6*   CALCIUM 7.5* 8.1*   ANIONGAP 11 14   EGFRNONAA 18* 19*     Coagulation: No results for input(s): PT, INR, APTT in the last 48 hours.  TSH: No results for input(s): TSH in the last 4320 hours.  Urine Culture: No results for input(s): LABURIN in the last 48 hours.  Urine Studies: No results for input(s): COLORU, APPEARANCEUA, PHUR, SPECGRAV, PROTEINUA, GLUCUA, KETONESU, BILIRUBINUA, OCCULTUA, NITRITE, UROBILINOGEN, LEUKOCYTESUR, RBCUA, WBCUA, BACTERIA, SQUAMEPITHEL, HYALINECASTS in the last 48 hours.    Invalid input(s): SLICK    Significant Imaging: I have reviewed all pertinent imaging results/findings within the past 24 hours.

## 2020-01-02 NOTE — PLAN OF CARE
Patient A&O x4. With c/o pain on right ankle, PRN meds given. Cast padding on right foot dry and intact. Patient had a bowel movement 2x within the shift. Specimen sent for OB. Results revealed positive for OB. No signs of bleeding noted. Dr. Baxter informed.

## 2020-01-02 NOTE — NURSING
BRENDEN Rollins was contacted about patient's Blood sugar being 42. Orders were put in. Will continue to monitor.

## 2020-01-03 LAB
ANION GAP SERPL CALC-SCNC: 10 MMOL/L (ref 8–16)
BASOPHILS # BLD AUTO: 0.03 K/UL (ref 0–0.2)
BASOPHILS NFR BLD: 0.4 % (ref 0–1.9)
BUN SERPL-MCNC: 50 MG/DL (ref 8–23)
CALCIUM SERPL-MCNC: 7.5 MG/DL (ref 8.7–10.5)
CHLORIDE SERPL-SCNC: 111 MMOL/L (ref 95–110)
CO2 SERPL-SCNC: 17 MMOL/L (ref 23–29)
CREAT SERPL-MCNC: 2.7 MG/DL (ref 0.5–1.4)
DIFFERENTIAL METHOD: ABNORMAL
EOSINOPHIL # BLD AUTO: 0.1 K/UL (ref 0–0.5)
EOSINOPHIL NFR BLD: 2.1 % (ref 0–8)
ERYTHROCYTE [DISTWIDTH] IN BLOOD BY AUTOMATED COUNT: 16.9 % (ref 11.5–14.5)
EST. GFR  (AFRICAN AMERICAN): 21 ML/MIN/1.73 M^2
EST. GFR  (NON AFRICAN AMERICAN): 18 ML/MIN/1.73 M^2
GLUCOSE SERPL-MCNC: 135 MG/DL (ref 70–110)
HCT VFR BLD AUTO: 31.8 % (ref 37–48.5)
HGB BLD-MCNC: 10 G/DL (ref 12–16)
LYMPHOCYTES # BLD AUTO: 1 K/UL (ref 1–4.8)
LYMPHOCYTES NFR BLD: 15.1 % (ref 18–48)
MCH RBC QN AUTO: 27 PG (ref 27–31)
MCHC RBC AUTO-ENTMCNC: 31.4 G/DL (ref 32–36)
MCV RBC AUTO: 86 FL (ref 82–98)
MONOCYTES # BLD AUTO: 0.9 K/UL (ref 0.3–1)
MONOCYTES NFR BLD: 13.3 % (ref 4–15)
NEUTROPHILS # BLD AUTO: 4.7 K/UL (ref 1.8–7.7)
NEUTROPHILS NFR BLD: 69.1 % (ref 38–73)
PLATELET # BLD AUTO: 383 K/UL (ref 150–350)
PMV BLD AUTO: 9.9 FL (ref 9.2–12.9)
POCT GLUCOSE: 132 MG/DL (ref 70–110)
POCT GLUCOSE: 181 MG/DL (ref 70–110)
POCT GLUCOSE: 182 MG/DL (ref 70–110)
POCT GLUCOSE: 187 MG/DL (ref 70–110)
POTASSIUM SERPL-SCNC: 4.6 MMOL/L (ref 3.5–5.1)
RBC # BLD AUTO: 3.7 M/UL (ref 4–5.4)
SODIUM SERPL-SCNC: 138 MMOL/L (ref 136–145)
WBC # BLD AUTO: 6.76 K/UL (ref 3.9–12.7)

## 2020-01-03 PROCEDURE — 94761 N-INVAS EAR/PLS OXIMETRY MLT: CPT

## 2020-01-03 PROCEDURE — 85025 COMPLETE CBC W/AUTO DIFF WBC: CPT

## 2020-01-03 PROCEDURE — 97110 THERAPEUTIC EXERCISES: CPT | Mod: CQ

## 2020-01-03 PROCEDURE — 80048 BASIC METABOLIC PNL TOTAL CA: CPT

## 2020-01-03 PROCEDURE — 25000003 PHARM REV CODE 250: Performed by: HOSPITALIST

## 2020-01-03 PROCEDURE — 97530 THERAPEUTIC ACTIVITIES: CPT | Mod: CQ

## 2020-01-03 PROCEDURE — 11000001 HC ACUTE MED/SURG PRIVATE ROOM

## 2020-01-03 PROCEDURE — 63600175 PHARM REV CODE 636 W HCPCS

## 2020-01-03 PROCEDURE — 25000003 PHARM REV CODE 250: Performed by: NURSE PRACTITIONER

## 2020-01-03 PROCEDURE — 36415 COLL VENOUS BLD VENIPUNCTURE: CPT

## 2020-01-03 PROCEDURE — 25000003 PHARM REV CODE 250

## 2020-01-03 PROCEDURE — 30200315 PPD INTRADERMAL TEST REV CODE 302

## 2020-01-03 PROCEDURE — 86580 TB INTRADERMAL TEST: CPT

## 2020-01-03 PROCEDURE — 97110 THERAPEUTIC EXERCISES: CPT | Mod: CO

## 2020-01-03 PROCEDURE — 97535 SELF CARE MNGMENT TRAINING: CPT | Mod: CO

## 2020-01-03 PROCEDURE — 97530 THERAPEUTIC ACTIVITIES: CPT

## 2020-01-03 RX ADMIN — TUBERCULIN PURIFIED PROTEIN DERIVATIVE 5 UNITS: 5 INJECTION INTRADERMAL at 10:01

## 2020-01-03 RX ADMIN — FUROSEMIDE 40 MG: 40 TABLET ORAL at 08:01

## 2020-01-03 RX ADMIN — OXYCODONE HYDROCHLORIDE 5 MG: 5 TABLET ORAL at 01:01

## 2020-01-03 RX ADMIN — ESCITALOPRAM OXALATE 10 MG: 10 TABLET ORAL at 08:01

## 2020-01-03 RX ADMIN — DOCUSATE SODIUM 100 MG: 100 CAPSULE, LIQUID FILLED ORAL at 08:01

## 2020-01-03 RX ADMIN — METOPROLOL TARTRATE 50 MG: 50 TABLET ORAL at 09:01

## 2020-01-03 RX ADMIN — ASPIRIN 81 MG 81 MG: 81 TABLET ORAL at 08:01

## 2020-01-03 RX ADMIN — PANTOPRAZOLE SODIUM 40 MG: 40 TABLET, DELAYED RELEASE ORAL at 08:01

## 2020-01-03 RX ADMIN — OXYCODONE HYDROCHLORIDE 5 MG: 5 TABLET ORAL at 05:01

## 2020-01-03 RX ADMIN — FLUTICASONE PROPIONATE 50 MCG: 50 SPRAY, METERED NASAL at 09:01

## 2020-01-03 RX ADMIN — FERROUS SULFATE TAB EC 325 MG (65 MG FE EQUIVALENT) 325 MG: 325 (65 FE) TABLET DELAYED RESPONSE at 08:01

## 2020-01-03 RX ADMIN — LISINOPRIL 20 MG: 20 TABLET ORAL at 09:01

## 2020-01-03 RX ADMIN — FERROUS SULFATE TAB EC 325 MG (65 MG FE EQUIVALENT) 325 MG: 325 (65 FE) TABLET DELAYED RESPONSE at 09:01

## 2020-01-03 RX ADMIN — ENOXAPARIN SODIUM 30 MG: 100 INJECTION SUBCUTANEOUS at 05:01

## 2020-01-03 RX ADMIN — FLUTICASONE PROPIONATE 50 MCG: 50 SPRAY, METERED NASAL at 08:01

## 2020-01-03 RX ADMIN — METOPROLOL TARTRATE 50 MG: 50 TABLET ORAL at 08:01

## 2020-01-03 RX ADMIN — OXYCODONE HYDROCHLORIDE 5 MG: 5 TABLET ORAL at 07:01

## 2020-01-03 NOTE — PT/OT/SLP PROGRESS
Physical Therapy Treatment    Patient Name:  Bina Daniels   MRN:  [id number]    Recommendations:     Discharge Recommendations:  nursing facility, skilled   Discharge Equipment Recommendations: (defer to SNF)   Barriers to discharge: Inaccessible home    Assessment:     Bina Daniels is a 64 y.o. female admitted with a medical diagnosis of Trimalleolar fracture of ankle, closed, right, initial encounter.  She presents with the following impairments/functional limitations:  weakness, impaired endurance, impaired self care skills, impaired functional mobilty, impaired balance, decreased coordination, decreased lower extremity function, decreased upper extremity function, decreased safety awareness, pain, impaired skin, decreased ROM, orthopedic precautions Pt would continue to benefit from P.T. To address impairments listed above.      Rehab Prognosis: Fair; patient would benefit from acute skilled PT services to address these deficits and reach maximum level of function.    Recent Surgery: Procedure(s) (LRB):  OPEN REDUCTION INTERNAL FIXATION-ANKLE (Right)  CLOSURE, WOUND (Right) 5 Days Post-Op    Plan:     During this hospitalization, patient to be seen daily to address the identified rehab impairments via gait training, therapeutic activities, therapeutic exercises, neuromuscular re-education, wheelchair management/training and progress toward the following goals:    · Plan of Care Expires:  01/30/20    Subjective       Patient/Family Comments/goals: Pt agreed to tx.  Pain/Comfort:  · Pain Rating 1: 3/10  · Location - Side 1: Right  · Location - Orientation 1: generalized  · Location 1: ankle  · Pain Addressed 1: Pre-medicate for activity, Reposition, Distraction, Cessation of Activity, Nurse notified  · Pain Rating Post-Intervention 1: 3/10      Objective:     Communicated with RN prior to session.  Patient found up in chair with SCD(chair alarm, CAM boot) upon PT entry to room.     General Precautions: Standard,  fall   Orthopedic Precautions:RLE non weight bearing   Braces:       Functional Mobility:  · Bed Mobility:     · Sit to Supine: minimum assistance and LE  · Transfers:     · W/c Chair to EOB: minimum assistance, moderate assistance of 2 persons with via Beasy board.  VC's and assist for hand placement, left foot placement and to maintain NWB RLE.  · Balance: sitting fair/fair+      AM-PAC 6 CLICK MOBILITY  Turning over in bed (including adjusting bedclothes, sheets and blankets)?: 4  Sitting down on and standing up from a chair with arms (e.g., wheelchair, bedside commode, etc.): 3  Moving from lying on back to sitting on the side of the bed?: 3  Moving to and from a bed to a chair (including a wheelchair)?: 2  Need to walk in hospital room?: 1  Climbing 3-5 steps with a railing?: 1  Basic Mobility Total Score: 14       Therapeutic Activities and Exercises:   Supine BLE therex: heelslides, hip ABD/ADD, QS and glut sets 10 x 2 with vc's and CGA.  Pt told PTA she is unable to perform a left heelslide without assistance.  PTA explained to pt that PTA was only touching her left heel to quide her for proper technique.  Pt was unable to perform heelslide unless PTA was touching pt's left heel.    Seated AP(L), LAQs with Ricky on R 10 x 2.    Patient left supine with all lines intact, call button in reach, bed alarm on and RN notified..    GOALS:   Multidisciplinary Problems     Physical Therapy Goals        Problem: Physical Therapy Goal    Goal Priority Disciplines Outcome Goal Variances Interventions   Physical Therapy Goal     PT, PT/OT Ongoing, Progressing     Description:  Goals to be met by: 2020     Patient will increase functional independence with mobility by performin. Supine to sit with Supervision/SBA  2. Sit to supine with Supervision/SBA  3. Sit to stand transfer with Stand-by Assistance and Contact Guard Assistance using RW NWB RLE  4. Bed to chair transfer with Stand-by Assistance and Contact  Guard Assistance using Rolling Walker, or slideboard NWB RLE  5. Gait  x 10-30 feet with Contact Guard Assistance and Minimal Assistance using Rolling Walker as able NWB RLE.   6. Wheelchair propulsion x  feet with Supervision using bilateral upper extremities  7. Lower extremity exercise program x10 x 3 reps with independence                      Time Tracking:     PT Received On: 01/03/20  PT Start Time: 1341     PT Stop Time: 1404  PT Total Time (min): 23 min     Billable Minutes: Therapeutic Activity 12 and Therapeutic Exercise 13    Treatment Type: Treatment  PT/PTA: PTA     PTA Visit Number: 4     Rhonda Hauser PTA  01/03/2020

## 2020-01-03 NOTE — PHYSICIAN QUERY
"PT Name: Bina Daniels  MR #: [id number]    Physician Query Form - Hematology Clarification      CDS/: Angela ALBERT, RN              Contact information: [email or web address]    This form is a permanent document in the medical record.      Query Date: January 3, 2020    By submitting this query, we are merely seeking further clarification of documentation. Please utilize your independent clinical judgment when addressing the question(s) below.    The Medical record contains the following:   Indicators  Supporting Clinical Findings Location in Medical Record    x "Anemia" documented  Anemia due to stage 3 chronic kidney disease  Stable   S/P 1 unit PRBC tranfusion  Dr. Gaona's Consult 12/30/19    x H & H =  The patient was taken to Recovery Room where stat CBC revealed her hematocrit had drifted to 23 from the preoperative level of 36.       Hemoglobin = 10.5--> 6.9   Hematocrit = 34.1--> 22.4   Dr. Medina's Op note 12/27/19       Lab: Hematology 12/26/19 - 12/31/19    BP =                     HR=      "GI bleeding" documented      x Acute bleeding (Non GI site)  ESTIMATED BLOOD LOSS:  500 mL     COMPLICATIONS:  Inability to complete reduction and wound closure due to significant intraoperative hemorrhage      Incisions were made medial and lateral ankle and due to   significant persistent bleeding, the tourniquet was deflated and then reinflated to 275 mmHg.  The incisions were carried sharply through subcutaneous tissue down to the level of the bone.  Once again, due to inability to maintain hemostasis including with Bovie anticoagulation compression, the tourniquet was once again deflated and the leg was re-exsanguinated and tourniquet reinflated to 300 mmHg.  According to Anesthesia, the patient's blood pressure was no higher than 140/70s throughout the procedure; however, she continued to bleed from the skin edges, depths of the wound as well as from the bone. During drilling of holes for insertion of " the screws, the patient continued to have significant bleeding through the screw holes.  Therefore, at this point, I decided to abort the procedure.  Dr. Medina's Op note 12/27/19    x Transfusion(s)   The patient's type had been typed and screened and then two orders of packed red blood cells were ordered and orders were given to transfer these.  Dr. Medina's Op note 12/27/19    Treatment:      Other:        Provider, please specify diagnosis or diagnoses associated with above clinical findings.    [  x] Acute blood loss anemia expected post-operatively   [  ] Acute blood loss anemia     [  ] Other Hematological Diagnosis (please specify):     [  ] Clinically Undetermined       Please document in your progress notes daily for the duration of treatment, until resolved, and include in your discharge summary.

## 2020-01-03 NOTE — PLAN OF CARE
Slept well during the night. Repositioned. Kept dry and clean. Medicated for pain as needed. Zen lower extremities elevated and boot to right leg maintained. Safety and comfort maintained.

## 2020-01-03 NOTE — PT/OT/SLP PROGRESS
Occupational Therapy   Treatment    Name: Bina Daniels  MRN: [id number]  Admitting Diagnosis:  Trimalleolar fracture of ankle, closed, right, initial encounter  5 Days Post-Op    Recommendations:     Discharge Recommendations: nursing facility, skilled  Discharge Equipment Recommendations:  (Per SNF)  Barriers to discharge:  Inaccessible home environment    Assessment:   Patient self-limits 2/2 fear and panic during standing attempts. Patient will need continued OT/PT to address functional deficits and improve strength and endurance for ADL tasks. Cont OT.     Bina Daniels is a 64 y.o. female with a medical diagnosis of Trimalleolar fracture of ankle, closed, right, initial encounter.  Performance deficits affecting function are weakness, impaired endurance, impaired self care skills, impaired functional mobilty, gait instability, impaired balance, decreased upper extremity function, decreased lower extremity function, pain, decreased ROM.     Rehab Prognosis:  Good; patient would benefit from acute skilled OT services to address these deficits and reach maximum level of function.       Plan:     Patient to be seen 5 x/week to address the above listed problems via self-care/home management, therapeutic activities, therapeutic exercises  · Plan of Care Expires: 01/28/20  · Plan of Care Reviewed with: patient    Subjective     Pain/Comfort:  · Pain Rating 1: 3/10  · Location - Side 1: Right  · Location - Orientation 1: generalized  · Location 1: ankle  · Pain Addressed 1: Reposition, Distraction, Cessation of Activity  · Pain Rating Post-Intervention 1: 3/10    Objective:     Communicated with: nurseSwetha prior to session.  Patient found HOB elevated with bed alarm, SCD upon OT entry to room.    General Precautions: Standard, fall   Orthopedic Precautions:RLE non weight bearing   Braces: (RLE CAM boot - NWB)     Bed Mobility:    · Patient completed Rolling/Turning to Left with  contact guard assistance, minimum  assistance and with side rail  · Patient completed Scooting/Bridging with stand by assistance  · Patient completed Supine to Sit with contact guard assistance and with side rail     Functional Mobility/Transfers:  · Patient completed Sit <> Stand Transfer with maximal assistance and of 2 persons  with  rolling walker   · Patient completed Bed <> Chair Transfer using Slide Board technique with minimum assistance and of 2 persons with Beasy Board and wheelchair    Activities of Daily Living:  · Toileting: dep to place bedpan, but patient able to perfom hygiene /c set-up      [facility] 6 Click ADL: 16    Treatment & Education:  Patient requesting to use bedpan on arrival. Rolling and toileting as above. Patient transitioned to EOB. Instructed in sit <> stand using RW (PT keeping RLE elevated for NWB). MaxA of 2 persons to stand -- patient goes into panic upon attempting standing and reports she cannot straighten LLE during stance. Patient sat for rest break (required totalA of 2 to push back safely onto bed). Patient stood a 2nd time with MaxA of 2 persons and a 3rd person to block/give extension to L knee ( see PT note). Patient stood ~50 seconds CGA of 2. Dep to place Beasy Board and patient Sharan of 2 to slide board to w/c. RLE elevated on leg rest. Patient instructed in BUE strengthening using min resistance theraband -- difficulty utilizing band, so task graded down to AROM all jts and planes x 10 reps. Patient set-up for lunch.     [id number] - Assist PTA /c SB t/f back to EOB. Initially Sharan of 2, but modA of 1 to get patient fully on EOB. Left seated EOB /c PTA for therex.    Patient left up in chair with all lines intact, call button in reach and nsg notifiedEducation:      GOALS:   Multidisciplinary Problems     Occupational Therapy Goals        Problem: Occupational Therapy Goal    Goal Priority Disciplines Outcome Interventions   Occupational Therapy Goal     OT, PT/OT Ongoing, Progressing    Description:  Goals  to be met by: 01/29/20     Patient will increase functional independence with ADLs by performing:    UE Dressing with Modified [place].  LE Dressing with Modified [place].  Toileting from bedside commode with Contact Guard Assistance for hygiene and clothing management.   Toilet transfer to bedside commode with Minimal Assistance while maintaining NWB to R LE.  Upper extremity exercise program 3 x12 reps per handout, with supervision to improve UB strength/endurance for improved transfers.                      Time Tracking:     OT Date of Treatment: 01/03/20  OT Start Time: 1042  OT Stop Time: 1131  OT Total Time (min): 49 min   Partial co-tx /c PTA ([id number])  [id number] - Assist PTA; no charge    Billable Minutes:Self Care/Home Management 15  Therapeutic Activity 12  Therapeutic Exercise 8    HOLDEN [place]  1/3/2020

## 2020-01-03 NOTE — PT/OT/SLP PROGRESS
Physical Therapy Treatment    Patient Name:  Bina Daniels   MRN:  [id number]    Recommendations:     Discharge Recommendations:  nursing facility, skilled   Discharge Equipment Recommendations: (per SNF; possibly s/b, RW, w/c)   Barriers to discharge: Decreased caregiver support    Assessment:     Bina Daniels is a 64 y.o. female admitted with a medical diagnosis of Trimalleolar fracture of ankle, closed, right, initial encounter.  She presents with the following impairments/functional limitations:  weakness, impaired endurance, impaired self care skills, impaired functional mobilty, impaired balance, decreased upper extremity function, decreased lower extremity function, decreased safety awareness, pain, decreased coordination, decreased ROM, impaired skin, orthopedic precautions. Pt would continue to benefit from P.T. To address impairments listed above, and assist pt's return to prior level of function.  .    Rehab Prognosis: Fair; patient would benefit from acute skilled PT services to address these deficits and reach maximum level of function.    Recent Surgery: Procedure(s) (LRB):  OPEN REDUCTION INTERNAL FIXATION-ANKLE (Right)  CLOSURE, WOUND (Right) 5 Days Post-Op    Plan:     During this hospitalization, patient to be seen daily to address the identified rehab impairments via gait training, therapeutic activities, therapeutic exercises, neuromuscular re-education, wheelchair management/training and progress toward the following goals:    · Plan of Care Expires:  01/30/20    Subjective     Patient/Family Comments/goals: Pt agreed to tx  Pain/Comfort:  · Pain Rating 1: 3/10  · Location - Side 1: Right  · Location - Orientation 1: generalized  · Location 1: ankle  · Pain Rating Post-Intervention 1: 3/10      Objective:     Communicated with RN  prior to session.  Patient found sitting EOB with JOAQUIN with bed alarm upon PT entry to room.     General Precautions: Standard, fall   Orthopedic Precautions:RLE non  "weight bearing   Braces:       Functional Mobility:  · Transfers:     · Sit to Stand:  maximal assistance of 2 persons and ddjd' with rolling walker  · Bed to Chair: minimum assistance of 2 persons with Beasy board to transfer EOB > w/c  · Gait: not able to.  · Balance: sitting fair/fair+, standing poor      AM-PAC 6 CLICK MOBILITY  Turning over in bed (including adjusting bedclothes, sheets and blankets)?: 4  Sitting down on and standing up from a chair with arms (e.g., wheelchair, bedside commode, etc.): 3  Moving from lying on back to sitting on the side of the bed?: 3  Moving to and from a bed to a chair (including a wheelchair)?: 2  Need to walk in hospital room?: 1  Climbing 3-5 steps with a railing?: 1  Basic Mobility Total Score: 14       Therapeutic Activities and Exercises:   pt sat EOB with SBA.  Pt stood x 1 bout with Rw and Max A of 2 with assist for NWB RLE.  First attempt, pt was unable to come to a complete stand, standing on LLE with flexed knee stating, "I cant" with Max A of 2.  Second trial, pt stood with Max A of 2 and assist of a third person (PTA) sitting on floor in front to assist with L knee extension.  Pt required Ricky for initial L knee extension and was able maintained for remainder of time(~50sec) with CGA/SBA at knee with vc'/tc's when minimal knee flexion noted and able to regain full knee extension with Max A of 2 with Rw.   Pt becomes very anxious with functional mobility, standing, transfers, scooting, and is limited secondary to this.      Patient left up in chair with all lines intact, call button in reach, chair alarm on and RN notified..    GOALS:   Multidisciplinary Problems     Physical Therapy Goals        Problem: Physical Therapy Goal    Goal Priority Disciplines Outcome Goal Variances Interventions   Physical Therapy Goal     PT, PT/OT Ongoing, Progressing     Description:  Goals to be met by: 1/30/2020     Patient will increase functional independence with mobility by " performin. Supine to sit with Supervision/SBA  2. Sit to supine with Supervision/SBA  3. Sit to stand transfer with Stand-by Assistance and Contact Guard Assistance using RW NWB RLE  4. Bed to chair transfer with Stand-by Assistance and Contact Guard Assistance using Rolling Walker, or slideboard NWB RLE  5. Gait  x 10-30 feet with Contact Guard Assistance and Minimal Assistance using Rolling Walker as able NWB RLE.   6. Wheelchair propulsion x  feet with Supervision using bilateral upper extremities  7. Lower extremity exercise program x10 x 3 reps with independence                      Time Tracking:     PT Received On: 20  PT Start Time: 1057     PT Stop Time: 1122  PT Total Time (min): 25 min     Billable Minutes: Therapeutic Activity 13  Co-tx 12 with JOAQUIN    Treatment Type: Treatment  PT/PTA: PTA     PTA Visit Number: 3     Rhonda Hauser PTA  2020

## 2020-01-03 NOTE — PT/OT/SLP PROGRESS
Physical Therapy Treatment    Patient Name:  Bina Daniels   MRN:  [id number]    Recommendations:     Discharge Recommendations:  nursing facility, skilled   Discharge Equipment Recommendations: (per SNF; possible sliding board, RW, w/c)   Barriers to discharge: Inaccessible home    Assessment:     Bina Daniels is a 64 y.o. female admitted with a medical diagnosis of Trimalleolar fracture of ankle, closed, right, initial encounter.  She presents with the following impairments/functional limitations:  weakness, impaired endurance, impaired self care skills, impaired functional mobilty, impaired balance, decreased coordination, decreased upper extremity function, decreased lower extremity function, decreased safety awareness, pain, decreased ROM, orthopedic precautions.  Pt would continue to benefit from P.T. To address impairments listed above.      Rehab Prognosis: Fair; patient would benefit from acute skilled PT services to address these deficits and reach maximum level of function.    Recent Surgery: Procedure(s) (LRB):  OPEN REDUCTION INTERNAL FIXATION-ANKLE (Right)  CLOSURE, WOUND (Right) 4 Days Post-Op    Plan:     During this hospitalization, patient to be seen daily to address the identified rehab impairments via gait training, therapeutic activities, therapeutic exercises, neuromuscular re-education, wheelchair management/training and progress toward the following goals:    · Plan of Care Expires:  01/30/20    Subjective     Patient/Family Comments/goals: Pt agreed to tx.  Pain/Comfort:  Pain Rating 1: 3/10  Location - Side 1: Right  Location - Orientation 1: generalized  Location 1: ankle  Pain Addressed 1: Reposition, Distraction, Cessation of Activity  Pain Rating Post-Intervention 1: 3/10      Objective:     Communicated with RN (Jason) prior to session.  Patient found supine with bed alarm upon PT entry to room.     General Precautions: Standard, fall   Orthopedic Precautions:RLE non weight bearing    Braces:       Functional Mobility:  · Bed Mobility:     · Scooting: maximal assistance and of 2 persons  · Supine to Sit: contact guard assistance and vc's for proper technique  · Transfers:     · Sit to Stand:  moderate assistance of 2 on first bout with RW.  On second and third bouts, pt required Max A of 2 with RW and was only able to stand <10 secs each bout secondary to fear of falling and increased anxiety. Pt required assistance for balance as well as to maintain NWB RLE with LE extended out in front.  · Balance: sitting fair +, standing poor      AM-PAC 6 CLICK MOBILITY  Turning over in bed (including adjusting bedclothes, sheets and blankets)?: 4  Sitting down on and standing up from a chair with arms (e.g., wheelchair, bedside commode, etc.): 3  Moving from lying on back to sitting on the side of the bed?: 3  Moving to and from a bed to a chair (including a wheelchair)?: 2  Need to walk in hospital room?: 1  Climbing 3-5 steps with a railing?: 1  Basic Mobility Total Score: 14       Therapeutic Activities and Exercises:   Pt stated she went to a CT scan this morning.  She had to transfer from her bed to a w/c, then a w/c to a table for the scan and back.  Pt stated she was very tired.  Pt required increased assistance on last two standing bouts and became more anxious.    Supine BLE therex: AP(L), heelslides, hip ABD/ADD, QS and glut sets, SAQs 10 x 2 reps with Ricky/CGA for proper technique.  Pt had CAM boot donned prior to tx and throughout tx.    Patient left supine with all lines intact, call button in reach, bed alarm on and RN notified..    GOALS:   Multidisciplinary Problems     Physical Therapy Goals        Problem: Physical Therapy Goal    Goal Priority Disciplines Outcome Goal Variances Interventions   Physical Therapy Goal     PT, PT/OT Ongoing, Progressing     Description:  Goals to be met by: 2020     Patient will increase functional independence with mobility by performin. Supine  to sit with Supervision/SBA  2. Sit to supine with Supervision/SBA  3. Sit to stand transfer with Stand-by Assistance and Contact Guard Assistance using RW NWB RLE  4. Bed to chair transfer with Stand-by Assistance and Contact Guard Assistance using Rolling Walker, or slideboard NWB RLE  5. Gait  x 10-30 feet with Contact Guard Assistance and Minimal Assistance using Rolling Walker as able NWB RLE.   6. Wheelchair propulsion x  feet with Supervision using bilateral upper extremities  7. Lower extremity exercise program x10 x 3 reps with independence                      Time Tracking:     PT Received On: 01/02/20  PT Start Time: 1123     PT Stop Time: 1202  PT Total Time (min): 39 min   1151- 1202  11 mins    Billable Minutes: Therapeutic Activity 13  Co tx with JOAQUIN /  PTA onlyTherapeutic Exercise 11    Treatment Type: Treatment  PT/PTA: PTA     PTA Visit Number: 2     Rhonda Hauser PTA  01/02/2020

## 2020-01-03 NOTE — PLAN OF CARE
Problem: Physical Therapy Goal  Goal: Physical Therapy Goal  Description  Goals to be met by: 2020     Patient will increase functional independence with mobility by performin. Supine to sit with Supervision/SBA  2. Sit to supine with Supervision/SBA  3. Sit to stand transfer with Stand-by Assistance and Contact Guard Assistance using RW NWB RLE  4. Bed to chair transfer with Stand-by Assistance and Contact Guard Assistance using Rolling Walker, or slideboard NWB RLE  5. Gait  x 10-30 feet with Contact Guard Assistance and Minimal Assistance using Rolling Walker as able NWB RLE.   6. Wheelchair propulsion x  feet with Supervision using bilateral upper extremities  7. Lower extremity exercise program x10 x 3 reps with independence     Outcome: Ongoing, Progressing   Continue working toward goals.

## 2020-01-03 NOTE — PROGRESS NOTES
[facility]  Orthopedics  Progress Note    Patient Name: Bina Daniels  MRN: [id number]  Admission Date: 12/26/2019  Hospital Length of Stay: 7 days  Attending Provider: Cooper Medina MD  Primary Care Provider: Tho Haro MD  Follow-up For: Procedure(s) (LRB):  OPEN REDUCTION INTERNAL FIXATION-ANKLE (Right)  CLOSURE, WOUND (Right)    Post-Operative Day: 5 Days Post-Op  Subjective:     Principal Problem:Trimalleolar fracture of ankle, closed, right, initial encounter    Principal Orthopedic Problem: Post op ORIF trimalleolar ankle fx dislocation    Interval History: No calf or leg pain, swelling gone.  Still unable to ambulate NWB on RLE    Review of patient's allergies indicates:   Allergen Reactions    Sulfa (sulfonamide antibiotics) Hives       Current Facility-Administered Medications   Medication    0.9%  NaCl infusion (for blood administration)    acetaminophen tablet 650 mg    aspirin chewable tablet 81 mg    cetirizine tablet 5 mg    dextrose 10% (D10W) Bolus    dextrose 10% (D10W) Bolus    docusate sodium capsule 100 mg    enoxaparin injection 30 mg    escitalopram oxalate tablet 10 mg    ferrous sulfate EC tablet 325 mg    fluticasone propionate 50 mcg/actuation nasal spray 50 mcg    furosemide tablet 40 mg    glucagon (human recombinant) injection 1 mg    glucose chewable tablet 16 g    glucose chewable tablet 24 g    hydromorphone (PF) injection 0.2 mg    HYDROmorphone injection 0.5 mg    influenza (QUADRIVALENT PF) vaccine 0.5 mL    insulin aspart U-100 pen 0-5 Units    lisinopril tablet 20 mg    metoprolol tartrate (LOPRESSOR) tablet 50 mg    ondansetron injection 4 mg    oxyCODONE immediate release tablet 5 mg    pantoprazole EC tablet 40 mg    promethazine (PHENERGAN) 6.25 mg in dextrose 5 % 50 mL IVPB    senna-docusate 8.6-50 mg per tablet 1 tablet    sodium chloride 0.9% flush 10 mL    sodium chloride 0.9% flush 10 mL    traMADol tablet 50  "mg    tuberculin injection 5 Units     Objective:     Vital Signs (Most Recent):  Temp: 97.6 °F (36.4 °C) (01/03/20 0745)  Pulse: 82 (01/03/20 0745)  Resp: 15 (01/03/20 0745)  BP: (!) 155/69 (01/03/20 0745)  SpO2: (!) 92 % (01/03/20 0441) Vital Signs (24h Range):  Temp:  [97.3 °F (36.3 °C)-98.2 °F (36.8 °C)] 97.6 °F (36.4 °C)  Pulse:  [62-82] 82  Resp:  [15-20] 15  SpO2:  [91 %-92 %] 92 %  BP: ()/(50-76) 155/69     Weight: 66.4 kg (146 lb 6.2 oz)  Height: 5' 4" (162.6 cm)  Body mass index is 25.13 kg/m².      Intake/Output Summary (Last 24 hours) at 1/3/2020 0803  Last data filed at 1/3/2020 0432  Gross per 24 hour   Intake 490 ml   Output 1001 ml   Net -511 ml       Ortho/SPM Exam    Dressing intact,  Homans neg,  NVI    Significant Labs:   BMP:   Recent Labs   Lab 01/03/20  0527   *      K 4.6   *   CO2 17*   BUN 50*   CREATININE 2.7*   CALCIUM 7.5*     CBC:   Recent Labs   Lab 01/02/20  0500 01/03/20  0527   WBC 7.80 6.76   HGB 10.8* 10.0*   HCT 34.0* 31.8*   * 383*     All pertinent labs within the past 24 hours have been reviewed.    Significant Imaging: U/S: I have reviewed all pertinent results/findings and my personal findings are:  No DVT    Assessment/Plan:     Active Diagnoses:    Diagnosis Date Noted POA    PRINCIPAL PROBLEM:  Trimalleolar fracture of ankle, closed, right, initial encounter [S82.851A] 12/26/2019 Yes    Neuropathy due to secondary diabetes mellitus [E13.40] 12/27/2019 Yes     Chronic    Anemia due to stage 3 chronic kidney disease [N18.3, D63.1] 12/27/2019 Yes    CKD (chronic kidney disease), stage IV [N18.4] 12/27/2019 Yes     Chronic    Type 2 diabetes mellitus with neurologic complication, without long-term current use of insulin [E11.49] 12/27/2019 Yes     Chronic    Coronary artery disease involving native heart without angina pectoris [I25.10] 12/27/2019 Yes     Chronic    Ankle dislocation, right, initial encounter [S93.04XA] 12/27/2019 Yes "      Problems Resolved During this Admission:   Post op ORIF trimalleolar ankle fx dislocation-Stable    Plan- stable for transfer to SNF when bed available.   Continue transfers aand PT  No need for prolonged anticoagulation      Cooper Medina MD  Orthopedics  [facility]

## 2020-01-03 NOTE — PLAN OF CARE
Insurance auth has been approved, Ida Vogt will be here Monday to sign paperwork with patient.     01/03/20 1062   Post-Acute Status   Post-Acute Authorization Placement   Post-Acute Placement Status Pending Payor Review

## 2020-01-03 NOTE — PLAN OF CARE
received communication-- patient has been medically accepted to [facility]. Awaiting [facility] auth request. SW will continue to follow up pnding insurance approval.       01/03/20 1208   Post-Acute Status   Post-Acute Authorization Placement   Post-Acute Placement Status Pending Payor Review

## 2020-01-03 NOTE — PLAN OF CARE
[facility] unable to accept patient--does not accept payor.    BUDDY called [facility], patientis still under review. Buddy spoke with Ida, [phone], wll contact once patient has been fully reviewed.       01/03/20 1036   Post-Acute Status   Post-Acute Authorization Placement   Post-Acute Placement Status Patient Evaluation by Facility

## 2020-01-04 LAB
ANION GAP SERPL CALC-SCNC: 10 MMOL/L (ref 8–16)
BASOPHILS # BLD AUTO: 0.02 K/UL (ref 0–0.2)
BASOPHILS NFR BLD: 0.4 % (ref 0–1.9)
BUN SERPL-MCNC: 54 MG/DL (ref 8–23)
CALCIUM SERPL-MCNC: 7.3 MG/DL (ref 8.7–10.5)
CHLORIDE SERPL-SCNC: 110 MMOL/L (ref 95–110)
CO2 SERPL-SCNC: 17 MMOL/L (ref 23–29)
CREAT SERPL-MCNC: 2.7 MG/DL (ref 0.5–1.4)
DIFFERENTIAL METHOD: ABNORMAL
EOSINOPHIL # BLD AUTO: 0.2 K/UL (ref 0–0.5)
EOSINOPHIL NFR BLD: 3.3 % (ref 0–8)
ERYTHROCYTE [DISTWIDTH] IN BLOOD BY AUTOMATED COUNT: 16.9 % (ref 11.5–14.5)
EST. GFR  (AFRICAN AMERICAN): 21 ML/MIN/1.73 M^2
EST. GFR  (NON AFRICAN AMERICAN): 18 ML/MIN/1.73 M^2
GLUCOSE SERPL-MCNC: 131 MG/DL (ref 70–110)
HCT VFR BLD AUTO: 27.7 % (ref 37–48.5)
HGB BLD-MCNC: 8.6 G/DL (ref 12–16)
LYMPHOCYTES # BLD AUTO: 1.1 K/UL (ref 1–4.8)
LYMPHOCYTES NFR BLD: 20.7 % (ref 18–48)
MCH RBC QN AUTO: 26.9 PG (ref 27–31)
MCHC RBC AUTO-ENTMCNC: 31 G/DL (ref 32–36)
MCV RBC AUTO: 87 FL (ref 82–98)
MONOCYTES # BLD AUTO: 1 K/UL (ref 0.3–1)
MONOCYTES NFR BLD: 18.2 % (ref 4–15)
NEUTROPHILS # BLD AUTO: 3.1 K/UL (ref 1.8–7.7)
NEUTROPHILS NFR BLD: 57.4 % (ref 38–73)
PLATELET # BLD AUTO: 340 K/UL (ref 150–350)
PMV BLD AUTO: 9.5 FL (ref 9.2–12.9)
POCT GLUCOSE: 151 MG/DL (ref 70–110)
POCT GLUCOSE: 157 MG/DL (ref 70–110)
POCT GLUCOSE: 199 MG/DL (ref 70–110)
POCT GLUCOSE: 208 MG/DL (ref 70–110)
POTASSIUM SERPL-SCNC: 4.6 MMOL/L (ref 3.5–5.1)
RBC # BLD AUTO: 3.2 M/UL (ref 4–5.4)
SODIUM SERPL-SCNC: 137 MMOL/L (ref 136–145)
WBC # BLD AUTO: 5.45 K/UL (ref 3.9–12.7)

## 2020-01-04 PROCEDURE — 25000003 PHARM REV CODE 250: Performed by: HOSPITALIST

## 2020-01-04 PROCEDURE — 11000001 HC ACUTE MED/SURG PRIVATE ROOM

## 2020-01-04 PROCEDURE — 80048 BASIC METABOLIC PNL TOTAL CA: CPT

## 2020-01-04 PROCEDURE — 25000003 PHARM REV CODE 250

## 2020-01-04 PROCEDURE — 97110 THERAPEUTIC EXERCISES: CPT

## 2020-01-04 PROCEDURE — 94761 N-INVAS EAR/PLS OXIMETRY MLT: CPT

## 2020-01-04 PROCEDURE — 99900035 HC TECH TIME PER 15 MIN (STAT)

## 2020-01-04 PROCEDURE — 85025 COMPLETE CBC W/AUTO DIFF WBC: CPT

## 2020-01-04 PROCEDURE — 36415 COLL VENOUS BLD VENIPUNCTURE: CPT

## 2020-01-04 RX ORDER — FUROSEMIDE 20 MG/1
20 TABLET ORAL DAILY
Status: DISCONTINUED | OUTPATIENT
Start: 2020-01-05 | End: 2020-01-06

## 2020-01-04 RX ADMIN — LISINOPRIL 20 MG: 20 TABLET ORAL at 08:01

## 2020-01-04 RX ADMIN — ASPIRIN 81 MG 81 MG: 81 TABLET ORAL at 08:01

## 2020-01-04 RX ADMIN — FLUTICASONE PROPIONATE 50 MCG: 50 SPRAY, METERED NASAL at 08:01

## 2020-01-04 RX ADMIN — PANTOPRAZOLE SODIUM 40 MG: 40 TABLET, DELAYED RELEASE ORAL at 08:01

## 2020-01-04 RX ADMIN — INSULIN ASPART 2 UNITS: 100 INJECTION, SOLUTION INTRAVENOUS; SUBCUTANEOUS at 01:01

## 2020-01-04 RX ADMIN — METOPROLOL TARTRATE 50 MG: 50 TABLET ORAL at 08:01

## 2020-01-04 RX ADMIN — FERROUS SULFATE TAB EC 325 MG (65 MG FE EQUIVALENT) 325 MG: 325 (65 FE) TABLET DELAYED RESPONSE at 08:01

## 2020-01-04 RX ADMIN — ESCITALOPRAM OXALATE 10 MG: 10 TABLET ORAL at 08:01

## 2020-01-04 RX ADMIN — OXYCODONE HYDROCHLORIDE 5 MG: 5 TABLET ORAL at 09:01

## 2020-01-04 RX ADMIN — FUROSEMIDE 40 MG: 40 TABLET ORAL at 08:01

## 2020-01-04 NOTE — PLAN OF CARE
Pt AAOx4, VSS, NAD. No complaints of headache or N/V. Complains of mild pain, PRN medications given per MAR. Blood glucose monitored, sliding scale insulin given per MAR. Tolerating diet. Right foot elevated and remained in boot. No other needs at this time. Safety maintained. Will continue to monitor.

## 2020-01-04 NOTE — PROGRESS NOTES
[facility]  Orthopedics  Progress Note    Patient Name: Bina Daniels  MRN: [id number]  Admission Date: 12/26/2019  Hospital Length of Stay: 8 days  Attending Provider: Cooper Medina MD  Primary Care Provider: Tho Haro MD  Follow-up For: Procedure(s) (LRB):  OPEN REDUCTION INTERNAL FIXATION-ANKLE (Right)  CLOSURE, WOUND (Right)    Post-Operative Day: 6 Days Post-Op  Subjective:     Principal Problem:Trimalleolar fracture of ankle, closed, right, initial encounter    Principal Orthopedic Problem: Post-op ORIF tri-malleolar Rt Ankle fracture    Interval History: Still having difficulty ambulating NWB rle with PT and OT. Awaiting SNF approval    Review of patient's allergies indicates:   Allergen Reactions    Sulfa (sulfonamide antibiotics) Hives       Current Facility-Administered Medications   Medication    0.9%  NaCl infusion (for blood administration)    acetaminophen tablet 650 mg    aspirin chewable tablet 81 mg    cetirizine tablet 5 mg    dextrose 10% (D10W) Bolus    dextrose 10% (D10W) Bolus    docusate sodium capsule 100 mg    escitalopram oxalate tablet 10 mg    ferrous sulfate EC tablet 325 mg    fluticasone propionate 50 mcg/actuation nasal spray 50 mcg    [START ON 1/5/2020] furosemide tablet 20 mg    glucagon (human recombinant) injection 1 mg    glucose chewable tablet 16 g    glucose chewable tablet 24 g    HYDROmorphone injection 0.5 mg    influenza (QUADRIVALENT PF) vaccine 0.5 mL    insulin aspart U-100 pen 0-5 Units    lisinopril tablet 20 mg    metoprolol tartrate (LOPRESSOR) tablet 50 mg    ondansetron injection 4 mg    oxyCODONE immediate release tablet 5 mg    pantoprazole EC tablet 40 mg    promethazine (PHENERGAN) 6.25 mg in dextrose 5 % 50 mL IVPB    senna-docusate 8.6-50 mg per tablet 1 tablet    sodium chloride 0.9% flush 10 mL    sodium chloride 0.9% flush 10 mL    traMADol tablet 50 mg     Objective:     Vital Signs (Most  "Recent):  Temp: 98.3 °F (36.8 °C) (01/04/20 0711)  Pulse: 73 (01/04/20 0711)  Resp: 16 (01/04/20 0711)  BP: (!) 141/71 (01/04/20 0711)  SpO2: 98 % (01/04/20 0804) Vital Signs (24h Range):  Temp:  [97.4 °F (36.3 °C)-99.1 °F (37.3 °C)] 98.3 °F (36.8 °C)  Pulse:  [71-79] 73  Resp:  [15-20] 16  SpO2:  [92 %-98 %] 98 %  BP: (127-141)/(58-71) 141/71     Weight: 66.4 kg (146 lb 6.2 oz)  Height: 5' 4" (162.6 cm)  Body mass index is 25.13 kg/m².      Intake/Output Summary (Last 24 hours) at 1/4/2020 1001  Last data filed at 1/4/2020 0500  Gross per 24 hour   Intake 665 ml   Output 1400 ml   Net -735 ml       Ortho/SPM Exam-Dressings intact, Homans neg. NVI. Min pain with motion    Significant Labs:   BMP:   Recent Labs   Lab 01/04/20  0331   *      K 4.6      CO2 17*   BUN 54*   CREATININE 2.7*   CALCIUM 7.3*     CBC:   Recent Labs   Lab 01/03/20  0527 01/04/20  0331   WBC 6.76 5.45   HGB 10.0* 8.6*   HCT 31.8* 27.7*   * 340     All pertinent labs within the past 24 hours have been reviewed.    Significant Imaging: None    Assessment/Plan:     Active Diagnoses:    Diagnosis Date Noted POA    PRINCIPAL PROBLEM:  Trimalleolar fracture of ankle, closed, right, initial encounter [S82.771A] 12/26/2019 Yes    Neuropathy due to secondary diabetes mellitus [E13.40] 12/27/2019 Yes     Chronic    Anemia due to stage 3 chronic kidney disease [N18.3, D63.1] 12/27/2019 Yes    CKD (chronic kidney disease), stage IV [N18.4] 12/27/2019 Yes     Chronic    Type 2 diabetes mellitus with neurologic complication, without long-term current use of insulin [E11.49] 12/27/2019 Yes     Chronic    Coronary artery disease involving native heart without angina pectoris [I25.10] 12/27/2019 Yes     Chronic    Ankle dislocation, right, initial encounter [S93.04XA] 12/27/2019 Yes      Problems Resolved During this Admission:   Post-op ORIF tri-malleolar Rt Ankle fracture  Renal function and anemia slightly worse- Medicine " following, will ask them to address this    Plan- continue attempting to ambulate with PT   To SNF when bed available  Will need follow-up for renal function and anemia      Cooper Medina MD  Orthopedics  [facility]

## 2020-01-04 NOTE — PLAN OF CARE
Pt awake and alert; up to chair throughout shift working with PT/OT. Diabetic diet, tolerated well. Blood glucose monitored with sliding scale given as needed. Complaints of pain with relief from PRN meds. R ankle with dressing clean, dry, and intact with boot on throughout shift. Bedpan used for elimination. Safety maintained. Bed locked and in lowest position. Call light and personal items within reach. Advised pt to call for assistance, pt verbalized understanding.

## 2020-01-04 NOTE — PT/OT/SLP PROGRESS
Physical Therapy Treatment    Patient Name:  Bina Daniels   MRN:  [id number]    Recommendations:     Discharge Recommendations:  nursing facility, skilled   Discharge Equipment Recommendations: (defer to SNF)   Barriers to discharge: Decreased caregiver support    Assessment:     Bina Daniels is a 64 y.o. female admitted with a medical diagnosis of Trimalleolar fracture of ankle, closed, right, initial encounter.  She presents with the following impairments/functional limitations:  weakness, impaired endurance, impaired sensation, impaired self care skills, decreased lower extremity function, decreased ROM, impaired functional mobilty, decreased safety awareness, impaired coordination, impaired fine motor, pain, decreased coordination, gait instability, impaired balance, orthopedic precautions pt able to complete supine/ seated therex with no reports of increased pain in R LE. She tolerated 10 min at edge of bed with no assistance for seated balance.    Rehab Prognosis: Good; patient would benefit from acute skilled PT services to address these deficits and reach maximum level of function.    Recent Surgery: Procedure(s) (LRB):  OPEN REDUCTION INTERNAL FIXATION-ANKLE (Right)  CLOSURE, WOUND (Right) 6 Days Post-Op    Plan:     During this hospitalization, patient to be seen daily to address the identified rehab impairments via gait training, therapeutic activities, neuromuscular re-education and progress toward the following goals:    · Plan of Care Expires:  01/30/20    Subjective     Chief Complaint: pain in R LE  Patient/Family Comments/goals: get better  Pain/Comfort:  · Pain Rating 1: 4/10  · Location - Side 1: Right  · Pain Addressed 1: Pre-medicate for activity, Nurse notified      Objective:     Communicated with NSG prior to session.  Patient found supine with bed alarm, peripheral IV upon PT entry to room.     General Precautions: Standard, fall   Orthopedic Precautions:RLE non weight bearing   Braces:    "    Functional Mobility:  · Bed Mobility:     · Rolling Right: stand by assistance      AM-PAC 6 CLICK MOBILITY  Turning over in bed (including adjusting bedclothes, sheets and blankets)?: 4  Sitting down on and standing up from a chair with arms (e.g., wheelchair, bedside commode, etc.): 3  Moving from lying on back to sitting on the side of the bed?: 3  Moving to and from a bed to a chair (including a wheelchair)?: 2  Need to walk in hospital room?: 1  Climbing 3-5 steps with a railing?: 1  Basic Mobility Total Score: 14       Therapeutic Activities and Exercises:   seated: LAQ, HIP FLEX, HIP ADD w/ pillow, HIP ABD/ADD 2x10  Supine: QS, GS 5"x10 reps B.     Patient left supine with call button in reach and bed alarm on..    GOALS:   Multidisciplinary Problems     Physical Therapy Goals        Problem: Physical Therapy Goal    Goal Priority Disciplines Outcome Goal Variances Interventions   Physical Therapy Goal     PT, PT/OT Ongoing, Progressing     Description:  Goals to be met by: 2020     Patient will increase functional independence with mobility by performin. Supine to sit with Supervision/SBA  2. Sit to supine with Supervision/SBA  3. Sit to stand transfer with Stand-by Assistance and Contact Guard Assistance using RW NWB RLE  4. Bed to chair transfer with Stand-by Assistance and Contact Guard Assistance using Rolling Walker, or slideboard NWB RLE  5. Gait  x 10-30 feet with Contact Guard Assistance and Minimal Assistance using Rolling Walker as able NWB RLE.   6. Wheelchair propulsion x  feet with Supervision using bilateral upper extremities  7. Lower extremity exercise program x10 x 3 reps with independence                      Time Tracking:     PT Received On: 20  PT Start Time: 1130     PT Stop Time: 1145  PT Total Time (min): 15 min     Billable Minutes: Therapeutic Exercise 15    Treatment Type: Treatment  PT/PTA: PTA     PTA VISIT 4     Rico Smith PTA  2020  "

## 2020-01-04 NOTE — PLAN OF CARE
Problem: Physical Therapy Goal  Goal: Physical Therapy Goal  Description  Goals to be met by: 2020     Patient will increase functional independence with mobility by performin. Supine to sit with Supervision/SBA  2. Sit to supine with Supervision/SBA  3. Sit to stand transfer with Stand-by Assistance and Contact Guard Assistance using RW NWB RLE  4. Bed to chair transfer with Stand-by Assistance and Contact Guard Assistance using Rolling Walker, or slideboard NWB RLE  5. Gait  x 10-30 feet with Contact Guard Assistance and Minimal Assistance using Rolling Walker as able NWB RLE.   6. Wheelchair propulsion x  feet with Supervision using bilateral upper extremities  7. Lower extremity exercise program x10 x 3 reps with independence     Outcome: Ongoing, Progressing   Cont to advance PT towards established PT goals.

## 2020-01-05 LAB
ANION GAP SERPL CALC-SCNC: 11 MMOL/L (ref 8–16)
ANION GAP SERPL CALC-SCNC: 12 MMOL/L (ref 8–16)
ANION GAP SERPL CALC-SCNC: 12 MMOL/L (ref 8–16)
BASOPHILS # BLD AUTO: 0.03 K/UL (ref 0–0.2)
BASOPHILS NFR BLD: 0.5 % (ref 0–1.9)
BUN SERPL-MCNC: 56 MG/DL (ref 8–23)
BUN SERPL-MCNC: 57 MG/DL (ref 8–23)
BUN SERPL-MCNC: 58 MG/DL (ref 8–23)
CALCIUM SERPL-MCNC: 7.5 MG/DL (ref 8.7–10.5)
CHLORIDE SERPL-SCNC: 109 MMOL/L (ref 95–110)
CHLORIDE SERPL-SCNC: 109 MMOL/L (ref 95–110)
CHLORIDE SERPL-SCNC: 111 MMOL/L (ref 95–110)
CO2 SERPL-SCNC: 16 MMOL/L (ref 23–29)
CO2 SERPL-SCNC: 17 MMOL/L (ref 23–29)
CO2 SERPL-SCNC: 18 MMOL/L (ref 23–29)
CREAT SERPL-MCNC: 2.9 MG/DL (ref 0.5–1.4)
CREAT SERPL-MCNC: 3.1 MG/DL (ref 0.5–1.4)
CREAT SERPL-MCNC: 3.2 MG/DL (ref 0.5–1.4)
DIFFERENTIAL METHOD: ABNORMAL
EOSINOPHIL # BLD AUTO: 0.2 K/UL (ref 0–0.5)
EOSINOPHIL NFR BLD: 3.8 % (ref 0–8)
ERYTHROCYTE [DISTWIDTH] IN BLOOD BY AUTOMATED COUNT: 16.9 % (ref 11.5–14.5)
EST. GFR  (AFRICAN AMERICAN): 17 ML/MIN/1.73 M^2
EST. GFR  (AFRICAN AMERICAN): 18 ML/MIN/1.73 M^2
EST. GFR  (AFRICAN AMERICAN): 19 ML/MIN/1.73 M^2
EST. GFR  (NON AFRICAN AMERICAN): 15 ML/MIN/1.73 M^2
EST. GFR  (NON AFRICAN AMERICAN): 15 ML/MIN/1.73 M^2
EST. GFR  (NON AFRICAN AMERICAN): 17 ML/MIN/1.73 M^2
GLUCOSE SERPL-MCNC: 210 MG/DL (ref 70–110)
GLUCOSE SERPL-MCNC: 271 MG/DL (ref 70–110)
GLUCOSE SERPL-MCNC: 294 MG/DL (ref 70–110)
HCT VFR BLD AUTO: 30.9 % (ref 37–48.5)
HGB BLD-MCNC: 9.6 G/DL (ref 12–16)
LYMPHOCYTES # BLD AUTO: 1.1 K/UL (ref 1–4.8)
LYMPHOCYTES NFR BLD: 17.6 % (ref 18–48)
MCH RBC QN AUTO: 26.9 PG (ref 27–31)
MCHC RBC AUTO-ENTMCNC: 31.1 G/DL (ref 32–36)
MCV RBC AUTO: 87 FL (ref 82–98)
MONOCYTES # BLD AUTO: 1 K/UL (ref 0.3–1)
MONOCYTES NFR BLD: 15.2 % (ref 4–15)
NEUTROPHILS # BLD AUTO: 4 K/UL (ref 1.8–7.7)
NEUTROPHILS NFR BLD: 62.9 % (ref 38–73)
PLATELET # BLD AUTO: 359 K/UL (ref 150–350)
PMV BLD AUTO: 9.5 FL (ref 9.2–12.9)
POCT GLUCOSE: 183 MG/DL (ref 70–110)
POCT GLUCOSE: 241 MG/DL (ref 70–110)
POCT GLUCOSE: 251 MG/DL (ref 70–110)
POCT GLUCOSE: 258 MG/DL (ref 70–110)
POTASSIUM SERPL-SCNC: 4.2 MMOL/L (ref 3.5–5.1)
POTASSIUM SERPL-SCNC: 4.3 MMOL/L (ref 3.5–5.1)
POTASSIUM SERPL-SCNC: 4.4 MMOL/L (ref 3.5–5.1)
RBC # BLD AUTO: 3.57 M/UL (ref 4–5.4)
SODIUM SERPL-SCNC: 138 MMOL/L (ref 136–145)
SODIUM SERPL-SCNC: 138 MMOL/L (ref 136–145)
SODIUM SERPL-SCNC: 139 MMOL/L (ref 136–145)
TB INDURATION 48 - 72 HR READ: 0 MM
WBC # BLD AUTO: 6.31 K/UL (ref 3.9–12.7)

## 2020-01-05 PROCEDURE — 25000003 PHARM REV CODE 250: Performed by: NURSE PRACTITIONER

## 2020-01-05 PROCEDURE — C9399 UNCLASSIFIED DRUGS OR BIOLOG: HCPCS | Performed by: FAMILY MEDICINE

## 2020-01-05 PROCEDURE — 97110 THERAPEUTIC EXERCISES: CPT | Performed by: PHYSICAL THERAPIST

## 2020-01-05 PROCEDURE — 11000001 HC ACUTE MED/SURG PRIVATE ROOM

## 2020-01-05 PROCEDURE — 36415 COLL VENOUS BLD VENIPUNCTURE: CPT

## 2020-01-05 PROCEDURE — 94761 N-INVAS EAR/PLS OXIMETRY MLT: CPT

## 2020-01-05 PROCEDURE — 85025 COMPLETE CBC W/AUTO DIFF WBC: CPT

## 2020-01-05 PROCEDURE — 80048 BASIC METABOLIC PNL TOTAL CA: CPT | Mod: 91

## 2020-01-05 PROCEDURE — 63600175 PHARM REV CODE 636 W HCPCS: Performed by: FAMILY MEDICINE

## 2020-01-05 PROCEDURE — 80048 BASIC METABOLIC PNL TOTAL CA: CPT

## 2020-01-05 PROCEDURE — 25000003 PHARM REV CODE 250

## 2020-01-05 PROCEDURE — 97530 THERAPEUTIC ACTIVITIES: CPT | Performed by: PHYSICAL THERAPIST

## 2020-01-05 RX ADMIN — PANTOPRAZOLE SODIUM 40 MG: 40 TABLET, DELAYED RELEASE ORAL at 08:01

## 2020-01-05 RX ADMIN — INSULIN ASPART 3 UNITS: 100 INJECTION, SOLUTION INTRAVENOUS; SUBCUTANEOUS at 05:01

## 2020-01-05 RX ADMIN — ACETAMINOPHEN 650 MG: 325 TABLET ORAL at 04:01

## 2020-01-05 RX ADMIN — FERROUS SULFATE TAB EC 325 MG (65 MG FE EQUIVALENT) 325 MG: 325 (65 FE) TABLET DELAYED RESPONSE at 08:01

## 2020-01-05 RX ADMIN — FLUTICASONE PROPIONATE 50 MCG: 50 SPRAY, METERED NASAL at 08:01

## 2020-01-05 RX ADMIN — DOCUSATE SODIUM 100 MG: 100 CAPSULE, LIQUID FILLED ORAL at 08:01

## 2020-01-05 RX ADMIN — METOPROLOL TARTRATE 50 MG: 50 TABLET ORAL at 09:01

## 2020-01-05 RX ADMIN — METOPROLOL TARTRATE 50 MG: 50 TABLET ORAL at 08:01

## 2020-01-05 RX ADMIN — OXYCODONE HYDROCHLORIDE 5 MG: 5 TABLET ORAL at 11:01

## 2020-01-05 RX ADMIN — DOCUSATE SODIUM 100 MG: 100 CAPSULE, LIQUID FILLED ORAL at 09:01

## 2020-01-05 RX ADMIN — INSULIN ASPART 3 UNITS: 100 INJECTION, SOLUTION INTRAVENOUS; SUBCUTANEOUS at 06:01

## 2020-01-05 RX ADMIN — ESCITALOPRAM OXALATE 10 MG: 10 TABLET ORAL at 08:01

## 2020-01-05 RX ADMIN — FUROSEMIDE 20 MG: 20 TABLET ORAL at 08:01

## 2020-01-05 RX ADMIN — FLUTICASONE PROPIONATE 50 MCG: 50 SPRAY, METERED NASAL at 09:01

## 2020-01-05 RX ADMIN — INSULIN DETEMIR 8 UNITS: 100 INJECTION, SOLUTION SUBCUTANEOUS at 09:01

## 2020-01-05 RX ADMIN — FERROUS SULFATE TAB EC 325 MG (65 MG FE EQUIVALENT) 325 MG: 325 (65 FE) TABLET DELAYED RESPONSE at 09:01

## 2020-01-05 RX ADMIN — ASPIRIN 81 MG 81 MG: 81 TABLET ORAL at 08:01

## 2020-01-05 RX ADMIN — LISINOPRIL 20 MG: 20 TABLET ORAL at 08:01

## 2020-01-05 RX ADMIN — SODIUM CHLORIDE 500 ML: 0.9 INJECTION, SOLUTION INTRAVENOUS at 10:01

## 2020-01-05 NOTE — PROGRESS NOTES
[facility]  Orthopedics  Progress Note    Patient Name: Bina Daniels  MRN: [id number]  Admission Date: 12/26/2019  Hospital Length of Stay: 9 days  Attending Provider: Cooper Medina MD  Primary Care Provider: Tho Haro MD  Follow-up For: Procedure(s) (LRB):  OPEN REDUCTION INTERNAL FIXATION-ANKLE (Right)  CLOSURE, WOUND (Right)    Post-Operative Day: 7 Days Post-Op  Subjective:     Principal Problem:Trimalleolar fracture of ankle, closed, right, initial encounter    Principal Orthopedic Problem: Post op ORIF trimalleolar ankle fx dislocation    Interval History: No changes, still limited with ambulation,  Mild pain    Review of patient's allergies indicates:   Allergen Reactions    Sulfa (sulfonamide antibiotics) Hives       Current Facility-Administered Medications   Medication    0.9%  NaCl infusion (for blood administration)    acetaminophen tablet 650 mg    aspirin chewable tablet 81 mg    cetirizine tablet 5 mg    dextrose 10% (D10W) Bolus    dextrose 10% (D10W) Bolus    docusate sodium capsule 100 mg    escitalopram oxalate tablet 10 mg    ferrous sulfate EC tablet 325 mg    fluticasone propionate 50 mcg/actuation nasal spray 50 mcg    furosemide tablet 20 mg    glucagon (human recombinant) injection 1 mg    glucose chewable tablet 16 g    glucose chewable tablet 24 g    HYDROmorphone injection 0.5 mg    influenza (QUADRIVALENT PF) vaccine 0.5 mL    insulin aspart U-100 pen 0-5 Units    lisinopril tablet 20 mg    metoprolol tartrate (LOPRESSOR) tablet 50 mg    ondansetron injection 4 mg    oxyCODONE immediate release tablet 5 mg    pantoprazole EC tablet 40 mg    promethazine (PHENERGAN) 6.25 mg in dextrose 5 % 50 mL IVPB    senna-docusate 8.6-50 mg per tablet 1 tablet    sodium chloride 0.9% flush 10 mL    sodium chloride 0.9% flush 10 mL    traMADol tablet 50 mg     Objective:     Vital Signs (Most Recent):  Temp: 97.9 °F (36.6 °C) (01/05/20  "0415)  Pulse: 75 (01/05/20 0415)  Resp: 18 (01/05/20 0415)  BP: (!) 144/70 (01/05/20 0415)  SpO2: (!) 93 % (01/05/20 0717) Vital Signs (24h Range):  Temp:  [97.9 °F (36.6 °C)-98.3 °F (36.8 °C)] 97.9 °F (36.6 °C)  Pulse:  [73-77] 75  Resp:  [15-20] 18  SpO2:  [93 %-98 %] 93 %  BP: (144-152)/(67-75) 144/70     Weight: 65.5 kg (144 lb 6.4 oz)  Height: 5' 4" (162.6 cm)  Body mass index is 24.79 kg/m².      Intake/Output Summary (Last 24 hours) at 1/5/2020 0827  Last data filed at 1/5/2020 0500  Gross per 24 hour   Intake 820 ml   Output 1500 ml   Net -680 ml       Ortho/SPM Exam   Dressing Rt ankle changed.  Mild SS drainage and duskiness medial incision, Rest of incisions stable  Abd benign  Homans neg    Significant Labs:   BMP:   Recent Labs   Lab 01/05/20  0425   *      K 4.2      CO2 17*   BUN 58*   CREATININE 3.1*   CALCIUM 7.5*     CBC:   Recent Labs   Lab 01/04/20  0331 01/05/20  0425   WBC 5.45 6.31   HGB 8.6* 9.6*   HCT 27.7* 30.9*    359*     All pertinent labs within the past 24 hours have been reviewed.    Significant Imaging: None    Assessment/Plan:     Active Diagnoses:    Diagnosis Date Noted POA    PRINCIPAL PROBLEM:  Trimalleolar fracture of ankle, closed, right, initial encounter [S82.854X] 12/26/2019 Yes    Neuropathy due to secondary diabetes mellitus [E13.40] 12/27/2019 Yes     Chronic    Anemia due to stage 3 chronic kidney disease [N18.3, D63.1] 12/27/2019 Yes    CKD (chronic kidney disease), stage IV [N18.4] 12/27/2019 Yes     Chronic    Type 2 diabetes mellitus with neurologic complication, without long-term current use of insulin [E11.49] 12/27/2019 Yes     Chronic    Coronary artery disease involving native heart without angina pectoris [I25.10] 12/27/2019 Yes     Chronic    Ankle dislocation, right, initial encounter [S93.04XA] 12/27/2019 Yes      Problems Resolved During this Admission:   Post op ORIF trimalleolar ankle fx dislocation-stable  Renal function " worsening, Stopped Lovenox and decreased lasix yest  Anemia stable    Plan-cont PT and OT beverly NAVA  Encouraged increased po intake.  HAve asked Hospitalist to re-assess renal function and medical problems      Cooper Medina MD  Orthopedics  [facility]

## 2020-01-05 NOTE — PLAN OF CARE
VN cued into room.  Pt resting comfortably in bed with call light and personal belongings within reach.  NADN.  No family at bedside.  Pt reports no pain.  No other needs or complaints at this time.  Reminded pt to use call light as needed.  VN will continue to follow and be available as needed.

## 2020-01-05 NOTE — PROGRESS NOTES
[facility] Hospital Medicine  Progress Note    Patient Name: Bina Daniels  MRN: [id number]  Patient Class: IP- Inpatient   Admission Date: 12/26/2019  Length of Stay: 9 days  Attending Physician: Cooper Medina MD  Primary Care Provider: Tho Haro MD        Subjective:     Principal Problem:Trimalleolar fracture of ankle, closed, right, initial encounter        HPI:  The pt is a 63 yeasrs old female with PMH significant for DMII, neuropathy, breast cancer, and decrease mobility. She was walking in the house using a walker and tripped on a towel, fell down, and sustained injury to her right foot.  She was admitted to ortho service, had a 1st stage of ORIF on Thursday, and will have a second repeat on Sunday morning.  Pt is clinically stable not having any complaints.  She is S/P 1 unit PRBC transfusion yesterday.    Overview/Hospital Course:  12/28 ptis doing fine, some pain and itchiness in the cast area.  12/29 pt is S/P stage 2 of ORIF right ankle. Ding ok, placing her on DM diet  12/30 pt is POD #2 for the second ORIF right ankle, doing ok, might go to rehab vs snf soon,. Her K is 5.3 as per pt she takes lasix 40 mg po BID at home. We will start on 40 mg po daily and dc fluid, pt is eating and tolerating PO intake. Re-introduce other home medsgradually    Interval History: awake and alert, eating no new complaint.   Creatinine elevated- monitor , hold Lisinopril and Lasix, Bolus fluid.       Start levemir and continue low dose SSI     Review of Systems   Constitutional: Positive for activity change. Negative for appetite change and chills.   Respiratory: Negative for cough and shortness of breath.    Cardiovascular: Negative for chest pain.   Gastrointestinal: Negative for abdominal pain, constipation, nausea and vomiting.   Musculoskeletal: Positive for arthralgias (pain controlled right ankle).   Neurological: Negative for dizziness and weakness.   Psychiatric/Behavioral:  Negative for agitation. The patient is not nervous/anxious.      Objective:     Vital Signs (Most Recent):  Temp: 97.4 °F (36.3 °C) (01/02/20 1607)  Pulse: 67 (01/02/20 1607)  Resp: 16 (01/02/20 1607)  BP: (!) 143/65 (01/02/20 1607)  SpO2: 97 % (01/02/20 0436) Vital Signs (24h Range):  Temp:  [97 °F (36.1 °C)-98.1 °F (36.7 °C)] 97.4 °F (36.3 °C)  Pulse:  [52-74] 67  Resp:  [16-20] 16  SpO2:  [97 %-98 %] 97 %  BP: (118-169)/(56-78) 143/65     Weight: 64.6 kg (142 lb 6.6 oz)  Body mass index is 24.45 kg/m².    Intake/Output Summary (Last 24 hours) at 1/2/2020 1709  Last data filed at 1/2/2020 1300  Gross per 24 hour   Intake 565 ml   Output 1299 ml   Net -734 ml      Physical Exam   Constitutional: She appears well-developed and well-nourished.   HENT:   Head: Normocephalic and atraumatic.   Eyes: EOM are normal.   Pulmonary/Chest: Effort normal. No respiratory distress.   Abdominal: Soft.   Musculoskeletal: She exhibits deformity (r ankle, s/p Post op).   Skin: Skin is warm and dry.   Psychiatric: She has a normal mood and affect. Her behavior is normal. Judgment and thought content normal.   Nursing note and vitals reviewed.      Significant Labs:   Blood Culture: No results for input(s): LABBLOO in the last 48 hours.  CBC:   Recent Labs   Lab 01/01/20  0422 01/02/20  0500   WBC 6.78 7.80   HGB 9.1* 10.8*   HCT 29.5* 34.0*    529*     CMP:   Recent Labs   Lab 01/01/20  0422 01/02/20  0500    139   K 4.6 4.4   * 112*   CO2 15* 13*   * 34*   BUN 45* 48*   CREATININE 2.7* 2.6*   CALCIUM 7.5* 8.1*   ANIONGAP 11 14   EGFRNONAA 18* 19*     Coagulation: No results for input(s): PT, INR, APTT in the last 48 hours.  TSH: No results for input(s): TSH in the last 4320 hours.  Urine Culture: No results for input(s): LABURIN in the last 48 hours.  Urine Studies: No results for input(s): COLORU, APPEARANCEUA, PHUR, SPECGRAV, PROTEINUA, GLUCUA, KETONESU, BILIRUBINUA, OCCULTUA, NITRITE, UROBILINOGEN,  LEUKOCYTESUR, RBCUA, WBCUA, BACTERIA, SQUAMEPITHEL, HYALINECASTS in the last 48 hours.    Invalid input(s): SLICK    Significant Imaging: I have reviewed all pertinent imaging results/findings within the past 24 hours.      Assessment/Plan:      * Trimalleolar fracture of ankle, closed, right, initial encounter  Per primary team  12/29 s/p OR ORIF today    Ankle dislocation, right, initial encounter  Per primary team      Coronary artery disease involving native heart without angina pectoris  Continue asa and metoprolol      Type 2 diabetes mellitus with neurologic complication, without long-term current use of insulin  Pt home meds are metformin and glyburide  I will recommend stopping them while inpatient and pt getting to be NPO,  I will place pt on ISS  12/29 place pt on dm diet, post op care  12/30 restart metformin and glyburide gradually    CKD (chronic kidney disease), stage IV  Will monitor bun/cr.  Good urine output  12/29 stable bmp, renal function  12/30 restart lasix at 40 mg po daily- d/c   Hold Lisinopril and Lasix, bolus and monitor BMP q 6    Anemia due to stage 3 chronic kidney disease  Stable   S/P 1 unit PRBC tranfusion      Neuropathy due to secondary diabetes mellitus  No meds needed.  monitor        VTE Risk Mitigation (From admission, onward)         Ordered     IP VTE HIGH RISK PATIENT  Once      12/29/19 1112     Place sequential compression device  Until discontinued      12/29/19 1112                      Monica Carbone MD  Department of Hospital Medicine   [facility]

## 2020-01-05 NOTE — PLAN OF CARE
Problem: Physical Therapy Goal  Goal: Physical Therapy Goal  Description  Goals to be met by: 2020     Patient will increase functional independence with mobility by performin. Supine to sit with Supervision/SBA  2. Sit to supine with Supervision/SBA  3. Sit to stand transfer with Stand-by Assistance and Contact Guard Assistance using RW NWB RLE  4. Bed to chair transfer with Stand-by Assistance and Contact Guard Assistance using Rolling Walker, or slideboard NWB RLE  5. Gait  x 10-30 feet with Contact Guard Assistance and Minimal Assistance using Rolling Walker as able NWB RLE.   6. Wheelchair propulsion x  feet with Supervision using bilateral upper extremities  7. Lower extremity exercise program x10 x 3 reps with independence     Outcome: Ongoing, Progressing

## 2020-01-05 NOTE — ASSESSMENT & PLAN NOTE
Will monitor bun/cr.  Good urine output  12/29 stable bmp, renal function  12/30 restart lasix at 40 mg po daily- d/c   Hold Lisinopril and Lasix, bolus and monitor BMP q 6

## 2020-01-05 NOTE — PT/OT/SLP PROGRESS
Physical Therapy Treatment    Patient Name:  Bina Daniels   MRN:  [id number]    Recommendations:     Discharge Recommendations:  nursing facility, skilled   Discharge Equipment Recommendations: (defer to SNF)   Barriers to discharge: Decreased caregiver support and decreased functional mobility    Assessment:     Bina Daniels is a 64 y.o. female admitted with a medical diagnosis of Trimalleolar fracture of ankle, closed, right, initial encounter.  She presents with the following impairments/functional limitations:  weakness, gait instability, impaired cardiopulmonary response to activity, impaired endurance, impaired balance, decreased lower extremity function, impaired self care skills, pain, orthopedic precautions, impaired functional mobilty.  Pt required min assist for supine to/from sitting.  Pt unable to go from sit to stand with NWB RLE with cam boot with Max assist of 1.  Pt scooted side/side on bed with NWB RLE with Mod assist of 1.      Rehab Prognosis: Fair; patient would benefit from acute skilled PT services to address these deficits and reach maximum level of function.    Recent Surgery: Procedure(s) (LRB):  OPEN REDUCTION INTERNAL FIXATION-ANKLE (Right)  CLOSURE, WOUND (Right) 7 Days Post-Op    Plan:     During this hospitalization, patient to be seen daily to address the identified rehab impairments via gait training, therapeutic activities, therapeutic exercises, neuromuscular re-education, wheelchair management/training and progress toward the following goals:    · Plan of Care Expires:  02/05/20    Subjective     Chief Complaint: I can't stand up  Patient/Family Comments/goals: get better  Pain/Comfort:  · Pain Rating 1: 3/10  · Location - Side 1: Left  · Location 1: ankle  · Pain Addressed 1: Pre-medicate for activity, Reposition, Distraction, Cessation of Activity  · Pain Rating Post-Intervention 1: 3/10      Objective:     Communicated with nurse prior to session.  Patient found supine with bed  "alarm, peripheral IV upon PT entry to room.     General Precautions: Standard, fall   Orthopedic Precautions:RLE non weight bearing   Braces: (RLE Cam Boot - NWB)     Functional Mobility:   Pt required min assist for supine to/from sitting.  Pt unable to go from sit to stand with NWB RLE with cam boot with Max assist of 1.  Pt scooted side/side on bed with NWB RLE with Mod assist of 1.  Pt required Min assist to scoot to HOB in trendelenburg utilizing BLE and LLE only.      AM-PAC 6 CLICK MOBILITY  Turning over in bed (including adjusting bedclothes, sheets and blankets)?: 4  Sitting down on and standing up from a chair with arms (e.g., wheelchair, bedside commode, etc.): 3  Moving from lying on back to sitting on the side of the bed?: 3  Moving to and from a bed to a chair (including a wheelchair)?: 2  Need to walk in hospital room?: 1  Climbing 3-5 steps with a railing?: 1  Basic Mobility Total Score: 14       Therapeutic Activities and Exercises:   Pt performed seated LLE LAQ, hip flexion and abd and AP 15 x 3, LLE LAQ, hip flexion and abd 10 x 2 with cam boot.  Pt performed supine QS 5" x 30, SAQ 10 x 3.      Patient left HOB elevated with all lines intact, call button in reach, bed alarm on and nurse notified..    GOALS:   Multidisciplinary Problems     Physical Therapy Goals        Problem: Physical Therapy Goal    Goal Priority Disciplines Outcome Goal Variances Interventions   Physical Therapy Goal     PT, PT/OT Ongoing, Progressing     Description:  Goals to be met by: 2020     Patient will increase functional independence with mobility by performin. Supine to sit with Supervision/SBA  2. Sit to supine with Supervision/SBA  3. Sit to stand transfer with Stand-by Assistance and Contact Guard Assistance using RW NWB RLE  4. Bed to chair transfer with Stand-by Assistance and Contact Guard Assistance using Rolling Walker, or slideboard NWB RLE  5. Gait  x 10-30 feet with Contact Guard Assistance and " Minimal Assistance using Rolling Walker as able NWB RLE.   6. Wheelchair propulsion x  feet with Supervision using bilateral upper extremities  7. Lower extremity exercise program x10 x 3 reps with independence                      Time Tracking:     PT Received On: 01/05/20  PT Start Time: 1045     PT Stop Time: 1112  PT Total Time (min): 27 min     Billable Minutes: Therapeutic Activity 12 and Therapeutic Exercise 15    Treatment Type: Treatment  PT/PTA: PT     PTA Visit Number: 0     Lauren Pierce, PT  01/05/2020

## 2020-01-05 NOTE — PLAN OF CARE
Patient is awake and alert.  Leg brace is in place to right lower extremity.  Patient received a bolus of 500ml of normal saline.  Denies pain.  Has great appetite and tolerates all meals without any nausea or vomiting.  Safety is maintained with bed low, wheels locked and side rails up.  Call light within reach.  Will continue to monitor.  Bed alarm on.

## 2020-01-06 LAB
BASOPHILS # BLD AUTO: 0.02 K/UL (ref 0–0.2)
BASOPHILS NFR BLD: 0.3 % (ref 0–1.9)
DIFFERENTIAL METHOD: ABNORMAL
EOSINOPHIL # BLD AUTO: 0.2 K/UL (ref 0–0.5)
EOSINOPHIL NFR BLD: 4 % (ref 0–8)
ERYTHROCYTE [DISTWIDTH] IN BLOOD BY AUTOMATED COUNT: 17 % (ref 11.5–14.5)
HCT VFR BLD AUTO: 31.2 % (ref 37–48.5)
HGB BLD-MCNC: 9.8 G/DL (ref 12–16)
LYMPHOCYTES # BLD AUTO: 1.1 K/UL (ref 1–4.8)
LYMPHOCYTES NFR BLD: 18 % (ref 18–48)
MCH RBC QN AUTO: 27.2 PG (ref 27–31)
MCHC RBC AUTO-ENTMCNC: 31.4 G/DL (ref 32–36)
MCV RBC AUTO: 87 FL (ref 82–98)
MONOCYTES # BLD AUTO: 0.9 K/UL (ref 0.3–1)
MONOCYTES NFR BLD: 14.9 % (ref 4–15)
NEUTROPHILS # BLD AUTO: 3.7 K/UL (ref 1.8–7.7)
NEUTROPHILS NFR BLD: 62.8 % (ref 38–73)
PLATELET # BLD AUTO: 350 K/UL (ref 150–350)
PMV BLD AUTO: 9.7 FL (ref 9.2–12.9)
POCT GLUCOSE: 122 MG/DL (ref 70–110)
POCT GLUCOSE: 176 MG/DL (ref 70–110)
POCT GLUCOSE: 261 MG/DL (ref 70–110)
POCT GLUCOSE: 284 MG/DL (ref 70–110)
RBC # BLD AUTO: 3.6 M/UL (ref 4–5.4)
WBC # BLD AUTO: 5.96 K/UL (ref 3.9–12.7)

## 2020-01-06 PROCEDURE — 97110 THERAPEUTIC EXERCISES: CPT | Mod: CQ

## 2020-01-06 PROCEDURE — 36415 COLL VENOUS BLD VENIPUNCTURE: CPT

## 2020-01-06 PROCEDURE — 25000003 PHARM REV CODE 250

## 2020-01-06 PROCEDURE — 11000001 HC ACUTE MED/SURG PRIVATE ROOM

## 2020-01-06 PROCEDURE — 94761 N-INVAS EAR/PLS OXIMETRY MLT: CPT

## 2020-01-06 PROCEDURE — 25000003 PHARM REV CODE 250: Performed by: NURSE PRACTITIONER

## 2020-01-06 PROCEDURE — 85025 COMPLETE CBC W/AUTO DIFF WBC: CPT

## 2020-01-06 PROCEDURE — 97530 THERAPEUTIC ACTIVITIES: CPT

## 2020-01-06 PROCEDURE — 25000003 PHARM REV CODE 250: Performed by: FAMILY MEDICINE

## 2020-01-06 PROCEDURE — 63600175 PHARM REV CODE 636 W HCPCS

## 2020-01-06 RX ORDER — AMLODIPINE BESYLATE 5 MG/1
10 TABLET ORAL DAILY
Status: DISCONTINUED | OUTPATIENT
Start: 2020-01-06 | End: 2020-01-08 | Stop reason: HOSPADM

## 2020-01-06 RX ADMIN — INSULIN ASPART 3 UNITS: 100 INJECTION, SOLUTION INTRAVENOUS; SUBCUTANEOUS at 05:01

## 2020-01-06 RX ADMIN — PANTOPRAZOLE SODIUM 40 MG: 40 TABLET, DELAYED RELEASE ORAL at 09:01

## 2020-01-06 RX ADMIN — ASPIRIN 81 MG 81 MG: 81 TABLET ORAL at 09:01

## 2020-01-06 RX ADMIN — FERROUS SULFATE TAB EC 325 MG (65 MG FE EQUIVALENT) 325 MG: 325 (65 FE) TABLET DELAYED RESPONSE at 09:01

## 2020-01-06 RX ADMIN — METOPROLOL TARTRATE 50 MG: 50 TABLET ORAL at 09:01

## 2020-01-06 RX ADMIN — AMLODIPINE BESYLATE 10 MG: 5 TABLET ORAL at 05:01

## 2020-01-06 RX ADMIN — ACETAMINOPHEN 650 MG: 325 TABLET ORAL at 11:01

## 2020-01-06 RX ADMIN — ESCITALOPRAM OXALATE 10 MG: 10 TABLET ORAL at 09:01

## 2020-01-06 RX ADMIN — DOCUSATE SODIUM 100 MG: 100 CAPSULE, LIQUID FILLED ORAL at 09:01

## 2020-01-06 RX ADMIN — FLUTICASONE PROPIONATE 50 MCG: 50 SPRAY, METERED NASAL at 09:01

## 2020-01-06 RX ADMIN — FUROSEMIDE 20 MG: 20 TABLET ORAL at 09:01

## 2020-01-06 NOTE — PHYSICIAN QUERY
PT Name: Bina Daniels  MR #: [id number]     PHYSICIAN QUERY -  ELECTROLYTE CLARIFICATION      CDS/: Angela ALBERT, RN              Contact information: [email or web address]  This form is a permanent document in the medical record.     Query Date: January 6, 2020    By submitting this query, we are merely seeking further clarification of documentation to reflect the severity of illness of your patient. Please utilize your independent clinical judgment when addressing the question(s) below.    The Medical record reflects the following:     Indicators   Supporting Clinical Findings Location in Medical Record    x Lab Value(s)  Potassium 5.0--> 5.2--> 5.3  Lab: Chemistry 12/27/19 - 12/30/19    x Treatment                                 Medication  Her K is 5.3 as per pt she takes lasix 40 mg po BID at home. We will start on 40 mg po daily and dc fluid, pt is eating and tolerating PO intake. Re-introduce other home medsgradually   [facility] Medicine progress 12/31/19    Other       Provider, please specify the diagnosis or diagnoses that correspond(s) to the above indicators. Jose all that apply:    [ x  ] Hyperkalemia   [   ] Other electrolyte disturbance (please specify): _______   [   ]  Clinically Undetermined       Please document in your progress notes daily for the duration of treatment until resolved, and include in your discharge summary.

## 2020-01-06 NOTE — PLAN OF CARE
Pt remains A+Ox4. Medicated this AM with PRN tylenol, no other complaints of pain this shift. Covered with 3u SSI this PM. R foot remains in boot, elevated at all times per MD order. Voiding without difficulty, no BM but does endorse flatus. Safety precautions maintained.

## 2020-01-06 NOTE — PHYSICIAN QUERY
PT Name: Bina Daniels  MR #: [id number]  Physician Query Form - Renal Condition Clarification     CDS/: Angela ALBERT, RN              Contact information: [email or web address]     This form is a permanent document in the medical record.     QueryDate: January 6, 2020    By submitting this query, we are merely seeking further clarification of documentation. Please utilize your independent clinical judgment when addressing the question(s) below.    The Medical record contains the following:   Indicator Supporting Clinical Findings Location in Medical Record    Kidney (Renal) Insufficiency      x Kidney (Renal) Failure / Injury  CKD (chronic kidney disease), stage IV  Will monitor bun/cr.  Good urine output  12/29 stable bmp, renal function  12/30 restart lasix at 40 mg po daily- d/c   Hold Lisinopril and Lasix, bolus and monitor BMP q 6     Anemia due to stage 3 chronic kidney disease  Stable   S/P 1 unit PRBC tranfusion  Dr. Francois's [facility] Medicine Progress note 1/5/20    Nephrotoxic Agents      x BUN/Creatinine GFR  Creatinine = 2.4--> 2.7--> 3.1--> 2.9--> 3.2        eGFR = 15--> 18  Lab: Chemistry 12/27/19 - 1/5/2020     Lab: Chemistry 12/26/19 - 1/5/2020    Urine: Casts         Eosinophils      Dehydration      Nausea/Vomiting      Dialysis/CRRT      x Treatment:  Renal function worsening, Stopped Lovenox and decreased lasix yest    Dr. Medina's [facility] Progress note 1/5/20    x Other:   Interval History: awake and alert, eating no new complaint.   Creatinine elevated- monitor , hold Lisinopril and Lasix, Bolus fluid.     Dr. Francois's [facility] Medicine Progress note 1/5/20   Acute Kidney Injury / Acute Renal Failure has different defining criteria. A generally accepted guideline  is:   A greater than 100% (2X) rise in serum creatinine from baseline* occurring during the course of a single hospital stay.   *Baseline as determined by the providers judgment and consideration of previous lab values and  "other documentation, if available.    A diagnosis of Acute Kidney Injury/ Acute Renal Failure should incorporate abnormal labs and clinical findings that are clinically significant      References: 1. Judson et al. Acute renal failure-definition, outcome measures, animal models, fluid therapy and information technology needs: the Second International Consensus Conference of the Acute Dialysis Quality Initiative (ADQI) Group. Crit Care 2004; 8:B204; 2. Gemma et al. Acute Kidney Injury Network: report of an initiative to improve outcomes in acute kidney injury. Crit Care 2007; 11:R31; 3. Kidney Disease: Improving Global Outcomes (KDIGO). Acute Kidney Injury Work Group. KDIGO clinical practice guidelines for acute kidney injury. Kidney Int Suppl 2012; 2:1.  The clinical guidelines noted below is only a system guideline, it does not replace the providers clinical judgment.    Provider, please specify the diagnosis or diagnoses associated with above clinical findings.    Due to conflicting documentation, please specify the stage of CKD.  Please document a diagnosis for "Creatinine elevated" and "Renal function worsening" as documented above. Thank you.  PLEASE CHECK ALL THAT APPLY:    [  x ] Other Acute Kidney Failure/Injury (please specify): ____________     [   ] Unspecified Acute Kidney Failure/Injury      [   ] Acute Renal Insufficiency  Consider if SCr rise is transient and normalizes quickly with no efforts at real resuscitation of vital signs and perfusion       [   ] Chronic Kidney Disease (CKD) stage 4  Severely reduced kidney function eGFR 15-29   [   ] Other (please specify): _________________________________   [   ]  Clinically Undetermined       Please document in your progress notes daily for the duration of treatment until resolved and include in your discharge summary.    "

## 2020-01-06 NOTE — PT/OT/SLP PROGRESS
Occupational Therapy   Treatment    Name: Bina Daniels  MRN: [id number]  Admitting Diagnosis:  Trimalleolar fracture of ankle, closed, right, initial encounter  8 Days Post-Op    Recommendations:     Discharge Recommendations: nursing facility, skilled  Discharge Equipment Recommendations:  (Defer to SNF)  Barriers to discharge:  Inaccessible home environment    Assessment:   Patient able to petty L sock from bed level /c set-up. Patient remains /c minimal progress /c standing attempts using RW and maintaining NWB status. Patient will need SNF upon D/C to maximize strength and endurance for functional transfers and ADL tasks.     Bina Daniels is a 64 y.o. female with a medical diagnosis of Trimalleolar fracture of ankle, closed, right, initial encounter. Performance deficits affecting function are weakness, impaired endurance, impaired self care skills, impaired functional mobilty, gait instability, impaired balance, decreased coordination, decreased upper extremity function, decreased lower extremity function, decreased safety awareness, decreased ROM, pain, orthopedic precautions.     Rehab Prognosis:  Fair+; patient would benefit from acute skilled OT services to address these deficits and reach maximum level of function.       Plan:     Patient to be seen 5 x/week to address the above listed problems via self-care/home management, therapeutic activities, therapeutic exercises  · Plan of Care Expires: 01/28/20  · Plan of Care Reviewed with: patient    Subjective     Pain/Comfort:  · Pain Rating 1: 3/10  · Location - Side 1: Right  · Location - Orientation 1: generalized  · Location 1: ankle  · Pain Addressed 1: Reposition, Distraction, Cessation of Activity    Objective:     Communicated with: nurseCally prior to session.  Patient found HOB elevated with bed alarm upon OT entry to room.    General Precautions: Standard, fall   Orthopedic Precautions:RLE non weight bearing   Braces: (RLE CAM boot - NWB)     Bed  "Mobility:    · Patient completed Rolling/Turning to Right with stand by assistance and with side rail  · Patient completed Scooting/Bridging with stand by assistance  · Patient completed Supine to Sit with stand by assistance and with side rail  · Patient completed Sit to Supine with maximal assistance and with leg lift     Functional Mobility/Transfers:  · Patient completed Sit <> Stand Transfer with maximal assistance, total assistance and of 2 persons  with  rolling walker     Activities of Daily Living:  · Feeding:  supervision    · Lower Body Dressing: supervision to petty L sock    [facility] 6 Click ADL: 17    Treatment & Education:  Patient /c bed mob as noted above. Instructed in sit <> stand using RW -- PTA present to keep RLE elevated for NWB status. Max-TotalA of 2 persons to attempt standing -- patient becomes "frozen" /c fear and anxiety and is very self-limiting, screaming "I can't!! I can't!!". After first trial, patient very unsafely attempted to sit back on bed -- was away from EOB and required totalA of therapists to scoot back safely. On 2nd attempt, patient still unable to stand upright. Unable to scoot laterally, despite max VCs and education on technique. Patient /c good strength throughout extremities, but limits self 2/2 fear. Returned supine and completed BUE therex all jts/planes.     Patient left HOB elevated with all lines intact, call button in reach, bed alarm on and nsg notifiedEducation:      GOALS:   Multidisciplinary Problems     Occupational Therapy Goals        Problem: Occupational Therapy Goal    Goal Priority Disciplines Outcome Interventions   Occupational Therapy Goal     OT, PT/OT Ongoing, Progressing    Description:  Goals to be met by: 01/29/20     Patient will increase functional independence with ADLs by performing:    UE Dressing with Modified [place].  LE Dressing with Modified [place].  Toileting from bedside commode with Contact Guard Assistance for hygiene and " clothing management.   Toilet transfer to bedside commode with Minimal Assistance while maintaining NWB to R LE.  Upper extremity exercise program 3 x12 reps per handout, with supervision to improve UB strength/endurance for improved transfers.                      Time Tracking:     OT Date of Treatment: 01/06/20  OT Start Time: 1200  OT Stop Time: 1225  OT Total Time (min): 25 mins co-tx /c PTA    Billable Minutes:Therapeutic Activity 12    HOLDEN [place]  1/6/2020

## 2020-01-06 NOTE — ASSESSMENT & PLAN NOTE
Will monitor bun/cr.  Good urine output  12/29 stable bmp, renal function  12/30 restart lasix at 40 mg po daily- d/c   Hold Lisinopril and Lasix, bolus and monitor BMP q 6   consult nephrology

## 2020-01-06 NOTE — PLAN OF CARE
Problem: Occupational Therapy Goal  Goal: Occupational Therapy Goal  Description  Goals to be met by: 01/29/20     Patient will increase functional independence with ADLs by performing:    UE Dressing with Modified [place].  LE Dressing with Modified [place].  Toileting from bedside commode with Contact Guard Assistance for hygiene and clothing management.   Toilet transfer to bedside commode with Minimal Assistance while maintaining NWB to R LE.  Upper extremity exercise program 3 x12 reps per handout, with supervision to improve UB strength/endurance for improved transfers.     Outcome: Ongoing, Progressing     Patient able to petty L sock from bed level /c set-up. Patient remains /c minimal progress /c standing attempts using RW and maintaining NWB status. Patient will need SNF upon D/C to maximize strength and endurance for functional transfers and ADL tasks.

## 2020-01-06 NOTE — PROGRESS NOTES
[facility] Hospital Medicine  Progress Note    Patient Name: Bina Daniels  MRN: [id number]  Patient Class: IP- Inpatient   Admission Date: 12/26/2019  Length of Stay: 10 days  Attending Physician: Cooper Medina MD  Primary Care Provider: Tho Haro MD        Subjective:     Principal Problem:Trimalleolar fracture of ankle, closed, right, initial encounter        HPI:  The pt is a 63 yeasrs old female with PMH significant for DMII, neuropathy, breast cancer, and decrease mobility. She was walking in the house using a walker and tripped on a towel, fell down, and sustained injury to her right foot.  She was admitted to ortho service, had a 1st stage of ORIF on Thursday, and will have a second repeat on Sunday morning.  Pt is clinically stable not having any complaints.  She is S/P 1 unit PRBC transfusion yesterday.    Overview/Hospital Course:  12/28 ptis doing fine, some pain and itchiness in the cast area.  12/29 pt is S/P stage 2 of ORIF right ankle. Ding ok, placing her on DM diet  12/30 pt is POD #2 for the second ORIF right ankle, doing ok, might go to rehab vs snf soon,. Her K is 5.3 as per pt she takes lasix 40 mg po BID at home. We will start on 40 mg po daily and dc fluid, pt is eating and tolerating PO intake. Re-introduce other home medsgradually    Interval History: awake and alert, eating no new complaint.   Creatinine still up trending Lisinopril and Lasix on hold, s/p fluid bolus- consult nephrology     continue levemir and continue low dose SSI     Review of Systems   Constitutional: Positive for activity change. Negative for appetite change and chills.   Respiratory: Negative for cough and shortness of breath.    Cardiovascular: Negative for chest pain.   Gastrointestinal: Negative for abdominal pain, constipation, nausea and vomiting.   Musculoskeletal: Positive for arthralgias (pain controlled right ankle).   Neurological: Negative for dizziness and weakness.    Psychiatric/Behavioral: Negative for agitation. The patient is not nervous/anxious.      Objective:     Vital Signs (Most Recent):  Temp: 96.5 °F (35.8 °C) (01/06/20 1157)  Pulse: 71 (01/06/20 1157)  Resp: 15 (01/06/20 1157)  BP: (!) 164/75 (01/06/20 1157)  SpO2: 95 % (01/06/20 1154) Vital Signs (24h Range):  Temp:  [96.5 °F (35.8 °C)-98.9 °F (37.2 °C)] 96.5 °F (35.8 °C)  Pulse:  [69-81] 71  Resp:  [15-20] 15  SpO2:  [94 %-98 %] 95 %  BP: (126-164)/(59-84) 164/75     Weight: 62.7 kg (138 lb 3.7 oz)  Body mass index is 23.73 kg/m².    Intake/Output Summary (Last 24 hours) at 1/6/2020 1508  Last data filed at 1/6/2020 0740  Gross per 24 hour   Intake 415 ml   Output 1900 ml   Net -1485 ml      Physical Exam   Constitutional: She appears well-developed and well-nourished.   HENT:   Head: Normocephalic and atraumatic.   Eyes: EOM are normal.   Pulmonary/Chest: Effort normal. No respiratory distress.   Abdominal: Soft.   Musculoskeletal: She exhibits deformity (r ankle, s/p Post op).   Skin: Skin is warm and dry.   Psychiatric: She has a normal mood and affect. Her behavior is normal. Judgment and thought content normal.   Nursing note and vitals reviewed.      Significant Labs:   Blood Culture: No results for input(s): LABBLOO in the last 48 hours.  CBC:   Recent Labs   Lab 01/05/20  0425 01/06/20  0441   WBC 6.31 5.96   HGB 9.6* 9.8*   HCT 30.9* 31.2*   * 350     CMP:   Recent Labs   Lab 01/05/20  0425 01/05/20  1204 01/05/20  1800    139 138   K 4.2 4.3 4.4    111* 109   CO2 17* 16* 18*   * 210* 294*   BUN 58* 57* 56*   CREATININE 3.1* 2.9* 3.2*   CALCIUM 7.5* 7.5* 7.5*   ANIONGAP 12 12 11   EGFRNONAA 15* 17* 15*     Coagulation: No results for input(s): PT, INR, APTT in the last 48 hours.  TSH: No results for input(s): TSH in the last 4320 hours.  Urine Culture: No results for input(s): LABURIN in the last 48 hours.  Urine Studies: No results for input(s): COLORU, APPEARANCEUA, PHUR,  SPECGRAV, PROTEINUA, GLUCUA, KETONESU, BILIRUBINUA, OCCULTUA, NITRITE, UROBILINOGEN, LEUKOCYTESUR, RBCUA, WBCUA, BACTERIA, SQUAMEPITHEL, HYALINECASTS in the last 48 hours.    Invalid input(s): SLICK    Significant Imaging: I have reviewed all pertinent imaging results/findings within the past 24 hours.      Assessment/Plan:      * Trimalleolar fracture of ankle, closed, right, initial encounter  Per primary team  12/29 s/p OR ORIF today    Ankle dislocation, right, initial encounter  Per primary team      Coronary artery disease involving native heart without angina pectoris  Continue asa and metoprolol      Type 2 diabetes mellitus with neurologic complication, without long-term current use of insulin  Pt home meds are metformin and glyburide  I will recommend stopping them while inpatient and pt getting to be NPO,  I will place pt on ISS  12/29 place pt on dm diet, post op care  12/30 restart metformin and glyburide gradually    CKD (chronic kidney disease), stage IV  Will monitor bun/cr.  Good urine output  12/29 stable bmp, renal function  12/30 restart lasix at 40 mg po daily- d/c   Hold Lisinopril and Lasix, bolus and monitor BMP q 6   consult nephrology    Anemia due to stage 3 chronic kidney disease  Stable   S/P 1 unit PRBC tranfusion      Neuropathy due to secondary diabetes mellitus  No meds needed.  monitor        VTE Risk Mitigation (From admission, onward)         Ordered     IP VTE HIGH RISK PATIENT  Once      12/29/19 1112     Place sequential compression device  Until discontinued      12/29/19 1112                      Monica N Innocent-MD Alessandro  Department of Hospital Medicine   [facility]

## 2020-01-06 NOTE — PLAN OF CARE
Alert and oriented x4. Scheduled medications administered per MD order. Prn pain medication administered. Afebrile. Blood glucose monitored. Right foot elevated on pillow. Voiding without difficulty. Bedpan provided. Safety maintained. Bed in lowest position. Call light in reach. Bed alarm on. Side rails raised x2. Instructed to call for mobility. Will continue to monitor.

## 2020-01-06 NOTE — SUBJECTIVE & OBJECTIVE
Interval History: awake and alert, eating no new complaint.   Creatinine still up trending Lisinopril and Lasix on hold, s/p fluid bolus- consult nephrology     continue levemir and continue low dose SSI     Review of Systems   Constitutional: Positive for activity change. Negative for appetite change and chills.   Respiratory: Negative for cough and shortness of breath.    Cardiovascular: Negative for chest pain.   Gastrointestinal: Negative for abdominal pain, constipation, nausea and vomiting.   Musculoskeletal: Positive for arthralgias (pain controlled right ankle).   Neurological: Negative for dizziness and weakness.   Psychiatric/Behavioral: Negative for agitation. The patient is not nervous/anxious.      Objective:     Vital Signs (Most Recent):  Temp: 96.5 °F (35.8 °C) (01/06/20 1157)  Pulse: 71 (01/06/20 1157)  Resp: 15 (01/06/20 1157)  BP: (!) 164/75 (01/06/20 1157)  SpO2: 95 % (01/06/20 1154) Vital Signs (24h Range):  Temp:  [96.5 °F (35.8 °C)-98.9 °F (37.2 °C)] 96.5 °F (35.8 °C)  Pulse:  [69-81] 71  Resp:  [15-20] 15  SpO2:  [94 %-98 %] 95 %  BP: (126-164)/(59-84) 164/75     Weight: 62.7 kg (138 lb 3.7 oz)  Body mass index is 23.73 kg/m².    Intake/Output Summary (Last 24 hours) at 1/6/2020 1508  Last data filed at 1/6/2020 0740  Gross per 24 hour   Intake 415 ml   Output 1900 ml   Net -1485 ml      Physical Exam   Constitutional: She appears well-developed and well-nourished.   HENT:   Head: Normocephalic and atraumatic.   Eyes: EOM are normal.   Pulmonary/Chest: Effort normal. No respiratory distress.   Abdominal: Soft.   Musculoskeletal: She exhibits deformity (r ankle, s/p Post op).   Skin: Skin is warm and dry.   Psychiatric: She has a normal mood and affect. Her behavior is normal. Judgment and thought content normal.   Nursing note and vitals reviewed.      Significant Labs:   Blood Culture: No results for input(s): LABBLOO in the last 48 hours.  CBC:   Recent Labs   Lab 01/05/20  2892  01/06/20  0441   WBC 6.31 5.96   HGB 9.6* 9.8*   HCT 30.9* 31.2*   * 350     CMP:   Recent Labs   Lab 01/05/20  0425 01/05/20  1204 01/05/20  1800    139 138   K 4.2 4.3 4.4    111* 109   CO2 17* 16* 18*   * 210* 294*   BUN 58* 57* 56*   CREATININE 3.1* 2.9* 3.2*   CALCIUM 7.5* 7.5* 7.5*   ANIONGAP 12 12 11   EGFRNONAA 15* 17* 15*     Coagulation: No results for input(s): PT, INR, APTT in the last 48 hours.  TSH: No results for input(s): TSH in the last 4320 hours.  Urine Culture: No results for input(s): LABURIN in the last 48 hours.  Urine Studies: No results for input(s): COLORU, APPEARANCEUA, PHUR, SPECGRAV, PROTEINUA, GLUCUA, KETONESU, BILIRUBINUA, OCCULTUA, NITRITE, UROBILINOGEN, LEUKOCYTESUR, RBCUA, WBCUA, BACTERIA, SQUAMEPITHEL, HYALINECASTS in the last 48 hours.    Invalid input(s): SLICK    Significant Imaging: I have reviewed all pertinent imaging results/findings within the past 24 hours.

## 2020-01-06 NOTE — PT/OT/SLP PROGRESS
Physical Therapy Treatment    Patient Name:  Bina Daniels   MRN:  [id number]    Recommendations:     Discharge Recommendations:  nursing facility, skilled   Discharge Equipment Recommendations: (defer)   Barriers to discharge: Decreased caregiver support and decreased functional mobility    Assessment:     Bina Daniels is a 64 y.o. female admitted with a medical diagnosis of Trimalleolar fracture of ankle, closed, right, initial encounter.  She presents with the following impairments/functional limitations:  weakness, impaired endurance, impaired functional mobilty, impaired self care skills, impaired balance, decreased coordination, decreased upper extremity function, decreased lower extremity function, decreased safety awareness, pain, decreased ROM, impaired skin, orthopedic precautions Pt would continue to benefit from P.T. To address impairments listed above.      Rehab Prognosis: Fair; patient would benefit from acute skilled PT services to address these deficits and reach maximum level of function.    Recent Surgery: Procedure(s) (LRB):  OPEN REDUCTION INTERNAL FIXATION-ANKLE (Right)  CLOSURE, WOUND (Right) 8 Days Post-Op    Plan:     During this hospitalization, patient to be seen daily to address the identified rehab impairments via gait training, therapeutic activities, therapeutic exercises, neuromuscular re-education, wheelchair management/training and progress toward the following goals:    · Plan of Care Expires:  02/05/20    Subjective       Patient/Family Comments/goals: Pt agreed to tx  Pain/Comfort:  · Pain Rating 1: 3/10  · Location - Side 1: Right  · Location - Orientation 1: generalized  · Location 1: ankle  · Pain Addressed 1: Reposition, Distraction, Cessation of Activity  · Pain Rating Post-Intervention 1: 3/10      Objective:     Communicated with RN  prior to session.  Patient found supine with bed alarm(CAM boot) upon PT entry to room.     General Precautions: Standard, fall   Orthopedic  "Precautions:RLE non weight bearing   Braces:       Functional Mobility:  · Bed Mobility:     · Rolling Right: stand by assistance and with b/r for assist  · Scooting: stand by assistance  · Supine to Sit: SBA with HOB elevated  · Sit to Supine: minimum assistance and for RLE  · Transfers:     · Sit to Stand:  maximal assistance of 2 with RW and with assistance to maintain RLE NWB.  Pt was unable to achieve L knee extension.  Assistance was given to extend left knee, but unable to completely extend with pt yelling, "I can't, I can't."  Pt stood twice with same assist.  · Balance: sitting fair+, standing poor-      AM-PAC 6 CLICK MOBILITY  Turning over in bed (including adjusting bedclothes, sheets and blankets)?: 4  Sitting down on and standing up from a chair with arms (e.g., wheelchair, bedside commode, etc.): 3  Moving from lying on back to sitting on the side of the bed?: 3  Moving to and from a bed to a chair (including a wheelchair)?: 2  Need to walk in hospital room?: 1  Climbing 3-5 steps with a railing?: 1  Basic Mobility Total Score: 14       Therapeutic Activities and Exercises:   Pt demonstrates increased fear of falling and increased anxiety when attempting to come to a stand with Rw and Total/Max A of 2.  PTA and JOAQUIN instructed pt in breathing techniques to assist with relaxation, and reassured pt that she was safe. Pt stands on flexed LLE with Max/Total A of 2 with RW.  Pt is unable to decrease  Her fear/anxiety to focus on proper/safe technique to achieve standing.    Supine BLE therex/ROM: AP(L only), SAQ, heelslides, hip ABD/ADD, quad sets, glut sets x 15 reps  Co-tx with JOAQUIN    Patient left supine with all lines intact, call button in reach, bed alarm on and Rn notified..    GOALS:   Multidisciplinary Problems     Physical Therapy Goals        Problem: Physical Therapy Goal    Goal Priority Disciplines Outcome Goal Variances Interventions   Physical Therapy Goal     PT, PT/OT Ongoing, Progressing   "   Description:  Goals to be met by: 2020     Patient will increase functional independence with mobility by performin. Supine to sit with Supervision/SBA  2. Sit to supine with Supervision/SBA  3. Sit to stand transfer with Stand-by Assistance and Contact Guard Assistance using RW NWB RLE  4. Bed to chair transfer with Stand-by Assistance and Contact Guard Assistance using Rolling Walker, or slideboard NWB RLE  5. Gait  x 10-30 feet with Contact Guard Assistance and Minimal Assistance using Rolling Walker as able NWB RLE.   6. Wheelchair propulsion x  feet with Supervision using bilateral upper extremities  7. Lower extremity exercise program x10 x 3 reps with independence                      Time Tracking:     PT Received On: 20  PT Start Time: 1200     PT Stop Time: 1225  PT Total Time (min): 25 min     Billable Minutes: Therapeutic Exercise 13 co-tx x 12 mins    Treatment Type: Treatment  PT/PTA: PTA     PTA Visit Number: 1     Rhonda Hauser PTA  2020

## 2020-01-06 NOTE — PROGRESS NOTES
[facility]  Orthopedics  Progress Note    Patient Name: Bina Daniels  MRN: [id number]  Admission Date: 12/26/2019  Hospital Length of Stay: 10 days  Attending Provider: Cooper Medina MD  Primary Care Provider: Tho Haro MD  Follow-up For: Procedure(s) (LRB):  OPEN REDUCTION INTERNAL FIXATION-ANKLE (Right)  CLOSURE, WOUND (Right)    Post-Operative Day: 8 Days Post-Op  Subjective:     Principal Problem:Trimalleolar fracture of ankle, closed, right, initial encounter    Principal Orthopedic Problem: Post-op ORIF Trimal, ankle fracture dislocation    Interval History: No significant change from ortho standpoint.  Renal fxn being monitored by hospitalist and renal consult sent    Review of patient's allergies indicates:   Allergen Reactions    Sulfa (sulfonamide antibiotics) Hives       Current Facility-Administered Medications   Medication    0.9%  NaCl infusion (for blood administration)    acetaminophen tablet 650 mg    amLODIPine tablet 10 mg    aspirin chewable tablet 81 mg    cetirizine tablet 5 mg    dextrose 10% (D10W) Bolus    dextrose 10% (D10W) Bolus    docusate sodium capsule 100 mg    escitalopram oxalate tablet 10 mg    ferrous sulfate EC tablet 325 mg    fluticasone propionate 50 mcg/actuation nasal spray 50 mcg    glucagon (human recombinant) injection 1 mg    glucose chewable tablet 16 g    glucose chewable tablet 24 g    HYDROmorphone injection 0.5 mg    influenza (QUADRIVALENT PF) vaccine 0.5 mL    insulin aspart U-100 pen 0-5 Units    insulin detemir U-100 pen 10 Units    metoprolol tartrate (LOPRESSOR) tablet 50 mg    ondansetron injection 4 mg    oxyCODONE immediate release tablet 5 mg    pantoprazole EC tablet 40 mg    promethazine (PHENERGAN) 6.25 mg in dextrose 5 % 50 mL IVPB    senna-docusate 8.6-50 mg per tablet 1 tablet    sodium chloride 0.9% flush 10 mL    sodium chloride 0.9% flush 10 mL    traMADol tablet 50 mg     Objective:  "    Vital Signs (Most Recent):  Temp: 98.2 °F (36.8 °C) (01/06/20 1644)  Pulse: 76 (01/06/20 1644)  Resp: 15 (01/06/20 1644)  BP: (!) 149/72 (01/06/20 1644)  SpO2: 95 % (01/06/20 1154) Vital Signs (24h Range):  Temp:  [96.5 °F (35.8 °C)-98.9 °F (37.2 °C)] 98.2 °F (36.8 °C)  Pulse:  [69-76] 76  Resp:  [15-20] 15  SpO2:  [94 %-95 %] 95 %  BP: (126-164)/(59-75) 149/72     Weight: 62.7 kg (138 lb 3.7 oz)  Height: 5' 4" (162.6 cm)  Body mass index is 23.73 kg/m².      Intake/Output Summary (Last 24 hours) at 1/6/2020 1749  Last data filed at 1/6/2020 0740  Gross per 24 hour   Intake 415 ml   Output 1900 ml   Net -1485 ml       Ortho/SPM Exam  Incision stable, min serous dscharge medial mal incison  Significant Labs:   BMP:   Recent Labs   Lab 01/05/20  1800   *      K 4.4      CO2 18*   BUN 56*   CREATININE 3.2*   CALCIUM 7.5*     CBC:   Recent Labs   Lab 01/05/20  0425 01/06/20  0441   WBC 6.31 5.96   HGB 9.6* 9.8*   HCT 30.9* 31.2*   * 350     Recent Lab Results       01/06/20  1643   01/06/20  1155   01/06/20  0557   01/06/20  0441   01/05/20  2000        Anion Gap               Baso #       0.02       Basophil%       0.3       BUN, Bld               Calcium               Chloride               CO2               Creatinine               Differential Method       Automated       eGFR if                eGFR if non                Eos #       0.2       Eosinophil%       4.0       Glucose               Gran # (ANC)       3.7       Gran%       62.8       Hematocrit       31.2       Hemoglobin       9.8       Lymph #       1.1       Lymph%       18.0       MCH       27.2       MCHC       31.4       MCV       87       Mono #       0.9       Mono%       14.9       MPV       9.7       Platelets       350       POCT Glucose 261 176 122   241     Potassium               RBC       3.60       RDW       17.0       Sodium               WBC       5.96                        " 01/05/20  1800        Anion Gap 11     Baso #       Basophil%       BUN, Bld 56     Calcium 7.5     Chloride 109     CO2 18     Creatinine 3.2     Differential Method       eGFR if  17     eGFR if non  15  Comment:  Calculation used to obtain the estimated glomerular filtration  rate (eGFR) is the CKD-EPI equation.        Eos #       Eosinophil%       Glucose 294     Gran # (ANC)       Gran%       Hematocrit       Hemoglobin       Lymph #       Lymph%       MCH       MCHC       MCV       Mono #       Mono%       MPV       Platelets       POCT Glucose       Potassium 4.4     RBC       RDW       Sodium 138     WBC           All pertinent labs within the past 24 hours have been reviewed.    Significant Imaging: None    Assessment/Plan:     Active Diagnoses:    Diagnosis Date Noted POA    PRINCIPAL PROBLEM:  Trimalleolar fracture of ankle, closed, right, initial encounter [S82.851A] 12/26/2019 Yes    Neuropathy due to secondary diabetes mellitus [E13.40] 12/27/2019 Yes     Chronic    Anemia due to stage 3 chronic kidney disease [N18.3, D63.1] 12/27/2019 Yes    CKD (chronic kidney disease), stage IV [N18.4] 12/27/2019 Yes     Chronic    Type 2 diabetes mellitus with neurologic complication, without long-term current use of insulin [E11.49] 12/27/2019 Yes     Chronic    Coronary artery disease involving native heart without angina pectoris [I25.10] 12/27/2019 Yes     Chronic    Ankle dislocation, right, initial encounter [S93.04XA] 12/27/2019 Yes      Problems Resolved During this Admission:    Post-op ORIF Trimal, ankle fracture dislocation-stable.  Plan- discharge to SNF when renal fxn stable      Cooper Medina MD  Orthopedics  [facility]

## 2020-01-06 NOTE — PLAN OF CARE
Paper work has been signed with facility. MITCHEL informed Ida with [facility], [phone], informed the CR levels are peaking and nephrology is being consulted, per conversation with LD Berg. MITCHEL informed Ida we will prepare for D/C tomorrow.       01/06/20 1604   Post-Acute Status   Post-Acute Authorization Placement   Post-Acute Placement Status Patient Evaluation by Facility

## 2020-01-07 LAB
ANION GAP SERPL CALC-SCNC: 8 MMOL/L (ref 8–16)
BUN SERPL-MCNC: 59 MG/DL (ref 8–23)
CALCIUM SERPL-MCNC: 7.5 MG/DL (ref 8.7–10.5)
CHLORIDE SERPL-SCNC: 108 MMOL/L (ref 95–110)
CO2 SERPL-SCNC: 21 MMOL/L (ref 23–29)
CREAT SERPL-MCNC: 2.8 MG/DL (ref 0.5–1.4)
EST. GFR  (AFRICAN AMERICAN): 20 ML/MIN/1.73 M^2
EST. GFR  (NON AFRICAN AMERICAN): 17 ML/MIN/1.73 M^2
GLUCOSE SERPL-MCNC: 233 MG/DL (ref 70–110)
POCT GLUCOSE: 145 MG/DL (ref 70–110)
POCT GLUCOSE: 201 MG/DL (ref 70–110)
POCT GLUCOSE: 206 MG/DL (ref 70–110)
POCT GLUCOSE: 234 MG/DL (ref 70–110)
POTASSIUM SERPL-SCNC: 4 MMOL/L (ref 3.5–5.1)
SODIUM SERPL-SCNC: 137 MMOL/L (ref 136–145)

## 2020-01-07 PROCEDURE — 25000003 PHARM REV CODE 250: Performed by: FAMILY MEDICINE

## 2020-01-07 PROCEDURE — 25000003 PHARM REV CODE 250

## 2020-01-07 PROCEDURE — 97110 THERAPEUTIC EXERCISES: CPT | Performed by: PHYSICAL THERAPIST

## 2020-01-07 PROCEDURE — 80048 BASIC METABOLIC PNL TOTAL CA: CPT

## 2020-01-07 PROCEDURE — 94761 N-INVAS EAR/PLS OXIMETRY MLT: CPT

## 2020-01-07 PROCEDURE — 36415 COLL VENOUS BLD VENIPUNCTURE: CPT

## 2020-01-07 PROCEDURE — 97802 MEDICAL NUTRITION INDIV IN: CPT

## 2020-01-07 PROCEDURE — 11000001 HC ACUTE MED/SURG PRIVATE ROOM

## 2020-01-07 PROCEDURE — 25000003 PHARM REV CODE 250: Performed by: NURSE PRACTITIONER

## 2020-01-07 RX ADMIN — ASPIRIN 81 MG 81 MG: 81 TABLET ORAL at 09:01

## 2020-01-07 RX ADMIN — METOPROLOL TARTRATE 50 MG: 50 TABLET ORAL at 09:01

## 2020-01-07 RX ADMIN — FLUTICASONE PROPIONATE 50 MCG: 50 SPRAY, METERED NASAL at 08:01

## 2020-01-07 RX ADMIN — METOPROLOL TARTRATE 50 MG: 50 TABLET ORAL at 08:01

## 2020-01-07 RX ADMIN — ESCITALOPRAM OXALATE 10 MG: 10 TABLET ORAL at 09:01

## 2020-01-07 RX ADMIN — INSULIN ASPART 2 UNITS: 100 INJECTION, SOLUTION INTRAVENOUS; SUBCUTANEOUS at 12:01

## 2020-01-07 RX ADMIN — INSULIN ASPART 1 UNITS: 100 INJECTION, SOLUTION INTRAVENOUS; SUBCUTANEOUS at 06:01

## 2020-01-07 RX ADMIN — FERROUS SULFATE TAB EC 325 MG (65 MG FE EQUIVALENT) 325 MG: 325 (65 FE) TABLET DELAYED RESPONSE at 08:01

## 2020-01-07 RX ADMIN — DOCUSATE SODIUM 100 MG: 100 CAPSULE, LIQUID FILLED ORAL at 09:01

## 2020-01-07 RX ADMIN — PANTOPRAZOLE SODIUM 40 MG: 40 TABLET, DELAYED RELEASE ORAL at 09:01

## 2020-01-07 RX ADMIN — DOCUSATE SODIUM 100 MG: 100 CAPSULE, LIQUID FILLED ORAL at 08:01

## 2020-01-07 RX ADMIN — FLUTICASONE PROPIONATE 50 MCG: 50 SPRAY, METERED NASAL at 09:01

## 2020-01-07 RX ADMIN — FERROUS SULFATE TAB EC 325 MG (65 MG FE EQUIVALENT) 325 MG: 325 (65 FE) TABLET DELAYED RESPONSE at 09:01

## 2020-01-07 RX ADMIN — INSULIN ASPART 2 UNITS: 100 INJECTION, SOLUTION INTRAVENOUS; SUBCUTANEOUS at 05:01

## 2020-01-07 RX ADMIN — AMLODIPINE BESYLATE 10 MG: 5 TABLET ORAL at 09:01

## 2020-01-07 NOTE — PT/OT/SLP PROGRESS
Physical Therapy Treatment    Patient Name:  Bina Daniels   MRN:  [id number]    Recommendations:     Discharge Recommendations:  nursing facility, skilled   Discharge Equipment Recommendations: (TBD)   Barriers to discharge: Decreased caregiver support and Decreased functional mobility    Assessment:     Bina Daniels is a 64 y.o. female admitted with a medical diagnosis of Trimalleolar fracture of ankle, closed, right, initial encounter.  She presents with the following impairments/functional limitations:  weakness, impaired functional mobilty, impaired cardiopulmonary response to activity, impaired endurance, gait instability, impaired sensation, impaired balance, impaired self care skills, decreased lower extremity function.  Pt refused OOB 2/2 just receiving some news from MD.      Rehab Prognosis: Good; patient would benefit from acute skilled PT services to address these deficits and reach maximum level of function.    Recent Surgery: Procedure(s) (LRB):  OPEN REDUCTION INTERNAL FIXATION-ANKLE (Right)  CLOSURE, WOUND (Right) 9 Days Post-Op    Plan:     During this hospitalization, patient to be seen daily to address the identified rehab impairments via gait training, therapeutic activities, therapeutic exercises, neuromuscular re-education, wheelchair management/training and progress toward the following goals:    · Plan of Care Expires:  02/07/20    Subjective     Chief Complaint: unwilling to sit up today  Patient/Family Comments/goals: none  Pain/Comfort:  · Pain Rating 1: 2/10  · Location - Side 1: Right  · Location 1: foot  · Pain Addressed 1: Reposition, Distraction, Cessation of Activity      Objective:     Communicated with nurse prior to session.  Patient found HOB elevated with bed alarm upon PT entry to room.     General Precautions: Standard, fall   Orthopedic Precautions:RLE non weight bearing   Braces: (RLE Cam Boot - NWB)     Functional Mobility:  Patient refused.      AM-PAC 6 CLICK  MOBILITY  Turning over in bed (including adjusting bedclothes, sheets and blankets)?: 4  Sitting down on and standing up from a chair with arms (e.g., wheelchair, bedside commode, etc.): 3  Moving from lying on back to sitting on the side of the bed?: 3  Moving to and from a bed to a chair (including a wheelchair)?: 2  Need to walk in hospital room?: 1  Climbing 3-5 steps with a railing?: 1  Basic Mobility Total Score: 14       Therapeutic Activities and Exercises:   Pt performed LLE AP 15 x 3, BLE SAQ 15 x 3, hip/knee flex/ext 15 x 3, hip abd and ER/IR 15 x 3.  Pt performed T-band PNF in BUE's 10 x 2.     Patient left HOB elevated with all lines intact, call button in reach, bed alarm on and nurse notified..    GOALS:   Multidisciplinary Problems     Physical Therapy Goals        Problem: Physical Therapy Goal    Goal Priority Disciplines Outcome Goal Variances Interventions   Physical Therapy Goal     PT, PT/OT Ongoing, Progressing     Description:  Goals to be met by: 2020     Patient will increase functional independence with mobility by performin. Supine to sit with Supervision/SBA  2. Sit to supine with Supervision/SBA  3. Sit to stand transfer with Stand-by Assistance and Contact Guard Assistance using RW NWB RLE  4. Bed to chair transfer with Stand-by Assistance and Contact Guard Assistance using Rolling Walker, or slideboard NWB RLE  5. Gait  x 10-30 feet with Contact Guard Assistance and Minimal Assistance using Rolling Walker as able NWB RLE.   6. Wheelchair propulsion x  feet with Supervision using bilateral upper extremities  7. Lower extremity exercise program x10 x 3 reps with independence                      Time Tracking:     PT Received On: 20  PT Start Time: 1303     PT Stop Time: 1330  PT Total Time (min): 27 min     Billable Minutes: Therapeutic Exercise 27    Treatment Type: Treatment  PT/PTA: PT     PTA Visit Number: 0     Lauren Pierce, PT  2020

## 2020-01-07 NOTE — PHYSICIAN QUERY
PT Name: Bina Daniels  MR #: [id number]  Physician Query Form - Renal Condition Clarification     CDS/: Angela ALBERT, RN               Contact information: [email or web address]    This form is a permanent document in the medical record.     QueryDate: January 7, 2020    By submitting this query, we are merely seeking further clarification of documentation. Please utilize your independent clinical judgment when addressing the question(s) below.    The Medical record contains the following:   Indicator Supporting Clinical Findings Location in Medical Record    Kidney (Renal) Insufficiency      x Kidney (Renal) Failure / Injury  CKD (chronic kidney disease), stage IV  Will monitor bun/cr.  Good urine output  12/29 stable bmp, renal function  12/30 restart lasix at 40 mg po daily- d/c   Hold Lisinopril and Lasix, bolus and monitor BMP q 6     Anemia due to stage 3 chronic kidney disease  Stable   S/P 1 unit PRBC tranfusion   [facility] Progress note 1/5/20    Nephrotoxic Agents      x BUN/Creatinine GFR  Creatinine = 2.4--> 2.7--> 3.1--> 2.9--> 3.2        eGFR = 15--> 18  Lab: Chemistry 12/27/19 - 1/5/2020     Lab: Chemistry 12/26/19 - 1/5/2020    Urine: Casts         Eosinophils      Dehydration      Nausea/Vomiting      Dialysis/CRRT       Treatment:        Other:      Acute Kidney Injury / Acute Renal Failure has different defining criteria. A generally accepted guideline  is:   A greater than 100% (2X) rise in serum creatinine from baseline* occurring during the course of a single hospital stay.   *Baseline as determined by the providers judgment and consideration of previous lab values and other documentation, if available.    A diagnosis of Acute Kidney Injury/ Acute Renal Failure should incorporate abnormal labs and clinical findings that are clinically significant      References: 1. Judson et al. Acute renal failure-definition, outcome measures, animal models, fluid therapy and  information technology needs: the Second International Consensus Conference of the Acute Dialysis Quality Initiative (ADQI) Group. Crit Care 2004; 8:B204; 2. Gemma et al. Acute Kidney Injury Network: report of an initiative to improve outcomes in acute kidney injury. Crit Care 2007; 11:R31; 3. Kidney Disease: Improving Global Outcomes (KDIGO). Acute Kidney Injury Work Group. KDIGO clinical practice guidelines for acute kidney injury. Kidney Int Suppl 2012; 2:1.    The clinical guidelines noted below is only a system guideline, it does not replace the providers clinical judgment.    Provider, please specify the diagnosis or diagnoses associated with above clinical findings.    Due to conflicting documentation, please specify the stage of CKD. Thank you.    [ x  ] Chronic Kidney Disease (CKD) stage 4  Severely reduced kidney function eGFR 15-29   [   ] Other (please specify): _________________________________   [   ]  Clinically Undetermined       Please document in your progress notes daily for the duration of treatment until resolved and include in your discharge summary.

## 2020-01-07 NOTE — PT/OT/SLP PROGRESS
"Occupational Therapy  Visit Attempt    Patient Name:  Bina Dnaiels   MRN:  [id number]    Patient not seen today secondary to declining EOB/OOB activity -- reports receiving "bad news" from nephrology and is now "depressed". Patient agreeable to BLE therex (PT present to complete). Will follow-up next avail date.     SONIA Lynn  1/7/2020  "

## 2020-01-07 NOTE — PLAN OF CARE
Recommendation:   1. Encourage good intake at meals to rpomtoe wound healing    Goals:   Pt will consume at least 50-75% intake at meals  Nutrition Goal Status: new  Communication of RD Recs: reviewed with RN

## 2020-01-07 NOTE — ASSESSMENT & PLAN NOTE
Contributing Nutrition Diagnosis  Increased energy needs    Related to (etiology):   Wound healing, s/p surgery    Signs and Symptoms (as evidenced by):   ORIF ankle    Interventions:  Collaboration with other providers    Nutrition Diagnosis Status:   New

## 2020-01-07 NOTE — PROGRESS NOTES
"[facility]  Adult Nutrition  Progress Note    SUMMARY       Recommendations    Recommendation:   1. Encourage good intake at meals to rpomtoe wound healing    Goals:   Pt will consume at least 50-75% intake at meals  Nutrition Goal Status: new  Communication of RD Recs: reviewed with RN    Reason for Assessment  Reason For Assessment: length of stay  Diagnosis: (ankle fx)  Relevant Medical History: DM, cancer, breast lumpectomy, cholecystectomy  General Information Comments: Pt with ORIF ankle 12/27. Pt on 1500cal ADA diet. Noted 100% intake at recent meals. Pt was asleep at visit. NFPE completed today 1/7-pt nourished at this time  Nutrition Discharge Planning: pt to d/c on ADA diet    Nutrition Risk Screen  Nutrition Risk Screen: no indicators present    Nutrition/Diet History  Food Preferences: unable to assess  Spiritual, Cultural Beliefs, Judaism Practices, Values that Affect Care: no  Factors Affecting Nutritional Intake: None identified at this time    Anthropometrics  Temp: 97.7 °F (36.5 °C)  Height Method: Stated  Height: 5' 4" (162.6 cm)  Height (inches): 64 in  Weight Method: Bed Scale  Weight: 64.3 kg (141 lb 12.1 oz)  Weight (lb): 141.76 lb  Ideal Body Weight (IBW), Female: 120 lb  % Ideal Body Weight, Female (lb): 118.13 %  BMI (Calculated): 24.3  BMI Grade: 18.5-24.9 - normal     Lab/Procedures/Meds  Pertinent Labs Reviewed: reviewed  Pertinent Labs Comments: BUN 59H, Crea 2.8H, Glu 233H, Ca 7.5L, Alb 2.5L  Pertinent Medications Reviewed: reviewed  Pertinent Medications Comments: aspirin, insulin, pantoprazole    Estimated/Assessed Needs  Weight Used For Calorie Calculations: 64.3 kg (141 lb 12.1 oz)  Energy Calorie Requirements (kcal): 1929 (30 kcal/kg)  Energy Need Method: Kcal/kg  Protein Requirements: 77g (1.2g/kg)  Weight Used For Protein Calculations: 64.3 kg (141 lb 12.1 oz)  Estimated Fluid Requirement Method: RDA Method  RDA Method (mL): 1929  CHO Requirement: " 200g    Nutrition Prescription Ordered  Current Diet Order: 1500cal ADA    Evaluation of Received Nutrient/Fluid Intake  I/O: 570/1400  Energy Calories Required: meeting needs  Protein Required: meeting needs  Fluid Required: meeting needs  Comments: LBM 1/4  % Intake of Estimated Energy Needs: 75 - 100 %  % Meal Intake: 75 - 100 %    Nutrition Risk  Level of Risk/Frequency of Follow-up: (1xweekly)     Assessment and Plan  * Trimalleolar fracture of ankle, closed, right, initial encounter  Contributing Nutrition Diagnosis  Increased energy needs    Related to (etiology):   Wound healing, s/p surgery    Signs and Symptoms (as evidenced by):   ORIF ankle    Interventions:  Collaboration with other providers    Nutrition Diagnosis Status:   New      Monitor and Evaluation  Food and Nutrient Intake: food and beverage intake  Food and Nutrient Adminstration: diet order  Physical Activity and Function: nutrition-related ADLs and IADLs  Anthropometric Measurements: weight  Biochemical Data, Medical Tests and Procedures: electrolyte and renal panel  Nutrition-Focused Physical Findings: overall appearance     Malnutrition Assessment  Subcutaneous Fat Loss (Final Summary): well nourished  Muscle Loss Evaluation (Final Summary): well nourished       Nutrition Follow-Up  RD Follow-up?: Yes

## 2020-01-07 NOTE — PLAN OF CARE
Slept well during the night. Had no c/o pain. Being repositioned every two hours and as needed. Voiding without difficulty.

## 2020-01-07 NOTE — PROGRESS NOTES
[facility]  Orthopedics  Progress Note    Patient Name: Bina Daniels  MRN: [id number]  Admission Date: 12/26/2019  Hospital Length of Stay: 11 days  Attending Provider: Cooper Medina MD  Primary Care Provider: Tho Haro MD  Follow-up For: Procedure(s) (LRB):  OPEN REDUCTION INTERNAL FIXATION-ANKLE (Right)  CLOSURE, WOUND (Right)    Post-Operative Day: 9 Days Post-Op  Subjective:     Principal Problem:Trimalleolar fracture of ankle, closed, right, initial encounter    Principal Orthopedic Problem: Post op ORIF trimalleolar ankle fracture dislocation    Interval History: No new problems overnight.  Awaiting renal consult     Review of patient's allergies indicates:   Allergen Reactions    Sulfa (sulfonamide antibiotics) Hives       Current Facility-Administered Medications   Medication    0.9%  NaCl infusion (for blood administration)    acetaminophen tablet 650 mg    amLODIPine tablet 10 mg    aspirin chewable tablet 81 mg    cetirizine tablet 5 mg    dextrose 10% (D10W) Bolus    dextrose 10% (D10W) Bolus    docusate sodium capsule 100 mg    escitalopram oxalate tablet 10 mg    ferrous sulfate EC tablet 325 mg    fluticasone propionate 50 mcg/actuation nasal spray 50 mcg    glucagon (human recombinant) injection 1 mg    glucose chewable tablet 16 g    glucose chewable tablet 24 g    HYDROmorphone injection 0.5 mg    influenza (QUADRIVALENT PF) vaccine 0.5 mL    insulin aspart U-100 pen 0-5 Units    insulin detemir U-100 pen 10 Units    metoprolol tartrate (LOPRESSOR) tablet 50 mg    ondansetron injection 4 mg    oxyCODONE immediate release tablet 5 mg    pantoprazole EC tablet 40 mg    promethazine (PHENERGAN) 6.25 mg in dextrose 5 % 50 mL IVPB    senna-docusate 8.6-50 mg per tablet 1 tablet    sodium chloride 0.9% flush 10 mL    sodium chloride 0.9% flush 10 mL    traMADol tablet 50 mg     Objective:     Vital Signs (Most Recent):  Temp: 98.2 °F (36.8 °C)  "(01/07/20 0343)  Pulse: 66 (01/07/20 0343)  Resp: 17 (01/07/20 0343)  BP: 129/63 (01/07/20 0343)  SpO2: 96 % (01/07/20 0357) Vital Signs (24h Range):  Temp:  [96.5 °F (35.8 °C)-98.2 °F (36.8 °C)] 98.2 °F (36.8 °C)  Pulse:  [66-76] 66  Resp:  [15-19] 17  SpO2:  [94 %-96 %] 96 %  BP: (117-164)/(58-75) 129/63     Weight: 64.3 kg (141 lb 12.1 oz)  Height: 5' 4" (162.6 cm)  Body mass index is 24.33 kg/m².      Intake/Output Summary (Last 24 hours) at 1/7/2020 0636  Last data filed at 1/7/2020 0343  Gross per 24 hour   Intake 570 ml   Output 1400 ml   Net -830 ml       Ortho/SPM Exam   Incision intact, Min drainage.   Homans neg  NVI    Significant Labs:   BMP:   Recent Labs   Lab 01/07/20  0455   *      K 4.0      CO2 21*   BUN 59*   CREATININE 2.8*   CALCIUM 7.5*     CBC:   Recent Labs   Lab 01/06/20  0441   WBC 5.96   HGB 9.8*   HCT 31.2*        Recent Lab Results       01/07/20  0603   01/07/20  0455   01/06/20  2049   01/06/20  1643   01/06/20  1155        Anion Gap   8           BUN, Bld   59           Calcium   7.5           Chloride   108           CO2   21           Creatinine   2.8           eGFR if    20           eGFR if non    17  Comment:  Calculation used to obtain the estimated glomerular filtration  rate (eGFR) is the CKD-EPI equation.              Glucose   233           POCT Glucose 201   284 261 176     Potassium   4.0           Sodium   137                              All pertinent labs within the past 24 hours have been reviewed.    Significant Imaging: None    Assessment/Plan:     Active Diagnoses:    Diagnosis Date Noted POA    PRINCIPAL PROBLEM:  Trimalleolar fracture of ankle, closed, right, initial encounter [S82.851A] 12/26/2019 Yes    Neuropathy due to secondary diabetes mellitus [E13.40] 12/27/2019 Yes     Chronic    Anemia due to stage 3 chronic kidney disease [N18.3, D63.1] 12/27/2019 Yes    CKD (chronic kidney disease), stage " IV [N18.4] 12/27/2019 Yes     Chronic    Type 2 diabetes mellitus with neurologic complication, without long-term current use of insulin [E11.49] 12/27/2019 Yes     Chronic    Coronary artery disease involving native heart without angina pectoris [I25.10] 12/27/2019 Yes     Chronic    Ankle dislocation, right, initial encounter [S93.04XA] 12/27/2019 Yes      Problems Resolved During this Admission:   Post op ORIF trimalleolar ankle fracture dislocation-Stable  Renal status appears to be stabilizing.    Prob transfer after renal assessment      Cooper Medina MD  Orthopedics  [facility]

## 2020-01-07 NOTE — PLAN OF CARE
VN rounds: VN cued into pt's room with pt's permission. Pt resting in bed. Fall risk protocol discussed with pt. VN instructed pt to call for assistance. Pt aware and agreeable. NAD noted. Allowed time for questions.  Will cont to be available and intervene as needed.     01/06/20 9057   Type of Frequent Check   Type Patient Rounds;Other (see comments)  (vn round)   Safety/Activity   Patient Rounds visualized patient;call light in patient/parent reach   Safety Promotion/Fall Prevention instructed to call staff for mobility;Fall Risk reviewed with patient/family   Positioning   Body Position supine   Head of Bed (HOB) HOB elevated   Pain/Comfort/Sleep   Preferred Pain Scale number (Numeric Rating Pain Scale)   Pain Rating (0-10): Rest 0   Sleep/Rest/Relaxation awake;no problem identified   Assessments (Pre/Post)   Level of Consciousness (AVPU) alert

## 2020-01-08 VITALS
WEIGHT: 138.44 LBS | BODY MASS INDEX: 23.64 KG/M2 | OXYGEN SATURATION: 95 % | TEMPERATURE: 98 F | DIASTOLIC BLOOD PRESSURE: 66 MMHG | SYSTOLIC BLOOD PRESSURE: 143 MMHG | RESPIRATION RATE: 18 BRPM | HEART RATE: 83 BPM | HEIGHT: 64 IN

## 2020-01-08 LAB
25(OH)D3+25(OH)D2 SERPL-MCNC: 5 NG/ML (ref 30–96)
ALBUMIN SERPL BCP-MCNC: 1.6 G/DL (ref 3.5–5.2)
ANION GAP SERPL CALC-SCNC: 11 MMOL/L (ref 8–16)
BASOPHILS # BLD AUTO: 0.02 K/UL (ref 0–0.2)
BASOPHILS NFR BLD: 0.3 % (ref 0–1.9)
BUN SERPL-MCNC: 60 MG/DL (ref 8–23)
CALCIUM SERPL-MCNC: 7.7 MG/DL (ref 8.7–10.5)
CHLORIDE SERPL-SCNC: 111 MMOL/L (ref 95–110)
CO2 SERPL-SCNC: 18 MMOL/L (ref 23–29)
CREAT SERPL-MCNC: 2.8 MG/DL (ref 0.5–1.4)
DIFFERENTIAL METHOD: ABNORMAL
EOSINOPHIL # BLD AUTO: 0.3 K/UL (ref 0–0.5)
EOSINOPHIL NFR BLD: 4.6 % (ref 0–8)
ERYTHROCYTE [DISTWIDTH] IN BLOOD BY AUTOMATED COUNT: 17.4 % (ref 11.5–14.5)
EST. GFR  (AFRICAN AMERICAN): 20 ML/MIN/1.73 M^2
EST. GFR  (NON AFRICAN AMERICAN): 17 ML/MIN/1.73 M^2
FERRITIN SERPL-MCNC: 57 NG/ML (ref 20–300)
GLUCOSE SERPL-MCNC: 135 MG/DL (ref 70–110)
HCT VFR BLD AUTO: 30 % (ref 37–48.5)
HGB BLD-MCNC: 9.2 G/DL (ref 12–16)
IRON SERPL-MCNC: 66 UG/DL (ref 30–160)
LYMPHOCYTES # BLD AUTO: 1.2 K/UL (ref 1–4.8)
LYMPHOCYTES NFR BLD: 18.4 % (ref 18–48)
MCH RBC QN AUTO: 27 PG (ref 27–31)
MCHC RBC AUTO-ENTMCNC: 30.7 G/DL (ref 32–36)
MCV RBC AUTO: 88 FL (ref 82–98)
MONOCYTES # BLD AUTO: 0.9 K/UL (ref 0.3–1)
MONOCYTES NFR BLD: 15 % (ref 4–15)
NEUTROPHILS # BLD AUTO: 3.8 K/UL (ref 1.8–7.7)
NEUTROPHILS NFR BLD: 61.7 % (ref 38–73)
PHOSPHATE SERPL-MCNC: 4.9 MG/DL (ref 2.7–4.5)
PLATELET # BLD AUTO: 346 K/UL (ref 150–350)
PLATELET BLD QL SMEAR: ABNORMAL
PMV BLD AUTO: 9.5 FL (ref 9.2–12.9)
POCT GLUCOSE: 121 MG/DL (ref 70–110)
POCT GLUCOSE: 166 MG/DL (ref 70–110)
POCT GLUCOSE: 188 MG/DL (ref 70–110)
POTASSIUM SERPL-SCNC: 4.1 MMOL/L (ref 3.5–5.1)
PTH-INTACT SERPL-MCNC: 263.7 PG/ML (ref 9–77)
RBC # BLD AUTO: 3.41 M/UL (ref 4–5.4)
SATURATED IRON: 28 % (ref 20–50)
SODIUM SERPL-SCNC: 140 MMOL/L (ref 136–145)
TOTAL IRON BINDING CAPACITY: 238 UG/DL (ref 250–450)
TRANSFERRIN SERPL-MCNC: 161 MG/DL (ref 200–375)
WBC # BLD AUTO: 6.26 K/UL (ref 3.9–12.7)

## 2020-01-08 PROCEDURE — 63600175 PHARM REV CODE 636 W HCPCS: Performed by: INTERNAL MEDICINE

## 2020-01-08 PROCEDURE — 85025 COMPLETE CBC W/AUTO DIFF WBC: CPT

## 2020-01-08 PROCEDURE — 80069 RENAL FUNCTION PANEL: CPT

## 2020-01-08 PROCEDURE — 90686 IIV4 VACC NO PRSV 0.5 ML IM: CPT

## 2020-01-08 PROCEDURE — 83540 ASSAY OF IRON: CPT

## 2020-01-08 PROCEDURE — 83970 ASSAY OF PARATHORMONE: CPT

## 2020-01-08 PROCEDURE — 25000003 PHARM REV CODE 250

## 2020-01-08 PROCEDURE — 97530 THERAPEUTIC ACTIVITIES: CPT | Mod: CO

## 2020-01-08 PROCEDURE — 97530 THERAPEUTIC ACTIVITIES: CPT | Mod: CQ

## 2020-01-08 PROCEDURE — 82306 VITAMIN D 25 HYDROXY: CPT

## 2020-01-08 PROCEDURE — 82728 ASSAY OF FERRITIN: CPT

## 2020-01-08 PROCEDURE — 25000003 PHARM REV CODE 250: Performed by: FAMILY MEDICINE

## 2020-01-08 PROCEDURE — 63600175 PHARM REV CODE 636 W HCPCS

## 2020-01-08 PROCEDURE — 25000003 PHARM REV CODE 250: Performed by: INTERNAL MEDICINE

## 2020-01-08 PROCEDURE — 90471 IMMUNIZATION ADMIN: CPT

## 2020-01-08 PROCEDURE — 94761 N-INVAS EAR/PLS OXIMETRY MLT: CPT

## 2020-01-08 PROCEDURE — 36415 COLL VENOUS BLD VENIPUNCTURE: CPT

## 2020-01-08 RX ORDER — ACETAMINOPHEN 325 MG/1
650 TABLET ORAL EVERY 6 HOURS PRN
Qty: 10 TABLET | Refills: 0 | Status: SHIPPED | OUTPATIENT
Start: 2020-01-08

## 2020-01-08 RX ORDER — CALCITRIOL 0.25 UG/1
0.25 CAPSULE ORAL DAILY
Status: DISCONTINUED | OUTPATIENT
Start: 2020-01-09 | End: 2020-01-08 | Stop reason: HOSPADM

## 2020-01-08 RX ORDER — ACETAMINOPHEN 325 MG/1
650 TABLET ORAL EVERY 6 HOURS PRN
Qty: 10 TABLET | Refills: 0 | Status: ON HOLD
Start: 2020-01-08 | End: 2020-02-16 | Stop reason: HOSPADM

## 2020-01-08 RX ORDER — SODIUM BICARBONATE 650 MG/1
650 TABLET ORAL 4 TIMES DAILY
Status: DISCONTINUED | OUTPATIENT
Start: 2020-01-08 | End: 2020-01-08 | Stop reason: HOSPADM

## 2020-01-08 RX ADMIN — IRON SUCROSE 100 MG: 20 INJECTION, SOLUTION INTRAVENOUS at 05:01

## 2020-01-08 RX ADMIN — INFLUENZA VIRUS VACCINE 0.5 ML: 15; 15; 15; 15 SUSPENSION INTRAMUSCULAR at 07:01

## 2020-01-08 RX ADMIN — AMLODIPINE BESYLATE 10 MG: 5 TABLET ORAL at 10:01

## 2020-01-08 RX ADMIN — ASPIRIN 81 MG 81 MG: 81 TABLET ORAL at 10:01

## 2020-01-08 RX ADMIN — ESCITALOPRAM OXALATE 10 MG: 10 TABLET ORAL at 10:01

## 2020-01-08 RX ADMIN — PANTOPRAZOLE SODIUM 40 MG: 40 TABLET, DELAYED RELEASE ORAL at 10:01

## 2020-01-08 RX ADMIN — SODIUM BICARBONATE 650 MG TABLET 650 MG: at 05:01

## 2020-01-08 NOTE — PROGRESS NOTES
[facility]  Orthopedics  Progress Note    Patient Name: Bina Daniels  MRN: [id number]  Admission Date: 12/26/2019  Hospital Length of Stay: 12 days  Attending Provider: Cooper Medina MD  Primary Care Provider: Tho Haro MD  Follow-up For: Procedure(s) (LRB):  OPEN REDUCTION INTERNAL FIXATION-ANKLE (Right)  CLOSURE, WOUND (Right)    Post-Operative Day: 10 Days Post-Op  Subjective:     Principal Problem:Trimalleolar fracture of ankle, closed, right, initial encounter    Principal Orthopedic Problem: Post-op ORIF Trimalleolar ankle fracture dislocation    Interval History: Awaiting completion of consult by Nephrology.  She states she does not want to go to SNF.  She has a W/C at home.  Not taking any pain meds    Review of patient's allergies indicates:   Allergen Reactions    Sulfa (sulfonamide antibiotics) Hives       Current Facility-Administered Medications   Medication    0.9%  NaCl infusion (for blood administration)    acetaminophen tablet 650 mg    amLODIPine tablet 10 mg    aspirin chewable tablet 81 mg    cetirizine tablet 5 mg    dextrose 10% (D10W) Bolus    dextrose 10% (D10W) Bolus    docusate sodium capsule 100 mg    escitalopram oxalate tablet 10 mg    ferrous sulfate EC tablet 325 mg    fluticasone propionate 50 mcg/actuation nasal spray 50 mcg    glucagon (human recombinant) injection 1 mg    glucose chewable tablet 16 g    glucose chewable tablet 24 g    HYDROmorphone injection 0.5 mg    influenza (QUADRIVALENT PF) vaccine 0.5 mL    insulin aspart U-100 pen 0-5 Units    insulin detemir U-100 pen 10 Units    metoprolol tartrate (LOPRESSOR) tablet 50 mg    ondansetron injection 4 mg    oxyCODONE immediate release tablet 5 mg    pantoprazole EC tablet 40 mg    promethazine (PHENERGAN) 6.25 mg in dextrose 5 % 50 mL IVPB    senna-docusate 8.6-50 mg per tablet 1 tablet    sodium chloride 0.9% flush 10 mL    sodium chloride 0.9% flush 10 mL     "traMADol tablet 50 mg     Objective:     Vital Signs (Most Recent):  Temp: 97.9 °F (36.6 °C) (01/08/20 0307)  Pulse: 75 (01/08/20 0307)  Resp: 17 (01/08/20 0307)  BP: (!) 143/67 (01/08/20 0307)  SpO2: 96 % (01/08/20 0502) Vital Signs (24h Range):  Temp:  [97.7 °F (36.5 °C)-98.3 °F (36.8 °C)] 97.9 °F (36.6 °C)  Pulse:  [69-77] 75  Resp:  [15-19] 17  SpO2:  [95 %-98 %] 96 %  BP: (122-149)/(61-68) 143/67     Weight: 62.8 kg (138 lb 7.2 oz)  Height: 5' 4" (162.6 cm)  Body mass index is 23.76 kg/m².      Intake/Output Summary (Last 24 hours) at 1/8/2020 0713  Last data filed at 1/8/2020 0627  Gross per 24 hour   Intake 570 ml   Output 1450 ml   Net -880 ml       Ortho/SPM Exam   Incisions healing.  Min serous drainage medial incision.  Homans neg.  NVI    Significant Labs:   Recent Lab Results       01/08/20  0535   01/08/20  0455   01/07/20  2006   01/07/20  1647   01/07/20  1206        Albumin   1.6           Anion Gap   11           Baso #   0.02           Basophil%   0.3           BUN, Bld   60           Calcium   7.7           Chloride   111           CO2   18           Creatinine   2.8           Differential Method   Automated           eGFR if    20           eGFR if non    17  Comment:  Calculation used to obtain the estimated glomerular filtration  rate (eGFR) is the CKD-EPI equation.              Eos #   0.3           Eosinophil%   4.6           Glucose   135           Gran # (ANC)   3.8           Gran%   61.7           Hematocrit   30.0           Hemoglobin   9.2           Lymph #   1.2           Lymph%   18.4           MCH   27.0           MCHC   30.7           MCV   88           Mono #   0.9           Mono%   15.0           MPV   9.5           Phosphorus   4.9           Platelets   346           POCT Glucose 121   145 206 234     Potassium   4.1           RBC   3.41           RDW   17.4           Sodium   140           WBC   6.26                              All pertinent labs " within the past 24 hours have been reviewed.    Significant Imaging: None    Assessment/Plan:     Active Diagnoses:    Diagnosis Date Noted POA    PRINCIPAL PROBLEM:  Trimalleolar fracture of ankle, closed, right, initial encounter [S82.851A] 12/26/2019 Yes    Neuropathy due to secondary diabetes mellitus [E13.40] 12/27/2019 Yes     Chronic    Anemia due to stage 3 chronic kidney disease [N18.3, D63.1] 12/27/2019 Yes    CKD (chronic kidney disease), stage IV [N18.4] 12/27/2019 Yes     Chronic    Type 2 diabetes mellitus with neurologic complication, without long-term current use of insulin [E11.49] 12/27/2019 Yes     Chronic    Coronary artery disease involving native heart without angina pectoris [I25.10] 12/27/2019 Yes     Chronic    Ankle dislocation, right, initial encounter [S93.04XA] 12/27/2019 Yes      Problems Resolved During this Admission:   Post-op ORIF Trimalleolar ankle fracture dislocation-stable  Renal fxn appears to be stabilizing.    Plan- discharge with [facility] PT once renal consult completed      Cooper Medina MD  Orthopedics  [facility]

## 2020-01-08 NOTE — PLAN OF CARE
Patient is now declining to go to SNF. Patient states that she will have help at home from her son and grandson. Dr. Medina is aware.  [facility] and [facility].     4:45pm- Patient has been accepted by [facility] and a  nurse will be out tomorrow to visit the patient.         01/08/20 1526   Post-Acute Status   Post-Acute Authorization Placement;Home Health/Hospice   Post-Acute Placement Status Referrals Sent   Home Health/Hospice Status Referrals Sent

## 2020-01-08 NOTE — PT/OT/SLP PROGRESS
Physical Therapy Treatment    Patient Name:  Bina Daniels   MRN:  [id number]    Recommendations:     Discharge Recommendations:  nursing facility, skilled   Discharge Equipment Recommendations: wheelchair(drop arm B/S commode and W/C with removeable or drop armrest)   Barriers to discharge: Decreased caregiver support and  Decreased tolerance for functional mobility    Assessment:     Bina Daniels is a 64 y.o. female admitted with a medical diagnosis of Trimalleolar fracture of ankle, closed, right, initial encounter.  She presents with the following impairments/functional limitations:  weakness, impaired endurance, impaired self care skills, impaired functional mobilty, gait instability, impaired balance, decreased coordination, decreased upper extremity function, decreased lower extremity function, pain, decreased safety awareness, decreased ROM, impaired coordination, orthopedic precautions. AM session: Pt performed transfer training by 3 trials requiring max A of 1 with another person close by to increase safety. 1 from EOB to wheelchair, 1 from wheelchair to EOB, and 1 from EOB back to wheelchair. Does not adhere to NWB status RLE and unable to push high enough with BUE's or use LLE effectively and efficiently  to lift gluts to clear surface. Would benefit from continued PT services to increase pt's out of bed therapeutic activity and exercise addressing all impairments listed above.    Rehab Prognosis: Good; patient would benefit from acute skilled PT services to address these deficits and reach maximum level of function.    Recent Surgery: Procedure(s) (LRB):  OPEN REDUCTION INTERNAL FIXATION-ANKLE (Right)  CLOSURE, WOUND (Right) 10 Days Post-Op    Plan:     During this hospitalization, patient to be seen daily to address the identified rehab impairments via gait training, therapeutic activities, therapeutic exercises, neuromuscular re-education, wheelchair management/training and progress toward the  following goals:    · Plan of Care Expires:  02/07/20    Subjective     Chief Complaint: None Expressed  Patient/Family Comments/goals: None Expressed  Pain/Comfort:  · Pain Rating 1: (Does not rate)  · Location - Side 1: Right  · Location - Orientation 1: generalized  · Location 1: foot  · Pain Addressed 1: Reposition, Distraction, Cessation of Activity  · Pain Rating Post-Intervention 1: (Did not rate)      Objective:     Communicated with  nurse prior to session.  Patient found supine with peripheral IV upon PT entry to room.     General Precautions: Standard, fall   Orthopedic Precautions:RLE non weight bearing   Braces: ( RLE CAM boot-NWB)     Functional Mobility:  · Bed Mobility:     · Rolling Left:  stand by assistance  · Rolling Right: stand by assistance  · Scooting: stand by assistance and  to EOB and to HOB  · Bridging: stand by assistance  · Supine to Sit: stand by assistance  · Sit to Supine: contact guard assistance and  with assistance to advance RLE  · Transfers:     · Bed to Chair: maximal assistance, of  2 (1 person max A and 1 standing close by to increase safety)  3 trials EOB to W/C, W/C to EOB, then EOB back to W/C persons and RW with  rolling walker  using  Stand Pivot      AM-PAC 6 CLICK MOBILITY  Turning over in bed (including adjusting bedclothes, sheets and blankets)?: 4  Sitting down on and standing up from a chair with arms (e.g., wheelchair, bedside commode, etc.): 2  Moving from lying on back to sitting on the side of the bed?: 3  Moving to and from a bed to a chair (including a wheelchair)?: 2  Need to walk in hospital room?: 1  Climbing 3-5 steps with a railing?: 1  Basic Mobility Total Score: 13       Therapeutic Activities and Exercises:   AM and PM sessions:  Pt requested to use bed pan requiring assistance to place and remove with assistance needed to wipe backside. Performed 3 trials of transfer training from EOB to W/C, W/C to EOB, then again from EOB back to W/C all requiring  max A of 1 with another standing close by to increase safety. Does not adhere to NWB status during transfers. Cannot efficiently and effectively use BUE's and LLE to push up and clear gluts of off surface and unable to come to a full upright stance. Unsafe for all transfers at this time. In pm session pt declined to perform wheelchair propulsion prior to returning supine despite encouragement.    Patient left supine with all lines intact, call button in reach, bed alarm on and  nurse notified..    GOALS:   Multidisciplinary Problems     Physical Therapy Goals        Problem: Physical Therapy Goal    Goal Priority Disciplines Outcome Goal Variances Interventions   Physical Therapy Goal     PT, PT/OT Ongoing, Progressing     Description:  Goals to be met by: 2020     Patient will increase functional independence with mobility by performin. Supine to sit with Supervision/SBA  2. Sit to supine with Supervision/SBA  3. Sit to stand transfer with Stand-by Assistance and Contact Guard Assistance using RW NWB RLE  4. Bed to chair transfer with Stand-by Assistance and Contact Guard Assistance using Rolling Walker, or slideboard NWB RLE  5. Gait  x 10-30 feet with Contact Guard Assistance and Minimal Assistance using Rolling Walker as able NWB RLE.   6. Wheelchair propulsion x  feet with Supervision using bilateral upper extremities  7. Lower extremity exercise program x10 x 3 reps with independence                      Time Tracking:     PT Received On: 20  PT Start Time: 1029(AM session Co-Tx with OT. PM session 1311)     PT Stop Time: 1111(AM session Co-Tx with OT. PM session 1326)  PT Total Time (min): 42 min     Billable Minutes: Therapeutic Activity AM session= 24     PM session= 15    Treatment Type: Treatment  PT/PTA: PTA     PTA Visit Number: 1     Alayna Garibay, PTA  2020

## 2020-01-08 NOTE — ASSESSMENT & PLAN NOTE
Pt home meds are metformin and glyburide  I will recommend stopping them while inpatient and pt getting to be NPO,  I will place pt on ISS  12/29 place pt on dm diet, post op care  12/30 restart metformin and glyburide gradually  1/7 off metformin and glyburide

## 2020-01-08 NOTE — PT/OT/SLP PROGRESS
Occupational Therapy   Treatment    Name: Bina Daniels  MRN: [id number]  Admitting Diagnosis:  Trimalleolar fracture of ankle, closed, right, initial encounter  10 Days Post-Op    Recommendations:     Discharge Recommendations: nursing facility, skilled  Discharge Equipment Recommendations:  wheelchair(drop-arm BSC; wheelchair /c removal or drop armrest)  Barriers to discharge:  Inaccessible home environment    Assessment:   Patient still unable to perform transfers (scooting or stand pivot) without MaxA from therapists. Patient demonstrates increased difficulty in using BUEs and LLE to push self up to clear bottom and scoot. Patient is a fall risk and is also at risk to compromise surgery 2/2 inability to adhere to NWB precautions during standing attempts. OT and PT still strongly recommending SNF upon D/C to maximize strength and endurance for functional ADL tasks.     Bina Daniels is a 64 y.o. female with a medical diagnosis of Trimalleolar fracture of ankle, closed, right, initial encounter. Performance deficits affecting function are weakness, impaired endurance, impaired self care skills, impaired functional mobilty, gait instability, impaired balance, decreased upper extremity function, decreased lower extremity function, decreased coordination, impaired coordination, orthopedic precautions.     Rehab Prognosis:  Fair+; patient would benefit from acute skilled OT services to address these deficits and reach maximum level of function.       Plan:     Patient to be seen 5 x/week to address the above listed problems via self-care/home management, therapeutic activities, therapeutic exercises  · Plan of Care Expires: 01/28/20  · Plan of Care Reviewed with: patient    Subjective     Pain/Comfort:  · Pain Rating 1: (did not rate)    Objective:     Communicated with: Calvin grijalva prior to session.  Patient found supine with bed alarm upon OT entry to room.    General Precautions: Standard, fall   Orthopedic  Precautions:RLE non weight bearing   Braces: (RLE CAM boot - NWB)     Bed Mobility:    · Patient completed Rolling/Turning to Left with  stand by assistance and with side rail  · Patient completed Scooting/Bridging with stand by assistance  · Patient completed Supine to Sit with stand by assistance and with side rail     Functional Mobility/Transfers:  · Patient completed Bed <> Chair Transfer using Scoot Pivot technique with maximal assistance with wheelchair    Activities of Daily Living:  · Toileting: minimum assistance bedpan bed level    [facility] 6 Click ADL: 16    Treatment & Education:  Patient requesting to use bedpan. See above. Transitioned to EOB. Discussed the level of (A) she has at home. Patient reports her son will help her with everything and that she was able to t/f to w/c (her father in law's chair) and to her BSC pretty easily. Educated patient that she had major surgery and is now requiring more (A). EOB <> w/c scoot transfers completed x 3 trials -- each letting patient attempt the scoot, but would require MaxA to get in/out of chair.     Patient left up in chair with all lines intact, call button in reach and nsg notifiedEducation:      GOALS:   Multidisciplinary Problems     Occupational Therapy Goals        Problem: Occupational Therapy Goal    Goal Priority Disciplines Outcome Interventions   Occupational Therapy Goal     OT, PT/OT Ongoing, Progressing    Description:  Goals to be met by: 01/29/20     Patient will increase functional independence with ADLs by performing:    UE Dressing with Modified [place].  LE Dressing with Modified [place].  Toileting from bedside commode with Contact Guard Assistance for hygiene and clothing management.   Toilet transfer to bedside commode with Minimal Assistance while maintaining NWB to R LE.  Upper extremity exercise program 3 x12 reps per handout, with supervision to improve UB strength/endurance for improved transfers.                       Time Tracking:     OT Date of Treatment: 01/08/20  OT Start Time: 1029  OT Stop Time: 1111  OT Total Time (min): 42 mins co-tx    Billable Minutes:Therapeutic Activity 18    SONIA Lynn  1/8/2020

## 2020-01-08 NOTE — PLAN OF CARE
Problem: Occupational Therapy Goal  Goal: Occupational Therapy Goal  Description  Goals to be met by: 01/29/20     Patient will increase functional independence with ADLs by performing:    UE Dressing with Modified [place].  LE Dressing with Modified [place].  Toileting from bedside commode with Contact Guard Assistance for hygiene and clothing management.   Toilet transfer to bedside commode with Minimal Assistance while maintaining NWB to R LE.  Upper extremity exercise program 3 x12 reps per handout, with supervision to improve UB strength/endurance for improved transfers.     Outcome: Ongoing, Progressing     Patient still unable to perform transfers (scooting or stand pivot) without MaxA from therapists. Patient demonstrates increased difficulty in using BUEs and LLE to push self up to clear bottom and scoot. Patient is a fall risk and is also at risk to compromise surgery 2/2 inability to adhere to NWB precautions during standing attempts. OT and PT still strongly recommending SNF upon D/C to maximize strength and endurance for functional ADL tasks.

## 2020-01-08 NOTE — ASSESSMENT & PLAN NOTE
Will monitor bun/cr.  Good urine output  12/29 stable bmp, renal function  12/30 restart lasix at 40 mg po daily- d/c   Hold Lisinopril and Lasix, bolus and monitor BMP q 6   consult nephrology  1/7 awaiting nephro inoput,   Cr is stabalizing around 2.9 ? Possible new baseline for pt??

## 2020-01-08 NOTE — CONSULTS
"[facility] Medicine  Consult Note    Patient Name: Bina Daniels  MRN: [id number]  Admission Date: 12/26/2019  Hospital Length of Stay: 11 days  Attending Physician: Aleksandar Gaona DO  Primary Care Provider: Tho Haro MD           Patient information was obtained from patient and ER records.     Consults  Subjective:     Principal Problem: Trimalleolar fracture of ankle, closed, right, initial encounter    Chief Complaint:   Chief Complaint   Patient presents with    Foot Pain     pt states she broke right foot 1 week ago and unable to get in to see ortho, came to ER for pain control and to get " booked for ortho surgery"         HPI: The pt is a 63 yeasrs old female with PMH significant for DMII, neuropathy, breast cancer, and decrease mobility. She was walking in the house using a walker and tripped on a towel, fell down, and sustained injury to her right foot.  She was admitted to ortho service, had a 1st stage of ORIF on Thursday, and will have a second repeat on Sunday morning.  Pt is clinically stable not having any complaints.  She is S/P 1 unit PRBC transfusion yesterday.    Past Medical History:   Diagnosis Date    Cancer     Diabetes mellitus        Past Surgical History:   Procedure Laterality Date    BREAST SURGERY Left     lumpectomy    CHOLECYSTECTOMY      CLOSURE OF WOUND Right 12/29/2019    Procedure: CLOSURE, WOUND;  Surgeon: Cooper Medina MD;  Location: [facility] OR;  Service: Orthopedics;  Laterality: Right;    HYSTERECTOMY      OPEN REDUCTION AND INTERNAL FIXATION (ORIF) OF INJURY OF ANKLE Right 12/29/2019    Procedure: OPEN REDUCTION INTERNAL FIXATION-ANKLE;  Surgeon: Cooper Medina MD;  Location: [facility] OR;  Service: Orthopedics;  Laterality: Right;  C-arm, Synthes small frag set, cannulated screws notified confirmed with [employer]   Arthrex, tightrope anchors/ [place] notified and confirmed /xray notified KB    OPEN REDUCTION AND " INTERNAL FIXATION (ORIF) OF INJURY OF ANKLE Right 12/27/2019    Procedure: OPEN REDUCTION INTERNAL FIXATION-ANKLE;  Surgeon: Copoer Medina MD;  Location: [facility] OR;  Service: Orthopedics;  Laterality: Right;  C-arm, Synthes small frag set, cannulated screws, C-arm    TONSILLECTOMY         Review of patient's allergies indicates:   Allergen Reactions    Sulfa (sulfonamide antibiotics) Hives       No current facility-administered medications on file prior to encounter.      Current Outpatient Medications on File Prior to Encounter   Medication Sig    aspirin 81 MG Chew Take 81 mg by mouth once daily.    busPIRone (BUSPAR) 5 MG Tab Take 5 mg by mouth 2 (two) times daily.    escitalopram oxalate (LEXAPRO) 10 MG tablet Take 10 mg by mouth once daily.    glyBURIDE-metformin 2.5-500 mg (GLUCOVANCE) 2.5-500 mg per tablet 1 tablet once daily.    HYDROcodone-acetaminophen (NORCO) 7.5-325 mg per tablet Take 1 tablet by mouth every 6 (six) hours as needed for Pain.    ibuprofen (ADVIL,MOTRIN) 800 MG tablet Take 800 mg by mouth every 6 (six) hours as needed for Pain.    lisinopril (PRINIVIL,ZESTRIL) 20 MG tablet Take 20 mg by mouth once daily.    metoprolol tartrate (LOPRESSOR) 50 MG tablet Take 50 mg by mouth 2 (two) times daily.    pantoprazole (PROTONIX) 40 MG tablet Take 40 mg by mouth once daily.    atorvastatin (LIPITOR) 10 MG tablet     denosumab (PROLIA) 60 mg/mL Syrg Inject 60 mg into the skin every 6 (six) months.    gabapentin (NEURONTIN) 300 MG capsule     hyoscyamine (ANASPAZ,LEVSIN) 0.125 mg Tab Take 125 mcg by mouth every 4 (four) hours as needed.    losartan (COZAAR) 50 MG tablet     ondansetron (ZOFRAN) 4 MG tablet Take 8 mg by mouth 2 (two) times daily.    TOUJEO SOLOSTAR 300 unit/mL (1.5 mL) InPn pen     traMADol (ULTRAM) 50 mg tablet      Family History     Problem Relation (Age of Onset)    Breast cancer Maternal Grandmother        Tobacco Use    Smoking status: Former Smoker      Start date: 7/8/2003    Smokeless tobacco: Never Used   Substance and Sexual Activity    Alcohol use: No    Drug use: No    Sexual activity: Not Currently     Review of Systems   Constitutional: Positive for activity change. Negative for appetite change and chills.   Respiratory: Negative for cough and shortness of breath.    Cardiovascular: Negative for chest pain.   Gastrointestinal: Negative for abdominal distention, abdominal pain, constipation, nausea and vomiting.   Musculoskeletal: Positive for arthralgias.   Neurological: Negative for dizziness and weakness.   Psychiatric/Behavioral: Negative for agitation. The patient is not nervous/anxious.      Objective:     Vital Signs (Most Recent):  Temp: 97.7 °F (36.5 °C) (01/07/20 1648)  Pulse: 72 (01/07/20 1648)  Resp: 18 (01/07/20 1648)  BP: (!) 144/68 (01/07/20 1648)  SpO2: 96 % (01/07/20 1545) Vital Signs (24h Range):  Temp:  [97.6 °F (36.4 °C)-98.2 °F (36.8 °C)] 97.7 °F (36.5 °C)  Pulse:  [66-73] 72  Resp:  [15-19] 18  SpO2:  [95 %-97 %] 96 %  BP: (117-144)/(58-68) 144/68     Weight: 64.3 kg (141 lb 12.1 oz)  Body mass index is 24.33 kg/m².    Physical Exam   Constitutional: She appears well-developed and well-nourished.   HENT:   Head: Normocephalic and atraumatic.   Eyes: EOM are normal.   Pulmonary/Chest: Effort normal. No respiratory distress.   Abdominal: Soft.   Skin: Skin is warm and dry.   Psychiatric: She has a normal mood and affect. Her behavior is normal. Judgment and thought content normal.   Nursing note and vitals reviewed.      Significant Labs:   Recent Labs   Lab 01/04/20  0331 01/05/20  0425 01/06/20  0441   WBC 5.45 6.31 5.96   HGB 8.6* 9.6* 9.8*   HCT 27.7* 30.9* 31.2*    359* 350     Recent Labs   Lab 01/05/20  1204 01/05/20  1800 01/07/20  0455    138 137   K 4.3 4.4 4.0   * 109 108   CO2 16* 18* 21*   BUN 57* 56* 59*   CREATININE 2.9* 3.2* 2.8*   * 294* 233*   CALCIUM 7.5* 7.5* 7.5*     No results for  input(s): ALKPHOS, ALT, AST, ALBUMIN, PROT, BILITOT, INR in the last 168 hours.   No results for input(s): CPK, CPKMB, MB, TROPONINI in the last 72 hours.  Recent Labs   Lab 01/06/20  1155 01/06/20  1643 01/06/20  2049 01/07/20  0603 01/07/20  1206 01/07/20  1647   POCTGLUCOSE 176* 261* 284* 201* 234* 206*     Hemoglobin A1C   Date Value Ref Range Status   12/28/2019 9.9 (H) 4.0 - 5.6 % Final     Comment:     ADA Screening Guidelines:  5.7-6.4%  Consistent with prediabetes  >or=6.5%  Consistent with diabetes  High levels of fetal hemoglobin interfere with the HbA1C  assay. Heterozygous hemoglobin variants (HbS, HgC, etc)do  not significantly interfere with this assay.   However, presence of multiple variants may affect accuracy.       Scheduled Meds:   amLODIPine  10 mg Oral Daily    aspirin  81 mg Oral Daily    docusate sodium  100 mg Oral BID    escitalopram oxalate  10 mg Oral Daily    ferrous sulfate  325 mg Oral BID    fluticasone propionate  1 spray Each Nostril BID    insulin detemir U-100  10 Units Subcutaneous QHS    metoprolol tartrate  50 mg Oral BID    pantoprazole  40 mg Oral Daily     Continuous Infusions:    As Needed: sodium chloride, acetaminophen, cetirizine, Dextrose 10% Bolus, Dextrose 10% Bolus, glucagon (human recombinant), glucose, glucose, HYDROmorphone, influenza, insulin aspart U-100, ondansetron, oxyCODONE, promethazine (PHENERGAN) IVPB, senna-docusate 8.6-50 mg, sodium chloride 0.9%, sodium chloride 0.9%, traMADol    Significant Imaging:   US Lower Extremity Veins Right  Narrative: EXAMINATION:  US LOWER EXTREMITY VEINS RIGHT    CLINICAL HISTORY:  leg pain post op ORIF rt ankle fracture with abnormal D-dimer;    TECHNIQUE:  Duplex and color flow Doppler evaluation and graded compression of the right lower extremity veins was performed.    COMPARISON:  None    FINDINGS:  Right thigh veins: The common femoral, femoral, popliteal, upper greater saphenous, and deep femoral veins are  patent and free of thrombus. The veins are normally compressible and have normal phasic flow and augmentation response.    Right calf veins: The visualized calf veins are patent.    Contralateral CFV: The contralateral (left) common femoral vein is patent and free of thrombus.    Miscellaneous: None  Impression: No evidence of deep venous thrombosis in the right lower extremity.    Electronically signed by: Talib Medina MD  Date:    01/02/2020  Time:    11:02        Assessment/Plan:     * Trimalleolar fracture of ankle, closed, right, initial encounter  Per primary team  12/29 s/p OR ORIF today  1/7 post op care per primary    Type 2 diabetes mellitus with neurologic complication, without long-term current use of insulin  Pt home meds are metformin and glyburide  I will recommend stopping them while inpatient and pt getting to be NPO,  I will place pt on ISS  12/29 place pt on dm diet, post op care  12/30 restart metformin and glyburide gradually  1/7 off metformin and glyburide    CKD (chronic kidney disease), stage IV  Will monitor bun/cr.  Good urine output  12/29 stable bmp, renal function  12/30 restart lasix at 40 mg po daily- d/c   Hold Lisinopril and Lasix, bolus and monitor BMP q 6   consult nephrology  1/7 awaiting nephro inoput,   Cr is stabalizing around 2.9 ? Possible new baseline for pt??    Neuropathy due to secondary diabetes mellitus  No meds needed.  monitor      Ankle dislocation, right, initial encounter  Per primary team      Coronary artery disease involving native heart without angina pectoris  Continue asa and metoprolol      Anemia due to stage 3 chronic kidney disease  Stable   S/P 1 unit PRBC tranfusion  1/7 no acute H/H drop        VTE Risk Mitigation (From admission, onward)         Ordered     IP VTE HIGH RISK PATIENT  Once      12/29/19 1112     Place sequential compression device  Until discontinued      12/29/19 1112                    Thank you for your consult. I will follow-up  with patient. Please contact us if you have any additional questions.    Aleksandar Gaona DO  Department of Hospital Medicine   [facility]

## 2020-01-08 NOTE — PLAN OF CARE
Bedside nurse message regarding nephrology consult to be recalled. No notes noted that patient was seen. Nurse stated  will call consult in again.

## 2020-01-08 NOTE — PLAN OF CARE
Discharge rounds on patient. Patient will receive  services through [facility] and a home health nurse will visit the patient tomorrow 1/9/2020. Discussed followup appointments, blue discharge folder, discharge nurse will go over home medications and reasons for medications and final discharge instructions. All patient/caregiver questions answered. Patient verbalized understanding.       01/08/20 1650   Final Note   Assessment Type Discharge Planning Assessment   Anticipated Discharge Disposition Home-Health   What phone number can be called within the next 1-3 days to see how you are doing after discharge? [phone]   Hospital Follow Up  Appt(s) scheduled? Yes   Discharge plans and expectations educations in teach back method with documentation complete? Yes   Right Care Referral Info   Post Acute Recommendation SNF / Sub-Acute Rehab   Referral Type Home Health

## 2020-01-08 NOTE — CONSULTS
Nephrology Consult  H&P      Consult Requested By: Cooper Medina MD  Reason for Consult: ESRD    SUBJECTIVE:     History of Present Illness:  Patient is a 64 y.o. female presents with r ankle fracture  12/29 pt is S/P stage 2 of ORIF right ankle  Review of Systems   Constitutional: Negative for chills and fever.   Eyes: Negative for blurred vision and double vision.   Respiratory: Negative for cough and shortness of breath.    Cardiovascular: Negative for chest pain and leg swelling.   Gastrointestinal: Negative for blood in stool, diarrhea, nausea and vomiting.   Genitourinary: Negative for dysuria, frequency and urgency.   Musculoskeletal: Negative for myalgias and neck pain.   Skin: Negative for itching and rash.   Neurological: Negative for dizziness.   Endo/Heme/Allergies: Does not bruise/bleed easily.   Psychiatric/Behavioral: Negative for depression.       Past Medical History:   Diagnosis Date    Cancer     Diabetes mellitus      Past Surgical History:   Procedure Laterality Date    BREAST SURGERY Left     lumpectomy    CHOLECYSTECTOMY      CLOSURE OF WOUND Right 12/29/2019    Procedure: CLOSURE, WOUND;  Surgeon: Cooper Medina MD;  Location: [facility] OR;  Service: Orthopedics;  Laterality: Right;    HYSTERECTOMY      OPEN REDUCTION AND INTERNAL FIXATION (ORIF) OF INJURY OF ANKLE Right 12/29/2019    Procedure: OPEN REDUCTION INTERNAL FIXATION-ANKLE;  Surgeon: Cooper Medina MD;  Location: [facility] OR;  Service: Orthopedics;  Laterality: Right;  C-arm, Synthes small frag set, cannulated screws notified confirmed with [employer]   Arthrex, tightrope anchors/ [place] notified and confirmed /xray notified KB    OPEN REDUCTION AND INTERNAL FIXATION (ORIF) OF INJURY OF ANKLE Right 12/27/2019    Procedure: OPEN REDUCTION INTERNAL FIXATION-ANKLE;  Surgeon: Cooper Medina MD;  Location: [facility] OR;  Service: Orthopedics;  Laterality: Right;  C-arm, Synthes small frag set, cannulated screws,  "C-arm    TONSILLECTOMY       Family History   Problem Relation Age of Onset    Breast cancer Maternal Grandmother      Social History     Tobacco Use    Smoking status: Former Smoker     Start date: 7/8/2003    Smokeless tobacco: Never Used   Substance Use Topics    Alcohol use: No    Drug use: No       Review of patient's allergies indicates:   Allergen Reactions    Sulfa (sulfonamide antibiotics) Hives            OBJECTIVE:     Vital Signs (Most Recent)  Vitals:    01/07/20 1214 01/07/20 1545 01/07/20 1648 01/07/20 1700   BP:   (!) 144/68    BP Location:       Patient Position:   Lying    Pulse:   72    Resp:   18    Temp:   97.7 °F (36.5 °C)    TempSrc:   Oral    SpO2: 97% 96%     Weight:    64.3 kg (141 lb 12.1 oz)   Height:    5' 4" (1.626 m)         Date 01/07/20 0700 - 01/08/20 0659   Shift 5833-8502 2000-0613 8872-9622 24 Hour Total   INTAKE   P.O.  240  240   Shift Total(mL/kg)  240(3.7)  240(3.7)   OUTPUT   Urine(mL/kg/hr) 500(1)   500   Shift Total(mL/kg) 500(7.8)   500(7.8)   Weight (kg) 64.3 64.3 64.3 64.3           Medications:   amLODIPine  10 mg Oral Daily    aspirin  81 mg Oral Daily    docusate sodium  100 mg Oral BID    escitalopram oxalate  10 mg Oral Daily    ferrous sulfate  325 mg Oral BID    fluticasone propionate  1 spray Each Nostril BID    insulin detemir U-100  10 Units Subcutaneous QHS    metoprolol tartrate  50 mg Oral BID    pantoprazole  40 mg Oral Daily           Physical Exam   Constitutional: She is oriented to person, place, and time and well-developed, well-nourished, and in no distress. No distress.   HENT:   Head: Normocephalic and atraumatic.   Mouth/Throat: Oropharynx is clear and moist.   Eyes: EOM are normal. No scleral icterus.   Neck: Neck supple. No JVD present.   Cardiovascular: Normal rate and regular rhythm. Exam reveals no friction rub.   No murmur heard.  Pulmonary/Chest: Effort normal and breath sounds normal. No respiratory distress. She has no " wheezes. She has no rales.   Abdominal: Soft. Bowel sounds are normal. She exhibits no distension. There is no tenderness.   Musculoskeletal: She exhibits no edema.   Neurological: She is alert and oriented to person, place, and time.   Skin: Skin is warm and dry. No rash noted. She is not diaphoretic. No erythema.   Psychiatric: Affect normal.       Laboratory:  Recent Labs   Lab 01/04/20  0331 01/05/20  0425 01/06/20  0441   WBC 5.45 6.31 5.96   HGB 8.6* 9.6* 9.8*   HCT 27.7* 30.9* 31.2*    359* 350   MONO 18.2*  1.0 15.2*  1.0 14.9  0.9     Recent Labs   Lab 01/05/20  1204 01/05/20  1800 01/07/20  0455    138 137   K 4.3 4.4 4.0   * 109 108   CO2 16* 18* 21*   BUN 57* 56* 59*   CREATININE 2.9* 3.2* 2.8*   CALCIUM 7.5* 7.5* 7.5*       Diagnostic Results:  X-Ray: Reviewed  US: Reviewed  Echo: Reviewed  ASSESSMENT/PLAN:     1. CKD 4  - follows with Dr. Gerardo Desai on [facility]. Not sure of baseline but since admission 2.3-2.8, bumped yesterday to 3.2 but down to 2.8 today  Agree with Dr. Baxter that pt is on a dry side, better with IVf and holding Lasix     Avoid nephrotoxins, dose all meds as for GFr < 30  No indication for urgent RRT. Monitor closely. Strict ins/outs, daily weight     2. HTN (I10) - appears to be on losartan lisinopril combo at home but now on norvasc, borderline high, monitor for now, resume either ACe or ARB, no need for dual therapy  3. Anemia of chronic kidney disease - check iron  Recent Labs   Lab 01/04/20  0331 01/05/20  0425 01/06/20  0441   HGB 8.6* 9.6* 9.8*   HCT 27.7* 30.9* 31.2*    359* 350         No results found for: IRON, TIBC, FERRITIN, SATURATEDIRO    4. MBD (E88.9 M90.80) - check phos, vit D and PTH, check albumin to see if Ca corrects to normal    Pt is on Prolia outpt q 6 month  Recent Labs   Lab 01/07/20  0455   CALCIUM 7.5*     No results for input(s): MG in the last 168 hours.    Lab Results   Component Value Date    CALCIUM 7.5 (L) 01/07/2020      No results found for: [id number]    5. Acidosis - improving, monitor    Lab Results   Component Value Date    CO2 21 (L) 01/07/2020         6. Nutrition/Hypoalbuminemia (E88.09) - check albumin   No results for input(s): LABPROT, ALBUMIN in the last 168 hours.            Thank you for consult, will follow  With any question please call [phone]  Azalia Stovall    [facility]  TOLU Darnell MD, CHIDI XIE MD,   MD REVA Bermudez, NP  [address]  [place], [address]  [phone]

## 2020-01-08 NOTE — PLAN OF CARE
Pt AAOx4, no family members at bedside throughout shift. Pt complains of mild pain but denies n/v/d and SOB. Diabetic diet tolerated well. Pt still using bedpan to void and will not attempt to bear weight on RLE. Dressing change to right ankle completed, deborah boot placed on right foot. Blood glucose checks continued. Safety maintained, bed alarm on - will cont to monitor.

## 2020-01-08 NOTE — SUBJECTIVE & OBJECTIVE
Past Medical History:   Diagnosis Date    Cancer     Diabetes mellitus        Past Surgical History:   Procedure Laterality Date    BREAST SURGERY Left     lumpectomy    CHOLECYSTECTOMY      CLOSURE OF WOUND Right 12/29/2019    Procedure: CLOSURE, WOUND;  Surgeon: Cooper Medina MD;  Location: [facility] OR;  Service: Orthopedics;  Laterality: Right;    HYSTERECTOMY      OPEN REDUCTION AND INTERNAL FIXATION (ORIF) OF INJURY OF ANKLE Right 12/29/2019    Procedure: OPEN REDUCTION INTERNAL FIXATION-ANKLE;  Surgeon: Cooper Medina MD;  Location: [facility] OR;  Service: Orthopedics;  Laterality: Right;  C-arm, Synthes small frag set, cannulated screws notified confirmed with Mohit /synthesis   Arthrex, tightrope anchors/ Ansley notified and confirmed /xray notified KB    OPEN REDUCTION AND INTERNAL FIXATION (ORIF) OF INJURY OF ANKLE Right 12/27/2019    Procedure: OPEN REDUCTION INTERNAL FIXATION-ANKLE;  Surgeon: Cooper Medina MD;  Location: [facility] OR;  Service: Orthopedics;  Laterality: Right;  C-arm, Synthes small frag set, cannulated screws, C-arm    TONSILLECTOMY         Review of patient's allergies indicates:   Allergen Reactions    Sulfa (sulfonamide antibiotics) Hives       No current facility-administered medications on file prior to encounter.      Current Outpatient Medications on File Prior to Encounter   Medication Sig    aspirin 81 MG Chew Take 81 mg by mouth once daily.    busPIRone (BUSPAR) 5 MG Tab Take 5 mg by mouth 2 (two) times daily.    escitalopram oxalate (LEXAPRO) 10 MG tablet Take 10 mg by mouth once daily.    glyBURIDE-metformin 2.5-500 mg (GLUCOVANCE) 2.5-500 mg per tablet 1 tablet once daily.    HYDROcodone-acetaminophen (NORCO) 7.5-325 mg per tablet Take 1 tablet by mouth every 6 (six) hours as needed for Pain.    ibuprofen (ADVIL,MOTRIN) 800 MG tablet Take 800 mg by mouth every 6 (six) hours as needed for Pain.    lisinopril (PRINIVIL,ZESTRIL) 20 MG tablet Take 20 mg by  mouth once daily.    metoprolol tartrate (LOPRESSOR) 50 MG tablet Take 50 mg by mouth 2 (two) times daily.    pantoprazole (PROTONIX) 40 MG tablet Take 40 mg by mouth once daily.    atorvastatin (LIPITOR) 10 MG tablet     denosumab (PROLIA) 60 mg/mL Syrg Inject 60 mg into the skin every 6 (six) months.    gabapentin (NEURONTIN) 300 MG capsule     hyoscyamine (ANASPAZ,LEVSIN) 0.125 mg Tab Take 125 mcg by mouth every 4 (four) hours as needed.    losartan (COZAAR) 50 MG tablet     ondansetron (ZOFRAN) 4 MG tablet Take 8 mg by mouth 2 (two) times daily.    TOUJEO SOLOSTAR 300 unit/mL (1.5 mL) InPn pen     traMADol (ULTRAM) 50 mg tablet      Family History     Problem Relation (Age of Onset)    Breast cancer Maternal Grandmother        Tobacco Use    Smoking status: Former Smoker     Start date: 7/8/2003    Smokeless tobacco: Never Used   Substance and Sexual Activity    Alcohol use: No    Drug use: No    Sexual activity: Not Currently     Review of Systems   Constitutional: Positive for activity change. Negative for appetite change and chills.   Respiratory: Negative for cough and shortness of breath.    Cardiovascular: Negative for chest pain.   Gastrointestinal: Negative for abdominal distention, abdominal pain, constipation, nausea and vomiting.   Musculoskeletal: Positive for arthralgias.   Neurological: Negative for dizziness and weakness.   Psychiatric/Behavioral: Negative for agitation. The patient is not nervous/anxious.      Objective:     Vital Signs (Most Recent):  Temp: 97.7 °F (36.5 °C) (01/07/20 1648)  Pulse: 72 (01/07/20 1648)  Resp: 18 (01/07/20 1648)  BP: (!) 144/68 (01/07/20 1648)  SpO2: 96 % (01/07/20 1545) Vital Signs (24h Range):  Temp:  [97.6 °F (36.4 °C)-98.2 °F (36.8 °C)] 97.7 °F (36.5 °C)  Pulse:  [66-73] 72  Resp:  [15-19] 18  SpO2:  [95 %-97 %] 96 %  BP: (117-144)/(58-68) 144/68     Weight: 64.3 kg (141 lb 12.1 oz)  Body mass index is 24.33 kg/m².    Physical Exam    Constitutional: She appears well-developed and well-nourished.   HENT:   Head: Normocephalic and atraumatic.   Eyes: EOM are normal.   Pulmonary/Chest: Effort normal. No respiratory distress.   Abdominal: Soft.   Skin: Skin is warm and dry.   Psychiatric: She has a normal mood and affect. Her behavior is normal. Judgment and thought content normal.   Nursing note and vitals reviewed.      Significant Labs:   Recent Labs   Lab 01/04/20  0331 01/05/20  0425 01/06/20  0441   WBC 5.45 6.31 5.96   HGB 8.6* 9.6* 9.8*   HCT 27.7* 30.9* 31.2*    359* 350     Recent Labs   Lab 01/05/20  1204 01/05/20  1800 01/07/20  0455    138 137   K 4.3 4.4 4.0   * 109 108   CO2 16* 18* 21*   BUN 57* 56* 59*   CREATININE 2.9* 3.2* 2.8*   * 294* 233*   CALCIUM 7.5* 7.5* 7.5*     No results for input(s): ALKPHOS, ALT, AST, ALBUMIN, PROT, BILITOT, INR in the last 168 hours.   No results for input(s): CPK, CPKMB, MB, TROPONINI in the last 72 hours.  Recent Labs   Lab 01/06/20  1155 01/06/20  1643 01/06/20  2049 01/07/20  0603 01/07/20  1206 01/07/20  1647   POCTGLUCOSE 176* 261* 284* 201* 234* 206*     Hemoglobin A1C   Date Value Ref Range Status   12/28/2019 9.9 (H) 4.0 - 5.6 % Final     Comment:     ADA Screening Guidelines:  5.7-6.4%  Consistent with prediabetes  >or=6.5%  Consistent with diabetes  High levels of fetal hemoglobin interfere with the HbA1C  assay. Heterozygous hemoglobin variants (HbS, HgC, etc)do  not significantly interfere with this assay.   However, presence of multiple variants may affect accuracy.       Scheduled Meds:   amLODIPine  10 mg Oral Daily    aspirin  81 mg Oral Daily    docusate sodium  100 mg Oral BID    escitalopram oxalate  10 mg Oral Daily    ferrous sulfate  325 mg Oral BID    fluticasone propionate  1 spray Each Nostril BID    insulin detemir U-100  10 Units Subcutaneous QHS    metoprolol tartrate  50 mg Oral BID    pantoprazole  40 mg Oral Daily     Continuous  Infusions:    As Needed: sodium chloride, acetaminophen, cetirizine, Dextrose 10% Bolus, Dextrose 10% Bolus, glucagon (human recombinant), glucose, glucose, HYDROmorphone, influenza, insulin aspart U-100, ondansetron, oxyCODONE, promethazine (PHENERGAN) IVPB, senna-docusate 8.6-50 mg, sodium chloride 0.9%, sodium chloride 0.9%, traMADol    Significant Imaging:   US Lower Extremity Veins Right  Narrative: EXAMINATION:  US LOWER EXTREMITY VEINS RIGHT    CLINICAL HISTORY:  leg pain post op ORIF rt ankle fracture with abnormal D-dimer;    TECHNIQUE:  Duplex and color flow Doppler evaluation and graded compression of the right lower extremity veins was performed.    COMPARISON:  None    FINDINGS:  Right thigh veins: The common femoral, femoral, popliteal, upper greater saphenous, and deep femoral veins are patent and free of thrombus. The veins are normally compressible and have normal phasic flow and augmentation response.    Right calf veins: The visualized calf veins are patent.    Contralateral CFV: The contralateral (left) common femoral vein is patent and free of thrombus.    Miscellaneous: None  Impression: No evidence of deep venous thrombosis in the right lower extremity.    Electronically signed by: Talib Medina MD  Date:    01/02/2020  Time:    11:02

## 2020-01-08 NOTE — PROGRESS NOTES
Progress Note  Nephrology      Consult Requested By: Cooper Medina MD  Reason for Consult: CKD 4    SUBJECTIVE:     Review of Systems   Constitutional: Negative for chills and fever.   Respiratory: Negative for cough and shortness of breath.    Cardiovascular: Negative for chest pain and leg swelling.   Gastrointestinal: Negative for nausea.     Patient Active Problem List   Diagnosis    Trimalleolar fracture of ankle, closed, right, initial encounter    Neuropathy due to secondary diabetes mellitus    Anemia due to stage 3 chronic kidney disease    CKD (chronic kidney disease), stage IV    Type 2 diabetes mellitus with neurologic complication, without long-term current use of insulin    Coronary artery disease involving native heart without angina pectoris    Ankle dislocation, right, initial encounter       OBJECTIVE:     Medications:   amLODIPine  10 mg Oral Daily    aspirin  81 mg Oral Daily    [START ON 1/9/2020] calcitRIOL  0.25 mcg Oral Daily    docusate sodium  100 mg Oral BID    escitalopram oxalate  10 mg Oral Daily    ferrous sulfate  325 mg Oral BID    fluticasone propionate  1 spray Each Nostril BID    insulin detemir U-100  10 Units Subcutaneous QHS    iron sucrose (VENOFER) IVPB  100 mg Intravenous Daily    metoprolol tartrate  50 mg Oral BID    pantoprazole  40 mg Oral Daily    sodium bicarbonate  650 mg Oral QID       Vitals:    01/08/20 1205   BP: (!) 149/71   Pulse: 71   Resp: 18   Temp: 97.7 °F (36.5 °C)     I/O last 3 completed shifts:  In: 1140 [P.O.:1140]  Out: 2600 [Urine:2600]  Physical Exam   Constitutional: She is oriented to person, place, and time. She appears well-developed and well-nourished. No distress.   HENT:   Head: Normocephalic and atraumatic.   Eyes: EOM are normal. No scleral icterus.   Neck: Neck supple. No JVD present.   Cardiovascular: Normal rate and regular rhythm.   No murmur heard.  Pulmonary/Chest: Effort normal and breath sounds normal. No  respiratory distress. She has no wheezes. She has no rales.   Abdominal: Soft. Bowel sounds are normal. She exhibits no distension. There is no tenderness.   Musculoskeletal: She exhibits no edema.   Neurological: She is alert and oriented to person, place, and time.   Skin: Skin is warm and dry. No erythema. No pallor.   Psychiatric: She has a normal mood and affect. Judgment normal.     Laboratory:  Recent Labs   Lab 01/05/20  0425 01/06/20  0441 01/08/20 0455   WBC 6.31 5.96 6.26   HGB 9.6* 9.8* 9.2*   HCT 30.9* 31.2* 30.0*   * 350 346   MONO 15.2*  1.0 14.9  0.9 15.0  0.9     Recent Labs   Lab 01/05/20  1800 01/07/20 0455 01/08/20 0455    137 140   K 4.4 4.0 4.1    108 111*   CO2 18* 21* 18*   BUN 56* 59* 60*   CREATININE 3.2* 2.8* 2.8*   CALCIUM 7.5* 7.5* 7.7*   PHOS  --   --  4.9*     Labs reviewed  Diagnostic Results:  X-Ray: Reviewed  US: Reviewed  Echo: Reviewed      ASSESSMENT/PLAN:      1. CKD 4  - follows with Dr. Gerardo Desai on [facility]. Not sure of baseline but since admission 2.3-2.8, bumped yesterday to 3.2 but down to 2.8 today  Agree with Dr. Baxter that pt is on a dry side, better with IVf and holding Lasix      Avoid nephrotoxins, dose all meds as for GFr < 30  No indication for urgent RRT. Monitor closely. Strict ins/outs, daily weight     2. HTN (I10) - appears to be on losartan lisinopril combo at home but now on norvasc, borderline high, monitor for now, resume either ACe or ARB, no need for dual therapy  3. Anemia of chronic kidney disease - start venofer 100 daily,   Recent Labs   Lab 01/05/20 0425 01/06/20  0441 01/08/20 0455   WBC 6.31 5.96 6.26   HGB 9.6* 9.8* 9.2*   HCT 30.9* 31.2* 30.0*   * 350 346     Lab Results   Component Value Date    IRON 66 01/08/2020    TIBC 238 (L) 01/08/2020    FERRITIN 57 01/08/2020     4. MBD (E88.9 M90.80) - start Calcitriol 0.5 daily   Pt is on Prolia outpt q 6 month    Lab Results   Component Value Date    .7 (H)  01/08/2020    CALCIUM 7.7 (L) 01/08/2020    PHOS 4.9 (H) 01/08/2020     No results for input(s): MG in the last 168 hours.  Lab Results   Component Value Date    QMHCDEID29QQ 5 (L) 01/08/2020   '       5. Acidosis - start sod bicarb 650 QID     Lab Results   Component Value Date    CO2 18 (L) 01/08/2020          6. Nutrition/Hypoalbuminemia (E88.09) -   Lab Results   Component Value Date    LABPROT 12.7 (H) 12/27/2019    ALBUMIN 1.6 (L) 01/08/2020     Nepro with meals TID. Renal vitamins daily      Thank you for allowing me to participate in the care of your patients  With any question please call [phone]  Azalai Stovall    [facility]  TOLU Darnell MD, CHIDI XIE MD,   MD REVA Bermudez, NP  [address]  [place], [address]  [phone]

## 2020-01-08 NOTE — PROGRESS NOTES
Patient to receive one IV iron prior to d/c per Dr. Stovall. Patient to follow up with her nephrologist outpatient.

## 2020-01-09 NOTE — ASSESSMENT & PLAN NOTE
Will monitor bun/cr.  Good urine output  12/29 stable bmp, renal function  12/30 restart lasix at 40 mg po daily- d/c   Hold Lisinopril and Lasix, bolus and monitor BMP q 6   consult nephrology  1/7 awaiting nephro inoput,   Cr is stabalizing around 2.9 ? Possible new baseline for pt??  1/8 nephrology consulted

## 2020-01-09 NOTE — ASSESSMENT & PLAN NOTE
Per primary team  12/29 s/p OR ORIF today  1/7 post op care per primary  1/8 pt wants top go home not SNF or rehab

## 2020-01-09 NOTE — CONSULTS
"[facility] Medicine  Consult Note    Patient Name: Bina Daniels  MRN: [id number]  Admission Date: 12/26/2019  Hospital Length of Stay: 12 days  Attending Physician: Aleksandar Gaona DO  Primary Care Provider: Tho Haro MD           Patient information was obtained from patient and ER records.     Consults  Subjective:     Principal Problem: Trimalleolar fracture of ankle, closed, right, initial encounter    Chief Complaint:   Chief Complaint   Patient presents with    Foot Pain     pt states she broke right foot 1 week ago and unable to get in to see ortho, came to ER for pain control and to get " booked for ortho surgery"         HPI: The pt is a 63 yeasrs old female with PMH significant for DMII, neuropathy, breast cancer, and decrease mobility. She was walking in the house using a walker and tripped on a towel, fell down, and sustained injury to her right foot.  She was admitted to ortho service, had a 1st stage of ORIF on Thursday, and will have a second repeat on Sunday morning.  Pt is clinically stable not having any complaints.  She is S/P 1 unit PRBC transfusion yesterday.    Past Medical History:   Diagnosis Date    Cancer     Diabetes mellitus        Past Surgical History:   Procedure Laterality Date    BREAST SURGERY Left     lumpectomy    CHOLECYSTECTOMY      CLOSURE OF WOUND Right 12/29/2019    Procedure: CLOSURE, WOUND;  Surgeon: Cooper Medina MD;  Location: [facility] OR;  Service: Orthopedics;  Laterality: Right;    HYSTERECTOMY      OPEN REDUCTION AND INTERNAL FIXATION (ORIF) OF INJURY OF ANKLE Right 12/29/2019    Procedure: OPEN REDUCTION INTERNAL FIXATION-ANKLE;  Surgeon: Cooper Medina MD;  Location: [facility] OR;  Service: Orthopedics;  Laterality: Right;  C-arm, Synthes small frag set, cannulated screws notified confirmed with Mohit /Bvents   Arthrex, tightrope anchors/ Ansley notified and confirmed /xray notified KB    OPEN REDUCTION AND " INTERNAL FIXATION (ORIF) OF INJURY OF ANKLE Right 12/27/2019    Procedure: OPEN REDUCTION INTERNAL FIXATION-ANKLE;  Surgeon: Cooper Medina MD;  Location: [facility];  Service: Orthopedics;  Laterality: Right;  C-arm, Synthes small frag set, cannulated screws, C-arm    TONSILLECTOMY         Review of patient's allergies indicates:   Allergen Reactions    Sulfa (sulfonamide antibiotics) Hives       No current facility-administered medications on file prior to encounter.      Current Outpatient Medications on File Prior to Encounter   Medication Sig    aspirin 81 MG Chew Take 81 mg by mouth once daily.    busPIRone (BUSPAR) 5 MG Tab Take 5 mg by mouth 2 (two) times daily.    escitalopram oxalate (LEXAPRO) 10 MG tablet Take 10 mg by mouth once daily.    glyBURIDE-metformin 2.5-500 mg (GLUCOVANCE) 2.5-500 mg per tablet 1 tablet once daily.    metoprolol tartrate (LOPRESSOR) 50 MG tablet Take 50 mg by mouth 2 (two) times daily.    pantoprazole (PROTONIX) 40 MG tablet Take 40 mg by mouth once daily.    [DISCONTINUED] HYDROcodone-acetaminophen (NORCO) 7.5-325 mg per tablet Take 1 tablet by mouth every 6 (six) hours as needed for Pain.    [DISCONTINUED] ibuprofen (ADVIL,MOTRIN) 800 MG tablet Take 800 mg by mouth every 6 (six) hours as needed for Pain.    [DISCONTINUED] lisinopril (PRINIVIL,ZESTRIL) 20 MG tablet Take 20 mg by mouth once daily.    atorvastatin (LIPITOR) 10 MG tablet     denosumab (PROLIA) 60 mg/mL Syrg Inject 60 mg into the skin every 6 (six) months.    gabapentin (NEURONTIN) 300 MG capsule     hyoscyamine (ANASPAZ,LEVSIN) 0.125 mg Tab Take 125 mcg by mouth every 4 (four) hours as needed.    losartan (COZAAR) 50 MG tablet     TOUJEO SOLOSTAR 300 unit/mL (1.5 mL) InPn pen     traMADol (ULTRAM) 50 mg tablet     [DISCONTINUED] ondansetron (ZOFRAN) 4 MG tablet Take 8 mg by mouth 2 (two) times daily.     Family History     Problem Relation (Age of Onset)    Breast cancer Maternal Grandmother         Tobacco Use    Smoking status: Former Smoker     Start date: 7/8/2003    Smokeless tobacco: Never Used   Substance and Sexual Activity    Alcohol use: No    Drug use: No    Sexual activity: Not Currently     Review of Systems   Constitutional: Positive for activity change. Negative for appetite change and chills.   Respiratory: Negative for cough and shortness of breath.    Cardiovascular: Negative for chest pain.   Gastrointestinal: Negative for nausea and vomiting.   Neurological: Negative for dizziness and weakness.   Psychiatric/Behavioral: Negative for agitation. The patient is not nervous/anxious.      Objective:     Vital Signs (Most Recent):  Temp: 98 °F (36.7 °C) (01/08/20 1707)  Pulse: 83 (01/08/20 1707)  Resp: 18 (01/08/20 1707)  BP: (!) 143/66 (01/08/20 1707)  SpO2: 95 % (01/08/20 0827) Vital Signs (24h Range):  Temp:  [97.7 °F (36.5 °C)-98.3 °F (36.8 °C)] 98 °F (36.7 °C)  Pulse:  [71-83] 83  Resp:  [14-19] 18  SpO2:  [95 %-98 %] 95 %  BP: (122-149)/(61-71) 143/66     Weight: 62.8 kg (138 lb 7.2 oz)  Body mass index is 23.76 kg/m².    Physical Exam   Constitutional: She is oriented to person, place, and time. She appears well-developed and well-nourished.   HENT:   Head: Normocephalic and atraumatic.   Eyes: EOM are normal.   Pulmonary/Chest: Effort normal. No respiratory distress.   Neurological: She is alert and oriented to person, place, and time.   Skin: Skin is warm and dry.   Psychiatric: She has a normal mood and affect. Her behavior is normal. Judgment and thought content normal.   Nursing note and vitals reviewed.      Significant Labs:   Recent Labs   Lab 01/05/20  0425 01/06/20  0441 01/08/20  0455   WBC 6.31 5.96 6.26   HGB 9.6* 9.8* 9.2*   HCT 30.9* 31.2* 30.0*   * 350 346     Recent Labs   Lab 01/05/20  1800 01/07/20  0455 01/08/20  0455    137 140   K 4.4 4.0 4.1    108 111*   CO2 18* 21* 18*   BUN 56* 59* 60*   CREATININE 3.2* 2.8* 2.8*   * 233* 135*    CALCIUM 7.5* 7.5* 7.7*   PHOS  --   --  4.9*     Recent Labs   Lab 01/08/20  0455   ALBUMIN 1.6*      No results for input(s): CPK, CPKMB, MB, TROPONINI in the last 72 hours.  Recent Labs   Lab 01/07/20  1206 01/07/20  1647 01/07/20  2006 01/08/20  0535 01/08/20  1150 01/08/20  1706   POCTGLUCOSE 234* 206* 145* 121* 166* 188*     Hemoglobin A1C   Date Value Ref Range Status   12/28/2019 9.9 (H) 4.0 - 5.6 % Final     Comment:     ADA Screening Guidelines:  5.7-6.4%  Consistent with prediabetes  >or=6.5%  Consistent with diabetes  High levels of fetal hemoglobin interfere with the HbA1C  assay. Heterozygous hemoglobin variants (HbS, HgC, etc)do  not significantly interfere with this assay.   However, presence of multiple variants may affect accuracy.       Scheduled Meds:   amLODIPine  10 mg Oral Daily    aspirin  81 mg Oral Daily    [START ON 1/9/2020] calcitRIOL  0.25 mcg Oral Daily    docusate sodium  100 mg Oral BID    escitalopram oxalate  10 mg Oral Daily    ferrous sulfate  325 mg Oral BID    fluticasone propionate  1 spray Each Nostril BID    insulin detemir U-100  10 Units Subcutaneous QHS    iron sucrose (VENOFER) IVPB  100 mg Intravenous Daily    metoprolol tartrate  50 mg Oral BID    pantoprazole  40 mg Oral Daily    sodium bicarbonate  650 mg Oral QID     Continuous Infusions:    As Needed: sodium chloride, acetaminophen, cetirizine, Dextrose 10% Bolus, Dextrose 10% Bolus, glucagon (human recombinant), glucose, glucose, HYDROmorphone, insulin aspart U-100, ondansetron, oxyCODONE, promethazine (PHENERGAN) IVPB, senna-docusate 8.6-50 mg, sodium chloride 0.9%, sodium chloride 0.9%, traMADol    Significant Imaging:   US Lower Extremity Veins Right  Narrative: EXAMINATION:  US LOWER EXTREMITY VEINS RIGHT    CLINICAL HISTORY:  leg pain post op ORIF rt ankle fracture with abnormal D-dimer;    TECHNIQUE:  Duplex and color flow Doppler evaluation and graded compression of the right lower extremity  veins was performed.    COMPARISON:  None    FINDINGS:  Right thigh veins: The common femoral, femoral, popliteal, upper greater saphenous, and deep femoral veins are patent and free of thrombus. The veins are normally compressible and have normal phasic flow and augmentation response.    Right calf veins: The visualized calf veins are patent.    Contralateral CFV: The contralateral (left) common femoral vein is patent and free of thrombus.    Miscellaneous: None  Impression: No evidence of deep venous thrombosis in the right lower extremity.    Electronically signed by: Talib Medina MD  Date:    01/02/2020  Time:    11:02        Assessment/Plan:     * Trimalleolar fracture of ankle, closed, right, initial encounter  Per primary team  12/29 s/p OR ORIF today  1/7 post op care per primary  1/8 pt wants top go home not SNF or rehab    Type 2 diabetes mellitus with neurologic complication, without long-term current use of insulin  Pt home meds are metformin and glyburide  I will recommend stopping them while inpatient and pt getting to be NPO,  I will place pt on ISS  12/29 place pt on dm diet, post op care  12/30 restart metformin and glyburide gradually  1/7 off metformin and glyburide  1/8 cont insulin    CKD (chronic kidney disease), stage IV  Will monitor bun/cr.  Good urine output  12/29 stable bmp, renal function  12/30 restart lasix at 40 mg po daily- d/c   Hold Lisinopril and Lasix, bolus and monitor BMP q 6   consult nephrology  1/7 awaiting nephro inoput,   Cr is stabalizing around 2.9 ? Possible new baseline for pt??  1/8 nephrology consulted    Neuropathy due to secondary diabetes mellitus  No meds needed.  monitor      Ankle dislocation, right, initial encounter  Per primary team      Coronary artery disease involving native heart without angina pectoris  Continue asa and metoprolol      Anemia due to stage 3 chronic kidney disease  Stable   S/P 1 unit PRBC tranfusion  1/7 no acute H/H drop  1/8  stable        VTE Risk Mitigation (From admission, onward)         Ordered     IP VTE HIGH RISK PATIENT  Once      12/29/19 1112     Place sequential compression device  Until discontinued      12/29/19 1112                    Thank you for your consult. I will follow-up with patient. Please contact us if you have any additional questions.    Aleksandar Gaona DO  Department of Hospital Medicine   [facility]

## 2020-01-09 NOTE — SUBJECTIVE & OBJECTIVE
Past Medical History:   Diagnosis Date    Cancer     Diabetes mellitus        Past Surgical History:   Procedure Laterality Date    BREAST SURGERY Left     lumpectomy    CHOLECYSTECTOMY      CLOSURE OF WOUND Right 12/29/2019    Procedure: CLOSURE, WOUND;  Surgeon: Cooper Medina MD;  Location: [facility] OR;  Service: Orthopedics;  Laterality: Right;    HYSTERECTOMY      OPEN REDUCTION AND INTERNAL FIXATION (ORIF) OF INJURY OF ANKLE Right 12/29/2019    Procedure: OPEN REDUCTION INTERNAL FIXATION-ANKLE;  Surgeon: Cooper Medina MD;  Location: [facility] OR;  Service: Orthopedics;  Laterality: Right;  C-arm, Synthes small frag set, cannulated screws notified confirmed with Mohit /synthesis   Arthrex, tightrope anchors/ [place] notified and confirmed /xray notified KB    OPEN REDUCTION AND INTERNAL FIXATION (ORIF) OF INJURY OF ANKLE Right 12/27/2019    Procedure: OPEN REDUCTION INTERNAL FIXATION-ANKLE;  Surgeon: Cooper Medina MD;  Location: [facility] OR;  Service: Orthopedics;  Laterality: Right;  C-arm, Synthes small frag set, cannulated screws, C-arm    TONSILLECTOMY         Review of patient's allergies indicates:   Allergen Reactions    Sulfa (sulfonamide antibiotics) Hives       No current facility-administered medications on file prior to encounter.      Current Outpatient Medications on File Prior to Encounter   Medication Sig    aspirin 81 MG Chew Take 81 mg by mouth once daily.    busPIRone (BUSPAR) 5 MG Tab Take 5 mg by mouth 2 (two) times daily.    escitalopram oxalate (LEXAPRO) 10 MG tablet Take 10 mg by mouth once daily.    glyBURIDE-metformin 2.5-500 mg (GLUCOVANCE) 2.5-500 mg per tablet 1 tablet once daily.    metoprolol tartrate (LOPRESSOR) 50 MG tablet Take 50 mg by mouth 2 (two) times daily.    pantoprazole (PROTONIX) 40 MG tablet Take 40 mg by mouth once daily.    [DISCONTINUED] HYDROcodone-acetaminophen (NORCO) 7.5-325 mg per tablet Take 1 tablet by mouth every 6 (six) hours as  needed for Pain.    [DISCONTINUED] ibuprofen (ADVIL,MOTRIN) 800 MG tablet Take 800 mg by mouth every 6 (six) hours as needed for Pain.    [DISCONTINUED] lisinopril (PRINIVIL,ZESTRIL) 20 MG tablet Take 20 mg by mouth once daily.    atorvastatin (LIPITOR) 10 MG tablet     denosumab (PROLIA) 60 mg/mL Syrg Inject 60 mg into the skin every 6 (six) months.    gabapentin (NEURONTIN) 300 MG capsule     hyoscyamine (ANASPAZ,LEVSIN) 0.125 mg Tab Take 125 mcg by mouth every 4 (four) hours as needed.    losartan (COZAAR) 50 MG tablet     TOUJEO SOLOSTAR 300 unit/mL (1.5 mL) InPn pen     traMADol (ULTRAM) 50 mg tablet     [DISCONTINUED] ondansetron (ZOFRAN) 4 MG tablet Take 8 mg by mouth 2 (two) times daily.     Family History     Problem Relation (Age of Onset)    Breast cancer Maternal Grandmother        Tobacco Use    Smoking status: Former Smoker     Start date: 7/8/2003    Smokeless tobacco: Never Used   Substance and Sexual Activity    Alcohol use: No    Drug use: No    Sexual activity: Not Currently     Review of Systems   Constitutional: Positive for activity change. Negative for appetite change and chills.   Respiratory: Negative for cough and shortness of breath.    Cardiovascular: Negative for chest pain.   Gastrointestinal: Negative for nausea and vomiting.   Neurological: Negative for dizziness and weakness.   Psychiatric/Behavioral: Negative for agitation. The patient is not nervous/anxious.      Objective:     Vital Signs (Most Recent):  Temp: 98 °F (36.7 °C) (01/08/20 1707)  Pulse: 83 (01/08/20 1707)  Resp: 18 (01/08/20 1707)  BP: (!) 143/66 (01/08/20 1707)  SpO2: 95 % (01/08/20 0827) Vital Signs (24h Range):  Temp:  [97.7 °F (36.5 °C)-98.3 °F (36.8 °C)] 98 °F (36.7 °C)  Pulse:  [71-83] 83  Resp:  [14-19] 18  SpO2:  [95 %-98 %] 95 %  BP: (122-149)/(61-71) 143/66     Weight: 62.8 kg (138 lb 7.2 oz)  Body mass index is 23.76 kg/m².    Physical Exam   Constitutional: She is oriented to person, place,  and time. She appears well-developed and well-nourished.   HENT:   Head: Normocephalic and atraumatic.   Eyes: EOM are normal.   Pulmonary/Chest: Effort normal. No respiratory distress.   Neurological: She is alert and oriented to person, place, and time.   Skin: Skin is warm and dry.   Psychiatric: She has a normal mood and affect. Her behavior is normal. Judgment and thought content normal.   Nursing note and vitals reviewed.      Significant Labs:   Recent Labs   Lab 01/05/20  0425 01/06/20  0441 01/08/20  0455   WBC 6.31 5.96 6.26   HGB 9.6* 9.8* 9.2*   HCT 30.9* 31.2* 30.0*   * 350 346     Recent Labs   Lab 01/05/20  1800 01/07/20  0455 01/08/20  0455    137 140   K 4.4 4.0 4.1    108 111*   CO2 18* 21* 18*   BUN 56* 59* 60*   CREATININE 3.2* 2.8* 2.8*   * 233* 135*   CALCIUM 7.5* 7.5* 7.7*   PHOS  --   --  4.9*     Recent Labs   Lab 01/08/20  0455   ALBUMIN 1.6*      No results for input(s): CPK, CPKMB, MB, TROPONINI in the last 72 hours.  Recent Labs   Lab 01/07/20  1206 01/07/20  1647 01/07/20  2006 01/08/20  0535 01/08/20  1150 01/08/20  1706   POCTGLUCOSE 234* 206* 145* 121* 166* 188*     Hemoglobin A1C   Date Value Ref Range Status   12/28/2019 9.9 (H) 4.0 - 5.6 % Final     Comment:     ADA Screening Guidelines:  5.7-6.4%  Consistent with prediabetes  >or=6.5%  Consistent with diabetes  High levels of fetal hemoglobin interfere with the HbA1C  assay. Heterozygous hemoglobin variants (HbS, HgC, etc)do  not significantly interfere with this assay.   However, presence of multiple variants may affect accuracy.       Scheduled Meds:   amLODIPine  10 mg Oral Daily    aspirin  81 mg Oral Daily    [START ON 1/9/2020] calcitRIOL  0.25 mcg Oral Daily    docusate sodium  100 mg Oral BID    escitalopram oxalate  10 mg Oral Daily    ferrous sulfate  325 mg Oral BID    fluticasone propionate  1 spray Each Nostril BID    insulin detemir U-100  10 Units Subcutaneous QHS    iron sucrose  (VENOFER) IVPB  100 mg Intravenous Daily    metoprolol tartrate  50 mg Oral BID    pantoprazole  40 mg Oral Daily    sodium bicarbonate  650 mg Oral QID     Continuous Infusions:    As Needed: sodium chloride, acetaminophen, cetirizine, Dextrose 10% Bolus, Dextrose 10% Bolus, glucagon (human recombinant), glucose, glucose, HYDROmorphone, insulin aspart U-100, ondansetron, oxyCODONE, promethazine (PHENERGAN) IVPB, senna-docusate 8.6-50 mg, sodium chloride 0.9%, sodium chloride 0.9%, traMADol    Significant Imaging:   US Lower Extremity Veins Right  Narrative: EXAMINATION:  US LOWER EXTREMITY VEINS RIGHT    CLINICAL HISTORY:  leg pain post op ORIF rt ankle fracture with abnormal D-dimer;    TECHNIQUE:  Duplex and color flow Doppler evaluation and graded compression of the right lower extremity veins was performed.    COMPARISON:  None    FINDINGS:  Right thigh veins: The common femoral, femoral, popliteal, upper greater saphenous, and deep femoral veins are patent and free of thrombus. The veins are normally compressible and have normal phasic flow and augmentation response.    Right calf veins: The visualized calf veins are patent.    Contralateral CFV: The contralateral (left) common femoral vein is patent and free of thrombus.    Miscellaneous: None  Impression: No evidence of deep venous thrombosis in the right lower extremity.    Electronically signed by: Talib Medina MD  Date:    01/02/2020  Time:    11:02

## 2020-01-09 NOTE — ASSESSMENT & PLAN NOTE
Pt home meds are metformin and glyburide  I will recommend stopping them while inpatient and pt getting to be NPO,  I will place pt on ISS  12/29 place pt on dm diet, post op care  12/30 restart metformin and glyburide gradually  1/7 off metformin and glyburide  1/8 cont insulin

## 2020-01-20 ENCOUNTER — TELEPHONE (OUTPATIENT)
Dept: ORTHOPEDICS | Facility: CLINIC | Age: 65
End: 2020-01-20

## 2020-01-20 NOTE — TELEPHONE ENCOUNTER
----- Message from Hawa Hart sent at 1/20/2020  8:42 AM CST -----  Pt called to schedule an appt to have stitches removed and asked for a call back.      Pt contact # [phone].      Thanks

## 2020-01-20 NOTE — TELEPHONE ENCOUNTER
Spoke to patient. Referred her to Dr Cooper Medina who did her surgery. Gave patient physician's number.

## 2020-01-21 NOTE — DISCHARGE SUMMARY
[facility] General Surgery  Discharge Summary      Patient Name: Bina Daniels  MRN: [id number]  Admission Date: 12/26/2019  Hospital Length of Stay: 12 days  Discharge Date and Time: 1/8/2020  8:30 PM  Attending Physician: No att. providers found   Discharging Provider: Cooper Medina MD  Primary Care Provider: Tho Haro MD     HPI: 65yo admitted with unstable ankle fracture dislocation that occurred after a fall 5 days prior to admit    Procedure(s) (LRB):  OPEN REDUCTION INTERNAL FIXATION-ANKLE (Right)  CLOSURE, WOUND (Right)     Hospital Course: After surgery, she was admitted for pain control, pt, OT, monitor NV status and for discharge plans. Due to anemia she was transfused 2u PRBC.  Also she had CCD on admit and Creat asher during admission and responded to medication adjustment and hydration.  Due to poor progress with PT, she was referreed for and admitted for SNF. Stable at discharge to SNF    Consults: Hospitaalist and Nephrology, PT and OT    Significant Diagnostic Studies: Labs: All labs within the past 24 hours have been reviewed  Radiology: X-Ray: X-rays ankle show good alignment    Pending Diagnostic Studies:     None        Final Active Diagnoses:    Diagnosis Date Noted POA    PRINCIPAL PROBLEM:  Trimalleolar fracture of ankle, closed, right, initial encounter [S82.851A] 12/26/2019 Yes    Neuropathy due to secondary diabetes mellitus [E13.40] 12/27/2019 Yes     Chronic    Anemia due to stage 3 chronic kidney disease [N18.3, D63.1] 12/27/2019 Yes    CKD (chronic kidney disease), stage IV [N18.4] 12/27/2019 Yes     Chronic    Type 2 diabetes mellitus with neurologic complication, without long-term current use of insulin [E11.49] 12/27/2019 Yes     Chronic    Coronary artery disease involving native heart without angina pectoris [I25.10] 12/27/2019 Yes     Chronic      Problems Resolved During this Admission:      Discharged Condition: fair    Disposition: Home or  Self Care    Follow Up:  Follow-up Information     Cooper Medina MD On 1/20/2020.    Specialty:  Orthopedic Surgery  Why:  For suture removal, For wound re-check  Contact information:  [address]  [place] [address] [phone]             [facility].    Specialties:  DME Provider, Home Health Services  Why:  For Home Health. A home health nurse will visit you tomorrow 1/9/2020.   Contact information:  [id number]   [place] [address] [phone]                 Patient Instructions:      Referral to Home health   Referral Priority: Routine Referral Type: Home Health   Referral Reason: Specialty Services Required   Requested Specialty: Home Health Services   Number of Visits Requested: 1     Diet general     Order Specific Question Answer Comments   Total calories: 2000 Calorie      Sponge bath only until clinic visit     Keep surgical extremity elevated     No driving, operating heavy equipment or signing legal documents while taking pain medication     Call MD for:  temperature >100.4     Call MD for:  persistent nausea and vomiting     Call MD for:  extreme fatigue     Call MD for:  persistent dizziness or light-headedness     Call MD for:  hives     Call MD for:  redness, tenderness, or signs of infection (pain, swelling, redness, odor or green/yellow discharge around incision site)     Call MD for:  difficulty breathing, headache or visual disturbances     Call MD for:  severe uncontrolled pain     Change dressing (specify)   Order Comments: Dressing change:1times per day using Betasorb and redress.     Non weight bearing     Medications:  Transfer Medications (for Discharge Readmit only):   No current facility-administered medications for this encounter.      Current Outpatient Medications   Medication Sig Dispense Refill    aspirin 81 MG Chew Take 81 mg by mouth once daily.      busPIRone (BUSPAR) 5 MG Tab Take 5 mg by mouth 2 (two) times daily.      escitalopram  oxalate (LEXAPRO) 10 MG tablet Take 10 mg by mouth once daily.      glyBURIDE-metformin 2.5-500 mg (GLUCOVANCE) 2.5-500 mg per tablet 1 tablet once daily.      metoprolol tartrate (LOPRESSOR) 50 MG tablet Take 50 mg by mouth 2 (two) times daily.      pantoprazole (PROTONIX) 40 MG tablet Take 40 mg by mouth once daily.      acetaminophen (TYLENOL) 325 MG tablet Take 2 tablets (650 mg total) by mouth every 6 (six) hours as needed for Pain or Temperature greater than (101). 10 tablet 0    acetaminophen (TYLENOL) 325 MG tablet Take 2 tablets (650 mg total) by mouth every 6 (six) hours as needed for Pain. 10 tablet 0    atorvastatin (LIPITOR) 10 MG tablet       denosumab (PROLIA) 60 mg/mL Syrg Inject 60 mg into the skin every 6 (six) months.      gabapentin (NEURONTIN) 300 MG capsule       hyoscyamine (ANASPAZ,LEVSIN) 0.125 mg Tab Take 125 mcg by mouth every 4 (four) hours as needed.      losartan (COZAAR) 50 MG tablet       TOUJEO SOLOSTAR 300 unit/mL (1.5 mL) InPn pen       traMADol (ULTRAM) 50 mg tablet          Cooper Medina MD  General Surgery  [facility]

## 2020-01-28 NOTE — PHYSICIAN QUERY
PT Name: Bina Daniels  MR #: [id number]     Physician Query Form - Documentation Clarification      CDS: Jamaica Arriaza RN     Contact information:  [email or web address]    Phone: [phone]    This form is a permanent document in the medical record.     Query Date: January 28, 2020    By submitting this query, we are merely seeking further clarification of documentation. Please utilize your independent clinical judgment when addressing the question(s) below.    The Medical record reflects the following:    Supporting Clinical Findings Location in Medical Record     Leg was exsanguinated and tourniquet about the proximal thigh inflated to 250 mmHg initially.  Incisions were made medial and lateral ankle and due to significant persistent bleeding, the tourniquet was deflated and then reinflated  to 275 mmHg.      The incisions were carried sharply through subcutaneous tissue   down to the level of the bone.  Once again, due to inability to maintain hemostasis including with Bovie anticoagulation compression, the tourniquet was once again deflated and the leg was re-exsanguinated and tourniquet reinflated to 300 mmHg.     According to Anesthesia, the patient's blood pressure was no higher than 140/70s throughout the procedure; however, she continued to bleed from the skin edges, depths of the wound as well as from the bone.      During drilling of holes for insertion of the screws, the patient continued to have significant bleeding through the screw holes.  Therefore, at this point, I decided to abort the procedure.  The wounds were all packed open and tourniquet deflated and a well-padded posterior stirrup plaster splint was applied.      COMPLICATIONS:  Inability to complete reduction and wound closure due to significant intraoperative hemorrhage.    ESTIMATED BLOOD LOSS:  500 mL.    Op Note 12/27/19           Op Note 12/27/19             Op Note 12/27/19         Op Note 12/27/19             Op Note  12/27/19       Op Note 12/27/19      Platelets= 482 --> 410 --> 445 --> 356 --> 353 --> 331     PT= 12.7   INR= 1.2    aPTT= 30.6     presented to [facility] last night with a deformed painful right ankle fracture that she apparently sustained 6 to 7 days ago.  She states she thought that she had simply sprained it; however, she did not present to a physician immediately. She states she fell again on Zaira day and developed more deformity. She presented to her orthopedic surgeon's office on 12/26/2019 afternoon. His name is Dr. Sotomayor. She states that he attempted to reduce her ankle, was unable to and told her to go to the Emergency Room immediately to have another orthopedic surgeon to take care of her.  After presenting to [facility], Dr. Coleman attempted to reduce the fracture with some improvement.  He placed her in a splint and call me for consultation    HOME MEDICATIONS:  Include aspirin, atorvastatin, BuSpar, Prolia, Lexapro, Neurontin, Glucovance, Norco, Levsin, Motrin, Zestril, Cozaar, Zofran, Protonix, Toujeo and Ultram.    SKIN:  She notes a wound about the right ankle and heel.    1.  Unstable trimalleolar right displaced closed ankle fracture dislocation,   approximately 7 days in age.  2.  Diabetes mellitus.  3.  Diabetic peripheral neuropathy.  4.  Coronary artery disease.  5.  Hypertension.  6.  Chronic renal insufficiency.  7.  Anemia.  8.  Thrombocytosis    Lab 12/26/19 --> 12/30/19     Lab 12/27/19 20:32    H&P 12/26/19                         H&P 12/26/19         H&P 12/26/19     H&P 12/26/19                                                                            Doctor, please clarify the diagnosis or diagnoses associated with above clinical findings.    Provider Use Only        [  ] Intraoperative hemorrhage a complication of surgery      [  ] Intraoperative hemorrhage not a complication of surgery      [  ] Intraoperative hemorrhage due to thrombocytosis a  complication of surgery      [x  ] Intraoperative hemorrhage due to thrombocytosis not a complication of surgery       [  ] Intraoperative hemorrhage a complication of surgery due to other underlying conditon (please specify) ______________      [  ] Intraoperative hemorrhage not a complication of surgery due to other underlying conditon (please specify) ______________      [  ] Other (please specify) ______________                                                                                                             [  ] Clinically Undetermined

## 2020-02-10 ENCOUNTER — HOSPITAL ENCOUNTER (INPATIENT)
Facility: HOSPITAL | Age: 65
LOS: 6 days | Discharge: HOSPICE | DRG: 559 | End: 2020-02-16
Attending: EMERGENCY MEDICINE | Admitting: EMERGENCY MEDICINE
Payer: COMMERCIAL

## 2020-02-10 DIAGNOSIS — T81.42XD INFECTION OF DEEP INCISIONAL SURGICAL SITE AFTER PROCEDURE, SUBSEQUENT ENCOUNTER: ICD-10-CM

## 2020-02-10 DIAGNOSIS — A41.9 SEPSIS, DUE TO UNSPECIFIED ORGANISM, UNSPECIFIED WHETHER ACUTE ORGAN DYSFUNCTION PRESENT: ICD-10-CM

## 2020-02-10 DIAGNOSIS — I50.43 ACUTE ON CHRONIC COMBINED SYSTOLIC AND DIASTOLIC HEART FAILURE: ICD-10-CM

## 2020-02-10 DIAGNOSIS — I48.0 PAROXYSMAL ATRIAL FIBRILLATION: ICD-10-CM

## 2020-02-10 DIAGNOSIS — R05.9 COUGH: ICD-10-CM

## 2020-02-10 DIAGNOSIS — R06.02 SOB (SHORTNESS OF BREATH): ICD-10-CM

## 2020-02-10 DIAGNOSIS — N18.9 CHRONIC KIDNEY DISEASE, UNSPECIFIED CKD STAGE: ICD-10-CM

## 2020-02-10 DIAGNOSIS — A41.9 SEPSIS: ICD-10-CM

## 2020-02-10 DIAGNOSIS — T24.202A SECOND DEGREE BURN OF LEFT LEG, INITIAL ENCOUNTER: ICD-10-CM

## 2020-02-10 DIAGNOSIS — T81.40XA POSTOPERATIVE INFECTION, UNSPECIFIED TYPE, INITIAL ENCOUNTER: Primary | ICD-10-CM

## 2020-02-10 DIAGNOSIS — Z23 NEED FOR TD VACCINE: ICD-10-CM

## 2020-02-10 DIAGNOSIS — R78.81 BACTEREMIA: ICD-10-CM

## 2020-02-10 DIAGNOSIS — J18.9 PNEUMONIA OF BOTH LUNGS DUE TO INFECTIOUS ORGANISM, UNSPECIFIED PART OF LUNG: ICD-10-CM

## 2020-02-10 DIAGNOSIS — R79.89 ELEVATED BRAIN NATRIURETIC PEPTIDE (BNP) LEVEL: ICD-10-CM

## 2020-02-10 DIAGNOSIS — D72.829 LEUKOCYTOSIS, UNSPECIFIED TYPE: ICD-10-CM

## 2020-02-10 DIAGNOSIS — I50.21 ACUTE SYSTOLIC (CONGESTIVE) HEART FAILURE: ICD-10-CM

## 2020-02-10 DIAGNOSIS — T14.8XXA WOUND DISCHARGE: ICD-10-CM

## 2020-02-10 DIAGNOSIS — T84.7XXA INFECTED HARDWARE IN RIGHT LOWER EXTREMITY, INITIAL ENCOUNTER: ICD-10-CM

## 2020-02-10 PROBLEM — J96.01 ACUTE HYPOXEMIC RESPIRATORY FAILURE: Status: ACTIVE | Noted: 2020-02-10

## 2020-02-10 PROBLEM — E87.20 METABOLIC ACIDOSIS: Status: ACTIVE | Noted: 2020-02-10

## 2020-02-10 PROBLEM — N25.81 SECONDARY HYPERPARATHYROIDISM: Status: ACTIVE | Noted: 2020-02-10

## 2020-02-10 PROBLEM — N04.9 ANASARCA ASSOCIATED WITH DISORDER OF KIDNEY: Status: ACTIVE | Noted: 2020-02-10

## 2020-02-10 PROBLEM — I10 HYPERTENSION: Status: ACTIVE | Noted: 2020-02-10

## 2020-02-10 PROBLEM — F32.A DEPRESSION: Status: ACTIVE | Noted: 2020-02-10

## 2020-02-10 PROBLEM — D50.9 IRON DEFICIENCY ANEMIA: Status: ACTIVE | Noted: 2020-02-10

## 2020-02-10 PROBLEM — E55.9 VITAMIN D DEFICIENCY: Status: ACTIVE | Noted: 2020-02-10

## 2020-02-10 PROBLEM — Z87.81 S/P ORIF (OPEN REDUCTION INTERNAL FIXATION) FRACTURE: Status: ACTIVE | Noted: 2020-02-10

## 2020-02-10 PROBLEM — Z98.890 S/P ORIF (OPEN REDUCTION INTERNAL FIXATION) FRACTURE: Status: ACTIVE | Noted: 2020-02-10

## 2020-02-10 PROBLEM — G93.41 ACUTE METABOLIC ENCEPHALOPATHY: Status: ACTIVE | Noted: 2020-02-10

## 2020-02-10 PROBLEM — S82.891A ANKLE FRACTURE, RIGHT: Status: ACTIVE | Noted: 2019-12-26

## 2020-02-10 PROBLEM — E88.09 HYPOALBUMINEMIA: Status: ACTIVE | Noted: 2020-02-10

## 2020-02-10 PROBLEM — C50.919 BREAST CANCER: Status: ACTIVE | Noted: 2020-02-10

## 2020-02-10 PROBLEM — E87.70 VOLUME OVERLOAD: Status: ACTIVE | Noted: 2020-02-10

## 2020-02-10 LAB
ALBUMIN SERPL BCP-MCNC: 1.9 G/DL (ref 3.5–5.2)
ALP SERPL-CCNC: 129 U/L (ref 55–135)
ALT SERPL W/O P-5'-P-CCNC: 6 U/L (ref 10–44)
ANION GAP SERPL CALC-SCNC: 15 MMOL/L (ref 8–16)
ANISOCYTOSIS BLD QL SMEAR: SLIGHT
ANISOCYTOSIS BLD QL SMEAR: SLIGHT
APTT BLDCRRT: 28.2 SEC (ref 21–32)
AST SERPL-CCNC: 9 U/L (ref 10–40)
BASOPHILS # BLD AUTO: 0.04 K/UL (ref 0–0.2)
BASOPHILS # BLD AUTO: 0.05 K/UL (ref 0–0.2)
BASOPHILS NFR BLD: 0.3 % (ref 0–1.9)
BASOPHILS NFR BLD: 0.3 % (ref 0–1.9)
BILIRUB SERPL-MCNC: 0.3 MG/DL (ref 0.1–1)
BNP SERPL-MCNC: 2174 PG/ML (ref 0–99)
BUN SERPL-MCNC: 74 MG/DL (ref 8–23)
BURR CELLS BLD QL SMEAR: ABNORMAL
BURR CELLS BLD QL SMEAR: ABNORMAL
CALCIUM SERPL-MCNC: 8.4 MG/DL (ref 8.7–10.5)
CHLORIDE SERPL-SCNC: 105 MMOL/L (ref 95–110)
CK SERPL-CCNC: 20 U/L (ref 20–180)
CO2 SERPL-SCNC: 14 MMOL/L (ref 23–29)
CREAT SERPL-MCNC: 3 MG/DL (ref 0.5–1.4)
CRP SERPL-MCNC: 157.6 MG/L (ref 0–8.2)
DIFFERENTIAL METHOD: ABNORMAL
DIFFERENTIAL METHOD: ABNORMAL
DOHLE BOD BLD QL SMEAR: PRESENT
DOHLE BOD BLD QL SMEAR: PRESENT
EOSINOPHIL # BLD AUTO: 0 K/UL (ref 0–0.5)
EOSINOPHIL # BLD AUTO: 0 K/UL (ref 0–0.5)
EOSINOPHIL NFR BLD: 0.1 % (ref 0–8)
EOSINOPHIL NFR BLD: 0.1 % (ref 0–8)
ERYTHROCYTE [DISTWIDTH] IN BLOOD BY AUTOMATED COUNT: 17.6 % (ref 11.5–14.5)
ERYTHROCYTE [DISTWIDTH] IN BLOOD BY AUTOMATED COUNT: 17.6 % (ref 11.5–14.5)
ERYTHROCYTE [SEDIMENTATION RATE] IN BLOOD BY WESTERGREN METHOD: 68 MM/HR (ref 0–36)
EST. GFR  (AFRICAN AMERICAN): 18.5 ML/MIN/1.73 M^2
EST. GFR  (NON AFRICAN AMERICAN): 16 ML/MIN/1.73 M^2
GLUCOSE SERPL-MCNC: 313 MG/DL (ref 70–110)
HCT VFR BLD AUTO: 33.9 % (ref 37–48.5)
HCT VFR BLD AUTO: 34.2 % (ref 37–48.5)
HGB BLD-MCNC: 10.1 G/DL (ref 12–16)
HGB BLD-MCNC: 9.9 G/DL (ref 12–16)
IMM GRANULOCYTES # BLD AUTO: 0.14 K/UL (ref 0–0.04)
IMM GRANULOCYTES # BLD AUTO: 0.2 K/UL (ref 0–0.04)
IMM GRANULOCYTES NFR BLD AUTO: 0.9 % (ref 0–0.5)
IMM GRANULOCYTES NFR BLD AUTO: 1.3 % (ref 0–0.5)
INFLUENZA A, MOLECULAR: NEGATIVE
INFLUENZA B, MOLECULAR: NEGATIVE
INR PPP: 1.3 (ref 0.8–1.2)
INR PPP: 1.3 (ref 0.8–1.2)
LACTATE SERPL-SCNC: 1.3 MMOL/L (ref 0.5–2.2)
LYMPHOCYTES # BLD AUTO: 0.3 K/UL (ref 1–4.8)
LYMPHOCYTES # BLD AUTO: 0.5 K/UL (ref 1–4.8)
LYMPHOCYTES NFR BLD: 1.7 % (ref 18–48)
LYMPHOCYTES NFR BLD: 3.1 % (ref 18–48)
MAGNESIUM SERPL-MCNC: 2.1 MG/DL (ref 1.6–2.6)
MCH RBC QN AUTO: 26.9 PG (ref 27–31)
MCH RBC QN AUTO: 27 PG (ref 27–31)
MCHC RBC AUTO-ENTMCNC: 29.2 G/DL (ref 32–36)
MCHC RBC AUTO-ENTMCNC: 29.5 G/DL (ref 32–36)
MCV RBC AUTO: 91 FL (ref 82–98)
MCV RBC AUTO: 92 FL (ref 82–98)
MONOCYTES # BLD AUTO: 0.6 K/UL (ref 0.3–1)
MONOCYTES # BLD AUTO: 0.9 K/UL (ref 0.3–1)
MONOCYTES NFR BLD: 4.2 % (ref 4–15)
MONOCYTES NFR BLD: 5.5 % (ref 4–15)
NEUTROPHILS # BLD AUTO: 13.8 K/UL (ref 1.8–7.7)
NEUTROPHILS # BLD AUTO: 14.4 K/UL (ref 1.8–7.7)
NEUTROPHILS NFR BLD: 90.1 % (ref 38–73)
NEUTROPHILS NFR BLD: 92.4 % (ref 38–73)
NRBC BLD-RTO: 0 /100 WBC
NRBC BLD-RTO: 0 /100 WBC
OVALOCYTES BLD QL SMEAR: ABNORMAL
PHOSPHATE SERPL-MCNC: 5.2 MG/DL (ref 2.7–4.5)
PLATELET # BLD AUTO: 501 K/UL (ref 150–350)
PLATELET # BLD AUTO: 536 K/UL (ref 150–350)
PLATELET BLD QL SMEAR: ABNORMAL
PLATELET BLD QL SMEAR: ABNORMAL
PMV BLD AUTO: 9.6 FL (ref 9.2–12.9)
PMV BLD AUTO: 9.8 FL (ref 9.2–12.9)
POCT GLUCOSE: 258 MG/DL (ref 70–110)
POIKILOCYTOSIS BLD QL SMEAR: SLIGHT
POIKILOCYTOSIS BLD QL SMEAR: SLIGHT
POTASSIUM SERPL-SCNC: 4.7 MMOL/L (ref 3.5–5.1)
PROCALCITONIN SERPL IA-MCNC: 0.91 NG/ML
PROT SERPL-MCNC: 6.4 G/DL (ref 6–8.4)
PROTHROMBIN TIME: 12.7 SEC (ref 9–12.5)
PROTHROMBIN TIME: 12.9 SEC (ref 9–12.5)
RBC # BLD AUTO: 3.67 M/UL (ref 4–5.4)
RBC # BLD AUTO: 3.76 M/UL (ref 4–5.4)
SODIUM SERPL-SCNC: 134 MMOL/L (ref 136–145)
SPECIMEN SOURCE: NORMAL
TOXIC GRANULES BLD QL SMEAR: PRESENT
TOXIC GRANULES BLD QL SMEAR: PRESENT
TROPONIN I SERPL DL<=0.01 NG/ML-MCNC: 0.02 NG/ML (ref 0–0.03)
WBC # BLD AUTO: 14.87 K/UL (ref 3.9–12.7)
WBC # BLD AUTO: 16 K/UL (ref 3.9–12.7)

## 2020-02-10 PROCEDURE — 25000003 PHARM REV CODE 250: Performed by: HOSPITALIST

## 2020-02-10 PROCEDURE — 99223 PR INITIAL HOSPITAL CARE,LEVL III: ICD-10-PCS | Mod: ,,, | Performed by: HOSPITALIST

## 2020-02-10 PROCEDURE — 99285 EMERGENCY DEPT VISIT HI MDM: CPT | Mod: ,,, | Performed by: EMERGENCY MEDICINE

## 2020-02-10 PROCEDURE — 63600175 PHARM REV CODE 636 W HCPCS: Performed by: FAMILY MEDICINE

## 2020-02-10 PROCEDURE — 25000003 PHARM REV CODE 250: Performed by: PHYSICIAN ASSISTANT

## 2020-02-10 PROCEDURE — 63600175 PHARM REV CODE 636 W HCPCS: Performed by: PHYSICIAN ASSISTANT

## 2020-02-10 PROCEDURE — 84145 PROCALCITONIN (PCT): CPT

## 2020-02-10 PROCEDURE — 86140 C-REACTIVE PROTEIN: CPT

## 2020-02-10 PROCEDURE — 36415 COLL VENOUS BLD VENIPUNCTURE: CPT

## 2020-02-10 PROCEDURE — 87040 BLOOD CULTURE FOR BACTERIA: CPT | Mod: 59

## 2020-02-10 PROCEDURE — 80053 COMPREHEN METABOLIC PANEL: CPT

## 2020-02-10 PROCEDURE — 96367 TX/PROPH/DG ADDL SEQ IV INF: CPT

## 2020-02-10 PROCEDURE — 11000001 HC ACUTE MED/SURG PRIVATE ROOM

## 2020-02-10 PROCEDURE — 87502 INFLUENZA DNA AMP PROBE: CPT

## 2020-02-10 PROCEDURE — 84484 ASSAY OF TROPONIN QUANT: CPT

## 2020-02-10 PROCEDURE — 85025 COMPLETE CBC W/AUTO DIFF WBC: CPT

## 2020-02-10 PROCEDURE — 85610 PROTHROMBIN TIME: CPT | Mod: 91

## 2020-02-10 PROCEDURE — 85652 RBC SED RATE AUTOMATED: CPT

## 2020-02-10 PROCEDURE — 93005 ELECTROCARDIOGRAM TRACING: CPT

## 2020-02-10 PROCEDURE — 83880 ASSAY OF NATRIURETIC PEPTIDE: CPT

## 2020-02-10 PROCEDURE — 83605 ASSAY OF LACTIC ACID: CPT

## 2020-02-10 PROCEDURE — 84100 ASSAY OF PHOSPHORUS: CPT

## 2020-02-10 PROCEDURE — 83735 ASSAY OF MAGNESIUM: CPT

## 2020-02-10 PROCEDURE — 93010 EKG 12-LEAD: ICD-10-PCS | Mod: ,,, | Performed by: INTERNAL MEDICINE

## 2020-02-10 PROCEDURE — 90715 TDAP VACCINE 7 YRS/> IM: CPT | Performed by: PHYSICIAN ASSISTANT

## 2020-02-10 PROCEDURE — 99285 PR EMERGENCY DEPT VISIT,LEVEL V: ICD-10-PCS | Mod: ,,, | Performed by: EMERGENCY MEDICINE

## 2020-02-10 PROCEDURE — C9399 UNCLASSIFIED DRUGS OR BIOLOG: HCPCS | Performed by: HOSPITALIST

## 2020-02-10 PROCEDURE — 85730 THROMBOPLASTIN TIME PARTIAL: CPT

## 2020-02-10 PROCEDURE — 99285 EMERGENCY DEPT VISIT HI MDM: CPT | Mod: 25

## 2020-02-10 PROCEDURE — 85610 PROTHROMBIN TIME: CPT

## 2020-02-10 PROCEDURE — 96365 THER/PROPH/DIAG IV INF INIT: CPT

## 2020-02-10 PROCEDURE — 87186 SC STD MICRODIL/AGAR DIL: CPT | Mod: 59

## 2020-02-10 PROCEDURE — 93010 ELECTROCARDIOGRAM REPORT: CPT | Mod: ,,, | Performed by: INTERNAL MEDICINE

## 2020-02-10 PROCEDURE — 99223 1ST HOSP IP/OBS HIGH 75: CPT | Mod: ,,, | Performed by: HOSPITALIST

## 2020-02-10 PROCEDURE — 93005 ELECTROCARDIOGRAM TRACING: CPT | Performed by: INTERNAL MEDICINE

## 2020-02-10 PROCEDURE — 63600175 PHARM REV CODE 636 W HCPCS: Performed by: HOSPITALIST

## 2020-02-10 PROCEDURE — 90471 IMMUNIZATION ADMIN: CPT | Performed by: PHYSICIAN ASSISTANT

## 2020-02-10 PROCEDURE — 87077 CULTURE AEROBIC IDENTIFY: CPT | Mod: 59

## 2020-02-10 PROCEDURE — 96375 TX/PRO/DX INJ NEW DRUG ADDON: CPT

## 2020-02-10 PROCEDURE — 82550 ASSAY OF CK (CPK): CPT

## 2020-02-10 RX ORDER — IBUPROFEN 200 MG
16 TABLET ORAL
Status: DISCONTINUED | OUTPATIENT
Start: 2020-02-10 | End: 2020-02-16 | Stop reason: HOSPADM

## 2020-02-10 RX ORDER — POLYETHYLENE GLYCOL 3350 17 G/17G
17 POWDER, FOR SOLUTION ORAL DAILY
Status: DISCONTINUED | OUTPATIENT
Start: 2020-02-11 | End: 2020-02-16 | Stop reason: HOSPADM

## 2020-02-10 RX ORDER — GLUCAGON 1 MG
1 KIT INJECTION
Status: DISCONTINUED | OUTPATIENT
Start: 2020-02-10 | End: 2020-02-16 | Stop reason: HOSPADM

## 2020-02-10 RX ORDER — TALC
6 POWDER (GRAM) TOPICAL NIGHTLY PRN
Status: DISCONTINUED | OUTPATIENT
Start: 2020-02-10 | End: 2020-02-16 | Stop reason: HOSPADM

## 2020-02-10 RX ORDER — ESCITALOPRAM OXALATE 10 MG/1
10 TABLET ORAL DAILY
Status: DISCONTINUED | OUTPATIENT
Start: 2020-02-11 | End: 2020-02-16 | Stop reason: HOSPADM

## 2020-02-10 RX ORDER — IPRATROPIUM BROMIDE AND ALBUTEROL SULFATE 2.5; .5 MG/3ML; MG/3ML
3 SOLUTION RESPIRATORY (INHALATION) EVERY 4 HOURS PRN
Status: DISCONTINUED | OUTPATIENT
Start: 2020-02-10 | End: 2020-02-13

## 2020-02-10 RX ORDER — ONDANSETRON 2 MG/ML
4 INJECTION INTRAMUSCULAR; INTRAVENOUS EVERY 8 HOURS PRN
Status: DISCONTINUED | OUTPATIENT
Start: 2020-02-10 | End: 2020-02-16 | Stop reason: HOSPADM

## 2020-02-10 RX ORDER — SODIUM BICARBONATE 650 MG/1
650 TABLET ORAL 4 TIMES DAILY
Status: DISCONTINUED | OUTPATIENT
Start: 2020-02-10 | End: 2020-02-16 | Stop reason: HOSPADM

## 2020-02-10 RX ORDER — OXYCODONE HYDROCHLORIDE 5 MG/1
5 TABLET ORAL ONCE AS NEEDED
Status: DISCONTINUED | OUTPATIENT
Start: 2020-02-10 | End: 2020-02-10

## 2020-02-10 RX ORDER — BUSPIRONE HYDROCHLORIDE 5 MG/1
5 TABLET ORAL 2 TIMES DAILY
Status: DISCONTINUED | OUTPATIENT
Start: 2020-02-10 | End: 2020-02-16 | Stop reason: HOSPADM

## 2020-02-10 RX ORDER — FUROSEMIDE 10 MG/ML
80 INJECTION INTRAMUSCULAR; INTRAVENOUS
Status: COMPLETED | OUTPATIENT
Start: 2020-02-10 | End: 2020-02-10

## 2020-02-10 RX ORDER — IBUPROFEN 200 MG
24 TABLET ORAL
Status: DISCONTINUED | OUTPATIENT
Start: 2020-02-10 | End: 2020-02-16 | Stop reason: HOSPADM

## 2020-02-10 RX ORDER — ATORVASTATIN CALCIUM 10 MG/1
10 TABLET, FILM COATED ORAL DAILY
Status: DISCONTINUED | OUTPATIENT
Start: 2020-02-11 | End: 2020-02-16 | Stop reason: HOSPADM

## 2020-02-10 RX ORDER — CALCITRIOL 0.25 UG/1
0.25 CAPSULE ORAL DAILY
Status: DISCONTINUED | OUTPATIENT
Start: 2020-02-11 | End: 2020-02-16 | Stop reason: HOSPADM

## 2020-02-10 RX ORDER — INSULIN ASPART 100 [IU]/ML
0-5 INJECTION, SOLUTION INTRAVENOUS; SUBCUTANEOUS
Status: DISCONTINUED | OUTPATIENT
Start: 2020-02-10 | End: 2020-02-16 | Stop reason: HOSPADM

## 2020-02-10 RX ORDER — FLUTICASONE PROPIONATE 50 MCG
2 SPRAY, SUSPENSION (ML) NASAL DAILY
Status: DISCONTINUED | OUTPATIENT
Start: 2020-02-11 | End: 2020-02-11

## 2020-02-10 RX ORDER — SODIUM CHLORIDE 0.9 % (FLUSH) 0.9 %
10 SYRINGE (ML) INJECTION
Status: DISCONTINUED | OUTPATIENT
Start: 2020-02-10 | End: 2020-02-16 | Stop reason: HOSPADM

## 2020-02-10 RX ORDER — METOPROLOL TARTRATE 50 MG/1
50 TABLET ORAL 2 TIMES DAILY
Status: DISCONTINUED | OUTPATIENT
Start: 2020-02-11 | End: 2020-02-11

## 2020-02-10 RX ORDER — FERROUS SULFATE 325(65) MG
325 TABLET, DELAYED RELEASE (ENTERIC COATED) ORAL DAILY
Status: DISCONTINUED | OUTPATIENT
Start: 2020-02-11 | End: 2020-02-16 | Stop reason: HOSPADM

## 2020-02-10 RX ORDER — PANTOPRAZOLE SODIUM 40 MG/1
40 TABLET, DELAYED RELEASE ORAL DAILY
Status: DISCONTINUED | OUTPATIENT
Start: 2020-02-11 | End: 2020-02-16 | Stop reason: HOSPADM

## 2020-02-10 RX ORDER — HEPARIN SODIUM,PORCINE/D5W 25000/250
18 INTRAVENOUS SOLUTION INTRAVENOUS CONTINUOUS
Status: DISCONTINUED | OUTPATIENT
Start: 2020-02-10 | End: 2020-02-12

## 2020-02-10 RX ORDER — FUROSEMIDE 10 MG/ML
80 INJECTION INTRAMUSCULAR; INTRAVENOUS 2 TIMES DAILY
Status: DISCONTINUED | OUTPATIENT
Start: 2020-02-10 | End: 2020-02-12

## 2020-02-10 RX ORDER — HYDROCODONE BITARTRATE AND ACETAMINOPHEN 5; 325 MG/1; MG/1
1 TABLET ORAL EVERY 6 HOURS PRN
Status: ON HOLD | COMMUNITY
End: 2020-02-16 | Stop reason: HOSPADM

## 2020-02-10 RX ORDER — ACETAMINOPHEN 325 MG/1
650 TABLET ORAL EVERY 6 HOURS PRN
Status: DISCONTINUED | OUTPATIENT
Start: 2020-02-10 | End: 2020-02-16 | Stop reason: HOSPADM

## 2020-02-10 RX ORDER — HEPARIN SODIUM 5000 [USP'U]/ML
5000 INJECTION, SOLUTION INTRAVENOUS; SUBCUTANEOUS EVERY 8 HOURS
Status: DISCONTINUED | OUTPATIENT
Start: 2020-02-10 | End: 2020-02-10

## 2020-02-10 RX ORDER — FUROSEMIDE 80 MG/1
80 TABLET ORAL 2 TIMES DAILY
COMMUNITY

## 2020-02-10 RX ORDER — OXYCODONE HYDROCHLORIDE 5 MG/1
5 TABLET ORAL EVERY 4 HOURS PRN
Status: COMPLETED | OUTPATIENT
Start: 2020-02-10 | End: 2020-02-11

## 2020-02-10 RX ADMIN — SODIUM BICARBONATE 650 MG TABLET 650 MG: at 04:02

## 2020-02-10 RX ADMIN — FUROSEMIDE 80 MG: 10 INJECTION, SOLUTION INTRAMUSCULAR; INTRAVENOUS at 02:02

## 2020-02-10 RX ADMIN — CLOSTRIDIUM TETANI TOXOID ANTIGEN (FORMALDEHYDE INACTIVATED), CORYNEBACTERIUM DIPHTHERIAE TOXOID ANTIGEN (FORMALDEHYDE INACTIVATED), BORDETELLA PERTUSSIS TOXOID ANTIGEN (GLUTARALDEHYDE INACTIVATED), BORDETELLA PERTUSSIS FILAMENTOUS HEMAGGLUTININ ANTIGEN (FORMALDEHYDE INACTIVATED), BORDETELLA PERTUSSIS PERTACTIN ANTIGEN, AND BORDETELLA PERTUSSIS FIMBRIAE 2/3 ANTIGEN 0.5 ML: 5; 2; 2.5; 5; 3; 5 INJECTION, SUSPENSION INTRAMUSCULAR at 11:02

## 2020-02-10 RX ADMIN — OXYCODONE HYDROCHLORIDE 5 MG: 5 TABLET ORAL at 11:02

## 2020-02-10 RX ADMIN — BUSPIRONE HYDROCHLORIDE 5 MG: 5 TABLET ORAL at 09:02

## 2020-02-10 RX ADMIN — SODIUM BICARBONATE 650 MG TABLET 650 MG: at 09:02

## 2020-02-10 RX ADMIN — HEPARIN SODIUM AND DEXTROSE 18 UNITS/KG/HR: 10000; 5 INJECTION INTRAVENOUS at 10:02

## 2020-02-10 RX ADMIN — PIPERACILLIN AND TAZOBACTAM 4.5 G: 4; .5 INJECTION, POWDER, LYOPHILIZED, FOR SOLUTION INTRAVENOUS; PARENTERAL at 12:02

## 2020-02-10 RX ADMIN — VANCOMYCIN HYDROCHLORIDE 1500 MG: 1.5 INJECTION, POWDER, LYOPHILIZED, FOR SOLUTION INTRAVENOUS at 01:02

## 2020-02-10 RX ADMIN — INSULIN DETEMIR 10 UNITS: 100 INJECTION, SOLUTION SUBCUTANEOUS at 09:02

## 2020-02-10 RX ADMIN — Medication 6 MG: at 10:02

## 2020-02-10 RX ADMIN — FUROSEMIDE 80 MG: 10 INJECTION, SOLUTION INTRAMUSCULAR; INTRAVENOUS at 09:02

## 2020-02-10 RX ADMIN — ACETAMINOPHEN 650 MG: 325 TABLET ORAL at 06:02
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
## 2020-02-10 NOTE — ED NOTES
"Wet to dry dressing applied to wound on pt's RLE, per ortho's request; cap refill<3 bilaterally s/p dressing; pt resituated in room, reports feeling "more comfortable"; per physician, pt is allowed to eat; awaiting further orders; will continue to monitor and provide  "

## 2020-02-10 NOTE — H&P
"History and Physical  Hospital Medicine       Patient Name: Bina Daniels  MRN:  [id number]  Hospital Medicine Team: [facility] MED K Christine Palomino MD  Date of Admission:  2/10/2020     Principal Problem:  Sepsis   Primary Care Physician: Tho Haro MD      History of Present Illness:     Ms. Bina Daniels is a 64 y.o. female with a history of type 2 DM, stage 4 CKD, HTN, LAI, neuropathy, LAI, depressionn who was in her usual state of health until the week or so before gracy this year when she fell and thought she just sprained her ankle, per ortho notes she "didn't think it ws that bad", she then saw a Dr Sotomayor who was unable to reduce the ankle and dx a fx in the Right ankle and sent to [place] ER for treatment on 12/26, she went for attempted ORIF on 12/27 with Dr Medina with [facility] orth[facility] who was unable to complete procedure due to intraOp bleeding in screw holes, placed in splint and procedure aborted/2U given, on 12/27 ORIF completed to R ankle fx with recs for nonweight bearing at that time, SNF was recommended. She had prolonged hospital stay for JENNI on stage 4 CKD issues- nephro Dr Saenz and [facility] in [place] followed for med issues, she decided to go home and not SNF on 1/8. Per her family at bedside, she was getting home health to surgical site and was getting home PT/OT, they didn't seem thrilled with her choice to not do SNF, their brother who is her primary caregiver was not present in ER (daughter and son present in ER now and dont know all of med hx). In interim they didn't know she was having wound issues and dont report that they knew about it until this weekend that the site had opened up as well as worsening dyspnea and fluid/pitting edema in legs, arms as well. They report subjective chills at home but no known fevers, as well as her being slightly "off" and not herself completely now. They brought her for evlauation for both of above, and as seen in ER pictures the wound has opened " "and hardware visible. Per ER notes, [facility] ortho was attempted called from ER but "no answer"' reported so they admitted to hospital medicine here, ortho has been called here, family reports not seen yet. Given vanc, zosyn, lasix, tdap in ER. SHe missed her ortho f/u last week and they had rescheduled for this week but when they saw the wound they brought her to the ER.    On exam she reports some SOB and denies fever,chills. Reports some mild leg pain now. Patient sleepy on exam but can answer basic questions. Family is unsnure if she missed home meds but they think a few days possibly, [facility] records report being on lasix 40 BID at home. Family to bring home meds (her son who manages this), but most recent nephro MAR before discharge showed the following:  norvasc 10, asa81, calcitriol .25, colace, lexapro 10 iron 325, fluticasone, levemir 10, toprol 50 BID, protonix, nabicarb 650 4 times day. At one point was on also glyburide/metformin in [facility] and at home and lasix 40 BID, unsure if held on discharge or not as dc summary has no MAR, prolia injections, neurontin.    Med Hx:  Type 2 DM- A1C 9.9, stage 4 CKD- Cr baseline 2.3-2.5, iron deficiency/acute blood loss anemia, ankle fx- ORIF 12/29, neuropathy, breast ca s/p lumpectomy, depression, ?CAD, chronic metabolic acidosis, secondary hyperparathyroidism from CKD, HTN, hypoalbuminemia    Social: son not at bedside is primary caregiver, mutiple children assist in ADLS, reference to POA now in ER note- will clarify further. Walker use after fx, more bedbound now after surgery, SNF was rec but was at home. Smoker, stopped 10 years ago. No alcohol or drugs.    MEd Hx:     Review of Systems   Constitutional: positive for chills, fatigue, fever.   HENT: Negative for sore throat, trouble swallowing.    Eyes: Negative for photophobia, visual disturbance.   Respiratory: positive for cough, shortness of breath.    Cardiovascular: Negative for chest pain, palpitations, positive " leg swelling.   Gastrointestinal: Negative for abdominal pain, constipation, diarrhea, nausea, vomiting.   Endocrine: Negative for cold intolerance, heat intolerance.   Genitourinary: Negative for dysuria, frequency.   Musculoskeletal: positive for arthralgias, myalgias.   Skin positive for rash, wound, erythema   Neurological: Negative for dizziness, syncope, weakness, light-headedness.   Psychiatric/Behavioral: Negative for confusion, hallucinations, anxiety  All other systems reviewed and are negative.      Past Medical History: Patient has a past medical history of Breast cancer, CAD (coronary artery disease), Cancer, CKD (chronic kidney disease), Diabetes mellitus, HTN (hypertension), and Peripheral neuropathy.    Past Surgical History: Patient has a past surgical history that includes Breast surgery (Left); Cholecystectomy; Tonsillectomy; Hysterectomy; Open reduction and internal fixation (ORIF) of injury of ankle (Right, 12/29/2019); Closure of wound (Right, 12/29/2019); and Open reduction and internal fixation (ORIF) of injury of ankle (Right, 12/27/2019).    Social History: Patient reports that she has quit smoking. She started smoking about 16 years ago. She has never used smokeless tobacco. She reports that she does not drink alcohol or use drugs.    Family History: family history includes Breast cancer in her maternal grandmother.    Medications: Scheduled Meds:   [START ON 2/11/2020] atorvastatin  10 mg Oral Daily    busPIRone  5 mg Oral BID    [START ON 2/11/2020] calcitRIOL  0.25 mcg Oral Daily    [START ON 2/11/2020] docusate sodium  50 mg Oral Daily    [START ON 2/11/2020] escitalopram oxalate  10 mg Oral Daily    [START ON 2/11/2020] ferrous sulfate  325 mg Oral Daily    [START ON 2/11/2020] fluticasone propionate  2 spray Each Nostril Daily    furosemide  80 mg Intravenous BID    heparin (porcine)  5,000 Units Subcutaneous Q8H    insulin detemir U-100  10 Units Subcutaneous QHS     [START ON 2/11/2020] metoprolol tartrate  50 mg Oral BID    [START ON 2/11/2020] pantoprazole  40 mg Oral Daily    [START ON 2/11/2020] polyethylene glycol  17 g Oral Daily    sodium bicarbonate  650 mg Oral QID     Continuous Infusions:  PRN Meds:.acetaminophen, albuterol-ipratropium, Dextrose 10% Bolus, Dextrose 10% Bolus, dextrose 50%, dextrose 50%, glucagon (human recombinant), glucagon (human recombinant), glucose, glucose, glucose, glucose, insulin aspart U-100, melatonin, ondansetron, sodium chloride 0.9%    Allergies: Patient is allergic to sulfa (sulfonamide antibiotics).    Physical Exam:     Vital Signs (Most Recent):  Temp: 97.7 °F (36.5 °C) (02/10/20 1033)  Pulse: 92 (02/10/20 1246)  Resp: 19 (02/10/20 1246)  BP: (!) 141/67 (02/10/20 1246)  SpO2: 99 % (02/10/20 1246) Vital Signs Range (Last 24H):  Temp:  [97.7 °F (36.5 °C)]   Pulse:  [78-96]   Resp:  [18-36]   BP: (138-141)/(60-67)   SpO2:  [94 %-99 %]    There is no height or weight on file to calculate BMI.     Physical Exam:  Constitutional: appears lethargic, answers questoins. On NC.   Head: Normocephalic and atraumatic.   Mouth/Throat: Oropharynx is clear and moist.   Eyes: EOM are normal. Pupils are equal, round, and reactive to light. No scleral icterus.   Neck: Normal range of motion. Neck supple. JVP elevated.  Cardiovascular: Normal rate and regular rhythm.  No murmur heard.  Pulmonary/Chest: increased WOB, RR 18, on NC, crackles bilaterally on exam.  Abdominal: Soft. Bowel sounds are normal.  mild distension, anasarca.  Musculoskeletal: open R ankle wound with hardware visible (see ER note), wrapped now. Pulses present bialterally, warm to touch.   Neurological: Alert and oriented to person, place, and time. mild slowness to respond  Skin: Skin is warm and dry. Diffuse pitting edema to the hips bilaterally, 3+ pitting edema in legs, arms with edema RUE >LUE, mild tenderness with pressing on edema in extremities. Wound to  RLE.  Psychiatric: Normal mood and affect. Behavior is normal.   Vitals reviewed.    Recent Labs   Lab 02/10/20  1129   WBC 16.00*   HGB 9.9*   HCT 33.9*   *       Recent Labs   Lab 02/10/20  1129   *   K 4.7      CO2 14*   BUN 74*   CREATININE 3.0*   *   CALCIUM 8.4*   MG 2.1   PHOS 5.2*     Recent Labs   Lab 02/10/20  1129   ALKPHOS 129   ALT 6*   AST 9*   ALBUMIN 1.9*   PROT 6.4   BILITOT 0.3      No results for input(s): POCTGLUCOSE in the last 168 hours.      Assessment and Plan:     Ms. Bina Daniels is a 64 y.o. 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 [T84.7XXA]  Yes     Priority: 2     Acute on chronic combined systolic and diastolic heart failure [I50.43]  Yes     Priority: 3     Anasarca associated with disorder of kidney [N04.9]  Yes     Priority: 4     Postoperative infection [T81.40XA]  Yes     Priority: 5     Volume overload [E87.70]  Yes     Priority: 6     Iron deficiency anemia [D50.9]  Yes    Vitamin D deficiency [E55.9]  Yes    Metabolic acidosis [E87.2]  Yes    Secondary hyperparathyroidism [N25.81]  Yes    Acute metabolic encephalopathy [G93.41]  Yes    Hypertension [I10]  Yes    Hypoalbuminemia [E88.09]  Yes    Acute hypoxemic respiratory failure [J96.01]  Yes    Breast cancer [C50.919]  Yes    S/P ORIF (open reduction internal fixation) fracture [Z98.890, Z87.81]  Not Applicable     Ankle, 12/29      Depression [F32.9]  Yes    Neuropathy due to secondary diabetes mellitus [E13.40]  Yes     Chronic    CKD (chronic kidney disease), stage IV [N18.4]  Yes     Chronic    Type 2 diabetes mellitus with neurologic complication, without long-term current use of insulin [E11.49]  Yes     Chronic    Anemia due to stage 3 chronic kidney disease [N18.3, D63.1]  Yes      Resolved Hospital Problems   No resolved problems to  display.     Sepsis secondary to infected hardware in right leg/wound infection Right ankle  S/p ORIF, s/p R ankle fracture  -patient with open surgical wound, hardware exposed on R ankle, lactate 1.3, procal mildly up at .91, , ESR 68, unsure timing of when the wound started becoming clearly infected and open to air, family at bedside unsure but was reportely having HH site care.  -s/p ORIF 12/29 with [facility] ortho in [place], aborted surgery 12/27 due to intraOp bleeding, walked on the fx for at least a week before that per Dr Medina notes. Was nonweight bearing to RLE, continue this NWB status to RLE now, bedrest for now until ortho assessment. Bandaged now. ER called ortho in [place] per their note, no answer. Ortho aware of admit here per ER and admit coordinator before admitted to hosp med here given L-ortho patient with hardware infection  -s/p zosyn, vanc, tdap in ER. Will continue now  -wbc 17 on admit, toxic granulations seen on diff.   -Xray on admit showing widened tibotalar interval, cortical irregularity, edema, post op changes vs site infections, given the pics from the ER, site infection is there as open to air. Defer more imaging to ortho, no contrast due to CKD can be given    Anasarca  Acute on chronic systolic/diastolic heart failure  Volume overload  Acute hypoxemic respiratory failure  -massive volume overload, was supposed to be on 40 mg lasix at one point, family thinks maybe held on discharge, see at least day prior was held. 80 Iv in ER, continue BID and see how output goes  -strict I/O, daily weight, 2 grams Na, 1.5L fluid restrict  -pitting everywhere on exam, RUE>LUE, check US there to ensure no clot as probably had lines in UE in [place] also, probably is diffuse fluid but will ensure. Fluid probably worsened the surg site to open up also  -albumin very low, start boost, will check Pr/Cr with CKD  -purewick to watch output  -no echo on file, presumed reduced EF and diastolic  dysfunction, pending.  -sats in mid 90s, on minimal NC now, titrate off oxygen as tolerates    Acute kidney injury on Stage 4 CKD  -baseline Cr 2.3 to 2.8, trended up to 2.8 in January in [place], seen by nephro, sees [place] with nephgala per [place] notes  -check urine na, U/A, urine pr/cr, urine cr, urine bun  -avoid nephrotoxins  -likely element of cardiorenal worsening baseline, diuresis needed    Type 2 dm  -A1C 9.9, glucose 313 on CMP, on levemir 10 U at [place] recently, unsure what she went home on, Toujeo on MAR, metformin and glyburide also there, clearly those are not appropriate with this CKD and need holding now and likely forever  -SSI, levemir 10 to start as had hypoglycemia to glucose 30s reviewing [place] records also, titrate up PRN  -hold asa in case of surg  -neuropathy at baseline    Metabolic acidosis  -secondary to CKD, resume nabicarb    Anemia of chronic renal disease  Iron deficiency anemia  -tx recently in [place], ferrtin low at 57, continue daily iron  -hg stable at 9.9 now.     Hypoalbuminemia  -boost with meals  -check pr/cr    Secondary hyperparathyroidism  -, daily phos  -renal diet  -cacitriol daily    Vitamin D deficiency  Osteoporosis  -on prolia outpatient  -vit D low at 5 on check in January, dont see replacement on MAR. Will consider long term once out of acute illness      Breast cancer hx  -lumpectomy in past. Stable    Depression  -cont home lexapro    Acute metabolic encephalopathy  -likely from sepsis, mild, monitor closely           Diet:  Low na, 1.5L, renal/dm      DVT PPx:  Heparin, if surg plans then needs holding         Disposition:  Diuresis, ortho recs for open hardware

## 2020-02-10 NOTE — ED TRIAGE NOTES
Pt reports worsening SOB and cough s/p discharge for inpatient stay; pt denies fevers; pt also endorses increased  Edema to LE; +3 pitting edema  To all extremities; family members reports increased edema to abd; pt A&Ox4; unable to ambulate d/t wound on R foot and generalized weakness; pt presents with weak, persistent cough

## 2020-02-10 NOTE — SUBJECTIVE & OBJECTIVE
Past Medical History:   Diagnosis Date    Breast cancer     CAD (coronary artery disease)     Cancer     CKD (chronic kidney disease)     Diabetes mellitus     HTN (hypertension)     Peripheral neuropathy        Past Surgical History:   Procedure Laterality Date    BREAST SURGERY Left     lumpectomy    CHOLECYSTECTOMY      CLOSURE OF WOUND Right 12/29/2019    Procedure: CLOSURE, WOUND;  Surgeon: Cooper Medina MD;  Location: [facility] OR;  Service: Orthopedics;  Laterality: Right;    HYSTERECTOMY      OPEN REDUCTION AND INTERNAL FIXATION (ORIF) OF INJURY OF ANKLE Right 12/29/2019    Procedure: OPEN REDUCTION INTERNAL FIXATION-ANKLE;  Surgeon: Cooper Medina MD;  Location: [facility] OR;  Service: Orthopedics;  Laterality: Right;  C-arm, [employer] small frag set, cannulated screws notified confirmed with [employer]   [employer], tightrope anchors/ [place] notified and confirmed /xray notified KB    OPEN REDUCTION AND INTERNAL FIXATION (ORIF) OF INJURY OF ANKLE Right 12/27/2019    Procedure: OPEN REDUCTION INTERNAL FIXATION-ANKLE;  Surgeon: Cooper Medina MD;  Location: [facility] OR;  Service: Orthopedics;  Laterality: Right;  C-arm, [employer] small frag set, cannulated screws, C-arm    TONSILLECTOMY         Review of patient's allergies indicates:   Allergen Reactions    Sulfa (sulfonamide antibiotics) Hives       Current Facility-Administered Medications   Medication    acetaminophen tablet 650 mg    albuterol-ipratropium 2.5 mg-0.5 mg/3 mL nebulizer solution 3 mL    [START ON 2/11/2020] atorvastatin tablet 10 mg    busPIRone tablet 5 mg    [START ON 2/11/2020] calcitRIOL capsule 0.25 mcg    dextrose 10% (D10W) Bolus    dextrose 10% (D10W) Bolus    dextrose 50% injection 12.5 g    dextrose 50% injection 25 g    [START ON 2/11/2020] docusate sodium capsule 50 mg    [START ON 2/11/2020] escitalopram oxalate tablet 10 mg    [START ON 2/11/2020] ferrous sulfate EC tablet 325 mg    [START ON  2/11/2020] fluticasone propionate 50 mcg/actuation nasal spray 100 mcg    furosemide injection 80 mg    glucagon (human recombinant) injection 1 mg    glucagon (human recombinant) injection 1 mg    glucose chewable tablet 16 g    glucose chewable tablet 16 g    glucose chewable tablet 24 g    glucose chewable tablet 24 g    heparin (porcine) injection 5,000 Units    insulin aspart U-100 pen 0-5 Units    insulin detemir U-100 pen 10 Units    melatonin tablet 6 mg    [START ON 2/11/2020] metoprolol tartrate (LOPRESSOR) tablet 50 mg    ondansetron injection 4 mg    [START ON 2/11/2020] pantoprazole EC tablet 40 mg    [START ON 2/11/2020] piperacillin-tazobactam 4.5 g in sodium chloride 0.9% 100 mL IVPB (ready to mix system)    [START ON 2/11/2020] polyethylene glycol packet 17 g    sodium bicarbonate tablet 650 mg    sodium chloride 0.9% flush 10 mL    vancomycin - pharmacy to dose     Family History     Problem Relation (Age of Onset)    Breast cancer Maternal Grandmother        Tobacco Use    Smoking status: Former Smoker     Start date: 7/8/2003    Smokeless tobacco: Never Used   Substance and Sexual Activity    Alcohol use: No    Drug use: No    Sexual activity: Not Currently     ROS   Per ED ROS 2/10/20  Objective:     Vital Signs (Most Recent):  Temp: 96.5 °F (35.8 °C) (02/10/20 1740)  Pulse: 99 (02/10/20 1740)  Resp: 18 (02/10/20 1740)  BP: 133/74 (02/10/20 1740)  SpO2: (!) 93 % (02/10/20 1740) Vital Signs (24h Range):  Temp:  [96.5 °F (35.8 °C)-97.7 °F (36.5 °C)] 96.5 °F (35.8 °C)  Pulse:  [78-99] 99  Resp:  [18-36] 18  SpO2:  [93 %-99 %] 93 %  BP: (133-141)/(60-74) 133/74     Weight: 62.8 kg (138 lb 7.2 oz)     Body mass index is 23.76 kg/m².      Intake/Output Summary (Last 24 hours) at 2/10/2020 1745  Last data filed at 2/10/2020 1430  Gross per 24 hour   Intake 350 ml   Output --   Net 350 ml       Ortho/SPM Exam  Vitals: Afebrile.  Vital signs stable.  General: No acute  distress.  Cardio: Regular rate.  Chest: No increased work of breathing.    MSK:  RLE:   Skin medial ankle dehisced. 4x5 cm wound with exposed hardware and purulenet drainage. Lateral wound dehisced. Skin edges about 1 cm apart. No current drainage  obviou deformity  Compartments soft and compressible  Sensation diminshed to baseline SP/DP/MURILLO  Motor intact T/SP/DP  Brisk cap refill  Warm well perfused extremities  1+ DP palpable    LLE:  Skin intact, no deformity  No TTP  Compartments soft  Full painless ROM throughout lower extremity  SILT Sa/Murillo/DP/SP/T  Motor intact TA/SP/DP  1+ DP/PT     BUE:  Skin intact, no deformity noted  No open wounds/abrasions/crepitus  No bony TTP  FROM shoulder, elbow and wrist  SILT M/U/R  Motor intact AIN/PIN/M/U/R   Cap refill < 2s  2+ RP        Significant Labs:   CBC:   Recent Labs   Lab 02/10/20  1129   WBC 16.00*   HGB 9.9*   HCT 33.9*   *     CMP:   Recent Labs   Lab 02/10/20  1129   *   K 4.7      CO2 14*   *   BUN 74*   CREATININE 3.0*   CALCIUM 8.4*   PROT 6.4   ALBUMIN 1.9*   BILITOT 0.3   ALKPHOS 129   AST 9*   ALT 6*   ANIONGAP 15   EGFRNONAA 16.0*     CRP:   Recent Labs   Lab 02/10/20  1129   .6*     All pertinent labs within the past 24 hours have been reviewed.    Significant Imaging: I have reviewed all pertinent imaging results/findings.   Xray right ankle: hardware in place

## 2020-02-10 NOTE — PROGRESS NOTES
Pharmacokinetic Initial Assessment: IV Vancomycin  · Patient received 1500 mg loading dose in ED  · Pt with stage IV CKD, serum creatinine 3, no hemodialysis at this time  · Draw random vancomycin level with 2/11 AM labs  · Goal 10-15 mcg/mL  · Re-dose if level <15 mcg/mL, consider renal function    Please contact pharmacy at extension [phone] with any questions regarding this assessment.     Thank you for the consult,   Carmen Abraham       Patient brief summary:  Bina Daniels is a 64 y.o. female initiated on antimicrobial therapy with IV Vancomycin for treatment of suspected skin and soft tissue infection    Drug Allergies:   Review of patient's allergies indicates:   Allergen Reactions    Sulfa (sulfonamide antibiotics) Hives       Actual Body Weight:   62.8 kg    Renal Function:   Estimated Creatinine Clearance: 16.8 mL/min (A) (based on SCr of 3 mg/dL (H)).,     Dialysis Method (if applicable):  No dialysis at this time    CBC (last 72 hours):  Recent Labs   Lab Result Units 02/10/20  1129   WBC K/uL 16.00*   Hemoglobin g/dL 9.9*   Hematocrit % 33.9*   Platelets K/uL 536*   Gran% % 90.1*   Lymph% % 3.1*   Mono% % 5.5   Eosinophil% % 0.1   Basophil% % 0.3   Differential Method  Automated       Metabolic Panel (last 72 hours):  Recent Labs   Lab Result Units 02/10/20  1129   Sodium mmol/L 134*   Potassium mmol/L 4.7   Chloride mmol/L 105   CO2 mmol/L 14*   Glucose mg/dL 313*   BUN, Bld mg/dL 74*   Creatinine mg/dL 3.0*   Albumin g/dL 1.9*   Total Bilirubin mg/dL 0.3   Alkaline Phosphatase U/L 129   AST U/L 9*   ALT U/L 6*   Magnesium mg/dL 2.1   Phosphorus mg/dL 5.2*       Drug levels (last 3 results):  No results for input(s): VANCOMYCINRA, VANCOMYCINPE, VANCOMYCINTR in the last 72 hours.    Microbiologic Results:  Microbiology Results (last 7 days)     Procedure Component Value Units Date/Time    Culture, Respiratory with Gram Stain [[id number]]     Order Status:  No result Specimen:  Respiratory     Influenza A  & B by Molecular [[id number]] Collected:  02/10/20 1133    Order Status:  Completed Specimen:  Nasopharyngeal Swab Updated:  02/10/20 1210     Influenza A, Molecular Negative     Influenza B, Molecular Negative     Flu A & B Source Nasal swab    Blood Culture #2 **CANNOT BE ORDERED STAT** [[id number]] Collected:  02/10/20 1147    Order Status:  Sent Specimen:  Blood from Peripheral, Antecubital, Left Updated:  02/10/20 1158    Blood Culture #1 **CANNOT BE ORDERED STAT** [[id number]] Collected:  02/10/20 1142    Order Status:  Sent Specimen:  Blood from Peripheral, Wrist, Right Updated:  02/10/20 1157

## 2020-02-10 NOTE — CONSULTS
[facility]  Orthopedics  Consult Note    Patient Name: Bina Daniels  MRN: [id number]  Admission Date: 2/10/2020  Hospital Length of Stay: 0 days  Attending Provider: Christine Palomino MD  Primary Care Provider: Tho Haro MD       Inpatient consult to Orthopedic Surgery  Consult performed by: Carlos Leroy MD  Consult ordered by: Curly Pineda PA-C        Subjective:     Principal Problem:Acute hypoxemic respiratory failure    Chief Complaint:   Chief Complaint   Patient presents with    Wound Check     Pt with concerns for wound infection x three weeks, mult right ankle surgeries since before [place].         HPI: Ms. Bina Daniels is a 64 y.o. female with a significant  history of type 2 DM and  stage 4 CKD sp Right Ankel ORIF 12/28/20 with Dr Medina. Patient had presented one week after the injury. She had two procedures with Dr Medina, first procedure was aborted secondary to excessive bleeding, the wound was packed and then the following day, he re opened the wound and provided fixation to the fracture. She was in the hosptial for a while secondary to medical comorbidities. Her brother assists with her as her care giver, and reports that they changed the dressing on her wound today and saw dehiscence of the wound so they came to the ER. Also of note, patient was supposed to fu with Dr Medina last week, however, missed her apt so was rescheduled for Friday.     Past Medical History:   Diagnosis Date    Breast cancer     CAD (coronary artery disease)     Cancer     CKD (chronic kidney disease)     Diabetes mellitus     HTN (hypertension)     Peripheral neuropathy        Past Surgical History:   Procedure Laterality Date    BREAST SURGERY Left     lumpectomy    CHOLECYSTECTOMY      CLOSURE OF WOUND Right 12/29/2019    Procedure: CLOSURE, WOUND;  Surgeon: Cooper Medina MD;  Location: [facility] OR;  Service: Orthopedics;  Laterality: Right;     HYSTERECTOMY      OPEN REDUCTION AND INTERNAL FIXATION (ORIF) OF INJURY OF ANKLE Right 12/29/2019    Procedure: OPEN REDUCTION INTERNAL FIXATION-ANKLE;  Surgeon: Cooper Medina MD;  Location: [facility] OR;  Service: Orthopedics;  Laterality: Right;  C-arm, Synthes small frag set, cannulated screws notified confirmed with Mohit /synthesis   Arthrex, tightrope anchors/ [place] notified and confirmed /xray notified KB    OPEN REDUCTION AND INTERNAL FIXATION (ORIF) OF INJURY OF ANKLE Right 12/27/2019    Procedure: OPEN REDUCTION INTERNAL FIXATION-ANKLE;  Surgeon: Cooper Medina MD;  Location: [facility] OR;  Service: Orthopedics;  Laterality: Right;  C-arm, Synthes small frag set, cannulated screws, C-arm    TONSILLECTOMY         Review of patient's allergies indicates:   Allergen Reactions    Sulfa (sulfonamide antibiotics) Hives       Current Facility-Administered Medications   Medication    acetaminophen tablet 650 mg    albuterol-ipratropium 2.5 mg-0.5 mg/3 mL nebulizer solution 3 mL    [START ON 2/11/2020] atorvastatin tablet 10 mg    busPIRone tablet 5 mg    [START ON 2/11/2020] calcitRIOL capsule 0.25 mcg    dextrose 10% (D10W) Bolus    dextrose 10% (D10W) Bolus    dextrose 50% injection 12.5 g    dextrose 50% injection 25 g    [START ON 2/11/2020] docusate sodium capsule 50 mg    [START ON 2/11/2020] escitalopram oxalate tablet 10 mg    [START ON 2/11/2020] ferrous sulfate EC tablet 325 mg    [START ON 2/11/2020] fluticasone propionate 50 mcg/actuation nasal spray 100 mcg    furosemide injection 80 mg    glucagon (human recombinant) injection 1 mg    glucagon (human recombinant) injection 1 mg    glucose chewable tablet 16 g    glucose chewable tablet 16 g    glucose chewable tablet 24 g    glucose chewable tablet 24 g    heparin (porcine) injection 5,000 Units    insulin aspart U-100 pen 0-5 Units    insulin detemir U-100 pen 10 Units    melatonin tablet 6 mg    [START ON  2/11/2020] metoprolol tartrate (LOPRESSOR) tablet 50 mg    ondansetron injection 4 mg    [START ON 2/11/2020] pantoprazole EC tablet 40 mg    [START ON 2/11/2020] piperacillin-tazobactam 4.5 g in sodium chloride 0.9% 100 mL IVPB (ready to mix system)    [START ON 2/11/2020] polyethylene glycol packet 17 g    sodium bicarbonate tablet 650 mg    sodium chloride 0.9% flush 10 mL    vancomycin - pharmacy to dose     Family History     Problem Relation (Age of Onset)    Breast cancer Maternal Grandmother        Tobacco Use    Smoking status: Former Smoker     Start date: 7/8/2003    Smokeless tobacco: Never Used   Substance and Sexual Activity    Alcohol use: No    Drug use: No    Sexual activity: Not Currently     ROS   Per ED ROS 2/10/20  Objective:     Vital Signs (Most Recent):  Temp: 96.5 °F (35.8 °C) (02/10/20 1740)  Pulse: 99 (02/10/20 1740)  Resp: 18 (02/10/20 1740)  BP: 133/74 (02/10/20 1740)  SpO2: (!) 93 % (02/10/20 1740) Vital Signs (24h Range):  Temp:  [96.5 °F (35.8 °C)-97.7 °F (36.5 °C)] 96.5 °F (35.8 °C)  Pulse:  [78-99] 99  Resp:  [18-36] 18  SpO2:  [93 %-99 %] 93 %  BP: (133-141)/(60-74) 133/74     Weight: 62.8 kg (138 lb 7.2 oz)     Body mass index is 23.76 kg/m².      Intake/Output Summary (Last 24 hours) at 2/10/2020 1745  Last data filed at 2/10/2020 1430  Gross per 24 hour   Intake 350 ml   Output --   Net 350 ml       Ortho/SPM Exam  Vitals: Afebrile.  Vital signs stable.  General: No acute distress.  Cardio: Regular rate.  Chest: No increased work of breathing.    MSK:  RLE:   Skin medial ankle dehisced. 4x5 cm wound with exposed hardware and purulenet drainage. Lateral wound dehisced. Skin edges about 1 cm apart. No current drainage  obviou deformity  Compartments soft and compressible  Sensation diminshed to baseline SP/DP/LONGORIA  Motor intact T/SP/DP  Brisk cap refill  Warm well perfused extremities  1+ DP palpable    LLE:  Skin intact, no deformity  No TTP  Compartments soft  Full  painless ROM throughout lower extremity  SILT Sa/Murillo/DP/SP/T  Motor intact TA/SP/DP  1+ DP/PT     BUE:  Skin intact, no deformity noted  No open wounds/abrasions/crepitus  No bony TTP  FROM shoulder, elbow and wrist  SILT M/U/R  Motor intact AIN/PIN/M/U/R   Cap refill < 2s  2+ RP        Significant Labs:   CBC:   Recent Labs   Lab 02/10/20  1129   WBC 16.00*   HGB 9.9*   HCT 33.9*   *     CMP:   Recent Labs   Lab 02/10/20  1129   *   K 4.7      CO2 14*   *   BUN 74*   CREATININE 3.0*   CALCIUM 8.4*   PROT 6.4   ALBUMIN 1.9*   BILITOT 0.3   ALKPHOS 129   AST 9*   ALT 6*   ANIONGAP 15   EGFRNONAA 16.0*     CRP:   Recent Labs   Lab 02/10/20  1129   .6*     All pertinent labs within the past 24 hours have been reviewed.    Significant Imaging: I have reviewed all pertinent imaging results/findings.   Xray right ankle: hardware in place    Assessment/Plan:     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  Bina Daniels is a 64 y.o. female with medial and lateral wound dehiscence sp fixation right ankle.  Patient has appearance of being quite volume overloaded.  Will begin diuresis upon recommendation of the medical team.  Her chest x-ray is certainly concerning for volume overload status.  She does have a history of a diagnosis of some degree of heart failure, there is no current echo on file, we will obtain new echo in the morning.  This is not an emergent procedure, so we will wait 2 she is medically optimized before we proceed.  I discussed this with the medical staff as well.  In the meantime she will remain nonweightbearing right lower extremity, we placed a wet-to-dry dressing on the right lower extremity.  We will plan on irrigation debridement right lower extremity when optimized.  NPO at midnight as precaution.  Informed consent was obtained    -nonweightbearing right lower extremity  -follow-up echo  -NPO midnight  -Hardware is synthes plate and 4-0 screws. ArthCurbStand tight rope          Carlos  ARASH Leroy MD  Orthopedics  [facility]

## 2020-02-10 NOTE — HPI
Ms. Bina Daniels is a 64 y.o. female with a significant  history of type 2 DM and  stage 4 CKD sp Right Ankel ORIF 12/28/20 with Dr Medina. Patient had presented one week after the injury. She had two procedures with Dr Medina, first procedure was aborted secondary to excessive bleeding, the wound was packed and then the following day, he re opened the wound and provided fixation to the fracture. She was in the hosptial for a while secondary to medical comorbidities. Her brother assists with her as her care giver, and reports that they changed the dressing on her wound today and saw dehiscence of the wound so they came to the ER. Also of note, patient was supposed to fu with Dr Medina last week, however, missed her apt so was rescheduled for Friday.

## 2020-02-10 NOTE — ASSESSMENT & PLAN NOTE
Bina Daniels is a 64 y.o. female with medial and lateral wound dehiscence sp fixation right ankle.  Patient has appearance of being quite volume overloaded.  Will begin diuresis upon recommendation of the medical team.  Her chest x-ray is certainly concerning for volume overload status.  She does have a history of a diagnosis of some degree of heart failure, there is no current echo on file, we will obtain new echo in the morning.  This is not an emergent procedure, so we will wait 2 she is medically optimized before we proceed.  I discussed this with the medical staff as well.  In the meantime she will remain nonweightbearing right lower extremity, we placed a wet-to-dry dressing on the right lower extremity.  We will plan on irrigation debridement right lower extremity when optimized.  NPO at midnight as precaution.  Informed consent was obtained    -nonweightbearing right lower extremity  -follow-up echo  -NPO midnight

## 2020-02-11 PROBLEM — I82.613: Status: ACTIVE | Noted: 2020-02-11

## 2020-02-11 PROBLEM — I48.0 PAROXYSMAL ATRIAL FIBRILLATION: Status: ACTIVE | Noted: 2020-02-11

## 2020-02-11 PROBLEM — R78.81 BACTEREMIA: Status: ACTIVE | Noted: 2020-02-11

## 2020-02-11 LAB
ALBUMIN SERPL BCP-MCNC: 1.5 G/DL (ref 3.5–5.2)
ALP SERPL-CCNC: 97 U/L (ref 55–135)
ALT SERPL W/O P-5'-P-CCNC: 6 U/L (ref 10–44)
ANION GAP SERPL CALC-SCNC: 13 MMOL/L (ref 8–16)
ANISOCYTOSIS BLD QL SMEAR: SLIGHT
ANISOCYTOSIS BLD QL SMEAR: SLIGHT
APTT BLDCRRT: 30.2 SEC (ref 21–32)
APTT BLDCRRT: 35.5 SEC (ref 21–32)
APTT BLDCRRT: 41.5 SEC (ref 21–32)
ASCENDING AORTA: 3.42 CM
AST SERPL-CCNC: 7 U/L (ref 10–40)
AV INDEX (PROSTH): 0.71
AV MEAN GRADIENT: 4 MMHG
AV PEAK GRADIENT: 9 MMHG
AV VALVE AREA: 2.02 CM2
AV VELOCITY RATIO: 0.66
BASOPHILS # BLD AUTO: 0.02 K/UL (ref 0–0.2)
BASOPHILS # BLD AUTO: 0.02 K/UL (ref 0–0.2)
BASOPHILS NFR BLD: 0.1 % (ref 0–1.9)
BASOPHILS NFR BLD: 0.1 % (ref 0–1.9)
BILIRUB SERPL-MCNC: 0.3 MG/DL (ref 0.1–1)
BSA FOR ECHO PROCEDURE: 1.68 M2
BUN SERPL-MCNC: 72 MG/DL (ref 8–23)
CALCIUM SERPL-MCNC: 7.9 MG/DL (ref 8.7–10.5)
CHLORIDE SERPL-SCNC: 107 MMOL/L (ref 95–110)
CO2 SERPL-SCNC: 16 MMOL/L (ref 23–29)
CREAT SERPL-MCNC: 3 MG/DL (ref 0.5–1.4)
CV ECHO LV RWT: 0.36 CM
DIFFERENTIAL METHOD: ABNORMAL
DIFFERENTIAL METHOD: ABNORMAL
DOP CALC AO PEAK VEL: 1.53 M/S
DOP CALC AO VTI: 20.68 CM
DOP CALC LVOT AREA: 2.8 CM2
DOP CALC LVOT DIAMETER: 1.9 CM
DOP CALC LVOT PEAK VEL: 1.01 M/S
DOP CALC LVOT STROKE VOLUME: 41.86 CM3
DOP CALCLVOT PEAK VEL VTI: 14.77 CM
E/E' RATIO: 28.29 M/S
ECHO LV POSTERIOR WALL: 0.79 CM (ref 0.6–1.1)
EOSINOPHIL # BLD AUTO: 0.1 K/UL (ref 0–0.5)
EOSINOPHIL # BLD AUTO: 0.1 K/UL (ref 0–0.5)
EOSINOPHIL NFR BLD: 0.3 % (ref 0–8)
EOSINOPHIL NFR BLD: 0.3 % (ref 0–8)
ERYTHROCYTE [DISTWIDTH] IN BLOOD BY AUTOMATED COUNT: 17.5 % (ref 11.5–14.5)
ERYTHROCYTE [DISTWIDTH] IN BLOOD BY AUTOMATED COUNT: 17.5 % (ref 11.5–14.5)
EST. GFR  (AFRICAN AMERICAN): 18.5 ML/MIN/1.73 M^2
EST. GFR  (NON AFRICAN AMERICAN): 16 ML/MIN/1.73 M^2
ESTIMATED AVG GLUCOSE: 240 MG/DL (ref 68–131)
FRACTIONAL SHORTENING: 14 % (ref 28–44)
GLUCOSE SERPL-MCNC: 213 MG/DL (ref 70–110)
HBA1C MFR BLD HPLC: 10 % (ref 4–5.6)
HCT VFR BLD AUTO: 30.2 % (ref 37–48.5)
HCT VFR BLD AUTO: 30.2 % (ref 37–48.5)
HGB BLD-MCNC: 8.9 G/DL (ref 12–16)
HGB BLD-MCNC: 8.9 G/DL (ref 12–16)
HYPOCHROMIA BLD QL SMEAR: ABNORMAL
HYPOCHROMIA BLD QL SMEAR: ABNORMAL
IMM GRANULOCYTES # BLD AUTO: 0.18 K/UL (ref 0–0.04)
IMM GRANULOCYTES # BLD AUTO: 0.18 K/UL (ref 0–0.04)
IMM GRANULOCYTES NFR BLD AUTO: 1.2 % (ref 0–0.5)
IMM GRANULOCYTES NFR BLD AUTO: 1.2 % (ref 0–0.5)
INTERVENTRICULAR SEPTUM: 0.93 CM (ref 0.6–1.1)
IVRT: 0.08 MSEC
LA MAJOR: 5.7 CM
LA MINOR: 5.71 CM
LA WIDTH: 4.3 CM
LEFT ATRIUM SIZE: 4.06 CM
LEFT ATRIUM VOLUME INDEX: 50.7 ML/M2
LEFT ATRIUM VOLUME: 84.66 CM3
LEFT INTERNAL DIMENSION IN SYSTOLE: 3.83 CM (ref 2.1–4)
LEFT VENTRICLE DIASTOLIC VOLUME INDEX: 53.93 ML/M2
LEFT VENTRICLE DIASTOLIC VOLUME: 90.1 ML
LEFT VENTRICLE MASS INDEX: 73 G/M2
LEFT VENTRICLE SYSTOLIC VOLUME INDEX: 37.8 ML/M2
LEFT VENTRICLE SYSTOLIC VOLUME: 63.18 ML
LEFT VENTRICULAR INTERNAL DIMENSION IN DIASTOLE: 4.45 CM (ref 3.5–6)
LEFT VENTRICULAR MASS: 122.71 G
LV LATERAL E/E' RATIO: 24.75 M/S
LV SEPTAL E/E' RATIO: 33 M/S
LYMPHOCYTES # BLD AUTO: 0.4 K/UL (ref 1–4.8)
LYMPHOCYTES # BLD AUTO: 0.4 K/UL (ref 1–4.8)
LYMPHOCYTES NFR BLD: 3 % (ref 18–48)
LYMPHOCYTES NFR BLD: 3 % (ref 18–48)
MAGNESIUM SERPL-MCNC: 1.9 MG/DL (ref 1.6–2.6)
MCH RBC QN AUTO: 26.3 PG (ref 27–31)
MCH RBC QN AUTO: 26.3 PG (ref 27–31)
MCHC RBC AUTO-ENTMCNC: 29.5 G/DL (ref 32–36)
MCHC RBC AUTO-ENTMCNC: 29.5 G/DL (ref 32–36)
MCV RBC AUTO: 89 FL (ref 82–98)
MCV RBC AUTO: 89 FL (ref 82–98)
MONOCYTES # BLD AUTO: 0.7 K/UL (ref 0.3–1)
MONOCYTES # BLD AUTO: 0.7 K/UL (ref 0.3–1)
MONOCYTES NFR BLD: 5.1 % (ref 4–15)
MONOCYTES NFR BLD: 5.1 % (ref 4–15)
MV PEAK E VEL: 0.99 M/S
NEUTROPHILS # BLD AUTO: 13.1 K/UL (ref 1.8–7.7)
NEUTROPHILS # BLD AUTO: 13.1 K/UL (ref 1.8–7.7)
NEUTROPHILS NFR BLD: 90.3 % (ref 38–73)
NEUTROPHILS NFR BLD: 90.3 % (ref 38–73)
NRBC BLD-RTO: 0 /100 WBC
NRBC BLD-RTO: 0 /100 WBC
OVALOCYTES BLD QL SMEAR: ABNORMAL
OVALOCYTES BLD QL SMEAR: ABNORMAL
PHOSPHATE SERPL-MCNC: 5.6 MG/DL (ref 2.7–4.5)
PISA TR MAX VEL: 2.34 M/S
PLATELET # BLD AUTO: 481 K/UL (ref 150–350)
PLATELET # BLD AUTO: 481 K/UL (ref 150–350)
PLATELET BLD QL SMEAR: ABNORMAL
PLATELET BLD QL SMEAR: ABNORMAL
PMV BLD AUTO: 9.7 FL (ref 9.2–12.9)
PMV BLD AUTO: 9.7 FL (ref 9.2–12.9)
POCT GLUCOSE: 171 MG/DL (ref 70–110)
POCT GLUCOSE: 189 MG/DL (ref 70–110)
POCT GLUCOSE: 212 MG/DL (ref 70–110)
POCT GLUCOSE: 228 MG/DL (ref 70–110)
POIKILOCYTOSIS BLD QL SMEAR: SLIGHT
POIKILOCYTOSIS BLD QL SMEAR: SLIGHT
POLYCHROMASIA BLD QL SMEAR: ABNORMAL
POLYCHROMASIA BLD QL SMEAR: ABNORMAL
POTASSIUM SERPL-SCNC: 4.1 MMOL/L (ref 3.5–5.1)
PROT SERPL-MCNC: 5.2 G/DL (ref 6–8.4)
PULM VEIN S/D RATIO: 0.95
PV PEAK D VEL: 0.42 M/S
PV PEAK S VEL: 0.4 M/S
RA MAJOR: 4.08 CM
RA PRESSURE: 15 MMHG
RA WIDTH: 3.77 CM
RBC # BLD AUTO: 3.38 M/UL (ref 4–5.4)
RBC # BLD AUTO: 3.38 M/UL (ref 4–5.4)
RIGHT VENTRICULAR END-DIASTOLIC DIMENSION: 3.74 CM
RV TISSUE DOPPLER FREE WALL SYSTOLIC VELOCITY 1 (APICAL 4 CHAMBER VIEW): 6.01 CM/S
SINUS: 3.27 CM
SODIUM SERPL-SCNC: 136 MMOL/L (ref 136–145)
STJ: 2.86 CM
TDI LATERAL: 0.04 M/S
TDI SEPTAL: 0.03 M/S
TDI: 0.04 M/S
TR MAX PG: 22 MMHG
TRICUSPID ANNULAR PLANE SYSTOLIC EXCURSION: 0.95 CM
TV REST PULMONARY ARTERY PRESSURE: 37 MMHG
VANCOMYCIN SERPL-MCNC: 18.5 UG/ML
WBC # BLD AUTO: 14.47 K/UL (ref 3.9–12.7)
WBC # BLD AUTO: 14.47 K/UL (ref 3.9–12.7)

## 2020-02-11 PROCEDURE — 99233 PR SUBSEQUENT HOSPITAL CARE,LEVL III: ICD-10-PCS | Mod: ,,, | Performed by: HOSPITALIST

## 2020-02-11 PROCEDURE — 80202 ASSAY OF VANCOMYCIN: CPT

## 2020-02-11 PROCEDURE — 99222 1ST HOSP IP/OBS MODERATE 55: CPT | Mod: ,,, | Performed by: ORTHOPAEDIC SURGERY

## 2020-02-11 PROCEDURE — 99233 SBSQ HOSP IP/OBS HIGH 50: CPT | Mod: ,,, | Performed by: HOSPITALIST

## 2020-02-11 PROCEDURE — 93010 ELECTROCARDIOGRAM REPORT: CPT | Mod: ,,, | Performed by: INTERNAL MEDICINE

## 2020-02-11 PROCEDURE — 99222 PR INITIAL HOSPITAL CARE,LEVL II: ICD-10-PCS | Mod: ,,, | Performed by: ORTHOPAEDIC SURGERY

## 2020-02-11 PROCEDURE — 85730 THROMBOPLASTIN TIME PARTIAL: CPT | Mod: 91

## 2020-02-11 PROCEDURE — 85730 THROMBOPLASTIN TIME PARTIAL: CPT

## 2020-02-11 PROCEDURE — 80053 COMPREHEN METABOLIC PANEL: CPT

## 2020-02-11 PROCEDURE — 84100 ASSAY OF PHOSPHORUS: CPT

## 2020-02-11 PROCEDURE — 63600175 PHARM REV CODE 636 W HCPCS: Performed by: HOSPITALIST

## 2020-02-11 PROCEDURE — 99222 1ST HOSP IP/OBS MODERATE 55: CPT | Mod: ,,, | Performed by: PHYSICIAN ASSISTANT

## 2020-02-11 PROCEDURE — 25000003 PHARM REV CODE 250: Performed by: PHYSICIAN ASSISTANT

## 2020-02-11 PROCEDURE — 25000003 PHARM REV CODE 250: Performed by: HOSPITALIST

## 2020-02-11 PROCEDURE — 63600175 PHARM REV CODE 636 W HCPCS: Performed by: PHYSICIAN ASSISTANT

## 2020-02-11 PROCEDURE — 83036 HEMOGLOBIN GLYCOSYLATED A1C: CPT

## 2020-02-11 PROCEDURE — 87040 BLOOD CULTURE FOR BACTERIA: CPT | Mod: 59

## 2020-02-11 PROCEDURE — 25000242 PHARM REV CODE 250 ALT 637 W/ HCPCS: Performed by: PHYSICIAN ASSISTANT

## 2020-02-11 PROCEDURE — 85025 COMPLETE CBC W/AUTO DIFF WBC: CPT

## 2020-02-11 PROCEDURE — 99222 PR INITIAL HOSPITAL CARE,LEVL II: ICD-10-PCS | Mod: ,,, | Performed by: PHYSICIAN ASSISTANT

## 2020-02-11 PROCEDURE — 93010 EKG 12-LEAD: ICD-10-PCS | Mod: ,,, | Performed by: INTERNAL MEDICINE

## 2020-02-11 PROCEDURE — 25000242 PHARM REV CODE 250 ALT 637 W/ HCPCS: Performed by: HOSPITALIST

## 2020-02-11 PROCEDURE — 93005 ELECTROCARDIOGRAM TRACING: CPT

## 2020-02-11 PROCEDURE — 83735 ASSAY OF MAGNESIUM: CPT

## 2020-02-11 PROCEDURE — 63600175 PHARM REV CODE 636 W HCPCS: Performed by: FAMILY MEDICINE

## 2020-02-11 PROCEDURE — 11000001 HC ACUTE MED/SURG PRIVATE ROOM

## 2020-02-11 PROCEDURE — A4216 STERILE WATER/SALINE, 10 ML: HCPCS | Performed by: HOSPITALIST

## 2020-02-11 PROCEDURE — 36415 COLL VENOUS BLD VENIPUNCTURE: CPT

## 2020-02-11 RX ORDER — CEFEPIME HYDROCHLORIDE 2 G/1
2 INJECTION, POWDER, FOR SOLUTION INTRAVENOUS
Status: DISCONTINUED | OUTPATIENT
Start: 2020-02-11 | End: 2020-02-16 | Stop reason: HOSPADM

## 2020-02-11 RX ORDER — VANCOMYCIN HCL IN 5 % DEXTROSE 1G/250ML
15 PLASTIC BAG, INJECTION (ML) INTRAVENOUS ONCE
Status: COMPLETED | OUTPATIENT
Start: 2020-02-11 | End: 2020-02-11

## 2020-02-11 RX ORDER — FLUTICASONE PROPIONATE 50 MCG
2 SPRAY, SUSPENSION (ML) NASAL DAILY
Status: DISCONTINUED | OUTPATIENT
Start: 2020-02-11 | End: 2020-02-16 | Stop reason: HOSPADM

## 2020-02-11 RX ADMIN — SODIUM CHLORIDE 10 ML: 9 INJECTION, SOLUTION INTRAMUSCULAR; INTRAVENOUS; SUBCUTANEOUS at 05:02

## 2020-02-11 RX ADMIN — SODIUM BICARBONATE 650 MG TABLET 650 MG: at 08:02

## 2020-02-11 RX ADMIN — CALCITRIOL CAPSULES 0.25 MCG 0.25 MCG: 0.25 CAPSULE ORAL at 08:02

## 2020-02-11 RX ADMIN — PIPERACILLIN SODIUM AND TAZOBACTAM SODIUM 4.5 G: 4; .5 INJECTION, POWDER, LYOPHILIZED, FOR SOLUTION INTRAVENOUS at 01:02

## 2020-02-11 RX ADMIN — SODIUM BICARBONATE 650 MG TABLET 650 MG: at 02:02

## 2020-02-11 RX ADMIN — HEPARIN SODIUM AND DEXTROSE 23.14 UNITS/KG/HR: 10000; 5 INJECTION INTRAVENOUS at 06:02

## 2020-02-11 RX ADMIN — INSULIN DETEMIR 10 UNITS: 100 INJECTION, SOLUTION SUBCUTANEOUS at 08:02

## 2020-02-11 RX ADMIN — INSULIN ASPART 2 UNITS: 100 INJECTION, SOLUTION INTRAVENOUS; SUBCUTANEOUS at 05:02

## 2020-02-11 RX ADMIN — OXYCODONE HYDROCHLORIDE 5 MG: 5 TABLET ORAL at 02:02

## 2020-02-11 RX ADMIN — DOCUSATE SODIUM 50 MG: 50 CAPSULE, LIQUID FILLED ORAL at 08:02

## 2020-02-11 RX ADMIN — PIPERACILLIN SODIUM AND TAZOBACTAM SODIUM 4.5 G: 4; .5 INJECTION, POWDER, LYOPHILIZED, FOR SOLUTION INTRAVENOUS at 10:02

## 2020-02-11 RX ADMIN — ATORVASTATIN CALCIUM 10 MG: 10 TABLET, FILM COATED ORAL at 08:02

## 2020-02-11 RX ADMIN — FLUTICASONE PROPIONATE 100 MCG: 50 SPRAY, METERED NASAL at 10:02

## 2020-02-11 RX ADMIN — FERROUS SULFATE TAB EC 325 MG (65 MG FE EQUIVALENT) 325 MG: 325 (65 FE) TABLET DELAYED RESPONSE at 08:02

## 2020-02-11 RX ADMIN — CEFEPIME 2 G: 2 INJECTION, POWDER, FOR SOLUTION INTRAVENOUS at 06:02

## 2020-02-11 RX ADMIN — VANCOMYCIN HYDROCHLORIDE 1000 MG: 1 INJECTION, POWDER, LYOPHILIZED, FOR SOLUTION INTRAVENOUS at 01:02

## 2020-02-11 RX ADMIN — PANTOPRAZOLE SODIUM 40 MG: 40 TABLET, DELAYED RELEASE ORAL at 08:02

## 2020-02-11 RX ADMIN — HEPARIN SODIUM AND DEXTROSE 25.22 UNITS/KG/HR: 10000; 5 INJECTION INTRAVENOUS at 03:02

## 2020-02-11 RX ADMIN — FUROSEMIDE 80 MG: 10 INJECTION, SOLUTION INTRAMUSCULAR; INTRAVENOUS at 10:02

## 2020-02-11 RX ADMIN — INSULIN ASPART 1 UNITS: 100 INJECTION, SOLUTION INTRAVENOUS; SUBCUTANEOUS at 08:02

## 2020-02-11 RX ADMIN — BUSPIRONE HYDROCHLORIDE 5 MG: 5 TABLET ORAL at 08:02

## 2020-02-11 RX ADMIN — SODIUM BICARBONATE 650 MG TABLET 650 MG: at 04:02

## 2020-02-11 RX ADMIN — ESCITALOPRAM OXALATE 10 MG: 10 TABLET ORAL at 08:02

## 2020-02-11 RX ADMIN — ACETAMINOPHEN 650 MG: 325 TABLET ORAL at 11:02

## 2020-02-11 NOTE — NURSING
Waffle Mattress and Heel protector applied, with patient positioned with wedge to left side. Pillows used to maintain position and familypresent in room at time of care.

## 2020-02-11 NOTE — CONSULTS
[facility]  Infectious Disease  Consult Note    Patient Name: Bina Daniels  MRN: [id number]  Admission Date: 2/10/2020  Hospital Length of Stay: 1 days  Attending Physician: Christine Palomino MD  Primary Care Provider: Tho Haro MD     Isolation Status: No active isolations      Inpatient consult to Infectious Diseases  Consult performed by: Sahil Díza PA-C  Consult ordered by: Christine Palomino MD      ID consult received. Chart being reviewed. Full consult note with recommendations to follow.      Thank you,  Sahil Díaz PA-C  [id number]

## 2020-02-11 NOTE — NURSING
Pt arrived on floor with O2 sats at 82% on 3L of O2, report from ED stated pt was in the 90s on room air. Pt instructed on use of IS, O2 increased to 6L via nasal cannula, MD notified, face mask ordred PRN, suction placed at bedside, eventual O2 up to 94%. Otherwise VS stable, pt soaked in urine on arrival, so purewick replaced, incontinence pads changed, partial bed bath given, full skin assessment completed, including doc of pressure injury, burns and blisters. Wound care consult ordered and MD notified. Pt reported no NV or pain, family at bedside, pt off floor to ultrasound at shift change and report given to night nurse.

## 2020-02-11 NOTE — PROGRESS NOTES
Pt returned to floor from echo. Pt and family informed of plan of care via Dr. Palomino. Purewick in place, O2 in place, IV infusing and CDI. Family at bedside and call light within reach.

## 2020-02-11 NOTE — PLAN OF CARE
Patient lives at home with son and Grand son. Pt/ot recs pending. Will continue to follow for discharge needs.     02/11/20 1331   Discharge Assessment   Assessment Type Discharge Planning Assessment   Confirmed/corrected address and phone number on facesheet? Yes   Assessment information obtained from? Patient   Expected Length of Stay (days)   (3)   Communicated expected length of stay with patient/caregiver yes   Prior to hospitilization cognitive status: Alert/Oriented   Prior to hospitalization functional status: Independent;Assistive Equipment   Current cognitive status: Alert/Oriented   Current Functional Status: Independent;Assistive Equipment   Facility Arrived From:   (Home)   Lives With child(jo-ann), adult   Able to Return to Prior Arrangements no   Is patient able to care for self after discharge? Yes   Who are your caregiver(s) and their phone number(s)?   (Chandni Daniels (Daughter) [phone])   Patient's perception of discharge disposition home or selfcare   Readmission Within the Last 30 Days no previous admission in last 30 days   Patient currently being followed by outpatient case management? No   Patient currently receives any other outside agency services? No   Equipment Currently Used at Home bath bench;bedside commode   Do you have any problems affording any of your prescribed medications? No   Is the patient taking medications as prescribed? yes   Does the patient have transportation home? Yes   Transportation Anticipated family or friend will provide   Does the patient receive services at the [facility]? No   Discharge Plan A Skilled Nursing Facility   Discharge Plan B Home with family;Home Health   DME Needed Upon Discharge  none   Patient/Family in Agreement with Plan yes   Readmission Questionnaire   Have you felt down, depressed, or hopeless? 2

## 2020-02-11 NOTE — PROGRESS NOTES
Patient is complaining of discomfort unrelieved by the present medicine regimen. Intern on call notified of situation and also that patient output may be off because the patient keeps dislodging the purewick when anxious trying to get out of bed, therefore having multiple voiding accidents. Intake and output will definitely not be as accurate as it should. Encouraged patient of the importance of maintaining purewick and maintaining accurate intake and output. Will continue to monitor.Oxycodone was ordered to relieve patient's discomfort. Will administer medication and continue to monitor.

## 2020-02-11 NOTE — PROGRESS NOTES
"Progress Note  Hospital Medicine       Patient Name: Bina Daniels  MRN:  [id number]  Hospital Medicine Team: [facility] K Christine Palomino MD  Date of Admission:  2/10/2020     Principal Problem:  Sepsis   Primary Care Physician: Tho Haro MD      History of Present Illness:     Ms. Bina Daniels is a 64 y.o. female with a history of type 2 DM, stage 4 CKD, HTN, LAI, neuropathy, LAI, depressionn who was in her usual state of health until the week or so before gracy this year when she fell and thought she just sprained her ankle, per ortho notes she "didn't think it ws that bad", she then saw a Dr Sotomayor who was unable to reduce the ankle and dx a fx in the Right ankle and sent to [place] ER for treatment on 12/26, she went for attempted ORIF on 12/27 with Dr Medina with [facility] orth[facility] who was unable to complete procedure due to intraOp bleeding in screw holes, placed in splint and procedure aborted/2U given, on 12/27 ORIF completed to R ankle fx with recs for nonweight bearing at that time, SNF was recommended. She had prolonged hospital stay for JENNI on stage 4 CKD issues- nephro Dr Saenz and [facility] in [place] followed for med issues, she decided to go home and not SNF on 1/8. Per her family at bedside, she was getting home health to surgical site and was getting home PT/OT, they didn't seem thrilled with her choice to not do SNF, their brother who is her primary caregiver was not present in ER (daughter and son present in ER now and dont know all of med hx). In interim they didn't know she was having wound issues and dont report that they knew about it until this weekend that the site had opened up as well as worsening dyspnea and fluid/pitting edema in legs, arms as well. They report subjective chills at home but no known fevers, as well as her being slightly "off" and not herself completely now. They brought her for evlauation for both of above, and as seen in ER pictures the wound has opened and " "hardware visible. Per ER notes, [facility] orth[facility] was attempted called from ER but "no answer"' reported so they admitted to hospital medicine here, ortho has been called here, family reports not seen yet. Given vanc, zosyn, lasix, tdap in ER. SHe missed her ortho f/u last week and they had rescheduled for this week but when they saw the wound they brought her to the ER.    On exam she reports some SOB and denies fever,chills. Reports some mild leg pain now. Patient sleepy on exam but can answer basic questions. Family is unsnure if she missed home meds but they think a few days possibly, [facility] records report being on lasix 40 BID at home. Family to bring home meds (her son who manages this), but most recent nephro MAR before discharge showed the following:  norvasc 10, asa81, calcitriol .25, colace, lexapro 10 iron 325, fluticasone, levemir 10, toprol 50 BID, protonix, nabicarb 650 4 times day. At one point was on also glyburide/metformin in [place] and at home and lasix 40 BID, unsure if held on discharge or not as dc summary has no MAR, prolia injections, neurontin.    Med Hx:  Type 2 DM- A1C 9.9, stage 4 CKD- Cr baseline 2.3-2.5, iron deficiency/acute blood loss anemia, ankle fx- ORIF 12/29, neuropathy, breast ca s/p lumpectomy, depression, ?CAD, chronic metabolic acidosis, secondary hyperparathyroidism from CKD, HTN, hypoalbuminemia    Social: son not at bedside is primary caregiver, mutiple children assist in ADLS, reference to POA now in ER note- will clarify further. Walker use after fx, more bedbound now after surgery, SNF was rec but was at home. Smoker, stopped 10 years ago. No alcohol or drugs.    MEd Hx:     Review of Systems   Constitutional: positive for chills, fatigue, fever.   HENT: Negative for sore throat, trouble swallowing.    Eyes: Negative for photophobia, visual disturbance.   Respiratory: positive for cough, shortness of breath.    Cardiovascular: Negative for chest pain, palpitations, positive leg " "swelling.   Gastrointestinal: Negative for abdominal pain, constipation, diarrhea, nausea, vomiting.   Endocrine: Negative for cold intolerance, heat intolerance.   Genitourinary: Negative for dysuria, frequency.   Musculoskeletal: positive for arthralgias, myalgias.   Skin positive for rash, wound, erythema   Neurological: Negative for dizziness, syncope, weakness, light-headedness.   Psychiatric/Behavioral: Negative for confusion, hallucinations, anxiety  All other systems reviewed and are negative.        Interval History:    Reports still dyspnea at rest and with laying flat, on 6L last night, down to 4L at times with sats in higher 90s, I/O not recorded overnight despite orders for strict in and out multiple times, reported to day shift nurse as "a lot", presume is at goal, day diuresis has been going well per nursing reports, yellow clear ueinr in pure wick.  She reports being on lasix BID at home and missing maybe 1 dose, has had fluid issues in the past but never hospitalized for her. Denies chest pain at home or pressure, feels lit was all coming up recently at home since admit at [place]. She says she doesn't fluid restrict diet but tries to eat "not much" sodium.   Discussed with family- son and other family at bedside the plans- surgery tomorrow, more diuresis in interm, fine balnace of the fact that now with bacteremia from ankle and neeed source control with washout but also want to try tooptimize volume status in interim a little more today so will diurese more today while planning for washout for sepsis/bacteremia tomororw. Higher risk due to active anasarca as well as inherent CKD, DM. She reports some improvement in SOB today from yesterday, going to be days to get volume off but some mild improvements in lung and pitting edema, still very significant but a slight bit better on exam also. frqeuent cough, clear sputum.     Echo later in day showing very dec EF at 10, LAE, diastolic dysfunction, RV " dysfunction, LAP and CVP increased as expected, afib on echo and repeat ekg this AM (nsr on admit), mitral regurg. Had r cephalic and left basilic nonocclusive superificial vein thrombosis on US overnight, heparin drip started, but given superifical veins, low risk of embolism, can do ppx antitcoagulation for her post op.        Past Medical History: Patient has a past medical history of Breast cancer, CAD (coronary artery disease), Cancer, CKD (chronic kidney disease), Diabetes mellitus, HTN (hypertension), and Peripheral neuropathy.    Past Surgical History: Patient has a past surgical history that includes Breast surgery (Left); Cholecystectomy; Tonsillectomy; Hysterectomy; Open reduction and internal fixation (ORIF) of injury of ankle (Right, 12/29/2019); Closure of wound (Right, 12/29/2019); and Open reduction and internal fixation (ORIF) of injury of ankle (Right, 12/27/2019).    Social History: Patient reports that she has quit smoking. She started smoking about 16 years ago. She has never used smokeless tobacco. She reports that she does not drink alcohol or use drugs.    Family History: family history includes Breast cancer in her maternal grandmother.    Medications: Scheduled Meds:   atorvastatin  10 mg Oral Daily    busPIRone  5 mg Oral BID    calcitRIOL  0.25 mcg Oral Daily    docusate sodium  50 mg Oral Daily    escitalopram oxalate  10 mg Oral Daily    ferrous sulfate  325 mg Oral Daily    fluticasone propionate  2 spray Each Nostril Daily    furosemide  80 mg Intravenous BID    insulin detemir U-100  10 Units Subcutaneous QHS    metoprolol succinate  12.5 mg Oral Daily    pantoprazole  40 mg Oral Daily    piperacillin-tazobactam (ZOSYN) IVPB  4.5 g Intravenous Q12H    polyethylene glycol  17 g Oral Daily    sodium bicarbonate  650 mg Oral QID    vancomycin (VANCOCIN) IVPB  15 mg/kg Intravenous Once     Continuous Infusions:   heparin (porcine) in D5W 25.216 Units/kg/hr (02/11/20 1502)      PRN Meds:.acetaminophen, albuterol-ipratropium, Dextrose 10% Bolus, Dextrose 10% Bolus, dextrose 50%, dextrose 50%, glucagon (human recombinant), glucagon (human recombinant), glucose, glucose, glucose, glucose, heparin (PORCINE), heparin (PORCINE), insulin aspart U-100, melatonin, ondansetron, sodium chloride 0.9%, Pharmacy to dose Vancomycin consult **AND** vancomycin - pharmacy to dose    Allergies: Patient is allergic to sulfa (sulfonamide antibiotics).    Physical Exam:     Vital Signs (Most Recent):  Temp: 97.7 °F (36.5 °C) (02/11/20 0424)  Pulse: 110 (02/11/20 1001)  Resp: 15 (02/11/20 0424)  BP: 114/64 (02/11/20 1001)  SpO2: (!) 93 % (02/11/20 0424) Vital Signs Range (Last 24H):  Temp:  [96.2 °F (35.7 °C)-97.7 °F (36.5 °C)]   Pulse:  []   Resp:  [15-20]   BP: (114-138)/(62-74)   SpO2:  [93 %-98 %]    Body mass index is 23.69 kg/m².     Physical Exam:  Constitutional: more alert today, answers questions appropriately. On NC. occasional stopping for SOB.   Head: Normocephalic and atraumatic.   Mouth/Throat: Oropharynx is clear and moist.   Eyes: EOM are normal. Pupils are equal, round, and reactive to light. No scleral icterus.   Neck: Normal range of motion. Neck supple. JVP elevated.  Cardiovascular: Normal rate and regular rhythm.  No murmur heard.  Pulmonary/Chest: increased WOB but mild improvement since yesterday, more comfortable breathing but not at baseline, RR 18, on NC, crackles bilaterally on exam. occsaoinal use of accessory muscles.  Abdominal: Soft. Bowel sounds are normal.  mild distension, anasarca.  Musculoskeletal: open R ankle wound with hardware visible (see ER note), wrapped now. Pulses present bialterally, warm to touch.   Neurological: Alert and oriented to person, place, and time. more alert this morning and likely at baseline mental status now.   Skin: Skin is warm and dry. Diffuse pitting edema to the hips bilaterally, 3+ pitting edema in legs, arms with edema RUE >LUE,  mild tenderness with pressing on edema in extremities. Wound to RLE. Anasarca.  Psychiatric: Normal mood and affect. Behavior is normal. reports some depression from this, occasional tearful to nursing.  Vitals reviewed.    Recent Labs   Lab 02/10/20  1129 02/10/20  2110 02/11/20  0404   WBC 16.00* 14.87* 14.47*  14.47*   HGB 9.9* 10.1* 8.9*  8.9*   HCT 33.9* 34.2* 30.2*  30.2*   * 501* 481*  481*       Recent Labs   Lab 02/10/20  1129 02/11/20  0404   * 136   K 4.7 4.1    107   CO2 14* 16*   BUN 74* 72*   CREATININE 3.0* 3.0*   * 213*   CALCIUM 8.4* 7.9*   MG 2.1 1.9   PHOS 5.2* 5.6*     Recent Labs   Lab 02/10/20  1129 02/10/20  1741 02/10/20  2110 02/11/20  0404   ALKPHOS 129  --   --  97   ALT 6*  --   --  6*   AST 9*  --   --  7*   ALBUMIN 1.9*  --   --  1.5*   PROT 6.4  --   --  5.2*   BILITOT 0.3  --   --  0.3   INR  --  1.3* 1.3*  --       Recent Labs   Lab 02/10/20  2325 02/11/20  0807 02/11/20  1140   POCTGLUCOSE 258* 189* 171*         Assessment and Plan:     Ms. Bina Daniels is a 64 y.o. 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 [T84.7XXA]  Yes     Priority: 2     Bacteremia [R78.81]  Yes     Priority: 3     Acute on chronic combined systolic and diastolic heart failure [I50.43]  Yes     Priority: 3     Anasarca associated with disorder of kidney [N04.9]  Yes     Priority: 4     Postoperative infection [T81.40XA]  Yes     Priority: 5     Volume overload [E87.70]  Yes     Priority: 6     Acute thrombosis of superficial veins of both upper extremities [I82.613]  Yes    Paroxysmal atrial fibrillation [I48.0]  Yes    Iron deficiency anemia [D50.9]  Yes    Vitamin D deficiency [E55.9]  Yes    Metabolic acidosis [E87.2]  Yes    Secondary hyperparathyroidism [N25.81]  Yes    Acute metabolic encephalopathy [G93.41]  Yes     Hypertension [I10]  Yes    Hypoalbuminemia [E88.09]  Yes    Acute hypoxemic respiratory failure [J96.01]  Yes    Breast cancer [C50.919]  Yes    S/P ORIF (open reduction internal fixation) fracture [Z98.890, Z87.81]  Not Applicable     Ankle, 12/29      Depression [F32.9]  Yes    Neuropathy due to secondary diabetes mellitus [E13.40]  Yes     Chronic    CKD (chronic kidney disease), stage IV [N18.4]  Yes     Chronic    Type 2 diabetes mellitus with neurologic complication, without long-term current use of insulin [E11.49]  Yes     Chronic    Anemia due to stage 3 chronic kidney disease [N18.3, D63.1]  Yes      Resolved Hospital Problems   No resolved problems to display.     Sepsis secondary to infected hardware in right leg/wound infection Right ankle  Strep bacteremia. Gram negative bacteremia  S/p ORIF, s/p R ankle fracture  -patient with open surgical wound, hardware exposed on R ankle, lactate 1.3, procal mildly up at .91, , ESR 68, unsure timing of when the wound started becoming clearly infected and open to air  -s/p ORIF 12/29 with [facility] ortho in [place], aborted surgery 12/27 due to intraOp bleeding, walked on the fx for at least a week before that per Dr Medina notes. nonweight bearing to RLE, continue this NWB status to RLE now, bedrest for now until ortho assessment. Bandaged now.   -wbc 17 on admit, toxic granulations seen on diff.   -Xray on admit showing widened tibotalar interval, cortical irregularity, edema, post op changes vs site infections  -blood CX on admit 2/10 with Strep in 1/4 bottles, GNR 2/4 bottles. ID consulted to assist, vanc/zosyn now. Repeat Cx 2/11 pending.  -plan for washout 2/11 with ortho, medical optimize further today given active heart failure and volume overload. RCRI is high at 3 which gives a 15% 30 day risk of death, cardiac event, MI. In this case, balancing the source control with wash out of ankle and active bacteremia/sepsis will make the benefit higher  "than the risk, delay surgery 2/10 for the full echo report, to diurese further to optimize as best as possible as will take days to diurese fully but more before surgery to optimize as we can.    Anasarca  Acute on chronic systolic/ LV/RV diastolic heart failure (EF 10%)  Volume overload  Acute hypoxemic respiratory failure  -massive volume overload on admit, was supposed to be on 40 mg lasix at one point, patient says was getting it BID  -strict I/O, daily weight, 2 grams Na, 1.5L fluid restrict  -pitting everywhere on exam, arms, legs, chest. Fluid probably worsened the surg site to open up also  -albumin very low, started boost, will check Pr/Cr with CKD  -purewick to watch output- not collected  -no echo on file, presumed reduced EF and diastolic dysfunction on admit.. 2/11 with echo showing EF 10%, diastolic dysfunction- LV, RV, LAE, increased LAP, LVP, PAP elevated, MR.   -sats in mid 90s, on 6L NC on admit, around 4L NC now, diuresis not recorded overnight in epic, reported to nurse as "a lot". Again strict I/O ordered, nusring today reports is at goal for at least 2L, urine in purewick on exam. Some mild subjective improvement in SOB today, pitting a small pit less in legs, will take a good while to mobilize all this fluid  -may ask cards to assist later in stay depending on how it goes. Will need ace/arb long term, BB resumed today 2/11, go to XL toprol from short acting given EF. Will need consideration of life vest given depressed EF also  -RCRI 3, 15% risk of 30 day mortality post op. Optimizing with diuresis in interim today.    Acute kidney injury on Stage 4 CKD  -baseline Cr 2.3 to 2.8, trended up to 2.8 in January in [place], seen by nephro, sees [place] with griffin Vieyra notes  -check urine na, U/A, urine pr/cr, urine cr, urine bun  -avoid nephrotoxins  -likely element of cardiorenal worsening baseline, diuresis needed  2/11: 3.0 still, urine studies not collected, may be her new baseline as was " 2.8 in [place] in January.     Type 2 dm  -A1C 9.9, glucose 313 on CMP, on levemir 10 U at [place] recently, unsure what she went home on, Toujeo on MAR, metformin and glyburide also there, clearly those are not appropriate with this CKD and need holding now and likely forever  -SSI, levemir 10 to start as had hypoglycemia to glucose 30s reviewing [place] records also, titrate up PRN  -hold asa   -neuropathy at baseline    Paroxysmal Atrial fibrillation  -new finding 2/11 on ekg and echo, NSR on admit, likely stress of sepsis and depressed ef contributors, suspect will go in and out given LAE, EF probably wont stay out of it  -[id number] of at least 4, will need long term anticoagulation, eliquis will be only noac given ckd option.    Upper extremity superficial thrombosis  -right cephalic, left basilic  -superficial, low risk to embolize    Metabolic acidosis  -secondary to CKD, resume nabicarb,     Anemia of chronic renal disease  Iron deficiency anemia  -tx recently in [place], ferrtin low at 57, continue daily iron  -hg stable at 9.9 now.     Hypoalbuminemia  -boost with meals  -check pr/cr- not collected    Secondary hyperparathyroidism  -, daily phos  -renal diet  -cacitriol daily    Vitamin D deficiency  Osteoporosis  -on prolia outpatient  -vit D low at 5 on check in January, dont see replacement on MAR. Will consider long term once out of acute illness      Breast cancer hx  -lumpectomy in past. Stable    Depression  -cont home lexapro  -endorses depression symptoms worse now with acute medical issues. Watch closely for worsenign now    Acute metabolic encephalopathy  -likely from sepsis, mild, monitor closely  -improved 2/11, at baseline now           Diet:  Low na, 1.5L, renal/dm      DVT PPx:  Heparin, post op would go back to ppx and then consider eliquis down the line for afib         Disposition:  Diuresis, npo mn for washout tomorrow, id recs

## 2020-02-11 NOTE — SUBJECTIVE & OBJECTIVE
Past Medical History:   Diagnosis Date    Breast cancer     CAD (coronary artery disease)     Cancer     CKD (chronic kidney disease)     Diabetes mellitus     HTN (hypertension)     Peripheral neuropathy        Past Surgical History:   Procedure Laterality Date    BREAST SURGERY Left     lumpectomy    CHOLECYSTECTOMY      CLOSURE OF WOUND Right 12/29/2019    Procedure: CLOSURE, WOUND;  Surgeon: Cooper Medina MD;  Location: [facility] OR;  Service: Orthopedics;  Laterality: Right;    HYSTERECTOMY      OPEN REDUCTION AND INTERNAL FIXATION (ORIF) OF INJURY OF ANKLE Right 12/29/2019    Procedure: OPEN REDUCTION INTERNAL FIXATION-ANKLE;  Surgeon: Cooper Medina MD;  Location: [facility] OR;  Service: Orthopedics;  Laterality: Right;  C-arm, Synthes small frag set, cannulated screws notified confirmed with [employer]   Arthrex, tightrope anchors/ [place] notified and confirmed /xray notified KB    OPEN REDUCTION AND INTERNAL FIXATION (ORIF) OF INJURY OF ANKLE Right 12/27/2019    Procedure: OPEN REDUCTION INTERNAL FIXATION-ANKLE;  Surgeon: Cooper Medina MD;  Location: [facility] OR;  Service: Orthopedics;  Laterality: Right;  C-arm, Synthes small frag set, cannulated screws, C-arm    TONSILLECTOMY         Review of patient's allergies indicates:   Allergen Reactions    Sulfa (sulfonamide antibiotics) Hives       Medications:  Medications Prior to Admission   Medication Sig    aspirin 81 MG Chew Take 81 mg by mouth once daily.    atorvastatin (LIPITOR) 10 MG tablet     busPIRone (BUSPAR) 5 MG Tab Take 5 mg by mouth 2 (two) times daily.    escitalopram oxalate (LEXAPRO) 10 MG tablet Take 10 mg by mouth once daily.    furosemide (LASIX) 80 MG tablet Take 80 mg by mouth 2 (two) times daily.    HYDROcodone-acetaminophen (NORCO) 5-325 mg per tablet Take 1 tablet by mouth every 6 (six) hours as needed for Pain.    losartan (COZAAR) 50 MG tablet     metoprolol tartrate (LOPRESSOR) 50 MG tablet Take  50 mg by mouth 2 (two) times daily.    acetaminophen (TYLENOL) 325 MG tablet Take 2 tablets (650 mg total) by mouth every 6 (six) hours as needed for Pain or Temperature greater than (101).    acetaminophen (TYLENOL) 325 MG tablet Take 2 tablets (650 mg total) by mouth every 6 (six) hours as needed for Pain.    denosumab (PROLIA) 60 mg/mL Syrg Inject 60 mg into the skin every 6 (six) months.    gabapentin (NEURONTIN) 300 MG capsule     glyBURIDE-metformin 2.5-500 mg (GLUCOVANCE) 2.5-500 mg per tablet 1 tablet once daily.    hyoscyamine (ANASPAZ,LEVSIN) 0.125 mg Tab Take 125 mcg by mouth every 4 (four) hours as needed.    pantoprazole (PROTONIX) 40 MG tablet Take 40 mg by mouth once daily.    TOUJEO SOLOSTAR 300 unit/mL (1.5 mL) InPn pen     traMADol (ULTRAM) 50 mg tablet      Antibiotics (From admission, onward)    Start     Stop Route Frequency Ordered    02/11/20 1015  vancomycin in dextrose 5 % 1 gram/250 mL IVPB 1,000 mg      -- IV Once 02/11/20 0912    02/11/20 0100  piperacillin-tazobactam 4.5 g in sodium chloride 0.9% 100 mL IVPB (ready to mix system)      -- IV Every 12 hours 02/10/20 1628    02/10/20 1727  vancomycin - pharmacy to dose  (vancomycin IVPB)      -- IV pharmacy to manage frequency 02/10/20 1628        Antifungals (From admission, onward)    None        Antivirals (From admission, onward)    None           Immunization History   Administered Date(s) Administered    Influenza - Quadrivalent - PF (6 months and older) 01/08/2020    PPD Test 01/03/2020    Tdap 02/10/2020       Family History     Problem Relation (Age of Onset)    Breast cancer Maternal Grandmother        Social History     Socioeconomic History    Marital status:      Spouse name: Not on file    Number of children: Not on file    Years of education: Not on file    Highest education level: Not on file   Occupational History    Not on file   Social Needs    Financial resource strain: Not on file    Food  insecurity:     Worry: Not on file     Inability: Not on file    Transportation needs:     Medical: Not on file     Non-medical: Not on file   Tobacco Use    Smoking status: Former Smoker     Start date: 7/8/2003    Smokeless tobacco: Never Used   Substance and Sexual Activity    Alcohol use: No    Drug use: No    Sexual activity: Not Currently   Lifestyle    Physical activity:     Days per week: Not on file     Minutes per session: Not on file    Stress: Not on file   Relationships    Social connections:     Talks on phone: Not on file     Gets together: Not on file     Attends Church service: Not on file     Active member of club or organization: Not on file     Attends meetings of clubs or organizations: Not on file     Relationship status: Not on file   Other Topics Concern    Not on file   Social History Narrative    Not on file     Review of Systems   Constitutional: Negative for activity change, appetite change, chills, fatigue and fever.   Respiratory: Positive for cough and shortness of breath. Negative for wheezing and stridor.    Cardiovascular: Negative for chest pain, palpitations and leg swelling.   Gastrointestinal: Negative for abdominal distention, abdominal pain, diarrhea, nausea and vomiting.   Genitourinary: Negative for difficulty urinating and dysuria.   Musculoskeletal: Negative for arthralgias, back pain and myalgias.   Skin: Positive for wound. Negative for color change and rash.   Psychiatric/Behavioral: Negative for agitation and confusion.     Objective:     Vital Signs (Most Recent):  Temp: 97.7 °F (36.5 °C) (02/11/20 0424)  Pulse: 110 (02/11/20 1001)  Resp: 15 (02/11/20 0424)  BP: 114/64 (02/11/20 1001)  SpO2: (!) 93 % (02/11/20 0424) Vital Signs (24h Range):  Temp:  [96.2 °F (35.7 °C)-97.7 °F (36.5 °C)] 97.7 °F (36.5 °C)  Pulse:  [] 110  Resp:  [15-20] 15  SpO2:  [93 %-98 %] 93 %  BP: (114-138)/(62-74) 114/64     Weight: 62.6 kg (138 lb)  Body mass index is 23.69  kg/m².    Estimated Creatinine Clearance: 16.8 mL/min (A) (based on SCr of 3 mg/dL (H)).    Physical Exam   Constitutional: She is oriented to person, place, and time. She appears well-developed and well-nourished.   HENT:   Head: Normocephalic and atraumatic.   Cardiovascular: Normal rate, regular rhythm and normal heart sounds.   No murmur heard.  Pulmonary/Chest: Effort normal and breath sounds normal. She has no wheezes. She has no rales.   Abdominal: Soft. Bowel sounds are normal. She exhibits no distension and no mass. There is no tenderness.   Musculoskeletal: She exhibits edema and deformity.   Neurological: She is alert and oriented to person, place, and time.   Skin: There is erythema.   See media tab for wound pictures  Pt R leg wrapped       Significant Labs: All pertinent labs within the past 24 hours have been reviewed.    Significant Imaging: I have reviewed all pertinent imaging results/findings within the past 24 hours.

## 2020-02-11 NOTE — PLAN OF CARE
SW completed LOCET and faxed PASRR to state. Awaiting 142 at this time.  Rosa Saavedra LMSW  [facility]

## 2020-02-11 NOTE — CODE/ RAPID DOCUMENTATION
RAPID RESPONSE NURSE PROACTIVE ROUNDING NOTE     Time of Visit:     Admit Date: 2/10/2020  LOS: 1  Code Status: Full Code   Date of Visit: 2020  : 1955  Age: 64 y.o.  Sex: female  Race: White  Bed: 525/525 B:   MRN: [id number]  Was the patient discharged from an ICU this admission? no   Was the patient discharged from a PACU within last 24 hours?  no  Did the patient receive conscious sedation/general anesthesia in last 24 hours?  no  Was the patient in the ED within the past 24 hours?  yes  Was the patient started on NIPPV within the past 24 hours?  no  Attending Physician: Christine Palomino MD  Primary Service: [facility] MED     ASSESSMENT     Diagnosis: Sepsis    Abnormal Vital Signs: VSS  Clinical Issues: Circulatory    Patient  has a past medical history of Breast cancer, CAD (coronary artery disease), Cancer, CKD (chronic kidney disease), Diabetes mellitus, HTN (hypertension), and Peripheral neuropathy.    Patient came in due to need of orthopedic attention to ankle with concern of infection but also overloaded with some shortness of breath and cough.     Upon assessment, pt sitting straight up with no current complaints except her cough.     INTERVENTIONS/ RECOMMENDATIONS     Per current POC - some diuresis overnight, scheduled lasix  NPO at mignight  Echo  Optimize health prior to orthopedic intervention     Discussed plan of care with RNAsuncion.    PHYSICIAN ESCALATION     Yes/No  no    Orders received and case discussed with NA.    Disposition: Remain in room 525B.    FOLLOW-UP     Call back the Rapid Response Nurse, Celia Lin RN at [phone] for additional questions or concerns.

## 2020-02-11 NOTE — CONSULTS
[facility]  Infectious Disease  Consult Note    Patient Name: Bina Daniels  MRN: [id number]  Admission Date: 2/10/2020  Hospital Length of Stay: 1 days  Attending Physician: Christine Palomino MD  Primary Care Provider: Tho Haro MD     Isolation Status: No active isolations    Patient information was obtained from patient, past medical records and ER records.      Consults  Assessment/Plan:     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  Pt is a 64 y.o. F w/ a hx of type 2 DM, stage 4 CKD, HTN who presents for open leg wound.  Pt s/p w/ fall before christmas resulting in right ankle fx.  S/p ORIF on 12/27 with Dr Medina c/b by intraOp bleeding and further completed on 12/29.  Pt now w/ open wound w/ hardware exposure.      ESR and CRP elevated.  Pt's blood cxs positive for GNR in 2 of 4 bottles and GPC resembling strep in 1 of 4 bottles.  Ortho consulted with plans for I and D w/ hardware removal.    Plan  -Continue Vancomycin  -Discontinue Zosyn and start on Cefepime 2g q24h (renally dosed)  -Will follow blood cxs and tailor abx accordingly  -Please obtain cxs during sx- gram stain, aerobic, anaerobic, afb and fungal  -ID will continue to follow.        Thank you for your consult. I will follow-up with patient. Please contact us if you have any additional questions.    Sahil Díaz PA-C  Infectious Disease  [facility]    Subjective:     Principal Problem: Sepsis    HPI: Pt is a 64 y.o.  F w / a hx of type 2 DM, stage 4 CKD, HTN who presents for open leg wound.  Pt w/ fall before christmas and dx a fx in the right ankle.  She went for attempted ORIF on 12/27 with Dr Medina with [facility] orth[facility] who was unable to complete procedure due to intraOp bleeding in screw holes, placed in splint and procedure aborted, further completed on 12/29.  Pt refused SNF and she was getting home health to surgical site as well as PT/OT.  Pt reported that wound care had not come for the past  five days.  Once wound visualized noticed opening with clear drainage and hardware exposure.  They report subjective chills and confusion.      On exam she reports some SOB and denies fever,chills. Reports some mild leg pain now. Patient sleepy on exam but can answer basic questions. Family is unsnure if she missed home meds but they think a few days possibly, edie records report being on lasix 40 BID at home.     Pt's blood cxs positive for GNR in 2 of 4 bottles and GPC resembling strep in 1 of 4 bottles.  Ortho consulted with plans for I and D w/ hardware removal.    Past Medical History:   Diagnosis Date    Breast cancer     CAD (coronary artery disease)     Cancer     CKD (chronic kidney disease)     Diabetes mellitus     HTN (hypertension)     Peripheral neuropathy        Past Surgical History:   Procedure Laterality Date    BREAST SURGERY Left     lumpectomy    CHOLECYSTECTOMY      CLOSURE OF WOUND Right 12/29/2019    Procedure: CLOSURE, WOUND;  Surgeon: Cooper Medina MD;  Location: [facility] OR;  Service: Orthopedics;  Laterality: Right;    HYSTERECTOMY      OPEN REDUCTION AND INTERNAL FIXATION (ORIF) OF INJURY OF ANKLE Right 12/29/2019    Procedure: OPEN REDUCTION INTERNAL FIXATION-ANKLE;  Surgeon: Cooper Medina MD;  Location: [facility] OR;  Service: Orthopedics;  Laterality: Right;  C-arm, Synthes small frag set, cannulated screws notified confirmed with [employer]   Arthrex, tightrope anchors/ [place] notified and confirmed /xray notified KB    OPEN REDUCTION AND INTERNAL FIXATION (ORIF) OF INJURY OF ANKLE Right 12/27/2019    Procedure: OPEN REDUCTION INTERNAL FIXATION-ANKLE;  Surgeon: Cooper Medina MD;  Location: [facility] OR;  Service: Orthopedics;  Laterality: Right;  C-arm, Synthes small frag set, cannulated screws, C-arm    TONSILLECTOMY         Review of patient's allergies indicates:   Allergen Reactions    Sulfa (sulfonamide antibiotics) Hives        Medications:  Medications Prior to Admission   Medication Sig    aspirin 81 MG Chew Take 81 mg by mouth once daily.    atorvastatin (LIPITOR) 10 MG tablet     busPIRone (BUSPAR) 5 MG Tab Take 5 mg by mouth 2 (two) times daily.    escitalopram oxalate (LEXAPRO) 10 MG tablet Take 10 mg by mouth once daily.    furosemide (LASIX) 80 MG tablet Take 80 mg by mouth 2 (two) times daily.    HYDROcodone-acetaminophen (NORCO) 5-325 mg per tablet Take 1 tablet by mouth every 6 (six) hours as needed for Pain.    losartan (COZAAR) 50 MG tablet     metoprolol tartrate (LOPRESSOR) 50 MG tablet Take 50 mg by mouth 2 (two) times daily.    acetaminophen (TYLENOL) 325 MG tablet Take 2 tablets (650 mg total) by mouth every 6 (six) hours as needed for Pain or Temperature greater than (101).    acetaminophen (TYLENOL) 325 MG tablet Take 2 tablets (650 mg total) by mouth every 6 (six) hours as needed for Pain.    denosumab (PROLIA) 60 mg/mL Syrg Inject 60 mg into the skin every 6 (six) months.    gabapentin (NEURONTIN) 300 MG capsule     glyBURIDE-metformin 2.5-500 mg (GLUCOVANCE) 2.5-500 mg per tablet 1 tablet once daily.    hyoscyamine (ANASPAZ,LEVSIN) 0.125 mg Tab Take 125 mcg by mouth every 4 (four) hours as needed.    pantoprazole (PROTONIX) 40 MG tablet Take 40 mg by mouth once daily.    TOUJEO SOLOSTAR 300 unit/mL (1.5 mL) InPn pen     traMADol (ULTRAM) 50 mg tablet      Antibiotics (From admission, onward)    Start     Stop Route Frequency Ordered    02/11/20 1015  vancomycin in dextrose 5 % 1 gram/250 mL IVPB 1,000 mg      -- IV Once 02/11/20 0912    02/11/20 0100  piperacillin-tazobactam 4.5 g in sodium chloride 0.9% 100 mL IVPB (ready to mix system)      -- IV Every 12 hours 02/10/20 1628    02/10/20 1727  vancomycin - pharmacy to dose  (vancomycin IVPB)      -- IV pharmacy to manage frequency 02/10/20 1628        Antifungals (From admission, onward)    None        Antivirals (From admission, onward)     None           Immunization History   Administered Date(s) Administered    Influenza - Quadrivalent - PF (6 months and older) 01/08/2020    PPD Test 01/03/2020    Tdap 02/10/2020       Family History     Problem Relation (Age of Onset)    Breast cancer Maternal Grandmother        Social History     Socioeconomic History    Marital status:      Spouse name: Not on file    Number of children: Not on file    Years of education: Not on file    Highest education level: Not on file   Occupational History    Not on file   Social Needs    Financial resource strain: Not on file    Food insecurity:     Worry: Not on file     Inability: Not on file    Transportation needs:     Medical: Not on file     Non-medical: Not on file   Tobacco Use    Smoking status: Former Smoker     Start date: 7/8/2003    Smokeless tobacco: Never Used   Substance and Sexual Activity    Alcohol use: No    Drug use: No    Sexual activity: Not Currently   Lifestyle    Physical activity:     Days per week: Not on file     Minutes per session: Not on file    Stress: Not on file   Relationships    Social connections:     Talks on phone: Not on file     Gets together: Not on file     Attends [employer] service: Not on file     Active member of club or organization: Not on file     Attends meetings of clubs or organizations: Not on file     Relationship status: Not on file   Other Topics Concern    Not on file   Social History Narrative    Not on file     Review of Systems   Constitutional: Negative for activity change, appetite change, chills, fatigue and fever.   Respiratory: Positive for cough and shortness of breath. Negative for wheezing and stridor.    Cardiovascular: Negative for chest pain, palpitations and leg swelling.   Gastrointestinal: Negative for abdominal distention, abdominal pain, diarrhea, nausea and vomiting.   Genitourinary: Negative for difficulty urinating and dysuria.   Musculoskeletal: Negative for  arthralgias, back pain and myalgias.   Skin: Positive for wound. Negative for color change and rash.   Psychiatric/Behavioral: Negative for agitation and confusion.     Objective:     Vital Signs (Most Recent):  Temp: 97.7 °F (36.5 °C) (02/11/20 0424)  Pulse: 110 (02/11/20 1001)  Resp: 15 (02/11/20 0424)  BP: 114/64 (02/11/20 1001)  SpO2: (!) 93 % (02/11/20 0424) Vital Signs (24h Range):  Temp:  [96.2 °F (35.7 °C)-97.7 °F (36.5 °C)] 97.7 °F (36.5 °C)  Pulse:  [] 110  Resp:  [15-20] 15  SpO2:  [93 %-98 %] 93 %  BP: (114-138)/(62-74) 114/64     Weight: 62.6 kg (138 lb)  Body mass index is 23.69 kg/m².    Estimated Creatinine Clearance: 16.8 mL/min (A) (based on SCr of 3 mg/dL (H)).    Physical Exam   Constitutional: She is oriented to person, place, and time. She appears well-developed and well-nourished.   HENT:   Head: Normocephalic and atraumatic.   Cardiovascular: Normal rate, regular rhythm and normal heart sounds.   No murmur heard.  Pulmonary/Chest: Effort normal and breath sounds normal. She has no wheezes. She has no rales.   Abdominal: Soft. Bowel sounds are normal. She exhibits no distension and no mass. There is no tenderness.   Musculoskeletal: She exhibits edema and deformity.   Neurological: She is alert and oriented to person, place, and time.   Skin: There is erythema.   See media tab for wound pictures  Pt R leg wrapped       Significant Labs: All pertinent labs within the past 24 hours have been reviewed.    Significant Imaging: I have reviewed all pertinent imaging results/findings within the past 24 hours.

## 2020-02-11 NOTE — ASSESSMENT & PLAN NOTE
Bina Daniels is a 64 y.o. female who is here today for irrigation debridement left ankle, removal of hardware.  Marked and consented, NPO.      - Weight bearing status:  Nonweightbearing  - Pain control: Per APS  - Antibiotics:  Preop ordered  - DVT Prophylaxis:  Will hold preop , SCD's at all times when not ambulating.  - PT/OT  - Lines/Drains:  None  - Dispo:  To OR today.  Marked and consented.

## 2020-02-11 NOTE — CODE/ RAPID DOCUMENTATION
"RAPID RESPONSE NURSE PROACTIVE ROUNDING NOTE     Time of Visit: ***    Admit Date: 2/10/2020  LOS: 1  Code Status: Full Code   Date of Visit: 2020  : 1955  Age: 64 y.o.  Sex: female  Race: White  Bed: 525/525 B:   MRN: [id number]  Was the patient discharged from an ICU this admission? {YES/NO:}   Was the patient discharged from a PACU within last 24 hours?  {YES/NO:}  Did the patient receive conscious sedation/general anesthesia in last 24 hours?  {YES/NO:}  Was the patient in the ED within the past 24 hours?  {YES/NO:}  Was the patient started on NIPPV within the past 24 hours?  {YES/NO:}  Attending Physician: Christine Palomino MD  Primary Service: [facility] MED     ASSESSMENT     Diagnosis: Sepsis    Abnormal Vital Signs: /74   Pulse 99   Temp 96.5 °F (35.8 °C)   Resp 18   Wt 62.8 kg (138 lb 7.2 oz)   SpO2 (!) 93%   Breastfeeding? No   BMI 23.76 kg/m²      Clinical Issues: {Blank single:57257::"Neuro","Respiratory","Circulatory","Dysrythmia"}    Patient  has a past medical history of Breast cancer, CAD (coronary artery disease), Cancer, CKD (chronic kidney disease), Diabetes mellitus, HTN (hypertension), and Peripheral neuropathy.    ***     INTERVENTIONS/ RECOMMENDATIONS     ***    Discussed plan of care with RN, ***.    PHYSICIAN ESCALATION     Yes/No  {YES/NO:}    Orders received and case discussed with {RRS provider type:41354}.    Disposition: {Dispostion RRT:28883}    FOLLOW-UP     Call back the Rapid Response Nurse, Celia Lin RN at {RRN Phone:[phone]} for additional questions or concerns.          "

## 2020-02-11 NOTE — HPI
Pt is a 64 y.o. F w/ a hx of type 2 DM, stage 4 CKD, HTN who presents for open leg wound.  Pt w/ fall before christmas and dx a fx in the right ankle.  She went for attempted ORIF on 12/27 with Dr Mdeina with [facility] who was unable to complete procedure due to intraOp bleeding in screw holes, placed in splint and procedure aborted, further completed on 12/29.  Pt refused SNF and she was getting home health to surgical site as well as PT/OT.  Pt reported that wound care had not come for the past five days.  Once wound visualized noticed opening with clear drainage and hardware exposure.  They report subjective chills and confusion.      On exam she reports some SOB and denies fever,chills. Reports some mild leg pain now. Patient sleepy on exam but can answer basic questions. Family is unsnure if she missed home meds but they think a few days possibly, edie records report being on lasix 40 BID at home.     Pt's blood cxs positive for GNR in 2 of 4 bottles and GPC resembling strep in 1 of 4 bottles.  Ortho consulted with plans for I and D w/ hardware removal.

## 2020-02-11 NOTE — SUBJECTIVE & OBJECTIVE
Principal Problem:Sepsis    Principal Orthopedic Problem: same    Interval History: Patient seen and examined at bedside.  Here for not Chata healing wounds over left medial and lateral malleolus..   No acute events overnight.  Pain controlled.  To OR today for irrigation debridement, removal of hardware.      Review of patient's allergies indicates:   Allergen Reactions    Sulfa (sulfonamide antibiotics) Hives       Current Facility-Administered Medications   Medication    acetaminophen tablet 650 mg    albuterol-ipratropium 2.5 mg-0.5 mg/3 mL nebulizer solution 3 mL    atorvastatin tablet 10 mg    busPIRone tablet 5 mg    calcitRIOL capsule 0.25 mcg    dextrose 10% (D10W) Bolus    dextrose 10% (D10W) Bolus    dextrose 50% injection 12.5 g    dextrose 50% injection 25 g    docusate sodium capsule 50 mg    escitalopram oxalate tablet 10 mg    ferrous sulfate EC tablet 325 mg    fluticasone propionate 50 mcg/actuation nasal spray 100 mcg    furosemide injection 80 mg    glucagon (human recombinant) injection 1 mg    glucagon (human recombinant) injection 1 mg    glucose chewable tablet 16 g    glucose chewable tablet 16 g    glucose chewable tablet 24 g    glucose chewable tablet 24 g    heparin 25,000 units in dextrose 5% (100 units/ml) IV bolus from bag - ADDITIONAL PRN BOLUS - 30 units/kg    heparin 25,000 units in dextrose 5% (100 units/ml) IV bolus from bag - ADDITIONAL PRN BOLUS - 60 units/kg    heparin 25,000 units in dextrose 5% 250 mL (100 units/mL) infusion HIGH INTENSITY nomogram - [facility]    insulin aspart U-100 pen 0-5 Units    insulin detemir U-100 pen 10 Units    melatonin tablet 6 mg    metoprolol tartrate (LOPRESSOR) tablet 50 mg    ondansetron injection 4 mg    pantoprazole EC tablet 40 mg    piperacillin-tazobactam 4.5 g in sodium chloride 0.9% 100 mL IVPB (ready to mix system)    polyethylene glycol packet 17 g    sodium bicarbonate tablet 650 mg    sodium chloride  0.9% flush 10 mL    vancomycin - pharmacy to dose     Objective:     Vital Signs (Most Recent):  Temp: 97.7 °F (36.5 °C) (02/11/20 0424)  Pulse: 84 (02/11/20 0424)  Resp: 15 (02/11/20 0424)  BP: 114/64 (02/11/20 0424)  SpO2: (!) 93 % (02/11/20 0424) Vital Signs (24h Range):  Temp:  [96.2 °F (35.7 °C)-97.7 °F (36.5 °C)] 97.7 °F (36.5 °C)  Pulse:  [78-99] 84  Resp:  [15-36] 15  SpO2:  [93 %-99 %] 93 %  BP: (114-141)/(60-74) 114/64     Weight: 62.8 kg (138 lb 7.2 oz)     Body mass index is 23.76 kg/m².      Intake/Output Summary (Last 24 hours) at 2/11/2020 0553  Last data filed at 2/10/2020 1430  Gross per 24 hour   Intake 350 ml   Output --   Net 350 ml       Ortho/SPM Exam  Physical Exam:  NAD, A/O x 3.  Wound dressed with 4 x 4 cast padding, Ace wrap. No visible drainage.  No focal motor or sensory deficits noted.      Significant Labs: All pertinent labs within the past 24 hours have been reviewed.    Significant Imaging: I have reviewed and interpreted all pertinent imaging results/findings.

## 2020-02-11 NOTE — ASSESSMENT & PLAN NOTE
Pt is a 64 y.o. F w/ a hx of type 2 DM, stage 4 CKD, HTN who presents for open leg wound.  Pt s/p w/ fall before gracy resulting in right ankle fx.  S/p ORIF on 12/27 with Dr Adam duarte/christin by intraOp bleeding and further completed on 12/29.  Pt now w/ open wound w/ hardware exposure.      ESR and CRP elevated.  Pt's blood cxs positive for GNR in 2 of 4 bottles and GPC resembling strep in 1 of 4 bottles.  Ortho consulted with plans for I and D w/ hardware removal.    Plan  -Continue Vancomycin  -Discontinue Zosyn and start on Cefepime 2g q24h (renally dosed)  -Will follow blood cxs and tailor abx accordingly  -Please obtain cxs during sx- gram stain, aerobic, anaerobic, afb and fungal  -ID will continue to follow.

## 2020-02-11 NOTE — PROGRESS NOTES
Intern on call notified of positive blood culture results. No new orders given. Will continue to monitor.

## 2020-02-11 NOTE — PROGRESS NOTES
[facility]  Orthopedics  Progress Note    Patient Name: Bina Daniels  MRN: [id number]  Admission Date: 2/10/2020  Hospital Length of Stay: 1 days  Attending Provider: Christine Palomino MD  Primary Care Provider: Tho Haro MD  Follow-up For: Procedure(s) (LRB):  REMOVAL, HARDWARE, LOWER EXTREMITY, Irrigation and debridement Right ankle, synthes removal screw , Large C arm clock side (Right)    Post-Operative Day:    Subjective:     Principal Problem:Sepsis    Principal Orthopedic Problem: same    Interval History: Patient seen and examined at bedside.  Here for not Chata healing wounds over left medial and lateral malleolus..   No acute events overnight.  Pain controlled.  To OR today for irrigation debridement, removal of hardware.      Review of patient's allergies indicates:   Allergen Reactions    Sulfa (sulfonamide antibiotics) Hives       Current Facility-Administered Medications   Medication    acetaminophen tablet 650 mg    albuterol-ipratropium 2.5 mg-0.5 mg/3 mL nebulizer solution 3 mL    atorvastatin tablet 10 mg    busPIRone tablet 5 mg    calcitRIOL capsule 0.25 mcg    dextrose 10% (D10W) Bolus    dextrose 10% (D10W) Bolus    dextrose 50% injection 12.5 g    dextrose 50% injection 25 g    docusate sodium capsule 50 mg    escitalopram oxalate tablet 10 mg    ferrous sulfate EC tablet 325 mg    fluticasone propionate 50 mcg/actuation nasal spray 100 mcg    furosemide injection 80 mg    glucagon (human recombinant) injection 1 mg    glucagon (human recombinant) injection 1 mg    glucose chewable tablet 16 g    glucose chewable tablet 16 g    glucose chewable tablet 24 g    glucose chewable tablet 24 g    heparin 25,000 units in dextrose 5% (100 units/ml) IV bolus from bag - ADDITIONAL PRN BOLUS - 30 units/kg    heparin 25,000 units in dextrose 5% (100 units/ml) IV bolus from bag - ADDITIONAL PRN BOLUS - 60 units/kg    heparin 25,000 units in  dextrose 5% 250 mL (100 units/mL) infusion HIGH INTENSITY nomogram - [facility]    insulin aspart U-100 pen 0-5 Units    insulin detemir U-100 pen 10 Units    melatonin tablet 6 mg    metoprolol tartrate (LOPRESSOR) tablet 50 mg    ondansetron injection 4 mg    pantoprazole EC tablet 40 mg    piperacillin-tazobactam 4.5 g in sodium chloride 0.9% 100 mL IVPB (ready to mix system)    polyethylene glycol packet 17 g    sodium bicarbonate tablet 650 mg    sodium chloride 0.9% flush 10 mL    vancomycin - pharmacy to dose     Objective:     Vital Signs (Most Recent):  Temp: 97.7 °F (36.5 °C) (02/11/20 0424)  Pulse: 84 (02/11/20 0424)  Resp: 15 (02/11/20 0424)  BP: 114/64 (02/11/20 0424)  SpO2: (!) 93 % (02/11/20 0424) Vital Signs (24h Range):  Temp:  [96.2 °F (35.7 °C)-97.7 °F (36.5 °C)] 97.7 °F (36.5 °C)  Pulse:  [78-99] 84  Resp:  [15-36] 15  SpO2:  [93 %-99 %] 93 %  BP: (114-141)/(60-74) 114/64     Weight: 62.8 kg (138 lb 7.2 oz)     Body mass index is 23.76 kg/m².      Intake/Output Summary (Last 24 hours) at 2/11/2020 0553  Last data filed at 2/10/2020 1430  Gross per 24 hour   Intake 350 ml   Output --   Net 350 ml       Ortho/SPM Exam  Physical Exam:  NAD, A/O x 3.  Wound dressed with 4 x 4 cast padding, Ace wrap. No visible drainage.  No focal motor or sensory deficits noted.      Significant Labs: All pertinent labs within the past 24 hours have been reviewed.    Significant Imaging: I have reviewed and interpreted all pertinent imaging results/findings.    Assessment/Plan:     * Sepsis  Bina Daniels is a 64 y.o. female who is here today for irrigation debridement left ankle, removal of hardware.  Marked and consented, NPO.      - Weight bearing status:  Nonweightbearing  - Pain control: Per APS  - Antibiotics:  Preop ordered  - DVT Prophylaxis:  Will hold preop , SCD's at all times when not ambulating.  - PT/OT  - Lines/Drains:  None  - Dispo:  To OR today.  Marked and consented.          Infected hardware  in right leg  Bina Daniels is a 64 y.o. female with medial and lateral wound dehiscence sp fixation right ankle.  Patient has appearance of being quite volume overloaded.  Will begin diuresis upon recommendation of the medical team.  Her chest x-ray is certainly concerning for volume overload status.  She does have a history of a diagnosis of some degree of heart failure, there is no current echo on file, we will obtain new echo in the morning.  This is not an emergent procedure, so we will wait 2 she is medically optimized before we proceed.  I discussed this with the medical staff as well.  In the meantime she will remain nonweightbearing right lower extremity, we placed a wet-to-dry dressing on the right lower extremity.  We will plan on irrigation debridement right lower extremity when optimized.  NPO at midnight as precaution.  Informed consent was obtained    -nonweightbearing right lower extremity  -follow-up echo  -NPO midnight          Tho Reinoso MD  Orthopedics  [facility]

## 2020-02-11 NOTE — PROGRESS NOTES
Pharmacokinetic Assessment Follow Up: IV Vancomycin    Vancomycin serum concentration assessment(s):    The random level was drawn correctly and can be used to guide therapy at this time. The measurement is within the desired definitive target range of 15 to 20 mcg/mL.    Vancomycin Regimen Plan:    Vancomycin 1 gram today with random level tomorrow with am labs. Re-dose if level<20    Drug levels (last 3 results):  Recent Labs   Lab Result Units 02/11/20  0404   Vancomycin, Random ug/mL 18.5       Pharmacy will continue to follow and monitor vancomycin.    Please contact pharmacy at extension [phone] for questions regarding this assessment.    Thank you for the consult,   Mindy Ellis       Patient brief summary:  Bina Daniels is a 64 y.o. female initiated on antimicrobial therapy with IV Vancomycin for treatment of sepsis    The patient's current regimen is Vancomycin pulse dosing.    Drug Allergies:   Review of patient's allergies indicates:   Allergen Reactions    Sulfa (sulfonamide antibiotics) Hives       Actual Body Weight:   62.8 kg    Renal Function:   Estimated Creatinine Clearance: 16.8 mL/min (A) (based on SCr of 3 mg/dL (H)).,     Dialysis Method (if applicable):  N/A    CBC (last 72 hours):  Recent Labs   Lab Result Units 02/10/20  1129 02/10/20  2110 02/11/20  0404   WBC K/uL 16.00* 14.87* 14.47*  14.47*   Hemoglobin g/dL 9.9* 10.1* 8.9*  8.9*   Hemoglobin A1C %  --   --  10.0*   Hematocrit % 33.9* 34.2* 30.2*  30.2*   Platelets K/uL 536* 501* 481*  481*   Gran% % 90.1* 92.4* 90.3*  90.3*   Lymph% % 3.1* 1.7* 3.0*  3.0*   Mono% % 5.5 4.2 5.1  5.1   Eosinophil% % 0.1 0.1 0.3  0.3   Basophil% % 0.3 0.3 0.1  0.1   Differential Method  Automated Automated Automated  Automated       Metabolic Panel (last 72 hours):  Recent Labs   Lab Result Units 02/10/20  1129 02/11/20  0404   Sodium mmol/L 134* 136   Potassium mmol/L 4.7 4.1   Chloride mmol/L 105 107   CO2 mmol/L 14* 16*   Glucose mg/dL  313* 213*   BUN, Bld mg/dL 74* 72*   Creatinine mg/dL 3.0* 3.0*   Albumin g/dL 1.9* 1.5*   Total Bilirubin mg/dL 0.3 0.3   Alkaline Phosphatase U/L 129 97   AST U/L 9* 7*   ALT U/L 6* 6*   Magnesium mg/dL 2.1 1.9   Phosphorus mg/dL 5.2* 5.6*       Vancomycin Administrations:  vancomycin given in the last 96 hours                   vancomycin 1.5 g in dextrose 5 % 250 mL IVPB (ready to mix) (mg) 1,500 mg New Bag 02/10/20 1312                Microbiologic Results:  Microbiology Results (last 7 days)     Procedure Component Value Units Date/Time    Blood culture [[id number]] Collected:  02/11/20 0801    Order Status:  Sent Specimen:  Blood Updated:  02/11/20 0852    Blood culture [[id number]] Collected:  02/11/20 0806    Order Status:  Sent Specimen:  Blood Updated:  02/11/20 0852    Blood Culture #2 **CANNOT BE ORDERED STAT** [[id number]] Collected:  02/10/20 1147    Order Status:  Completed Specimen:  Blood from Peripheral, Antecubital, Left Updated:  02/11/20 0323     Blood Culture, Routine Gram stain sagrario bottle: Gram negative rods       Results called to and read back by:Asuncion Durant RN 02/11/2020  03:22    Blood Culture #1 **CANNOT BE ORDERED STAT** [[id number]] Collected:  02/10/20 1142    Order Status:  Completed Specimen:  Blood from Peripheral, Wrist, Right Updated:  02/11/20 0323     Blood Culture, Routine Gram stain aer bottle: Gram positive cocci in chains resembling Strep      Gram stain sagrario bottle: Gram negative rods      Results called to and read back by:Asuncion Durant RN 02/11/2020  03:22    Culture, Respiratory with Gram Stain [[id number]]     Order Status:  No result Specimen:  Respiratory     Influenza A & B by Molecular [[id number]] Collected:  02/10/20 1133    Order Status:  Completed Specimen:  Nasopharyngeal Swab Updated:  02/10/20 1210     Influenza A, Molecular Negative     Influenza B, Molecular Negative     Flu A & B Source Nasal swab

## 2020-02-12 PROBLEM — N18.4 CKD (CHRONIC KIDNEY DISEASE), STAGE IV: Chronic | Status: RESOLVED | Noted: 2019-12-27 | Resolved: 2020-02-12

## 2020-02-12 LAB
ALBUMIN SERPL BCP-MCNC: 1.4 G/DL (ref 3.5–5.2)
ALP SERPL-CCNC: 99 U/L (ref 55–135)
ALT SERPL W/O P-5'-P-CCNC: 6 U/L (ref 10–44)
AMORPH CRY UR QL COMP ASSIST: ABNORMAL
ANION GAP SERPL CALC-SCNC: 10 MMOL/L (ref 8–16)
APTT BLDCRRT: 27.7 SEC (ref 21–32)
AST SERPL-CCNC: 6 U/L (ref 10–40)
BACTERIA #/AREA URNS AUTO: ABNORMAL /HPF
BASOPHILS # BLD AUTO: 0.02 K/UL (ref 0–0.2)
BASOPHILS NFR BLD: 0.1 % (ref 0–1.9)
BILIRUB SERPL-MCNC: 0.3 MG/DL (ref 0.1–1)
BILIRUB UR QL STRIP: NEGATIVE
BUN SERPL-MCNC: 76 MG/DL (ref 8–23)
CALCIUM SERPL-MCNC: 7.5 MG/DL (ref 8.7–10.5)
CHLORIDE SERPL-SCNC: 106 MMOL/L (ref 95–110)
CLARITY UR REFRACT.AUTO: ABNORMAL
CO2 SERPL-SCNC: 19 MMOL/L (ref 23–29)
COLOR UR AUTO: YELLOW
CREAT SERPL-MCNC: 2.9 MG/DL (ref 0.5–1.4)
CREAT UR-MCNC: 19 MG/DL (ref 15–325)
CREAT UR-MCNC: 19 MG/DL (ref 15–325)
DIFFERENTIAL METHOD: ABNORMAL
EOSINOPHIL # BLD AUTO: 0.1 K/UL (ref 0–0.5)
EOSINOPHIL NFR BLD: 0.7 % (ref 0–8)
ERYTHROCYTE [DISTWIDTH] IN BLOOD BY AUTOMATED COUNT: 17.9 % (ref 11.5–14.5)
EST. GFR  (AFRICAN AMERICAN): 19.3 ML/MIN/1.73 M^2
EST. GFR  (NON AFRICAN AMERICAN): 16.7 ML/MIN/1.73 M^2
GLUCOSE SERPL-MCNC: 100 MG/DL (ref 70–110)
GLUCOSE UR QL STRIP: NEGATIVE
HCT VFR BLD AUTO: 26.7 % (ref 37–48.5)
HGB BLD-MCNC: 7.9 G/DL (ref 12–16)
HGB UR QL STRIP: NEGATIVE
HYALINE CASTS UR QL AUTO: 0 /LPF
IMM GRANULOCYTES # BLD AUTO: 0.15 K/UL (ref 0–0.04)
IMM GRANULOCYTES NFR BLD AUTO: 1.1 % (ref 0–0.5)
KETONES UR QL STRIP: NEGATIVE
LEUKOCYTE ESTERASE UR QL STRIP: NEGATIVE
LYMPHOCYTES # BLD AUTO: 0.5 K/UL (ref 1–4.8)
LYMPHOCYTES NFR BLD: 3.7 % (ref 18–48)
MAGNESIUM SERPL-MCNC: 1.9 MG/DL (ref 1.6–2.6)
MCH RBC QN AUTO: 26.3 PG (ref 27–31)
MCHC RBC AUTO-ENTMCNC: 29.6 G/DL (ref 32–36)
MCV RBC AUTO: 89 FL (ref 82–98)
MICROSCOPIC COMMENT: ABNORMAL
MONOCYTES # BLD AUTO: 0.7 K/UL (ref 0.3–1)
MONOCYTES NFR BLD: 5.4 % (ref 4–15)
NEUTROPHILS # BLD AUTO: 11.9 K/UL (ref 1.8–7.7)
NEUTROPHILS NFR BLD: 89 % (ref 38–73)
NITRITE UR QL STRIP: NEGATIVE
NRBC BLD-RTO: 0 /100 WBC
PH UR STRIP: 5 [PH] (ref 5–8)
PHOSPHATE SERPL-MCNC: 5.7 MG/DL (ref 2.7–4.5)
PLATELET # BLD AUTO: 419 K/UL (ref 150–350)
PMV BLD AUTO: 9.3 FL (ref 9.2–12.9)
POCT GLUCOSE: 169 MG/DL (ref 70–110)
POCT GLUCOSE: 65 MG/DL (ref 70–110)
POCT GLUCOSE: 82 MG/DL (ref 70–110)
POTASSIUM SERPL-SCNC: 4 MMOL/L (ref 3.5–5.1)
PROT SERPL-MCNC: 4.8 G/DL (ref 6–8.4)
PROT UR QL STRIP: ABNORMAL
PROT UR-MCNC: 45 MG/DL (ref 0–15)
PROT/CREAT UR: 2.37 MG/G{CREAT} (ref 0–0.2)
RBC # BLD AUTO: 3 M/UL (ref 4–5.4)
RBC #/AREA URNS AUTO: 1 /HPF (ref 0–4)
SODIUM SERPL-SCNC: 135 MMOL/L (ref 136–145)
SODIUM UR-SCNC: 56 MMOL/L (ref 20–250)
SP GR UR STRIP: 1 (ref 1–1.03)
SQUAMOUS #/AREA URNS AUTO: 8 /HPF
URN SPEC COLLECT METH UR: ABNORMAL
UUN UR-MCNC: 211 MG/DL (ref 140–1050)
VANCOMYCIN SERPL-MCNC: 27.4 UG/ML
WBC # BLD AUTO: 13.34 K/UL (ref 3.9–12.7)
WBC #/AREA URNS AUTO: 1 /HPF (ref 0–5)

## 2020-02-12 PROCEDURE — 63600175 PHARM REV CODE 636 W HCPCS: Performed by: HOSPITALIST

## 2020-02-12 PROCEDURE — 84300 ASSAY OF URINE SODIUM: CPT

## 2020-02-12 PROCEDURE — 85025 COMPLETE CBC W/AUTO DIFF WBC: CPT

## 2020-02-12 PROCEDURE — 80053 COMPREHEN METABOLIC PANEL: CPT

## 2020-02-12 PROCEDURE — 99233 PR SUBSEQUENT HOSPITAL CARE,LEVL III: ICD-10-PCS | Mod: ,,, | Performed by: HOSPITALIST

## 2020-02-12 PROCEDURE — 25000242 PHARM REV CODE 250 ALT 637 W/ HCPCS: Performed by: HOSPITALIST

## 2020-02-12 PROCEDURE — 63600175 PHARM REV CODE 636 W HCPCS: Performed by: PHYSICIAN ASSISTANT

## 2020-02-12 PROCEDURE — 99900035 HC TECH TIME PER 15 MIN (STAT)

## 2020-02-12 PROCEDURE — 84100 ASSAY OF PHOSPHORUS: CPT

## 2020-02-12 PROCEDURE — 99233 SBSQ HOSP IP/OBS HIGH 50: CPT | Mod: ,,, | Performed by: HOSPITALIST

## 2020-02-12 PROCEDURE — 94761 N-INVAS EAR/PLS OXIMETRY MLT: CPT

## 2020-02-12 PROCEDURE — 81001 URINALYSIS AUTO W/SCOPE: CPT

## 2020-02-12 PROCEDURE — 80202 ASSAY OF VANCOMYCIN: CPT

## 2020-02-12 PROCEDURE — 25000003 PHARM REV CODE 250: Performed by: HOSPITALIST

## 2020-02-12 PROCEDURE — 85730 THROMBOPLASTIN TIME PARTIAL: CPT

## 2020-02-12 PROCEDURE — 99222 1ST HOSP IP/OBS MODERATE 55: CPT | Mod: ,,, | Performed by: INTERNAL MEDICINE

## 2020-02-12 PROCEDURE — 97802 MEDICAL NUTRITION INDIV IN: CPT

## 2020-02-12 PROCEDURE — 94640 AIRWAY INHALATION TREATMENT: CPT

## 2020-02-12 PROCEDURE — 11000001 HC ACUTE MED/SURG PRIVATE ROOM

## 2020-02-12 PROCEDURE — 82570 ASSAY OF URINE CREATININE: CPT

## 2020-02-12 PROCEDURE — 99222 PR INITIAL HOSPITAL CARE,LEVL II: ICD-10-PCS | Mod: ,,, | Performed by: INTERNAL MEDICINE

## 2020-02-12 PROCEDURE — 83735 ASSAY OF MAGNESIUM: CPT

## 2020-02-12 PROCEDURE — 84540 ASSAY OF URINE/UREA-N: CPT

## 2020-02-12 RX ORDER — FUROSEMIDE 10 MG/ML
80 INJECTION INTRAMUSCULAR; INTRAVENOUS 3 TIMES DAILY
Status: DISCONTINUED | OUTPATIENT
Start: 2020-02-12 | End: 2020-02-12

## 2020-02-12 RX ORDER — SODIUM CHLORIDE 0.9 % (FLUSH) 0.9 %
10 SYRINGE (ML) INJECTION
Status: DISCONTINUED | OUTPATIENT
Start: 2020-02-12 | End: 2020-02-16 | Stop reason: HOSPADM

## 2020-02-12 RX ORDER — FENTANYL CITRATE 50 UG/ML
25 INJECTION, SOLUTION INTRAMUSCULAR; INTRAVENOUS EVERY 5 MIN PRN
Status: DISCONTINUED | OUTPATIENT
Start: 2020-02-12 | End: 2020-02-12 | Stop reason: HOSPADM

## 2020-02-12 RX ORDER — MIDAZOLAM HYDROCHLORIDE 1 MG/ML
0.5 INJECTION INTRAMUSCULAR; INTRAVENOUS
Status: DISCONTINUED | OUTPATIENT
Start: 2020-02-12 | End: 2020-02-12 | Stop reason: HOSPADM

## 2020-02-12 RX ORDER — FENTANYL CITRATE 50 UG/ML
25 INJECTION, SOLUTION INTRAMUSCULAR; INTRAVENOUS EVERY 5 MIN PRN
Status: CANCELLED | OUTPATIENT
Start: 2020-02-12

## 2020-02-12 RX ORDER — MUPIROCIN 20 MG/G
OINTMENT TOPICAL
Status: DISCONTINUED | OUTPATIENT
Start: 2020-02-12 | End: 2020-02-12 | Stop reason: HOSPADM

## 2020-02-12 RX ORDER — MIDAZOLAM HYDROCHLORIDE 1 MG/ML
0.5 INJECTION INTRAMUSCULAR; INTRAVENOUS
Status: CANCELLED | OUTPATIENT
Start: 2020-02-12

## 2020-02-12 RX ORDER — HEPARIN SODIUM 5000 [USP'U]/ML
5000 INJECTION, SOLUTION INTRAVENOUS; SUBCUTANEOUS EVERY 8 HOURS
Status: DISCONTINUED | OUTPATIENT
Start: 2020-02-12 | End: 2020-02-16

## 2020-02-12 RX ADMIN — SODIUM BICARBONATE 650 MG TABLET 650 MG: at 12:02

## 2020-02-12 RX ADMIN — INSULIN DETEMIR 10 UNITS: 100 INJECTION, SOLUTION SUBCUTANEOUS at 09:02

## 2020-02-12 RX ADMIN — FUROSEMIDE 80 MG: 10 INJECTION, SOLUTION INTRAMUSCULAR; INTRAVENOUS at 08:02

## 2020-02-12 RX ADMIN — METOPROLOL SUCCINATE 12.5 MG: 25 TABLET, EXTENDED RELEASE ORAL at 08:02

## 2020-02-12 RX ADMIN — CEFEPIME 2 G: 2 INJECTION, POWDER, FOR SOLUTION INTRAVENOUS at 04:02

## 2020-02-12 RX ADMIN — POLYETHYLENE GLYCOL 3350 17 G: 17 POWDER, FOR SOLUTION ORAL at 12:02

## 2020-02-12 RX ADMIN — CALCITRIOL CAPSULES 0.25 MCG 0.25 MCG: 0.25 CAPSULE ORAL at 12:02

## 2020-02-12 RX ADMIN — Medication 6 MG: at 11:02

## 2020-02-12 RX ADMIN — BUSPIRONE HYDROCHLORIDE 5 MG: 5 TABLET ORAL at 09:02

## 2020-02-12 RX ADMIN — HEPARIN SODIUM 5000 UNITS: 5000 INJECTION, SOLUTION INTRAVENOUS; SUBCUTANEOUS at 09:02

## 2020-02-12 RX ADMIN — SODIUM BICARBONATE 650 MG TABLET 650 MG: at 09:02

## 2020-02-12 RX ADMIN — SODIUM BICARBONATE 650 MG TABLET 650 MG: at 04:02

## 2020-02-12 RX ADMIN — ATORVASTATIN CALCIUM 10 MG: 10 TABLET, FILM COATED ORAL at 12:02

## 2020-02-12 RX ADMIN — ESCITALOPRAM OXALATE 10 MG: 10 TABLET ORAL at 12:02

## 2020-02-12 RX ADMIN — SODIUM BICARBONATE 650 MG TABLET 650 MG: at 08:02

## 2020-02-12 RX ADMIN — HEPARIN SODIUM 5000 UNITS: 5000 INJECTION, SOLUTION INTRAVENOUS; SUBCUTANEOUS at 03:02

## 2020-02-12 RX ADMIN — FERROUS SULFATE TAB EC 325 MG (65 MG FE EQUIVALENT) 325 MG: 325 (65 FE) TABLET DELAYED RESPONSE at 12:02

## 2020-02-12 RX ADMIN — FUROSEMIDE 20 MG/HR: 10 INJECTION, SOLUTION INTRAMUSCULAR; INTRAVENOUS at 04:02

## 2020-02-12 RX ADMIN — ACETAMINOPHEN 650 MG: 325 TABLET ORAL at 02:02

## 2020-02-12 RX ADMIN — FUROSEMIDE 80 MG: 10 INJECTION, SOLUTION INTRAMUSCULAR; INTRAVENOUS at 12:02

## 2020-02-12 RX ADMIN — DOCUSATE SODIUM 50 MG: 50 CAPSULE, LIQUID FILLED ORAL at 12:02

## 2020-02-12 RX ADMIN — PANTOPRAZOLE SODIUM 40 MG: 40 TABLET, DELAYED RELEASE ORAL at 12:02

## 2020-02-12 RX ADMIN — BUSPIRONE HYDROCHLORIDE 5 MG: 5 TABLET ORAL at 12:02

## 2020-02-12 RX ADMIN — IPRATROPIUM BROMIDE AND ALBUTEROL SULFATE 3 ML: .5; 3 SOLUTION RESPIRATORY (INHALATION) at 07:02

## 2020-02-12 NOTE — PROGRESS NOTES
"Progress Note  Hospital Medicine       Patient Name: Bina Daniels  MRN:  [id number]  Hospital Medicine Team: [facility] HOSP MED K Christine Palomino MD  Date of Admission:  2/10/2020     Principal Problem:  Sepsis   Primary Care Physician: Tho Haro MD      History of Present Illness:     Ms. Bina Daniels is a 64 y.o. female with a history of type 2 DM, stage 4 CKD, HTN, LAI, neuropathy, LAI, depressionn who was in her usual state of health until the week or so before gracy this year when she fell and thought she just sprained her ankle, per ortho notes she "didn't think it ws that bad", she then saw a Dr Sotomayor who was unable to reduce the ankle and dx a fx in the Right ankle and sent to [place] ER for treatment on 12/26, she went for attempted ORIF on 12/27 with Dr Medina with [facility] orth[facility] who was unable to complete procedure due to intraOp bleeding in screw holes, placed in splint and procedure aborted/2U given, on 12/27 ORIF completed to R ankle fx with recs for nonweight bearing at that time, SNF was recommended. She had prolonged hospital stay for JENNI on stage 4 CKD issues- nephro Dr Saenz and [facility] in [place] followed for med issues, she decided to go home and not SNF on 1/8. Per her family at bedside, she was getting home health to surgical site and was getting home PT/OT, they didn't seem thrilled with her choice to not do SNF, their brother who is her primary caregiver was not present in ER (daughter and son present in ER now and dont know all of med hx). In interim they didn't know she was having wound issues and dont report that they knew about it until this weekend that the site had opened up as well as worsening dyspnea and fluid/pitting edema in legs, arms as well. They report subjective chills at home but no known fevers, as well as her being slightly "off" and not herself completely now. They brought her for evlauation for both of above, and as seen in ER pictures the wound has opened and " "hardware visible. Per ER notes, [facility] ortho was attempted called from ER but "no answer"' reported so they admitted to hospital medicine here, ortho has been called here, family reports not seen yet. Given vanc, zosyn, lasix, tdap in ER. SHe missed her ortho f/u last week and they had rescheduled for this week but when they saw the wound they brought her to the ER.    On exam she reports some SOB and denies fever,chills. Reports some mild leg pain now. Patient sleepy on exam but can answer basic questions. Family is unsnure if she missed home meds but they think a few days possibly, [place] records report being on lasix 40 BID at home. Family to bring home meds (her son who manages this), but most recent nephro MAR before discharge showed the following:  norvasc 10, asa81, calcitriol .25, colace, lexapro 10 iron 325, fluticasone, levemir 10, toprol 50 BID, protonix, nabicarb 650 4 times day. At one point was on also glyburide/metformin in edie and at home and lasix 40 BID, unsure if held on discharge or not as dc summary has no MAR, prolia injections, neurontin.    Med Hx:  Type 2 DM- A1C 9.9, stage 4 CKD- Cr baseline 2.3-2.5, iron deficiency/acute blood loss anemia, ankle fx- ORIF 12/29, neuropathy, breast ca s/p lumpectomy, depression, ?CAD, chronic metabolic acidosis, secondary hyperparathyroidism from CKD, HTN, hypoalbuminemia    Social: son not at bedside is primary caregiver, mutiple children assist in ADLS, reference to POA now in ER note- will clarify further. Walker use after fx, more bedbound now after surgery, SNF was rec but was at home. Smoker, stopped 10 years ago. No alcohol or drugs.    MEd Hx:     Review of Systems   Constitutional: positive for chills, fatigue, fever.   HENT: Negative for sore throat, trouble swallowing.    Eyes: Negative for photophobia, visual disturbance.   Respiratory: positive for cough, shortness of breath.    Cardiovascular: Negative for chest pain, palpitations, positive leg " swelling.   Gastrointestinal: Negative for abdominal pain, constipation, diarrhea, nausea, vomiting.   Endocrine: Negative for cold intolerance, heat intolerance.   Genitourinary: Negative for dysuria, frequency.   Musculoskeletal: positive for arthralgias, myalgias.   Skin positive for rash, wound, erythema   Neurological: Negative for dizziness, syncope, weakness, light-headedness.   Psychiatric/Behavioral: Negative for confusion, hallucinations, anxiety  All other systems reviewed and are negative.         Interval History:    Overnight I/O not accurate it appears, patient was on 3L during day, had dyspnea whlie sleeping, and up to 6L overnight, increased lasix to 80 TID this AM with improvements in at least 800 cc output through day. Patient looking to be clearing cultures and is stable from bacteremia/sepsis standpoint it appears now. Discussed with anesthesia and ortho this AM. It will take days to become euvolemic as was grossly overloaded on admission, may even take until next week as pitting edema significant on admission and dyspnea with laying flat also significant and between 3-6L in the last 2 days and diuresis has been spotty especially with poor documented I/O in the last day. Mcdaniel order placed this AM as purewick even has not given us good output documentation. Decision to postpone surgery now as the risk is there to get source control with ankle but as the sepsis seems to have improved (repeat cx neg, no fevers, vitals stable ie not on pressors, etc) that the surgery while is semi urgent, can be delayed until the respiratory status is better once more diuresis is achieved. Discussed on phone with cards fellow as well further today, would go to lasix drip in interim which was started at 20 today. Discussed at length this AM with daughter the POA at bedside plans for diuresis. ID following for infection (Strep, GN bacteremia), cultures pending, vanc/cefepime in interim, bicarb improving at 19 today,  Hg downtrend to 8, HR controlled in 80s, long acting beta blocker started with pAfib yesterday, discussed with cards [id number] of at least 4 will need long term anticoags, as the clots in arms are superficial veins, can do ppx anticoagulation for afib and dvt ppx in interim and back on heparin 5k now until surg and drip stopped. Holding off on case request for ankle until we see how diuresis goes in next few days as again, it could be days until we approach closer to euvolemia given her overload on admit.        Past Medical History: Patient has a past medical history of Breast cancer, CAD (coronary artery disease), Cancer, CKD (chronic kidney disease), Diabetes mellitus, HTN (hypertension), and Peripheral neuropathy.    Past Surgical History: Patient has a past surgical history that includes Breast surgery (Left); Cholecystectomy; Tonsillectomy; Hysterectomy; Open reduction and internal fixation (ORIF) of injury of ankle (Right, 12/29/2019); Closure of wound (Right, 12/29/2019); and Open reduction and internal fixation (ORIF) of injury of ankle (Right, 12/27/2019).    Social History: Patient reports that she has quit smoking. She started smoking about 16 years ago. She has never used smokeless tobacco. She reports that she does not drink alcohol or use drugs.    Family History: family history includes Breast cancer in her maternal grandmother.    Medications: Scheduled Meds:   atorvastatin  10 mg Oral Daily    busPIRone  5 mg Oral BID    calcitRIOL  0.25 mcg Oral Daily    ceFEPime (MAXIPIME) IVPB  2 g Intravenous Q24H    docusate sodium  50 mg Oral Daily    escitalopram oxalate  10 mg Oral Daily    ferrous sulfate  325 mg Oral Daily    fluticasone propionate  2 spray Each Nostril Daily    heparin (porcine)  5,000 Units Subcutaneous Q8H    insulin detemir U-100  10 Units Subcutaneous QHS    metoprolol succinate  12.5 mg Oral Daily    pantoprazole  40 mg Oral Daily    polyethylene glycol  17 g Oral  Daily    sodium bicarbonate  650 mg Oral QID     Continuous Infusions:   furosemide (LASIX) 2 mg/mL continuous infusion (non-titrating)       PRN Meds:.acetaminophen, albuterol-ipratropium, Dextrose 10% Bolus, Dextrose 10% Bolus, dextrose 50%, dextrose 50%, glucagon (human recombinant), glucagon (human recombinant), glucose, glucose, glucose, glucose, insulin aspart U-100, melatonin, ondansetron, sodium chloride 0.9%, sodium chloride 0.9%, Pharmacy to dose Vancomycin consult **AND** vancomycin - pharmacy to dose    Allergies: Patient is allergic to sulfa (sulfonamide antibiotics).    Physical Exam:     Vital Signs (Most Recent):  Temp: 98.1 °F (36.7 °C) (02/12/20 1609)  Pulse: 88 (02/12/20 1609)  Resp: 20 (02/12/20 1609)  BP: (!) 144/66 (02/12/20 1609)  SpO2: 95 % (02/12/20 1609) Vital Signs Range (Last 24H):  Temp:  [96.4 °F (35.8 °C)-98.7 °F (37.1 °C)]   Pulse:  [80-88]   Resp:  [18-20]   BP: (115-144)/(57-76)   SpO2:  [91 %-96 %]    Body mass index is 23.69 kg/m².     Physical Exam:  Constitutional:  Alert, family at bedside. answers questions appropriately. On NC. - 3L.   Head: Normocephalic and atraumatic.   Mouth/Throat: Oropharynx is clear and moist.   Eyes: EOM are normal. Pupils are equal, round, and reactive to light. No scleral icterus.   Neck: Normal range of motion. Neck supple. JVP elevated.  Cardiovascular: irregularly irregular rhythm, rate of 85.  No murmur heard.  Pulmonary/Chest: increased WOB, sitting at 60 degree ankle but mild improvement since yesterday, less crackles, minimal use of accessory muscles, no tripoding,  RR 18, on NC  Abdominal: Soft. Bowel sounds are normal.  mild distension, anasarca.  Musculoskeletal: open R ankle wound with hardware visible (see ER note), wrapped now. Pulses present bialterally, warm to touch.   Neurological: Alert and oriented to person, place, and time.   Skin: Skin is warm and dry. Diffuse pitting edema to the hips bilaterally, 3+ pitting edema in legs,  arms with edema RUE >LUE, mild tenderness with pressing on edema in extremities. Wound to RLE. Anasarca.  Psychiatric: Normal mood and affect. Behavior is normal. reports some depression from this, occasional tearful to nursing.  Vitals reviewed.    Recent Labs   Lab 02/10/20  2110 02/11/20  0404 02/12/20  0542   WBC 14.87* 14.47*  14.47* 13.34*   HGB 10.1* 8.9*  8.9* 7.9*   HCT 34.2* 30.2*  30.2* 26.7*   * 481*  481* 419*       Recent Labs   Lab 02/10/20  1129 02/11/20  0404 02/12/20  0541   * 136 135*   K 4.7 4.1 4.0    107 106   CO2 14* 16* 19*   BUN 74* 72* 76*   CREATININE 3.0* 3.0* 2.9*   * 213* 100   CALCIUM 8.4* 7.9* 7.5*   MG 2.1 1.9 1.9   PHOS 5.2* 5.6* 5.7*     Recent Labs   Lab 02/10/20  1129 02/10/20  1741 02/10/20  2110 02/11/20  0404 02/12/20  0541   ALKPHOS 129  --   --  97 99   ALT 6*  --   --  6* 6*   AST 9*  --   --  7* 6*   ALBUMIN 1.9*  --   --  1.5* 1.4*   PROT 6.4  --   --  5.2* 4.8*   BILITOT 0.3  --   --  0.3 0.3   INR  --  1.3* 1.3*  --   --       Recent Labs   Lab 02/11/20  0807 02/11/20  1140 02/11/20  1700 02/11/20  2044 02/12/20  0729 02/12/20  1024   POCTGLUCOSE 189* 171* 228* 212* 82 65*         Assessment and Plan:     Ms. Bina Daniels is a 64 y.o. 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 [T84.7XXA]  Yes     Priority: 2     Bacteremia [R78.81]  Yes     Priority: 3     Acute on chronic combined systolic and diastolic heart failure [I50.43]  Yes     Priority: 3     Anasarca associated with disorder of kidney [N04.9]  Yes     Priority: 4     Postoperative infection [T81.40XA]  Yes     Priority: 5     Volume overload [E87.70]  Yes     Priority: 6     Acute thrombosis of superficial veins of both upper extremities [I82.613]  Yes    Paroxysmal atrial fibrillation [I48.0]  Yes    Iron deficiency anemia [D50.9]  Yes     Vitamin D deficiency [E55.9]  Yes    Metabolic acidosis [E87.2]  Yes    Secondary hyperparathyroidism [N25.81]  Yes    Acute metabolic encephalopathy [G93.41]  Yes    Hypertension [I10]  Yes    Hypoalbuminemia [E88.09]  Yes    Acute hypoxemic respiratory failure [J96.01]  Yes    Breast cancer [C50.919]  Yes    S/P ORIF (open reduction internal fixation) fracture [Z98.890, Z87.81]  Not Applicable     Ankle, 12/29      Depression [F32.9]  Yes    Neuropathy due to secondary diabetes mellitus [E13.40]  Yes     Chronic    Type 2 diabetes mellitus with neurologic complication, without long-term current use of insulin [E11.49]  Yes     Chronic    Anemia due to stage 3 chronic kidney disease [N18.3, D63.1]  Yes      Resolved Hospital Problems    Diagnosis Date Resolved POA    CKD (chronic kidney disease), stage IV [N18.4] 02/12/2020 Yes     Chronic     Sepsis secondary to infected hardware in right leg/wound infection Right ankle  Strep bacteremia. Gram negative bacteremia  S/p ORIF, s/p R ankle fracture  -patient with open surgical wound, hardware exposed on R ankle, lactate 1.3, procal mildly up at .91, , ESR 68, unsure timing of when the wound started becoming clearly infected and open to air  -s/p ORIF 12/29 with [facility] ortho in [place], aborted surgery 12/27 due to intraOp bleeding, walked on the fx for at least a week before that per Dr Medina notes. nonweight bearing to RLE, continue this NWB status to RLE now, bedrest for now until ortho assessment. Bandaged now.   -wbc 17 on admit, toxic granulations seen on diff--> 13 today  -Xray on admit showing widened tibotalar interval, cortical irregularity, edema, post op changes vs site infections  -blood CX on admit 2/10 with Strep in 1/4 bottles, GNR 2/4 bottles. ID consulted to assist, vanc/cefepime now. Repeat Cx 2/11 NGTD  -plan for washout in future with ortho, date TBD, too high risk currently given active acute decompensated heart failure per  discussions today 2/12 as didn't diurese as well as expected overnight and if euvolemic is needed for this then we likely have days before that is going to be obtained. As her sepsis/bacteremia seems to be stable (not on pressors, cultures are clearning in blood, afebrile) the risk of delay seems to be acceptable. If she was having florid sepsis issues and source control was immediately necessary then the benefit of this sooner would be tricky, but she appears to be responding to antibiotics for the infection from the ankle, so delay to get her respiratory status better in the interim seems to be prudent now. When she is ready for surgery, she is high risk- RCRI is high at 3 which gives a 15% 30 day risk of death, cardiac event, MI.      Anasarca  Acute on chronic systolic/ LV/RV diastolic heart failure (EF 10%)  Volume overload  Acute hypoxemic respiratory failure  -massive volume overload on admit, was supposed to be on 40 mg lasix at one point, patient says was getting it BID  -strict I/O, daily weight, 2 grams Na, 1.5L fluid restrict  -pitting everywhere on exam, arms, legs, chest. Fluid probably worsened the surg site to open up also  -albumin very low, started boost, will check Pr/Cr with CKD, pending  -purewick to watch output- not collected well, heaton placed 2/12.   -no echo on file on admit, presumed reduced EF and diastolic dysfunction on admit.. 2/11 with echo showing EF 10%, diastolic dysfunction- LV, RV, LAE, increased LAP, LVP, PAP elevated, MR.   -2/12: between 3-6L oxygen, diuresis not documented well, lasix IV in AM and onto lasix drip in PM per cards recs to assist with diuresis further. Started low dose toprol XL for afib 2/11 (was already on at home but was on short acting)  - Will need ace/arb long term. Will need consideration of life vest given depressed EF also  -RCRI 3, 15% risk of 30 day mortality post op. Optimizing with diuresis in interim     Acute kidney injury on Stage 4  CKD  -baseline Cr 2.3 to 2.8, trended up to 2.8 in January in [place], seen by nephro, tae Mckeon with nephro christophe Vieyra notes  -UA with protein, few bacteria. CK wnl. urine na, urine pr/cr, urine cr, urine bun collected and pending.  -avoid nephrotoxins  -likely element of cardiorenal worsening baseline, diuresis needed  2/11: 3.0 still, urine studies not collected, may be her new baseline as was 2.8 in [place] in January.  2/11: 2.9, output improving and heaton to know good I/o     Type 2 dm  -A1C 9.9, glucose 313 on CMP, on levemir 10 U at [place] recently, unsure what she went home on, Toujeo on MAR, metformin and glyburide also there, clearly those are not appropriate with this CKD and need holding now and likely forever  -SSI, levemir 10 to start as had hypoglycemia to glucose 30s reviewing [place] records also, titrate up PRN  -hold asa   -neuropathy at baseline    Paroxysmal Atrial fibrillation  -new finding 2/11 on ekg and echo, NSR on admit, likely stress of sepsis and depressed ef contributors, suspect will go in and out given LAE, EF probably wont stay out of it  -[id number] of at least 4, will need long term anticoagulation, eliquis will be only noac given ckd option.    Upper extremity superficial thrombosis  -right cephalic, left basilic  -superficial, low risk to embolize  -can do ppx heparin pre-op, long term after all surgeries done, options are eliquis vs coumadin, prefer eliquis.    Metabolic acidosis  -secondary to CKD, resume nabicarb, improving from 14 at admit to 19 now    Anemia of chronic renal disease  Iron deficiency anemia  -tx recently in [place], sharri low at 57, continue daily iron  -hg 9.9-->7.9 no signs of active bleeding but inflammation in ankle may cause acute inflammatory drop in hg. Trend, if under 7 would transfuse    Hypoalbuminemia  -boost with meals  -check pr/cr-pending now.    Secondary hyperparathyroidism  -, daily phos  -renal diet  -cacitriol daily    Vitamin D  deficiency  Osteoporosis  -on prolia outpatient  -vit D low at 5 on check in January, dont see replacement on MAR. Will consider long term once out of acute illness      Breast cancer hx  -lumpectomy in past. Stable    Depression  -cont home lexapro  -endorses depression symptoms worse now with acute medical issues. Watch closely for worsenign now    Acute metabolic encephalopathy  -likely from sepsis, mild, monitor closely  -improved 2/11, at baseline now           Diet:  Low na, 1.5L, renal/dm      DVT PPx:  Heparin ppx         Disposition:  Diuresis, surgery date TBD pending diuresis

## 2020-02-12 NOTE — PROGRESS NOTES
Pharmacokinetic Assessment Follow Up: IV Vancomycin    Vancomycin serum concentration assessment(s):    The random level was drawn correctly and can be used to guide therapy at this time. The measurement is above the desired definitive target range of 15 to 20 mcg/mL.    Vancomycin Regimen Plan:    Re-dose when the random level is less than 20 mcg/mL, next level to be drawn at 0430 on 2/13    Drug levels (last 3 results):  Recent Labs   Lab Result Units 02/11/20  0404 02/12/20  0541   Vancomycin, Random ug/mL 18.5 27.4       Pharmacy will continue to follow and monitor vancomycin.    Please contact pharmacy at extension [phone] for questions regarding this assessment.    Thank you for the consult,   Mindy Ellis       Patient brief summary:  Bina Daniels is a 64 y.o. female initiated on antimicrobial therapy with IV Vancomycin for treatment of sepsis    The patient's current regimen is vancomycin pulse dosing.    Drug Allergies:   Review of patient's allergies indicates:   Allergen Reactions    Sulfa (sulfonamide antibiotics) Hives       Actual Body Weight:   57.9 kg    Renal Function:   Estimated Creatinine Clearance: 17.4 mL/min (A) (based on SCr of 2.9 mg/dL (H)).,     Dialysis Method (if applicable):  N/A    CBC (last 72 hours):  Recent Labs   Lab Result Units 02/10/20  1129 02/10/20  2110 02/11/20  0404 02/12/20  0542   WBC K/uL 16.00* 14.87* 14.47*  14.47* 13.34*   Hemoglobin g/dL 9.9* 10.1* 8.9*  8.9* 7.9*   Hemoglobin A1C %  --   --  10.0*  --    Hematocrit % 33.9* 34.2* 30.2*  30.2* 26.7*   Platelets K/uL 536* 501* 481*  481* 419*   Gran% % 90.1* 92.4* 90.3*  90.3* 89.0*   Lymph% % 3.1* 1.7* 3.0*  3.0* 3.7*   Mono% % 5.5 4.2 5.1  5.1 5.4   Eosinophil% % 0.1 0.1 0.3  0.3 0.7   Basophil% % 0.3 0.3 0.1  0.1 0.1   Differential Method  Automated Automated Automated  Automated Automated       Metabolic Panel (last 72 hours):  Recent Labs   Lab Result Units 02/10/20  1129 02/11/20  0406  02/12/20  0541   Sodium mmol/L 134* 136 135*   Potassium mmol/L 4.7 4.1 4.0   Chloride mmol/L 105 107 106   CO2 mmol/L 14* 16* 19*   Glucose mg/dL 313* 213* 100   BUN, Bld mg/dL 74* 72* 76*   Creatinine mg/dL 3.0* 3.0* 2.9*   Albumin g/dL 1.9* 1.5* 1.4*   Total Bilirubin mg/dL 0.3 0.3 0.3   Alkaline Phosphatase U/L 129 97 99   AST U/L 9* 7* 6*   ALT U/L 6* 6* 6*   Magnesium mg/dL 2.1 1.9 1.9   Phosphorus mg/dL 5.2* 5.6* 5.7*       Vancomycin Administrations:  vancomycin given in the last 96 hours                   vancomycin in dextrose 5 % 1 gram/250 mL IVPB 1,000 mg (mg) 1,000 mg New Bag 02/11/20 1336    vancomycin 1.5 g in dextrose 5 % 250 mL IVPB (ready to mix) (mg) 1,500 mg New Bag 02/10/20 1312                Microbiologic Results:  Microbiology Results (last 7 days)     Procedure Component Value Units Date/Time    Blood culture [[id number]] Collected:  02/11/20 0801    Order Status:  Completed Specimen:  Blood Updated:  02/12/20 1012     Blood Culture, Routine No Growth to date      No Growth to date    Blood culture [[id number]] Collected:  02/11/20 0806    Order Status:  Completed Specimen:  Blood Updated:  02/12/20 1012     Blood Culture, Routine No Growth to date      No Growth to date    Blood Culture #2 **CANNOT BE ORDERED STAT** [[id number]] Collected:  02/10/20 1147    Order Status:  Completed Specimen:  Blood from Peripheral, Antecubital, Left Updated:  02/11/20 0323     Blood Culture, Routine Gram stain sagrario bottle: Gram negative rods       Results called to and read back by:Asuncion Durant RN 02/11/2020  03:22    Blood Culture #1 **CANNOT BE ORDERED STAT** [[id number]] Collected:  02/10/20 1142    Order Status:  Completed Specimen:  Blood from Peripheral, Wrist, Right Updated:  02/11/20 0323     Blood Culture, Routine Gram stain aer bottle: Gram positive cocci in chains resembling Strep      Gram stain sagrario bottle: Gram negative rods      Results called to and read back by:Asuncion Durant RN 02/11/2020  03:22     Culture, Respiratory with Gram Stain [[id number]]     Order Status:  No result Specimen:  Respiratory     Influenza A & B by Molecular [[id number]] Collected:  02/10/20 1133    Order Status:  Completed Specimen:  Nasopharyngeal Swab Updated:  02/10/20 1210     Influenza A, Molecular Negative     Influenza B, Molecular Negative     Flu A & B Source Nasal swab

## 2020-02-12 NOTE — PLAN OF CARE
02/12/20 1008   Post-Acute Status   Post-Acute Authorization Placement   Post-Acute Placement Status Awaiting Therapy Documentation   SW following patient for discharge needs. Awaiting pt/ot recs at this time. SW in communication with CM and patient care team.  Rosa Saavedra, LMSW [facility]     (3:28PM) SW received 142 and uploaded to right care.  Rosa Saavedra LMSW [facility]

## 2020-02-12 NOTE — PLAN OF CARE
Recommendations    1. As medically appropriate, advance as tolerated to renal diet.   2. Continue Boost Glucose Control TID. If renal function is a concern, suggest Novasource Renal.   3. RD to monitor & follow up.     Goals: Pt to receive nutrition by RD follow up  Nutrition Goal Status: new  Communication of RD Recs: other (comment)(POC)

## 2020-02-12 NOTE — PLAN OF CARE
Pt AAOx4. Intermittent anxiety noted. Pain assessed, meds given and patient reposition >Q2 hours. Skin assessed, wounds noted and assessed. HAPI protocol maintained. Neurovascular intact. Respiratory status assessed, humidified O2 via NC maintained. Productive cough noted. Free from falls. Frequent rounds made for pain, potty and repositioning. Call light within reach, bed at lowest point with side rails up x3. Pt NPO since midnight due to pending surgery in AM.

## 2020-02-12 NOTE — ASSESSMENT & PLAN NOTE
Bina Daniels is a 64 y.o. female with medial and lateral wound dehiscence sp fixation right ankle.  - nonweightbearing right lower extremity  - echo returned 10% EF  - NPO since midnight    To OR today

## 2020-02-12 NOTE — CONSULTS
2/10/2020    CC: Heart Failure Management     HPI:  Bina Daniels is a 64 y.o. lady who we are asked to help with heart failure management.     She admitted for chronic osteomyelitis surgical would dehiscence the plan is to take her to the OR for I&D with removal of the hardware of the right ankle. Complicating this is her heart failure as she has heart failure with reduced EF of 10% and is acutely decompensated so cardiology has been asked to help with diuresis.     PMH:  Past Medical History:   Diagnosis Date    Breast cancer     CAD (coronary artery disease)     Cancer     CKD (chronic kidney disease)     Diabetes mellitus     HTN (hypertension)     Peripheral neuropathy        PSH:  Past Surgical History:   Procedure Laterality Date    BREAST SURGERY Left     lumpectomy    CHOLECYSTECTOMY      CLOSURE OF WOUND Right 12/29/2019    Procedure: CLOSURE, WOUND;  Surgeon: Cooper Medina MD;  Location: [facility] OR;  Service: Orthopedics;  Laterality: Right;    HYSTERECTOMY      OPEN REDUCTION AND INTERNAL FIXATION (ORIF) OF INJURY OF ANKLE Right 12/29/2019    Procedure: OPEN REDUCTION INTERNAL FIXATION-ANKLE;  Surgeon: Cooper Medina MD;  Location: [facility] OR;  Service: Orthopedics;  Laterality: Right;  C-arm, Synthes small frag set, cannulated screws notified confirmed with Mohit /synthesis   Arthrex, tightrope anchors/ Ansley notified and confirmed /xray notified KB    OPEN REDUCTION AND INTERNAL FIXATION (ORIF) OF INJURY OF ANKLE Right 12/27/2019    Procedure: OPEN REDUCTION INTERNAL FIXATION-ANKLE;  Surgeon: Cooper Medina MD;  Location: [facility] OR;  Service: Orthopedics;  Laterality: Right;  C-arm, Synthes small frag set, cannulated screws, C-arm    TONSILLECTOMY         Family:  Family History   Problem Relation Age of Onset    Breast cancer Maternal Grandmother        Social:  Social History     Socioeconomic History    Marital status:      Spouse name: Not on file    Number of  children: Not on file    Years of education: Not on file    Highest education level: Not on file   Occupational History    Not on file   Social Needs    Financial resource strain: Not on file    Food insecurity:     Worry: Not on file     Inability: Not on file    Transportation needs:     Medical: Not on file     Non-medical: Not on file   Tobacco Use    Smoking status: Former Smoker     Start date: 7/8/2003    Smokeless tobacco: Never Used   Substance and Sexual Activity    Alcohol use: No    Drug use: No    Sexual activity: Not Currently   Lifestyle    Physical activity:     Days per week: Not on file     Minutes per session: Not on file    Stress: Not on file   Relationships    Social connections:     Talks on phone: Not on file     Gets together: Not on file     Attends Anabaptism service: Not on file     Active member of club or organization: Not on file     Attends meetings of clubs or organizations: Not on file     Relationship status: Not on file   Other Topics Concern    Not on file   Social History Narrative    Not on file       Medications:  No current facility-administered medications on file prior to encounter.      Current Outpatient Medications on File Prior to Encounter   Medication Sig Dispense Refill    aspirin 81 MG Chew Take 81 mg by mouth once daily.      atorvastatin (LIPITOR) 10 MG tablet       busPIRone (BUSPAR) 5 MG Tab Take 5 mg by mouth 2 (two) times daily.      escitalopram oxalate (LEXAPRO) 10 MG tablet Take 10 mg by mouth once daily.      furosemide (LASIX) 80 MG tablet Take 80 mg by mouth 2 (two) times daily.      HYDROcodone-acetaminophen (NORCO) 5-325 mg per tablet Take 1 tablet by mouth every 6 (six) hours as needed for Pain.      losartan (COZAAR) 50 MG tablet       metoprolol tartrate (LOPRESSOR) 50 MG tablet Take 50 mg by mouth 2 (two) times daily.      acetaminophen (TYLENOL) 325 MG tablet Take 2 tablets (650 mg total) by mouth every 6 (six) hours as  needed for Pain or Temperature greater than (101). 10 tablet 0    acetaminophen (TYLENOL) 325 MG tablet Take 2 tablets (650 mg total) by mouth every 6 (six) hours as needed for Pain. 10 tablet 0    denosumab (PROLIA) 60 mg/mL Syrg Inject 60 mg into the skin every 6 (six) months.      gabapentin (NEURONTIN) 300 MG capsule       glyBURIDE-metformin 2.5-500 mg (GLUCOVANCE) 2.5-500 mg per tablet 1 tablet once daily.      hyoscyamine (ANASPAZ,LEVSIN) 0.125 mg Tab Take 125 mcg by mouth every 4 (four) hours as needed.      pantoprazole (PROTONIX) 40 MG tablet Take 40 mg by mouth once daily.      TOUJEO SOLOSTAR 300 unit/mL (1.5 mL) InPn pen       traMADol (ULTRAM) 50 mg tablet          Allergies:  Review of patient's allergies indicates:   Allergen Reactions    Sulfa (sulfonamide antibiotics) Hives       Tobacco, alcohol, drugs:  Social History     Tobacco Use   Smoking Status Former Smoker    Start date: 7/8/2003   Smokeless Tobacco Never Used     Social History     Substance and Sexual Activity   Alcohol Use No     Social History     Substance and Sexual Activity   Drug Use No       ROS:  Review of Systems   All other systems reviewed and are negative.      Vitals:  Temp: 98.7 °F (37.1 °C) (02/12/20 1030)  Pulse: 86 (02/12/20 1030)  Resp: 20 (02/12/20 1030)  BP: 130/61 (02/12/20 1030)  SpO2: 96 % (02/12/20 1030)  Temp:  [96.4 °F (35.8 °C)-98.7 °F (37.1 °C)]   Pulse:  [80-93]   Resp:  [16-20]   BP: (115-138)/(57-76)   SpO2:  [90 %-96 %]     Ins/Outs:    Intake/Output Summary (Last 24 hours) at 2/12/2020 1427  Last data filed at 2/12/2020 1126  Gross per 24 hour   Intake 195 ml   Output 625 ml   Net -430 ml       PE:  Physical Exam   GEN: Alert and oriented in NAD  NECK: + JVD appreciated to ear  CVS: RRR, s1/s2, no MRG  PULM: Rales throughout  ABD: NT/ND BS +  Extremities: warm and dry, palpable pulses, mild edema  NEURO: Alert and oriented x 3  PSYCH: appropriate affect.         Labs:  CBC with Diff:   Recent  Labs   Lab 02/10/20  2110 02/11/20  0404 02/12/20  0542   WBC 14.87* 14.47*  14.47* 13.34*   HGB 10.1* 8.9*  8.9* 7.9*   HCT 34.2* 30.2*  30.2* 26.7*   * 481*  481* 419*   LYMPH 1.7*  0.3* 3.0*  3.0*  0.4*  0.4* 3.7*  0.5*   MONO 4.2  0.6 5.1  5.1  0.7  0.7 5.4  0.7   EOSINOPHIL 0.1 0.3  0.3 0.7       COAG:  Recent Labs   Lab 02/10/20  1741 02/10/20  2110  02/11/20  1201 02/11/20 1949 02/12/20  0542   APTT  --  28.2   < > 35.5* 41.5* 27.7   INR 1.3* 1.3*  --   --   --   --     < > = values in this interval not displayed.       CMP:   Recent Labs   Lab 02/10/20  1129 02/11/20  0404 02/12/20  0541   * 213* 100   CALCIUM 8.4* 7.9* 7.5*   ALBUMIN 1.9* 1.5* 1.4*   PROT 6.4 5.2* 4.8*   * 136 135*   K 4.7 4.1 4.0   CO2 14* 16* 19*    107 106   BUN 74* 72* 76*   CREATININE 3.0* 3.0* 2.9*   ALKPHOS 129 97 99   ALT 6* 6* 6*   AST 9* 7* 6*   BILITOT 0.3 0.3 0.3   MG 2.1 1.9 1.9   PHOS 5.2* 5.6* 5.7*     Estimated Creatinine Clearance: 17.4 mL/min (A) (based on SCr of 2.9 mg/dL (H)).    .  Recent Labs   Lab 02/10/20  1129 02/10/20  1741   CPK  --  20   TROPONINI 0.022  --    BNP 2,174*  --          Diagnostic Results:  Ejection Fractions   No results found for: EF     Assessment and Plan  Bina Daniels is a 64 y.o. lady who we have been asked to help with heart failure management.     Acute decompensated systolic and diastolic heart failure.   Pt with EF of 10% unknown if ischemic or non-ischemic, however is overloaded at this time with JVD noted to the earlobe and rales throughout.     Plan  Would not pursue surgery until euvolemic   Would give lasix 80 mg IV push then start on a lasix gtt at 20/hr  If this is not effective can give diuril 500 mg BID to help with diuresis  Strict I&Os, Mcdaniel  Will follow along.     Heaven Nicholson MD  Cardiology Fellow  Pager [phone]

## 2020-02-12 NOTE — NURSING
"Pt c/o "I can't breathe good". O2 sat and breath sounds assessed. Expiratory wheezing auscultated. Respiratory therapist notified for need of treatment, states she will see pt in a few minutes.   "

## 2020-02-12 NOTE — PROGRESS NOTES
"[facility]  Adult Nutrition  Progress Note    SUMMARY       Recommendations    Recommendation:   1. As medically appropriate, advance as tolerated to renal diet.   2. Continue Boost Glucose Control TID. If renal function is a concern, suggest Novasource Renal.   3. RD to monitor & follow up.    Goals: Pt to receive nutrition by RD follow up  Nutrition Goal Status: new  Communication of RD Recs: other (comment)(POC)    Reason for Assessment    Reason For Assessment: other (see comments)(A1c > 9)  Diagnosis: infection/sepsis(sepsis)  Relevant Medical History: DM  General Information Comments: Pt lethargic this AM with family member at bedside. NPO for hardware removal and washout today. Family reports pt w/ decreased appetite x 2 days since admission but otherwise believes pt w/ good PO intake PTA (family member does not live with pt). Family denies wt changes, wt has been stable ~138# x 1 month per chart. Pt appears nourished with age appropriate wasting, but is at risk for malnutrition if PO intake does not improve.     Provided the handout "Meal Planning Using the Plate Method" and discussed A1c, goal A1c, and diabetic diet guidelines with family member. Family voiced understanding.    Nutrition Discharge Planning: Adequate PO intake on diabetic diet    Nutrition Risk Screen    Nutrition Risk Screen: no indicators present    Nutrition/Diet History    Spiritual, Cultural Beliefs, Congregation Practices, Values that Affect Care: no    Anthropometrics    Temp: 98 °F (36.7 °C)  Height: 5' 4" (162.6 cm)  Height (inches): 64 in  Weight: 62.6 kg (138 lb)  Weight (lb): 138 lb  Ideal Body Weight (IBW), Female: 120 lb  % Ideal Body Weight, Female (lb): 115 %  BMI (Calculated): 23.7    Lab/Procedures/Meds    Pertinent Labs Reviewed: reviewed  Pertinent Labs Comments: Na 135, BUN 76, Cr 2.9, GFR 16.7, Ca 7.5, phos 5.7, Alb 1.4, .6, A1c 10 (2/11/2020)  Pertinent Medications Reviewed: reviewed  Pertinent " Medications Comments: acetaminophen, statin, ferrous sulfate, lasix, heparin, insulin, pantoprazole    Estimated/Assessed Needs    Weight Used For Calorie Calculations: 62.6 kg (138 lb 0.1 oz)  Energy Calorie Requirements (kcal): 5737-2702 kcal/day  Energy Need Method: Kcal/kg(25-30)  Protein Requirements: 75-88 g/day(1.2-1.4 g/kg)  Weight Used For Protein Calculations: 62.6 kg (138 lb 0.1 oz)  Fluid Requirements (mL): per MD or 1 mL/kcal     RDA Method (mL): 1565  CHO Requirement: 196g CHO/day or 50% total kcal    Nutrition Prescription Ordered    Current Diet Order: NPO  Oral Nutrition Supplement: Boost Glucose Control TID    Evaluation of Received Nutrient/Fluid Intake    Comments: LBM 2/8  % Intake of Estimated Energy Needs: 0 - 25 %  % Meal Intake: NPO    Nutrition Risk    Level of Risk/Frequency of Follow-up: low(1x/week)     Assessment and Plan    Nutrition Problem  Inadequate energy intake    Related to (etiology):   Decreased ability to consume sufficient energy    Signs and Symptoms (as evidenced by):   NPO with no alternate means of nutrition at this time     Interventions (treatment strategy):  Collaboration with other providers    Nutrition Diagnosis Status:   New    Monitor and Evaluation    Food and Nutrient Intake: energy intake, food and beverage intake  Food and Nutrient Adminstration: diet order  Anthropometric Measurements: weight, weight change, body mass index  Biochemical Data, Medical Tests and Procedures: electrolyte and renal panel, gastrointestinal profile, glucose/endocrine profile, inflammatory profile, lipid profile  Nutrition-Focused Physical Findings: overall appearance     Nutrition Follow-Up    RD Follow-up?: Yes

## 2020-02-12 NOTE — PLAN OF CARE
Pt A & O x4, HAPI precautions in place as charted, pt able to relax and sleep today as breathing improved, pt and family informed of plan of care and NPO at midnight status, heparin protocol followed, call light within reach.

## 2020-02-12 NOTE — PLAN OF CARE
Case canceled by Doctor. Patient unstable for surgery. Report called to floor nurse. Transport put in for patient to return to room 525 B

## 2020-02-12 NOTE — PLAN OF CARE
Pt is a 64 y.o. F w/ a hx of type 2 DM, stage 4 CKD, HTN who presents for open leg wound.  Pt s/p w/ fall before gracy resulting in right ankle fx.  S/p ORIF on 12/27 with Dr Adam duarte/christin by intraOp bleeding and further completed on 12/29.  Pt now w/ open wound w/ hardware exposure.      ESR and CRP elevated.  Pt's blood cxs positive for GNR in 2 of 4 bottles and GPC resembling strep in 1 of 4 bottles.  Ortho consulted with plans for I and D w/ hardware removal.      Pt out of room for sx but per notes postponed as pt unstable.  Echo yesterday w/ EF at 10. Cardiology consulted     Plan  -Continue Vancomycin dosed by pharm  supratherapeutic 27.4.  Random tomorrow.  -Continue Cefepime 2g q24h (renally dosed)  -Will follow blood cxs and tailor abx accordingly  -Please obtain cxs during sx- gram stain, aerobic, anaerobic, afb and fungal  -ID will continue to follow.

## 2020-02-12 NOTE — SUBJECTIVE & OBJECTIVE
Principal Problem:Sepsis    Principal Orthopedic Problem: same    Interval History: Patient seen and examined at bedside. Her pain is controlled. Splint in place. To OR today for hardware removal and washout.     Review of patient's allergies indicates:   Allergen Reactions    Sulfa (sulfonamide antibiotics) Hives       Current Facility-Administered Medications   Medication    acetaminophen tablet 650 mg    albuterol-ipratropium 2.5 mg-0.5 mg/3 mL nebulizer solution 3 mL    atorvastatin tablet 10 mg    busPIRone tablet 5 mg    calcitRIOL capsule 0.25 mcg    ceFEPIme injection 2 g    dextrose 10% (D10W) Bolus    dextrose 10% (D10W) Bolus    dextrose 50% injection 12.5 g    dextrose 50% injection 25 g    docusate sodium capsule 50 mg    escitalopram oxalate tablet 10 mg    ferrous sulfate EC tablet 325 mg    fluticasone propionate 50 mcg/actuation nasal spray 100 mcg    furosemide injection 80 mg    glucagon (human recombinant) injection 1 mg    glucagon (human recombinant) injection 1 mg    glucose chewable tablet 16 g    glucose chewable tablet 16 g    glucose chewable tablet 24 g    glucose chewable tablet 24 g    heparin 25,000 units in dextrose 5% (100 units/ml) IV bolus from bag - ADDITIONAL PRN BOLUS - 30 units/kg    heparin 25,000 units in dextrose 5% (100 units/ml) IV bolus from bag - ADDITIONAL PRN BOLUS - 60 units/kg    insulin aspart U-100 pen 0-5 Units    insulin detemir U-100 pen 10 Units    melatonin tablet 6 mg    metoprolol succinate (TOPROL-XL) 24 hr split tablet 12.5 mg    ondansetron injection 4 mg    pantoprazole EC tablet 40 mg    polyethylene glycol packet 17 g    sodium bicarbonate tablet 650 mg    sodium chloride 0.9% flush 10 mL    vancomycin - pharmacy to dose     Objective:     Vital Signs (Most Recent):  Temp: 96.4 °F (35.8 °C) (02/11/20 2242)  Pulse: 87 (02/11/20 2242)  Resp: 20 (02/11/20 1945)  BP: (!) 127/57 (02/11/20 2242)  SpO2: (!) 93 % (02/11/20  "7905) Vital Signs (24h Range):  Temp:  [96.4 °F (35.8 °C)-97.7 °F (36.5 °C)] 96.4 °F (35.8 °C)  Pulse:  [] 87  Resp:  [16-20] 20  SpO2:  [90 %-93 %] 93 %  BP: (114-138)/(57-72) 127/57     Weight: 62.6 kg (138 lb)  Height: 5' 4" (162.6 cm)  Body mass index is 23.69 kg/m².      Intake/Output Summary (Last 24 hours) at 2/12/2020 0538  Last data filed at 2/11/2020 1715  Gross per 24 hour   Intake 396 ml   Output 240 ml   Net 156 ml       Ortho/SPM Exam    Physical Exam:  NAD, A/O x 3.  Wound dressed with 4 x 4 cast padding, Ace wrap. No visible drainage.  No focal motor or sensory deficits noted.      Significant Labs: All pertinent labs within the past 24 hours have been reviewed.    Significant Imaging: I have reviewed and interpreted all pertinent imaging results/findings.  "

## 2020-02-13 LAB
ALBUMIN SERPL BCP-MCNC: 1.4 G/DL (ref 3.5–5.2)
ALP SERPL-CCNC: 100 U/L (ref 55–135)
ALP SERPL-CCNC: 112 U/L (ref 55–135)
ALP SERPL-CCNC: 99 U/L (ref 55–135)
ALT SERPL W/O P-5'-P-CCNC: 5 U/L (ref 10–44)
ALT SERPL W/O P-5'-P-CCNC: <5 U/L (ref 10–44)
ALT SERPL W/O P-5'-P-CCNC: <5 U/L (ref 10–44)
ANION GAP SERPL CALC-SCNC: 10 MMOL/L (ref 8–16)
AST SERPL-CCNC: 5 U/L (ref 10–40)
AST SERPL-CCNC: 6 U/L (ref 10–40)
AST SERPL-CCNC: 8 U/L (ref 10–40)
BACTERIA BLD CULT: ABNORMAL
BASOPHILS # BLD AUTO: 0.01 K/UL (ref 0–0.2)
BASOPHILS NFR BLD: 0.1 % (ref 0–1.9)
BILIRUB SERPL-MCNC: 0.2 MG/DL (ref 0.1–1)
BUN SERPL-MCNC: 75 MG/DL (ref 8–23)
BUN SERPL-MCNC: 76 MG/DL (ref 8–23)
BUN SERPL-MCNC: 77 MG/DL (ref 8–23)
CALCIUM SERPL-MCNC: 7.5 MG/DL (ref 8.7–10.5)
CALCIUM SERPL-MCNC: 7.6 MG/DL (ref 8.7–10.5)
CALCIUM SERPL-MCNC: 7.9 MG/DL (ref 8.7–10.5)
CHLORIDE SERPL-SCNC: 105 MMOL/L (ref 95–110)
CHLORIDE SERPL-SCNC: 106 MMOL/L (ref 95–110)
CHLORIDE SERPL-SCNC: 106 MMOL/L (ref 95–110)
CO2 SERPL-SCNC: 20 MMOL/L (ref 23–29)
CO2 SERPL-SCNC: 22 MMOL/L (ref 23–29)
CO2 SERPL-SCNC: 23 MMOL/L (ref 23–29)
CREAT SERPL-MCNC: 3 MG/DL (ref 0.5–1.4)
CREAT SERPL-MCNC: 3 MG/DL (ref 0.5–1.4)
CREAT SERPL-MCNC: 3.1 MG/DL (ref 0.5–1.4)
DIFFERENTIAL METHOD: ABNORMAL
EOSINOPHIL # BLD AUTO: 0.1 K/UL (ref 0–0.5)
EOSINOPHIL NFR BLD: 0.5 % (ref 0–8)
ERYTHROCYTE [DISTWIDTH] IN BLOOD BY AUTOMATED COUNT: 17.8 % (ref 11.5–14.5)
EST. GFR  (AFRICAN AMERICAN): 17.8 ML/MIN/1.73 M^2
EST. GFR  (AFRICAN AMERICAN): 18.5 ML/MIN/1.73 M^2
EST. GFR  (AFRICAN AMERICAN): 18.5 ML/MIN/1.73 M^2
EST. GFR  (NON AFRICAN AMERICAN): 15.4 ML/MIN/1.73 M^2
EST. GFR  (NON AFRICAN AMERICAN): 16 ML/MIN/1.73 M^2
EST. GFR  (NON AFRICAN AMERICAN): 16 ML/MIN/1.73 M^2
GLUCOSE SERPL-MCNC: 114 MG/DL (ref 70–110)
GLUCOSE SERPL-MCNC: 156 MG/DL (ref 70–110)
GLUCOSE SERPL-MCNC: 98 MG/DL (ref 70–110)
HCT VFR BLD AUTO: 28.1 % (ref 37–48.5)
HGB BLD-MCNC: 8.3 G/DL (ref 12–16)
IMM GRANULOCYTES # BLD AUTO: 0.23 K/UL (ref 0–0.04)
IMM GRANULOCYTES NFR BLD AUTO: 1.5 % (ref 0–0.5)
LYMPHOCYTES # BLD AUTO: 0.4 K/UL (ref 1–4.8)
LYMPHOCYTES NFR BLD: 2.7 % (ref 18–48)
MAGNESIUM SERPL-MCNC: 1.9 MG/DL (ref 1.6–2.6)
MCH RBC QN AUTO: 26.6 PG (ref 27–31)
MCHC RBC AUTO-ENTMCNC: 29.5 G/DL (ref 32–36)
MCV RBC AUTO: 90 FL (ref 82–98)
MONOCYTES # BLD AUTO: 0.8 K/UL (ref 0.3–1)
MONOCYTES NFR BLD: 5 % (ref 4–15)
NEUTROPHILS # BLD AUTO: 14.2 K/UL (ref 1.8–7.7)
NEUTROPHILS NFR BLD: 90.2 % (ref 38–73)
NRBC BLD-RTO: 0 /100 WBC
PHOSPHATE SERPL-MCNC: 5.6 MG/DL (ref 2.7–4.5)
PLATELET # BLD AUTO: 424 K/UL (ref 150–350)
PMV BLD AUTO: 9.6 FL (ref 9.2–12.9)
POCT GLUCOSE: 100 MG/DL (ref 70–110)
POCT GLUCOSE: 104 MG/DL (ref 70–110)
POCT GLUCOSE: 109 MG/DL (ref 70–110)
POCT GLUCOSE: 206 MG/DL (ref 70–110)
POTASSIUM SERPL-SCNC: 3.5 MMOL/L (ref 3.5–5.1)
POTASSIUM SERPL-SCNC: 3.5 MMOL/L (ref 3.5–5.1)
POTASSIUM SERPL-SCNC: 3.7 MMOL/L (ref 3.5–5.1)
PROT SERPL-MCNC: 5.1 G/DL (ref 6–8.4)
PROT SERPL-MCNC: 5.2 G/DL (ref 6–8.4)
PROT SERPL-MCNC: 5.3 G/DL (ref 6–8.4)
RBC # BLD AUTO: 3.12 M/UL (ref 4–5.4)
SODIUM SERPL-SCNC: 136 MMOL/L (ref 136–145)
SODIUM SERPL-SCNC: 138 MMOL/L (ref 136–145)
SODIUM SERPL-SCNC: 138 MMOL/L (ref 136–145)
VANCOMYCIN SERPL-MCNC: 26.4 UG/ML
WBC # BLD AUTO: 15.7 K/UL (ref 3.9–12.7)

## 2020-02-13 PROCEDURE — 99233 SBSQ HOSP IP/OBS HIGH 50: CPT | Mod: ,,, | Performed by: PHYSICIAN ASSISTANT

## 2020-02-13 PROCEDURE — 80053 COMPREHEN METABOLIC PANEL: CPT

## 2020-02-13 PROCEDURE — 85025 COMPLETE CBC W/AUTO DIFF WBC: CPT

## 2020-02-13 PROCEDURE — 99231 PR SUBSEQUENT HOSPITAL CARE,LEVL I: ICD-10-PCS | Mod: ,,, | Performed by: INTERNAL MEDICINE

## 2020-02-13 PROCEDURE — 25000003 PHARM REV CODE 250: Performed by: PHYSICIAN ASSISTANT

## 2020-02-13 PROCEDURE — 99232 SBSQ HOSP IP/OBS MODERATE 35: CPT | Mod: ,,, | Performed by: HOSPITALIST

## 2020-02-13 PROCEDURE — 63600175 PHARM REV CODE 636 W HCPCS: Performed by: PHYSICIAN ASSISTANT

## 2020-02-13 PROCEDURE — 63600175 PHARM REV CODE 636 W HCPCS: Performed by: HOSPITALIST

## 2020-02-13 PROCEDURE — 80202 ASSAY OF VANCOMYCIN: CPT

## 2020-02-13 PROCEDURE — 99232 PR SUBSEQUENT HOSPITAL CARE,LEVL II: ICD-10-PCS | Mod: ,,, | Performed by: HOSPITALIST

## 2020-02-13 PROCEDURE — 25000003 PHARM REV CODE 250: Performed by: HOSPITALIST

## 2020-02-13 PROCEDURE — 99231 SBSQ HOSP IP/OBS SF/LOW 25: CPT | Mod: ,,, | Performed by: INTERNAL MEDICINE

## 2020-02-13 PROCEDURE — 84100 ASSAY OF PHOSPHORUS: CPT

## 2020-02-13 PROCEDURE — 83735 ASSAY OF MAGNESIUM: CPT

## 2020-02-13 PROCEDURE — 11000001 HC ACUTE MED/SURG PRIVATE ROOM

## 2020-02-13 PROCEDURE — 80053 COMPREHEN METABOLIC PANEL: CPT | Mod: 91

## 2020-02-13 PROCEDURE — 63600175 PHARM REV CODE 636 W HCPCS: Performed by: STUDENT IN AN ORGANIZED HEALTH CARE EDUCATION/TRAINING PROGRAM

## 2020-02-13 PROCEDURE — 36415 COLL VENOUS BLD VENIPUNCTURE: CPT

## 2020-02-13 PROCEDURE — 99233 PR SUBSEQUENT HOSPITAL CARE,LEVL III: ICD-10-PCS | Mod: ,,, | Performed by: PHYSICIAN ASSISTANT

## 2020-02-13 RX ORDER — IPRATROPIUM BROMIDE AND ALBUTEROL SULFATE 2.5; .5 MG/3ML; MG/3ML
3 SOLUTION RESPIRATORY (INHALATION) EVERY 4 HOURS PRN
Status: DISCONTINUED | OUTPATIENT
Start: 2020-02-13 | End: 2020-02-16 | Stop reason: HOSPADM

## 2020-02-13 RX ORDER — GUAIFENESIN 100 MG/5ML
200 SOLUTION ORAL EVERY 4 HOURS PRN
Status: DISCONTINUED | OUTPATIENT
Start: 2020-02-13 | End: 2020-02-16 | Stop reason: HOSPADM

## 2020-02-13 RX ADMIN — CALCITRIOL CAPSULES 0.25 MCG 0.25 MCG: 0.25 CAPSULE ORAL at 08:02

## 2020-02-13 RX ADMIN — Medication 6 MG: at 08:02

## 2020-02-13 RX ADMIN — ATORVASTATIN CALCIUM 10 MG: 10 TABLET, FILM COATED ORAL at 08:02

## 2020-02-13 RX ADMIN — INSULIN DETEMIR 6 UNITS: 100 INJECTION, SOLUTION SUBCUTANEOUS at 09:02

## 2020-02-13 RX ADMIN — SODIUM BICARBONATE 650 MG TABLET 650 MG: at 02:02

## 2020-02-13 RX ADMIN — BUSPIRONE HYDROCHLORIDE 5 MG: 5 TABLET ORAL at 08:02

## 2020-02-13 RX ADMIN — HEPARIN SODIUM 5000 UNITS: 5000 INJECTION, SOLUTION INTRAVENOUS; SUBCUTANEOUS at 02:02

## 2020-02-13 RX ADMIN — GUAIFENESIN 200 MG: 200 SOLUTION ORAL at 01:02

## 2020-02-13 RX ADMIN — FLUTICASONE PROPIONATE 100 MCG: 50 SPRAY, METERED NASAL at 08:02

## 2020-02-13 RX ADMIN — FUROSEMIDE 20 MG/HR: 10 INJECTION, SOLUTION INTRAMUSCULAR; INTRAVENOUS at 12:02

## 2020-02-13 RX ADMIN — CHLOROTHIAZIDE SODIUM 500 MG: 500 INJECTION, POWDER, LYOPHILIZED, FOR SOLUTION INTRAVENOUS at 10:02

## 2020-02-13 RX ADMIN — SODIUM BICARBONATE 650 MG TABLET 650 MG: at 08:02

## 2020-02-13 RX ADMIN — ACETAMINOPHEN 650 MG: 325 TABLET ORAL at 08:02

## 2020-02-13 RX ADMIN — PANTOPRAZOLE SODIUM 40 MG: 40 TABLET, DELAYED RELEASE ORAL at 08:02

## 2020-02-13 RX ADMIN — BUSPIRONE HYDROCHLORIDE 5 MG: 5 TABLET ORAL at 09:02

## 2020-02-13 RX ADMIN — FERROUS SULFATE TAB EC 325 MG (65 MG FE EQUIVALENT) 325 MG: 325 (65 FE) TABLET DELAYED RESPONSE at 08:02

## 2020-02-13 RX ADMIN — CEFEPIME 2 G: 2 INJECTION, POWDER, FOR SOLUTION INTRAVENOUS at 05:02

## 2020-02-13 RX ADMIN — FUROSEMIDE 20 MG/HR: 10 INJECTION, SOLUTION INTRAMUSCULAR; INTRAVENOUS at 02:02

## 2020-02-13 RX ADMIN — INSULIN ASPART 3 UNITS: 100 INJECTION, SOLUTION INTRAVENOUS; SUBCUTANEOUS at 04:02

## 2020-02-13 RX ADMIN — METOPROLOL SUCCINATE 12.5 MG: 25 TABLET, EXTENDED RELEASE ORAL at 08:02

## 2020-02-13 RX ADMIN — CHLOROTHIAZIDE SODIUM 500 MG: 500 INJECTION, POWDER, LYOPHILIZED, FOR SOLUTION INTRAVENOUS at 09:02

## 2020-02-13 RX ADMIN — ESCITALOPRAM OXALATE 10 MG: 10 TABLET ORAL at 08:02

## 2020-02-13 RX ADMIN — HEPARIN SODIUM 5000 UNITS: 5000 INJECTION, SOLUTION INTRAVENOUS; SUBCUTANEOUS at 05:02

## 2020-02-13 RX ADMIN — ACETAMINOPHEN 650 MG: 325 TABLET ORAL at 09:02

## 2020-02-13 RX ADMIN — HEPARIN SODIUM 5000 UNITS: 5000 INJECTION, SOLUTION INTRAVENOUS; SUBCUTANEOUS at 09:02

## 2020-02-13 RX ADMIN — SODIUM BICARBONATE 650 MG TABLET 650 MG: at 09:02

## 2020-02-13 NOTE — PROGRESS NOTES
Pharmacokinetic Assessment Follow Up: IV Vancomycin    Vancomycin serum concentration assessment(s):    The random level was drawn correctly and can be used to guide therapy at this time. The measurement is above the desired definitive target range of 15 to 20 mcg/mL.    Vancomycin Regimen Plan:    Re-dose when the random level is less than 20 mcg/mL, next level to be drawn at 0430 on 2/14    Drug levels (last 3 results):  Recent Labs   Lab Result Units 02/11/20  0404 02/12/20  0541 02/13/20  0435   Vancomycin, Random ug/mL 18.5 27.4 26.4       Pharmacy will continue to follow and monitor vancomycin.    Please contact pharmacy at extension [phone] for questions regarding this assessment.    Thank you for the consult,   Mindy Ellis       Patient brief summary:  Bina Daniels is a 64 y.o. female initiated on antimicrobial therapy with IV Vancomycin for treatment of bone/joint infection    The patient's current regimen is vancomycin pulse dosing.    Drug Allergies:   Review of patient's allergies indicates:   Allergen Reactions    Sulfa (sulfonamide antibiotics) Hives       Actual Body Weight:   62.6 kg    Renal Function:   Estimated Creatinine Clearance: 16.8 mL/min (A) (based on SCr of 3 mg/dL (H)).,     Dialysis Method (if applicable):  N/A    CBC (last 72 hours):  Recent Labs   Lab Result Units 02/10/20  2110 02/11/20  0404 02/12/20  0542 02/13/20  0435   WBC K/uL 14.87* 14.47*  14.47* 13.34* 15.70*   Hemoglobin g/dL 10.1* 8.9*  8.9* 7.9* 8.3*   Hemoglobin A1C %  --  10.0*  --   --    Hematocrit % 34.2* 30.2*  30.2* 26.7* 28.1*   Platelets K/uL 501* 481*  481* 419* 424*   Gran% % 92.4* 90.3*  90.3* 89.0* 90.2*   Lymph% % 1.7* 3.0*  3.0* 3.7* 2.7*   Mono% % 4.2 5.1  5.1 5.4 5.0   Eosinophil% % 0.1 0.3  0.3 0.7 0.5   Basophil% % 0.3 0.1  0.1 0.1 0.1   Differential Method  Automated Automated  Automated Automated Automated       Metabolic Panel (last 72 hours):  Recent Labs   Lab Result Units  02/11/20  0404 02/12/20  0541 02/12/20  1100 02/12/20  1106 02/13/20  0435 02/13/20  0820   Sodium mmol/L 136 135*  --   --  136 138   Sodium Urine Random mmol/L  --   --  56  --   --   --    Potassium mmol/L 4.1 4.0  --   --  3.5 3.5   Chloride mmol/L 107 106  --   --  106 106   CO2 mmol/L 16* 19*  --   --  20* 22*   Glucose mg/dL 213* 100  --   --  156* 114*   Glucose, UA   --   --   --  Negative  --   --    BUN, Bld mg/dL 72* 76*  --   --  75* 76*   Creatinine mg/dL 3.0* 2.9*  --   --  3.0* 3.0*   Creatinine, Random Ur mg/dL  --   --  19.0  19.0  --   --   --    Albumin g/dL 1.5* 1.4*  --   --  1.4* 1.4*   Total Bilirubin mg/dL 0.3 0.3  --   --  0.2 0.2   Alkaline Phosphatase U/L 97 99  --   --  112 99   AST U/L 7* 6*  --   --  6* 5*   ALT U/L 6* 6*  --   --  5* <5*   Magnesium mg/dL 1.9 1.9  --   --  1.9  --    Phosphorus mg/dL 5.6* 5.7*  --   --  5.6*  --        Vancomycin Administrations:  vancomycin given in the last 96 hours                   vancomycin in dextrose 5 % 1 gram/250 mL IVPB 1,000 mg (mg) 1,000 mg New Bag 02/11/20 1336                Microbiologic Results:  Microbiology Results (last 7 days)     Procedure Component Value Units Date/Time    Blood Culture #1 **CANNOT BE ORDERED STAT** [[id number]]  (Abnormal)  (Susceptibility) Collected:  02/10/20 1142    Order Status:  Completed Specimen:  Blood from Peripheral, Wrist, Right Updated:  02/13/20 1109     Blood Culture, Routine Gram stain aer bottle: Gram positive cocci in chains resembling Strep      Gram stain sagrario bottle: Gram negative rods      Results called to and read back by:Asuncion Durant RN 02/11/2020  03:22      STREPTOCOCCUS PNEUMONIAE      ENTEROBACTER CLOACAE COMPLEX    Blood culture [[id number]] Collected:  02/11/20 0806    Order Status:  Completed Specimen:  Blood Updated:  02/13/20 1012     Blood Culture, Routine No Growth to date      No Growth to date      No Growth to date    Blood culture [[id number]] Collected:  02/11/20 0801     Order Status:  Completed Specimen:  Blood Updated:  02/13/20 1012     Blood Culture, Routine No Growth to date      No Growth to date      No Growth to date    Blood Culture #2 **CANNOT BE ORDERED STAT** [[id number]]  (Abnormal) Collected:  02/10/20 1147    Order Status:  Completed Specimen:  Blood from Peripheral, Antecubital, Left Updated:  02/13/20 0905     Blood Culture, Routine Gram stain sagrario bottle: Gram negative rods       Results called to and read back by:Asuncion Durant RN 02/11/2020  03:22      ENTEROBACTER CLOACAE COMPLEX  For susceptibility see order # [id number]      Culture, Respiratory with Gram Stain [[id number]]     Order Status:  No result Specimen:  Respiratory     Influenza A & B by Molecular [[id number]] Collected:  02/10/20 1133    Order Status:  Completed Specimen:  Nasopharyngeal Swab Updated:  02/10/20 1210     Influenza A, Molecular Negative     Influenza B, Molecular Negative     Flu A & B Source Nasal swab

## 2020-02-13 NOTE — SUBJECTIVE & OBJECTIVE
Interval History: Patient denies any shortness of breath.  She had 1.7 L of urinary output after diuresis.      Review of Systems   Constitution: Negative for chills and fever.   Cardiovascular: Positive for leg swelling. Negative for chest pain and palpitations.   Gastrointestinal: Negative for bloating, abdominal pain and diarrhea.     Objective:     Vital Signs (Most Recent):  Temp: 98.1 °F (36.7 °C) (02/13/20 1150)  Pulse: 71 (02/13/20 1150)  Resp: 16 (02/13/20 1150)  BP: (!) 114/53 (02/13/20 1150)  SpO2: (!) 94 % (02/13/20 1150) Vital Signs (24h Range):  Temp:  [96.1 °F (35.6 °C)-98.3 °F (36.8 °C)] 98.1 °F (36.7 °C)  Pulse:  [71-96] 71  Resp:  [16-20] 16  SpO2:  [92 %-95 %] 94 %  BP: (114-144)/(53-68) 114/53     Weight: 62.6 kg (138 lb)  Body mass index is 23.69 kg/m².     SpO2: (!) 94 %  O2 Device (Oxygen Therapy): nasal cannula      Intake/Output Summary (Last 24 hours) at 2/13/2020 1308  Last data filed at 2/13/2020 1248  Gross per 24 hour   Intake 340 ml   Output 2550 ml   Net -2210 ml       Lines/Drains/Airways     Drain                 Urethral Catheter 02/12/20 1100 Straight-tip 1 day          Peripheral Intravenous Line                 Peripheral IV - Single Lumen 02/11/20 22 G Anterior;Proximal;Right Forearm 2 days                Physical Exam   Constitutional: She is oriented to person, place, and time.   Patient is a 65 yo F who appears older than stated age, alert and in NAD.    HENT:   Head: Normocephalic and atraumatic.   Eyes: Pupils are equal, round, and reactive to light. Conjunctivae are normal.   Neck: Normal range of motion. JVD present.   Cardiovascular: Normal rate and regular rhythm.   No murmur heard.  Pulmonary/Chest: Effort normal. She has rales (biltateral lower lobes).   Abdominal: Soft. She exhibits no distension. There is no tenderness.   Musculoskeletal:   SCDs in place   Neurological: She is oriented to person, place, and time.   Skin: Skin is warm and dry.   Psychiatric: She has  a normal mood and affect.       Significant Labs: All pertinent labs within the past 24 hours have been reviewed.     Recent Labs   Lab 02/11/20  0404 02/12/20  0541 02/12/20  0542 02/13/20  0435 02/13/20  0820   WBC 14.47*  14.47*  --  13.34* 15.70*  --    HGB 8.9*  8.9*  --  7.9* 8.3*  --    HCT 30.2*  30.2*  --  26.7* 28.1*  --    *  481*  --  419* 424*  --    MCV 89  89  --  89 90  --    RDW 17.5*  17.5*  --  17.9* 17.8*  --     135*  --  136 138   K 4.1 4.0  --  3.5 3.5    106  --  106 106   CO2 16* 19*  --  20* 22*   BUN 72* 76*  --  75* 76*   CREATININE 3.0* 2.9*  --  3.0* 3.0*   * 100  --  156* 114*   PROT 5.2* 4.8*  --  5.1* 5.2*   ALBUMIN 1.5* 1.4*  --  1.4* 1.4*   BILITOT 0.3 0.3  --  0.2 0.2   AST 7* 6*  --  6* 5*   ALKPHOS 97 99  --  112 99   ALT 6* 6*  --  5* <5*       Significant Imaging: I have reviewed all pertinent imaging results/findings within the past 24 hours.

## 2020-02-13 NOTE — PLAN OF CARE
Patient aaox3, not amb, incont to BM, heaton inplace draining well. O2 3 L sat well no other discomfort noted at this time

## 2020-02-13 NOTE — PLAN OF CARE
Pt AAOx4. Pain assessed. Skin assessed, wound care compete. Neurovascular intact. VS measured. Free from falls. Frequent rounds made for pain, potty and positioning. Bed at lowest point, call light within reach with side rails up x2.

## 2020-02-13 NOTE — SUBJECTIVE & OBJECTIVE
Principal Problem:Sepsis    Principal Orthopedic Problem: same    Interval History: Patient seen and examined at bedside.  Here for non healing wounds over left medial and lateral malleolus.  Cardiology consult place yesterday, she was placed on IV Lasix drip for her decompensated heart failure.  JVD reportedly to the years yesterday.  Ejection fraction 10%.  Both Cardiology and Medicine stating that it may take several days for her heart failure to become compensated.  Saturating 92% on 3 L last night.      Review of patient's allergies indicates:   Allergen Reactions    Sulfa (sulfonamide antibiotics) Hives       Current Facility-Administered Medications   Medication    acetaminophen tablet 650 mg    albuterol-ipratropium 2.5 mg-0.5 mg/3 mL nebulizer solution 3 mL    atorvastatin tablet 10 mg    busPIRone tablet 5 mg    calcitRIOL capsule 0.25 mcg    ceFEPIme injection 2 g    dextrose 10% (D10W) Bolus    dextrose 10% (D10W) Bolus    dextrose 50% injection 12.5 g    dextrose 50% injection 25 g    docusate sodium capsule 50 mg    escitalopram oxalate tablet 10 mg    ferrous sulfate EC tablet 325 mg    fluticasone propionate 50 mcg/actuation nasal spray 100 mcg    furosemide (LASIX) 2 mg/mL in sodium chloride 0.9% 100 mL continuous infusion (conc: 2 mg/mL)    glucagon (human recombinant) injection 1 mg    glucagon (human recombinant) injection 1 mg    glucose chewable tablet 16 g    glucose chewable tablet 16 g    glucose chewable tablet 24 g    glucose chewable tablet 24 g    guaifenesin 100 mg/5 ml syrup 200 mg    heparin (porcine) injection 5,000 Units    insulin aspart U-100 pen 0-5 Units    insulin detemir U-100 pen 10 Units    melatonin tablet 6 mg    metoprolol succinate (TOPROL-XL) 24 hr split tablet 12.5 mg    ondansetron injection 4 mg    pantoprazole EC tablet 40 mg    polyethylene glycol packet 17 g    sodium bicarbonate tablet 650 mg    sodium chloride 0.9% flush 10 mL  "   sodium chloride 0.9% flush 10 mL    vancomycin - pharmacy to dose     Objective:     Vital Signs (Most Recent):  Temp: 96.4 °F (35.8 °C) (02/13/20 0555)  Pulse: 82 (02/13/20 0555)  Resp: 20 (02/13/20 0555)  BP: 134/65 (02/13/20 0555)  SpO2: (!) 94 % (02/13/20 0555) Vital Signs (24h Range):  Temp:  [96.4 °F (35.8 °C)-98.7 °F (37.1 °C)] 96.4 °F (35.8 °C)  Pulse:  [80-96] 82  Resp:  [18-20] 20  SpO2:  [92 %-96 %] 94 %  BP: (115-144)/(61-76) 134/65     Weight: 62.6 kg (138 lb)  Height: 5' 4" (162.6 cm)  Body mass index is 23.69 kg/m².      Intake/Output Summary (Last 24 hours) at 2/13/2020 0621  Last data filed at 2/13/2020 0207  Gross per 24 hour   Intake 340 ml   Output 1775 ml   Net -1435 ml       Ortho/SPM Exam    Physical Exam:  NAD, A/O x 3.  Wound dressed with 4 x 4 cast padding, Ace wrap. No visible drainage.  No focal motor or sensory deficits noted.      Significant Labs: All pertinent labs within the past 24 hours have been reviewed.    Significant Imaging: I have reviewed and interpreted all pertinent imaging results/findings.  "

## 2020-02-13 NOTE — ASSESSMENT & PLAN NOTE
Bina Daniels is a 64 y.o. female who scheduled today for irrigation debridement left ankle, removal of hardware.  Despite original plans to go to the operating room today is become clear that her heart failure remains decompensated and she would be at very high risk for cardiopulmonary compromise intraoperatively.      - Weight bearing status:  Nonweightbearing  - Pain control: Per APS  - Antibiotics:  Preop ordered  - DVT Prophylaxis:  Heparin held preop , SCD's at all times when not ambulating.  - PT/OT  - Lines/Drains:  None  - Dispo:  Will discuss with staff as we had original plans to take the patient to the operating room today however is become clear that up her decompensated heart failure may prevent this and she can be better optimized with several additional days for diuresis.

## 2020-02-13 NOTE — ASSESSMENT & PLAN NOTE
Pt is a 64 y.o. F w/ a hx of type 2 DM, stage 4 CKD, HTN who presents for open leg wound.  Pt s/p w/ fall before gracy resulting in right ankle fx.  S/p ORIF on 12/27 with Dr Adam duarte/christin by intraOp bleeding and further completed on 12/29.  Pt now w/ open wound w/ hardware exposure.  Chest xray 2/10-Patchy consolidation seen in the bilateral lower and right upper lung zones.      ESR and CRP elevated.  Pt's blood cxs positive for E.Cloacae (pan sensitive)2/4 bottles and Strep pneumo (pan sensitive) 1/4 bottles.  Ortho consulted with plans for I and D w/ hardware removal, but removal delayed as pt w/ decompensated heart failure (being managed by cardiology).    E. Cloacae likely from wound, strep pneumo uncertain source resp?    Plan  -Discontinue Vancomycin   -Continue on Cefepime 2g q24h (renally dosed) which will cover E. Cloacae and Strep Pneumo  -Ortho sx plans on hold due to pt's decompensated heart failure.  Please obtain cxs during sx- gram stain, aerobic, anaerobic, afb and fungal  -Anticipate pt will need 6 weeks of abx from sx  -ID will continue to follow.

## 2020-02-13 NOTE — PROGRESS NOTES
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 F w/ a hx of type 2 DM, stage 4 CKD, HTN who presents for open leg wound.  Pt s/p w/ fall before christmas resulting in right ankle fx.  S/p ORIF on 12/27 with Dr Adam duarte/christin by intraOp bleeding and further completed on 12/29.  Pt now w/ open wound w/ hardware exposure.  Chest xray 2/10-Patchy consolidation seen in the bilateral lower and right upper lung zones.      ESR and CRP elevated.  Pt's blood cxs positive for E.Cloacae (pan sensitive)2/4 bottles and Strep pneumo (pan sensitive) 1/4 bottles.  Ortho consulted with plans for I and D w/ hardware removal, but removal delayed as pt w/ decompensated heart failure (being managed by cardiology).    E. Cloacae likely from wound, strep pneumo uncertain source resp?    Plan  -Discontinue Vancomycin   -Continue on Cefepime 2g q24h (renally dosed) which will cover E. Cloacae and Strep Pneumo  -Ortho sx plans on hold due to pt's decompensated heart failure.  Please obtain cxs during sx- gram stain, aerobic, anaerobic, afb and fungal  -Anticipate pt will need 6 weeks of abx from sx  -Recommend oupt Prevnar 13 and Pneumovax 23  -ID will continue to follow.        Pt discussed with primary team and staff.  Thank you for your consult. ID will follow-up with patient. Please contact us if you have any additional questions.    Sahil Díaz PA-C  Infectious Disease  [facility]    Subjective:     Principal Problem:Sepsis    HPI: Pt is a 64 y.o.  F w / a hx of type 2 DM, stage 4 CKD, HTN who presents for open leg wound.  Pt w/ fall before christmas and dx a fx in the right ankle.  She went for  attempted ORIF on 12/27 with Dr Medina with [facility] orth[facility] who was unable to complete procedure due to intraOp bleeding in screw holes, placed in splint and procedure aborted, further completed on 12/29.  Pt refused SNF and she was getting home health to surgical site as well as PT/OT.  Pt reported that wound care had not come for the past five days.  Once wound visualized noticed opening with clear drainage and hardware exposure.  They report subjective chills and confusion.      On exam she reports some SOB and denies fever,chills. Reports some mild leg pain now. Patient sleepy on exam but can answer basic questions. Family is unsnure if she missed home meds but they think a few days possibly, edie records report being on lasix 40 BID at home.     Pt's blood cxs positive for GNR in 2 of 4 bottles and GPC resembling strep in 1 of 4 bottles.  Ortho consulted with plans for I and D w/ hardware removal.  Interval History: Pt white count 15.70.  Blood cxs w/ e. Cloacae and Strep Pneumo both pan sensitive.    Review of Systems   Constitutional: Negative for activity change, appetite change, chills, fatigue and fever.   Respiratory: Positive for cough and shortness of breath. Negative for wheezing and stridor.    Cardiovascular: Negative for chest pain, palpitations and leg swelling.   Gastrointestinal: Negative for abdominal distention, abdominal pain, diarrhea, nausea and vomiting.   Genitourinary: Negative for difficulty urinating and dysuria.   Musculoskeletal: Negative for arthralgias, back pain and myalgias.   Skin: Positive for wound. Negative for color change and rash.   Psychiatric/Behavioral: Negative for agitation and confusion.     Objective:     Vital Signs (Most Recent):  Temp: 98.1 °F (36.7 °C) (02/13/20 1150)  Pulse: 71 (02/13/20 1150)  Resp: 16 (02/13/20 1150)  BP: (!) 114/53 (02/13/20 1150)  SpO2: (!) 94 % (02/13/20 1150) Vital Signs (24h Range):  Temp:  [96.1 °F (35.6 °C)-98.3 °F (36.8 °C)] 98.1 °F (36.7  °C)  Pulse:  [71-96] 71  Resp:  [16-20] 16  SpO2:  [92 %-95 %] 94 %  BP: (114-144)/(53-68) 114/53     Weight: 62.6 kg (138 lb)  Body mass index is 23.69 kg/m².    Estimated Creatinine Clearance: 16.8 mL/min (A) (based on SCr of 3 mg/dL (H)).    Physical Exam   Constitutional: She is oriented to person, place, and time. She appears well-developed and well-nourished.   HENT:   Head: Normocephalic and atraumatic.   Cardiovascular: Normal rate, regular rhythm and normal heart sounds.   No murmur heard.  Pulmonary/Chest: Effort normal and breath sounds normal. She has no wheezes. She has no rales.   Abdominal: Soft. Bowel sounds are normal. She exhibits no distension and no mass. There is no tenderness.   Musculoskeletal: She exhibits edema and deformity.   Neurological: She is alert and oriented to person, place, and time.   Skin: There is erythema.   See media tab for wound pictures  Pt R leg wrapped       Significant Labs: All pertinent labs within the past 24 hours have been reviewed.    Significant Imaging: I have reviewed all pertinent imaging results/findings within the past 24 hours.

## 2020-02-13 NOTE — PROGRESS NOTES
[facility]  Cardiology  Progress Note    Patient Name: Bina Daniels  MRN: [id number]  Admission Date: 2/10/2020  Hospital Length of Stay: 3 days  Code Status: Full Code   Attending Physician: Tracy Peralta MD   Primary Care Physician: Tho Haro MD  Expected Discharge Date: 2/17/2020  Principal Problem:Sepsis    Subjective:     Hospital Course:   No notes on file    Interval History: Patient denies any shortness of breath.  She had 1.7 L of urinary output after diuresis.      Review of Systems   Constitution: Negative for chills and fever.   Cardiovascular: Positive for leg swelling. Negative for chest pain and palpitations.   Gastrointestinal: Negative for bloating, abdominal pain and diarrhea.     Objective:     Vital Signs (Most Recent):  Temp: 98.1 °F (36.7 °C) (02/13/20 1150)  Pulse: 71 (02/13/20 1150)  Resp: 16 (02/13/20 1150)  BP: (!) 114/53 (02/13/20 1150)  SpO2: (!) 94 % (02/13/20 1150) Vital Signs (24h Range):  Temp:  [96.1 °F (35.6 °C)-98.3 °F (36.8 °C)] 98.1 °F (36.7 °C)  Pulse:  [71-96] 71  Resp:  [16-20] 16  SpO2:  [92 %-95 %] 94 %  BP: (114-144)/(53-68) 114/53     Weight: 62.6 kg (138 lb)  Body mass index is 23.69 kg/m².     SpO2: (!) 94 %  O2 Device (Oxygen Therapy): nasal cannula      Intake/Output Summary (Last 24 hours) at 2/13/2020 1308  Last data filed at 2/13/2020 1248  Gross per 24 hour   Intake 340 ml   Output 2550 ml   Net -2210 ml       Lines/Drains/Airways     Drain                 Urethral Catheter 02/12/20 1100 Straight-tip 1 day          Peripheral Intravenous Line                 Peripheral IV - Single Lumen 02/11/20 22 G Anterior;Proximal;Right Forearm 2 days                Physical Exam   Constitutional: She is oriented to person, place, and time.   Patient is a 65 yo F who appears older than stated age, alert and in NAD.    HENT:   Head: Normocephalic and atraumatic.   Eyes: Pupils are equal, round, and reactive to light. Conjunctivae are normal.    Neck: Normal range of motion. JVD present.   Cardiovascular: Normal rate and regular rhythm.   No murmur heard.  Pulmonary/Chest: Effort normal. She has rales (biltateral lower lobes).   Abdominal: Soft. She exhibits no distension. There is no tenderness.   Musculoskeletal:   SCDs in place   Neurological: She is oriented to person, place, and time.   Skin: Skin is warm and dry.   Psychiatric: She has a normal mood and affect.       Significant Labs: All pertinent labs within the past 24 hours have been reviewed.     Recent Labs   Lab 02/11/20  0404 02/12/20  0541 02/12/20  0542 02/13/20  0435 02/13/20  0820   WBC 14.47*  14.47*  --  13.34* 15.70*  --    HGB 8.9*  8.9*  --  7.9* 8.3*  --    HCT 30.2*  30.2*  --  26.7* 28.1*  --    *  481*  --  419* 424*  --    MCV 89  89  --  89 90  --    RDW 17.5*  17.5*  --  17.9* 17.8*  --     135*  --  136 138   K 4.1 4.0  --  3.5 3.5    106  --  106 106   CO2 16* 19*  --  20* 22*   BUN 72* 76*  --  75* 76*   CREATININE 3.0* 2.9*  --  3.0* 3.0*   * 100  --  156* 114*   PROT 5.2* 4.8*  --  5.1* 5.2*   ALBUMIN 1.5* 1.4*  --  1.4* 1.4*   BILITOT 0.3 0.3  --  0.2 0.2   AST 7* 6*  --  6* 5*   ALKPHOS 97 99  --  112 99   ALT 6* 6*  --  5* <5*       Significant Imaging: I have reviewed all pertinent imaging results/findings within the past 24 hours.       Assessment and Plan:       Acute on chronic combined systolic and diastolic heart failure  Patient is a 65 yo F who we have been asked to help with heart failure management.    Acute decompensated systolic and diastolic heart failure.   Pt with EF of 10% unknown if ischemic or non-ischemic.  She is overloaded with rales noted BL on lung exam.      Recommendations:  -Continue lasix gtt at 20/hr and diuril 500mg BID for diuresis   -Strict I&Os, Mcdaniel  -Would not pursue surgery until euvolemic            VTE Risk Mitigation (From admission, onward)         Ordered     heparin (porcine) injection 5,000  Units  Every 8 hours      02/12/20 1318     Place SKYLAR hose  Until discontinued      02/12/20 1037     IP VTE HIGH RISK PATIENT  Once      02/10/20 1527                Venancio Kelsey MD  Cardiology  [facility]

## 2020-02-13 NOTE — SUBJECTIVE & OBJECTIVE
Interval History: Pt white count 15.70.  Blood cxs w/ e. Cloacae and Strep Pneumo both pan sensitive.    Review of Systems   Constitutional: Negative for activity change, appetite change, chills, fatigue and fever.   Respiratory: Positive for cough and shortness of breath. Negative for wheezing and stridor.    Cardiovascular: Negative for chest pain, palpitations and leg swelling.   Gastrointestinal: Negative for abdominal distention, abdominal pain, diarrhea, nausea and vomiting.   Genitourinary: Negative for difficulty urinating and dysuria.   Musculoskeletal: Negative for arthralgias, back pain and myalgias.   Skin: Positive for wound. Negative for color change and rash.   Psychiatric/Behavioral: Negative for agitation and confusion.     Objective:     Vital Signs (Most Recent):  Temp: 98.1 °F (36.7 °C) (02/13/20 1150)  Pulse: 71 (02/13/20 1150)  Resp: 16 (02/13/20 1150)  BP: (!) 114/53 (02/13/20 1150)  SpO2: (!) 94 % (02/13/20 1150) Vital Signs (24h Range):  Temp:  [96.1 °F (35.6 °C)-98.3 °F (36.8 °C)] 98.1 °F (36.7 °C)  Pulse:  [71-96] 71  Resp:  [16-20] 16  SpO2:  [92 %-95 %] 94 %  BP: (114-144)/(53-68) 114/53     Weight: 62.6 kg (138 lb)  Body mass index is 23.69 kg/m².    Estimated Creatinine Clearance: 16.8 mL/min (A) (based on SCr of 3 mg/dL (H)).    Physical Exam   Constitutional: She is oriented to person, place, and time. She appears well-developed and well-nourished.   HENT:   Head: Normocephalic and atraumatic.   Cardiovascular: Normal rate, regular rhythm and normal heart sounds.   No murmur heard.  Pulmonary/Chest: Effort normal and breath sounds normal. She has no wheezes. She has no rales.   Abdominal: Soft. Bowel sounds are normal. She exhibits no distension and no mass. There is no tenderness.   Musculoskeletal: She exhibits edema and deformity.   Neurological: She is alert and oriented to person, place, and time.   Skin: There is erythema.   See media tab for wound pictures  Pt R leg wrapped        Significant Labs: All pertinent labs within the past 24 hours have been reviewed.    Significant Imaging: I have reviewed all pertinent imaging results/findings within the past 24 hours.

## 2020-02-13 NOTE — PROGRESS NOTES
"Progress Note  Hospital Medicine       Patient Name: Bina Daniels  MRN:  [id number]  Hospital Medicine Team: [facility] HOSP MED K Tracy Peralta MD  Date of Admission:  2/10/2020     Principal Problem:  Sepsis   Primary Care Physician: Tho Haro MD      History of Present Illness:     Interval History:    Patient with increase in urine output with diuril and lasix gtt.  Decision to postpone surgery now as the risk is there to get source control with ankle but as the sepsis seems to have improved (repeat cx neg, no fevers, vitals stable ie not on pressors, etc) that the surgery while is semi urgent, can be delayed until the respiratory status is better once more diuresis is achieved. May require continued diuresis over the weekend as significant residual volume overload currently present. Discussed cardiac function and renal impairment with family.     HPI:   Ms. Bina Daniels is a 64 y.o. female with a history of type 2 DM, stage 4 CKD, HTN, LAI, neuropathy, LAI, depressionn who was in her usual state of health until the week or so before gracy this year when she fell and thought she just sprained her ankle, per ortho notes she "didn't think it ws that bad", she then saw a Dr Sotomayor who was unable to reduce the ankle and dx a fx in the Right ankle and sent to [place] ER for treatment on 12/26, she went for attempted ORIF on 12/27 with Dr Medina with [facility] ortho who was unable to complete procedure due to intraOp bleeding in screw holes, placed in splint and procedure aborted/2U given, on 12/27 ORIF completed to R ankle fx with recs for nonweight bearing at that time, SNF was recommended. She had prolonged hospital stay for JENNI on stage 4 CKD issues- nephro Dr Saenz and hosp med in [place] followed for med issues, she decided to go home and not SNF on 1/8. Per her family at bedside, she was getting home health to surgical site and was getting home PT/OT, they didn't seem thrilled with her choice to not do " "SNF, their brother who is her primary caregiver was not present in ER (daughter and son present in ER now and dont know all of med hx). In interim they didn't know she was having wound issues and dont report that they knew about it until this weekend that the site had opened up as well as worsening dyspnea and fluid/pitting edema in legs, arms as well. They report subjective chills at home but no known fevers, as well as her being slightly "off" and not herself completely now. They brought her for evlauation for both of above, and as seen in ER pictures the wound has opened and hardware visible. Per ER notes, [facility] ortho was attempted called from ER but "no answer"' reported so they admitted to hospital medicine here, ortho has been called here, family reports not seen yet. Given vanc, zosyn, lasix, tdap in ER. SHe missed her ortho f/u last week and they had rescheduled for this week but when they saw the wound they brought her to the ER.     Family is unsnure if she missed home meds but they think a few days possibly, [facility] records report being on lasix 40 BID at home. Family to bring home meds (her son who manages this), but most recent nephro MAR before discharge showed the following:  norvasc 10, asa81, calcitriol .25, colace, lexapro 10 iron 325, fluticasone, levemir 10, toprol 50 BID, protonix, nabicarb 650 4 times day. At one point was on also glyburide/metformin in edie and at home and lasix 40 BID, unsure if held on discharge or not as dc summary has no MAR, prolia injections, neurontin.    Med Hx:  Type 2 DM- A1C 9.9, stage 4 CKD- Cr baseline 2.3-2.5, iron deficiency/acute blood loss anemia, ankle fx- ORIF 12/29, neuropathy, breast ca s/p lumpectomy, depression, ?CAD, chronic metabolic acidosis, secondary hyperparathyroidism from CKD, HTN, hypoalbuminemia    Social: son not at bedside is primary caregiver, mutiple children assist in ADLS, Walker use after fx, more bedbound now after surgery, SNF was rec " but was at home. Smoker, stopped 10 years ago. No alcohol or drugs.    Review of Systems   Constitutional: positive for chills, fatigue, fever.   HENT: Negative for sore throat, trouble swallowing.    Eyes: Negative for photophobia, visual disturbance.   Respiratory: positive for cough, shortness of breath.    Cardiovascular: Negative for chest pain, palpitations, positive leg swelling.   Gastrointestinal: Negative for abdominal pain, constipation, diarrhea, nausea, vomiting.   Endocrine: Negative for cold intolerance, heat intolerance.   Genitourinary: Negative for dysuria, frequency.   Musculoskeletal: positive for arthralgias, myalgias.   Skin positive for rash, wound, erythema   Neurological: Negative for dizziness, syncope, weakness, light-headedness.   Psychiatric/Behavioral: Negative for confusion, hallucinations, anxiety  All other systems reviewed and are negative.      Past Medical History: Patient has a past medical history of Breast cancer, CAD (coronary artery disease), Cancer, CKD (chronic kidney disease), Diabetes mellitus, HTN (hypertension), and Peripheral neuropathy.    Past Surgical History: Patient has a past surgical history that includes Breast surgery (Left); Cholecystectomy; Tonsillectomy; Hysterectomy; Open reduction and internal fixation (ORIF) of injury of ankle (Right, 12/29/2019); Closure of wound (Right, 12/29/2019); and Open reduction and internal fixation (ORIF) of injury of ankle (Right, 12/27/2019).    Social History: Patient reports that she has quit smoking. She started smoking about 16 years ago. She has never used smokeless tobacco. She reports that she does not drink alcohol or use drugs.    Family History: family history includes Breast cancer in her maternal grandmother.    Medications: Scheduled Meds:   atorvastatin  10 mg Oral Daily    busPIRone  5 mg Oral BID    calcitRIOL  0.25 mcg Oral Daily    ceFEPime (MAXIPIME) IVPB  2 g Intravenous Q24H    chlorothiazide  (DIURIL) IVPB  500 mg Intravenous Q12H    docusate sodium  50 mg Oral Daily    escitalopram oxalate  10 mg Oral Daily    ferrous sulfate  325 mg Oral Daily    fluticasone propionate  2 spray Each Nostril Daily    heparin (porcine)  5,000 Units Subcutaneous Q8H    insulin detemir U-100  6 Units Subcutaneous QHS    metoprolol succinate  12.5 mg Oral Daily    pantoprazole  40 mg Oral Daily    polyethylene glycol  17 g Oral Daily    sodium bicarbonate  650 mg Oral QID     Continuous Infusions:   furosemide (LASIX) 2 mg/mL continuous infusion (non-titrating) 20 mg/hr (02/13/20 1242)     PRN Meds:.acetaminophen, albuterol-ipratropium, Dextrose 10% Bolus, Dextrose 10% Bolus, dextrose 50%, dextrose 50%, glucagon (human recombinant), glucagon (human recombinant), glucose, glucose, glucose, glucose, guaifenesin 100 mg/5 ml, insulin aspart U-100, melatonin, ondansetron, sodium chloride 0.9%, sodium chloride 0.9%, Pharmacy to dose Vancomycin consult **AND** vancomycin - pharmacy to dose    Allergies: Patient is allergic to sulfa (sulfonamide antibiotics).    Physical Exam:     Vital Signs (Most Recent):  Temp: 98.1 °F (36.7 °C) (02/13/20 1150)  Pulse: 71 (02/13/20 1150)  Resp: 16 (02/13/20 1150)  BP: (!) 114/53 (02/13/20 1150)  SpO2: (!) 94 % (02/13/20 1150) Vital Signs Range (Last 24H):  Temp:  [96.1 °F (35.6 °C)-98.3 °F (36.8 °C)]   Pulse:  [71-96]   Resp:  [16-20]   BP: (114-144)/(53-68)   SpO2:  [92 %-95 %]    Body mass index is 23.69 kg/m².     Physical Exam:  Constitutional:  Alert, family at bedside. answers questions appropriately. On NC. - 3L.   Head: Normocephalic and atraumatic.   Mouth/Throat: Oropharynx is clear and moist.   Eyes: EOM are normal. Pupils are equal, round, and reactive to light. No scleral icterus.   Neck: Normal range of motion. Neck supple. JVP elevated.  Cardiovascular: irregularly irregular rhythm, rate of 85.  No murmur heard.  Pulmonary/Chest: increased WOB, sitting at 60 degree  ankle but mild improvement since yesterday, less crackles, minimal use of accessory muscles, no tripoding,  RR 18, on NC  Abdominal: Soft. Bowel sounds are normal.  mild distension, anasarca.  Musculoskeletal: open R ankle wound with hardware visible (see ER note), wrapped now. Pulses present bialterally, warm to touch.   Neurological: Alert and oriented to person, place, and time.   Skin: Skin is warm and dry. Diffuse pitting edema to the hips bilaterally, 3+ pitting edema in legs, arms with edema RUE >LUE, mild tenderness with pressing on edema in extremities. Wound to RLE. Anasarca.  Psychiatric: Normal mood and affect. Behavior is normal. reports some depression from this, occasional tearful to nursing.  Vitals reviewed.    Recent Labs   Lab 02/11/20  0404 02/12/20  0542 02/13/20  0435   WBC 14.47*  14.47* 13.34* 15.70*   HGB 8.9*  8.9* 7.9* 8.3*   HCT 30.2*  30.2* 26.7* 28.1*   *  481* 419* 424*       Recent Labs   Lab 02/11/20  0404 02/12/20  0541 02/13/20  0435 02/13/20  0820    135* 136 138   K 4.1 4.0 3.5 3.5    106 106 106   CO2 16* 19* 20* 22*   BUN 72* 76* 75* 76*   CREATININE 3.0* 2.9* 3.0* 3.0*   * 100 156* 114*   CALCIUM 7.9* 7.5* 7.5* 7.6*   MG 1.9 1.9 1.9  --    PHOS 5.6* 5.7* 5.6*  --      Recent Labs   Lab 02/10/20  1741 02/10/20  2110  02/12/20  0541 02/13/20  0435 02/13/20  0820   ALKPHOS  --   --    < > 99 112 99   ALT  --   --    < > 6* 5* <5*   AST  --   --    < > 6* 6* 5*   ALBUMIN  --   --    < > 1.4* 1.4* 1.4*   PROT  --   --    < > 4.8* 5.1* 5.2*   BILITOT  --   --    < > 0.3 0.2 0.2   INR 1.3* 1.3*  --   --   --   --     < > = values in this interval not displayed.      Recent Labs   Lab 02/11/20 2044 02/12/20  0729 02/12/20  1024 02/12/20  2109 02/13/20  0742 02/13/20  1109   POCTGLUCOSE 212* 82 65* 169* 104 100         Assessment and Plan:     Ms. Bina Daniels is a 64 y.o. 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 [T84.7XXA]  Yes    Volume overload [E87.70]  Yes    Anasarca associated with disorder of kidney [N04.9]  Yes    Iron deficiency anemia [D50.9]  Yes    Vitamin D deficiency [E55.9]  Yes    Metabolic acidosis [E87.2]  Yes    Secondary hyperparathyroidism [N25.81]  Yes    Acute metabolic encephalopathy [G93.41]  Yes    Hypertension [I10]  Yes    Hypoalbuminemia [E88.09]  Yes    Acute hypoxemic respiratory failure [J96.01]  Yes    Acute on chronic combined systolic and diastolic heart failure [I50.43]  Yes    Breast cancer [C50.919]  Yes    S/P ORIF (open reduction internal fixation) fracture [Z98.890, Z87.81]  Not Applicable     Ankle, 12/29      Depression [F32.9]  Yes    Neuropathy due to secondary diabetes mellitus [E13.40]  Yes     Chronic    Type 2 diabetes mellitus with neurologic complication, without long-term current use of insulin [E11.49]  Yes     Chronic    Anemia due to stage 3 chronic kidney disease [N18.3, D63.1]  Yes      Resolved Hospital Problems    Diagnosis Date Resolved POA    CKD (chronic kidney disease), stage IV [N18.4] 02/12/2020 Yes     Chronic     Sepsis secondary to infected hardware in right leg/wound infection Right ankle  Strep bacteremia. Gram negative bacteremia  S/p ORIF, s/p R ankle fracture  -patient with open surgical wound, hardware exposed on R ankle, lactate 1.3, procal mildly up at .91, , ESR 68, unsure timing of when the wound started becoming clearly infected and open to air  -s/p ORIF 12/29 with [facility] ortho in [place], aborted surgery 12/27 due to intraOp bleeding, walked on the fx for at least a week before that per Dr Medina notes. nonweight bearing to RLE, continue this NWB  status to RLE now, bedrest for now until ortho assessment. Bandaged now.   -wbc 17 on admit, toxic granulations seen on diff--> 13 today  -Xray on admit showing widened tibotalar interval, cortical irregularity, edema, post op changes vs site infections  -blood CX on admit 2/10 with Strep in 1/4 bottles, GNR 2/4 bottles. ID consulted to assist, vanc/cefepime now. Repeat Cx 2/11 NGTD  -plan for washout in future with ortho, date TBD, too high risk currently given active acute decompensated heart failure per discussions today 2/12 as didn't diurese as well as expected overnight and if euvolemic is needed for this then we likely have days before that is going to be obtained. As her sepsis/bacteremia seems to be stable (not on pressors, cultures are clearning in blood, afebrile) the risk of delay seems to be acceptable. If she was having florid sepsis issues and source control was immediately necessary then the benefit of this sooner would be tricky, but she appears to be responding to antibiotics for the infection from the ankle, so delay to get her respiratory status better in the interim seems to be prudent now. When she is ready for surgery, she is high risk- RCRI is high at 3 which gives a 15% 30 day risk of death, cardiac event, MI.      Anasarca  Acute on chronic systolic/ LV/RV diastolic heart failure (EF 10%)  Volume overload  Acute hypoxemic respiratory failure  -massive volume overload on admit,   -strict I/O, daily weight, 2 grams Na, 1.5L fluid restrict  -pitting everywhere on exam, arms, legs, chest. Fluid probably worsened the surg site to open up also  -albumin very low, started boost  -heaton placed 2/12.   - 2/11 with echo showing EF 10%, diastolic dysfunction- LV, RV, LAE, increased LAP, LVP, PAP elevated, MR.   -2/12: between 3-6L oxygen, diuresis not documented well, lasix IV in AM and onto lasix drip in PM per cards recs to assist with diuresis further. Started low dose toprol XL for afib 2/11 (was  already on at home but was on short acting)  - Will need ace/arb long term. Will need consideration of life vest given depressed EF also  -RCRI 3, 15% risk of 30 day mortality post op. Optimizing with diuresis in interim     Acute kidney injury on Stage 4 CKD  -baseline Cr 2.3 to 2.8, trended up to 2.8 in January in [place], seen by nephro, seerandell Davisee with nephro christophe Vieyra notes  -UA with protein, few bacteria. CK wnl. urine na, urine pr/cr, urine cr, urine bun collected and pending.  -avoid nephrotoxins  -likely element of cardiorenal worsening baseline, diuresis needed  2/13: 3.0 still,     Type 2 dm  -A1C 9.9, glucose 313 on CMP, on levemir 10 U at [place] recently, unsure what she went home on, Toujeo on MAR, metformin and glyburide also there, clearly those are not appropriate with this CKD and need holding now and likely forever  -SSI, decrease levemir 6 to start as had hypoglycemia to glucose 30s reviewing [place] records also, titrate up PRN  -hold asa   -neuropathy at baseline    Paroxysmal Atrial fibrillation  -new finding 2/11 on ekg and echo, NSR on admit, likely stress of sepsis and depressed ef contributors, suspect will go in and out given LAE, EF probably wont stay out of it  -[id number] of at least 4, will need long term anticoagulation, eliquis will be only noac given ckd option. Pending initiation of NOAC until surgical plan finalized.     Upper extremity superficial thrombosis  -right cephalic, left basilic  -superficial, low risk to embolize  -can do ppx heparin pre-op, long term after all surgeries done, options are eliquis vs coumadin, prefer eliquis.    Metabolic acidosis  -secondary to CKD, resume nabicarb, improving from 14 at admit to 19 now    Anemia of chronic renal disease  Iron deficiency anemia  -tx recently in [place] at 57, continue daily iron  -hg 9.9-->7.9    Hypoalbuminemia  -boost with meals  -pr/cr ratio 2.37.    Secondary hyperparathyroidism  -, daily  phos  -renal diet  -cacitriol daily    Vitamin D deficiency  Osteoporosis  -on prolia outpatient  -vit D low at 5 on check in January, dont see replacement on MAR. Will consider long term once out of acute illness    Depression  -cont home lexapro  -endorses depression symptoms worse now with acute medical issues. Watch closely for worsenign now    Acute metabolic encephalopathy  -likely from sepsis, mild, monitor closely  -improved 2/11, at baseline now    Diet:  Low na, 1.5L, renal/dm      DVT PPx:  Heparin ppx       Disposition:  Diuresis, surgery date TBD pending diuresis    Tracy Peralta MD

## 2020-02-13 NOTE — PROGRESS NOTES
[facility]  Orthopedics  Progress Note    Patient Name: Bina Daniels  MRN: [id number]  Admission Date: 2/10/2020  Hospital Length of Stay: 3 days  Attending Provider: Christine Palomino MD  Primary Care Provider: Tho Haro MD  Follow-up For: Procedure(s) (LRB):  REMOVAL, HARDWARE, LOWER EXTREMITY, Irrigation and debridement Right ankle, synthes removal screw , Large C arm clock side (Right)    Post-Operative Day: 1 Day Post-Op  Subjective:     Principal Problem:Sepsis    Principal Orthopedic Problem: same    Interval History: Patient seen and examined at bedside.  Here for non healing wounds over left medial and lateral malleolus.  Cardiology consult place yesterday, she was placed on IV Lasix drip for her decompensated heart failure.  JVD reportedly to the years yesterday.  Ejection fraction 10%.  Both Cardiology and Medicine stating that it may take several days for her heart failure to become compensated.  Saturating 92% on 3 L last night.      Review of patient's allergies indicates:   Allergen Reactions    Sulfa (sulfonamide antibiotics) Hives       Current Facility-Administered Medications   Medication    acetaminophen tablet 650 mg    albuterol-ipratropium 2.5 mg-0.5 mg/3 mL nebulizer solution 3 mL    atorvastatin tablet 10 mg    busPIRone tablet 5 mg    calcitRIOL capsule 0.25 mcg    ceFEPIme injection 2 g    dextrose 10% (D10W) Bolus    dextrose 10% (D10W) Bolus    dextrose 50% injection 12.5 g    dextrose 50% injection 25 g    docusate sodium capsule 50 mg    escitalopram oxalate tablet 10 mg    ferrous sulfate EC tablet 325 mg    fluticasone propionate 50 mcg/actuation nasal spray 100 mcg    furosemide (LASIX) 2 mg/mL in sodium chloride 0.9% 100 mL continuous infusion (conc: 2 mg/mL)    glucagon (human recombinant) injection 1 mg    glucagon (human recombinant) injection 1 mg    glucose chewable tablet 16 g    glucose chewable tablet 16 g    glucose  "chewable tablet 24 g    glucose chewable tablet 24 g    guaifenesin 100 mg/5 ml syrup 200 mg    heparin (porcine) injection 5,000 Units    insulin aspart U-100 pen 0-5 Units    insulin detemir U-100 pen 10 Units    melatonin tablet 6 mg    metoprolol succinate (TOPROL-XL) 24 hr split tablet 12.5 mg    ondansetron injection 4 mg    pantoprazole EC tablet 40 mg    polyethylene glycol packet 17 g    sodium bicarbonate tablet 650 mg    sodium chloride 0.9% flush 10 mL    sodium chloride 0.9% flush 10 mL    vancomycin - pharmacy to dose     Objective:     Vital Signs (Most Recent):  Temp: 96.4 °F (35.8 °C) (02/13/20 0555)  Pulse: 82 (02/13/20 0555)  Resp: 20 (02/13/20 0555)  BP: 134/65 (02/13/20 0555)  SpO2: (!) 94 % (02/13/20 0555) Vital Signs (24h Range):  Temp:  [96.4 °F (35.8 °C)-98.7 °F (37.1 °C)] 96.4 °F (35.8 °C)  Pulse:  [80-96] 82  Resp:  [18-20] 20  SpO2:  [92 %-96 %] 94 %  BP: (115-144)/(61-76) 134/65     Weight: 62.6 kg (138 lb)  Height: 5' 4" (162.6 cm)  Body mass index is 23.69 kg/m².      Intake/Output Summary (Last 24 hours) at 2/13/2020 0621  Last data filed at 2/13/2020 0207  Gross per 24 hour   Intake 340 ml   Output 1775 ml   Net -1435 ml       Ortho/SPM Exam    Physical Exam:  NAD, A/O x 3.  Wound dressed with 4 x 4 cast padding, Ace wrap. No visible drainage.  No focal motor or sensory deficits noted.      Significant Labs: All pertinent labs within the past 24 hours have been reviewed.    Significant Imaging: I have reviewed and interpreted all pertinent imaging results/findings.    Assessment/Plan:     * Sepsis  Bina Daniels is a 64 y.o. female who scheduled today for irrigation debridement left ankle, removal of hardware.  Despite original plans to go to the operating room today is become clear that her heart failure remains decompensated and she would be at very high risk for cardiopulmonary compromise intraoperatively.      - Weight bearing status:  Nonweightbearing  - Pain " control: Per APS  - Antibiotics:  Preop ordered  - DVT Prophylaxis:  Heparin, SCD's at all times when not ambulating.  - PT/OT  - Lines/Drains:  None    - Dispo:  We had original plans to take the patient to the operating room today however is become clear that up her decompensated heart failure may prevent this and she can be better optimized with several additional days for diuresis.  Discussed with staff who agrees that she is very high risk and should be better served by further optimization.  Will take her case and put it back in the [place] today.  Will await further recommendations from Cardiology and Medicine.            Tho Reinoso MD  Orthopedics  [facility]

## 2020-02-13 NOTE — ASSESSMENT & PLAN NOTE
Patient is a 63 yo F who we have been asked to help with heart failure management.    Acute decompensated systolic and diastolic heart failure.   Pt with EF of 10% unknown if ischemic or non-ischemic.  She is overloaded with rales noted BL on lung exam.      Recommendations:  -Continue lasix gtt at 20/hr and diuril 500mg BID for diuresis   -Strict I&Os, Mcdaniel  -Would not pursue surgery until euvolemic

## 2020-02-14 PROBLEM — N17.9 ACUTE KIDNEY INJURY SUPERIMPOSED ON CKD: Status: ACTIVE | Noted: 2020-02-14

## 2020-02-14 PROBLEM — N18.9 ACUTE KIDNEY INJURY SUPERIMPOSED ON CKD: Status: ACTIVE | Noted: 2020-02-14

## 2020-02-14 LAB
ALBUMIN SERPL BCP-MCNC: 1.3 G/DL (ref 3.5–5.2)
ALBUMIN SERPL BCP-MCNC: 1.4 G/DL (ref 3.5–5.2)
ALP SERPL-CCNC: 92 U/L (ref 55–135)
ALP SERPL-CCNC: 93 U/L (ref 55–135)
ALT SERPL W/O P-5'-P-CCNC: <5 U/L (ref 10–44)
ALT SERPL W/O P-5'-P-CCNC: <5 U/L (ref 10–44)
ANION GAP SERPL CALC-SCNC: 10 MMOL/L (ref 8–16)
ANION GAP SERPL CALC-SCNC: 11 MMOL/L (ref 8–16)
AST SERPL-CCNC: 7 U/L (ref 10–40)
AST SERPL-CCNC: 8 U/L (ref 10–40)
BASOPHILS # BLD AUTO: 0.03 K/UL (ref 0–0.2)
BASOPHILS NFR BLD: 0.2 % (ref 0–1.9)
BILIRUB SERPL-MCNC: 0.2 MG/DL (ref 0.1–1)
BILIRUB SERPL-MCNC: 0.2 MG/DL (ref 0.1–1)
BUN SERPL-MCNC: 80 MG/DL (ref 8–23)
BUN SERPL-MCNC: 82 MG/DL (ref 8–23)
CALCIUM SERPL-MCNC: 7.6 MG/DL (ref 8.7–10.5)
CALCIUM SERPL-MCNC: 7.9 MG/DL (ref 8.7–10.5)
CHLORIDE SERPL-SCNC: 105 MMOL/L (ref 95–110)
CHLORIDE SERPL-SCNC: 106 MMOL/L (ref 95–110)
CO2 SERPL-SCNC: 23 MMOL/L (ref 23–29)
CO2 SERPL-SCNC: 23 MMOL/L (ref 23–29)
CREAT SERPL-MCNC: 3.1 MG/DL (ref 0.5–1.4)
CREAT SERPL-MCNC: 3.2 MG/DL (ref 0.5–1.4)
DIFFERENTIAL METHOD: ABNORMAL
EOSINOPHIL # BLD AUTO: 0.2 K/UL (ref 0–0.5)
EOSINOPHIL NFR BLD: 1.5 % (ref 0–8)
ERYTHROCYTE [DISTWIDTH] IN BLOOD BY AUTOMATED COUNT: 17.7 % (ref 11.5–14.5)
EST. GFR  (AFRICAN AMERICAN): 17.1 ML/MIN/1.73 M^2
EST. GFR  (AFRICAN AMERICAN): 17.8 ML/MIN/1.73 M^2
EST. GFR  (NON AFRICAN AMERICAN): 14.8 ML/MIN/1.73 M^2
EST. GFR  (NON AFRICAN AMERICAN): 15.4 ML/MIN/1.73 M^2
GLUCOSE SERPL-MCNC: 124 MG/DL (ref 70–110)
GLUCOSE SERPL-MCNC: 92 MG/DL (ref 70–110)
HCT VFR BLD AUTO: 31.6 % (ref 37–48.5)
HGB BLD-MCNC: 9.3 G/DL (ref 12–16)
IMM GRANULOCYTES # BLD AUTO: 0.2 K/UL (ref 0–0.04)
IMM GRANULOCYTES NFR BLD AUTO: 1.4 % (ref 0–0.5)
LYMPHOCYTES # BLD AUTO: 0.6 K/UL (ref 1–4.8)
LYMPHOCYTES NFR BLD: 4.1 % (ref 18–48)
MAGNESIUM SERPL-MCNC: 1.9 MG/DL (ref 1.6–2.6)
MCH RBC QN AUTO: 26.3 PG (ref 27–31)
MCHC RBC AUTO-ENTMCNC: 29.4 G/DL (ref 32–36)
MCV RBC AUTO: 89 FL (ref 82–98)
MONOCYTES # BLD AUTO: 0.8 K/UL (ref 0.3–1)
MONOCYTES NFR BLD: 5.5 % (ref 4–15)
NEUTROPHILS # BLD AUTO: 12.1 K/UL (ref 1.8–7.7)
NEUTROPHILS NFR BLD: 87.3 % (ref 38–73)
NRBC BLD-RTO: 0 /100 WBC
PHOSPHATE SERPL-MCNC: 6.1 MG/DL (ref 2.7–4.5)
PLATELET # BLD AUTO: 406 K/UL (ref 150–350)
PMV BLD AUTO: 9.7 FL (ref 9.2–12.9)
POCT GLUCOSE: 100 MG/DL (ref 70–110)
POCT GLUCOSE: 131 MG/DL (ref 70–110)
POCT GLUCOSE: 91 MG/DL (ref 70–110)
POCT GLUCOSE: 93 MG/DL (ref 70–110)
POTASSIUM SERPL-SCNC: 3.4 MMOL/L (ref 3.5–5.1)
POTASSIUM SERPL-SCNC: 3.5 MMOL/L (ref 3.5–5.1)
PROT SERPL-MCNC: 5 G/DL (ref 6–8.4)
PROT SERPL-MCNC: 5.3 G/DL (ref 6–8.4)
RBC # BLD AUTO: 3.54 M/UL (ref 4–5.4)
SODIUM SERPL-SCNC: 139 MMOL/L (ref 136–145)
SODIUM SERPL-SCNC: 139 MMOL/L (ref 136–145)
VANCOMYCIN SERPL-MCNC: 23.4 UG/ML
WBC # BLD AUTO: 13.89 K/UL (ref 3.9–12.7)

## 2020-02-14 PROCEDURE — 99232 SBSQ HOSP IP/OBS MODERATE 35: CPT | Mod: ,,, | Performed by: INTERNAL MEDICINE

## 2020-02-14 PROCEDURE — 63600175 PHARM REV CODE 636 W HCPCS: Performed by: HOSPITALIST

## 2020-02-14 PROCEDURE — 94640 AIRWAY INHALATION TREATMENT: CPT

## 2020-02-14 PROCEDURE — 80053 COMPREHEN METABOLIC PANEL: CPT

## 2020-02-14 PROCEDURE — 99223 PR INITIAL HOSPITAL CARE,LEVL III: ICD-10-PCS | Mod: ,,, | Performed by: INTERNAL MEDICINE

## 2020-02-14 PROCEDURE — 27000221 HC OXYGEN, UP TO 24 HOURS

## 2020-02-14 PROCEDURE — 99232 SBSQ HOSP IP/OBS MODERATE 35: CPT | Mod: ,,, | Performed by: HOSPITALIST

## 2020-02-14 PROCEDURE — 25000003 PHARM REV CODE 250: Performed by: PHYSICIAN ASSISTANT

## 2020-02-14 PROCEDURE — 85025 COMPLETE CBC W/AUTO DIFF WBC: CPT

## 2020-02-14 PROCEDURE — 63600175 PHARM REV CODE 636 W HCPCS: Performed by: PHYSICIAN ASSISTANT

## 2020-02-14 PROCEDURE — 99232 PR SUBSEQUENT HOSPITAL CARE,LEVL II: ICD-10-PCS | Mod: ,,, | Performed by: HOSPITALIST

## 2020-02-14 PROCEDURE — C9399 UNCLASSIFIED DRUGS OR BIOLOG: HCPCS | Performed by: HOSPITALIST

## 2020-02-14 PROCEDURE — 63600175 PHARM REV CODE 636 W HCPCS: Performed by: STUDENT IN AN ORGANIZED HEALTH CARE EDUCATION/TRAINING PROGRAM

## 2020-02-14 PROCEDURE — 99223 1ST HOSP IP/OBS HIGH 75: CPT | Mod: ,,, | Performed by: INTERNAL MEDICINE

## 2020-02-14 PROCEDURE — 25000242 PHARM REV CODE 250 ALT 637 W/ HCPCS: Performed by: PHYSICIAN ASSISTANT

## 2020-02-14 PROCEDURE — 11000001 HC ACUTE MED/SURG PRIVATE ROOM

## 2020-02-14 PROCEDURE — 99233 PR SUBSEQUENT HOSPITAL CARE,LEVL III: ICD-10-PCS | Mod: ,,, | Performed by: PHYSICIAN ASSISTANT

## 2020-02-14 PROCEDURE — 99233 SBSQ HOSP IP/OBS HIGH 50: CPT | Mod: ,,, | Performed by: PHYSICIAN ASSISTANT

## 2020-02-14 PROCEDURE — 84100 ASSAY OF PHOSPHORUS: CPT

## 2020-02-14 PROCEDURE — 99232 PR SUBSEQUENT HOSPITAL CARE,LEVL II: ICD-10-PCS | Mod: ,,, | Performed by: INTERNAL MEDICINE

## 2020-02-14 PROCEDURE — 36415 COLL VENOUS BLD VENIPUNCTURE: CPT

## 2020-02-14 PROCEDURE — 83735 ASSAY OF MAGNESIUM: CPT

## 2020-02-14 PROCEDURE — 94761 N-INVAS EAR/PLS OXIMETRY MLT: CPT

## 2020-02-14 PROCEDURE — 80202 ASSAY OF VANCOMYCIN: CPT

## 2020-02-14 PROCEDURE — 25000003 PHARM REV CODE 250: Performed by: HOSPITALIST

## 2020-02-14 RX ADMIN — ATORVASTATIN CALCIUM 10 MG: 10 TABLET, FILM COATED ORAL at 10:02

## 2020-02-14 RX ADMIN — BUSPIRONE HYDROCHLORIDE 5 MG: 5 TABLET ORAL at 10:02

## 2020-02-14 RX ADMIN — ESCITALOPRAM OXALATE 10 MG: 10 TABLET ORAL at 10:02

## 2020-02-14 RX ADMIN — IPRATROPIUM BROMIDE AND ALBUTEROL SULFATE 3 ML: .5; 3 SOLUTION RESPIRATORY (INHALATION) at 11:02

## 2020-02-14 RX ADMIN — CHLOROTHIAZIDE SODIUM 500 MG: 500 INJECTION, POWDER, LYOPHILIZED, FOR SOLUTION INTRAVENOUS at 08:02

## 2020-02-14 RX ADMIN — FUROSEMIDE 20 MG/HR: 10 INJECTION, SOLUTION INTRAMUSCULAR; INTRAVENOUS at 02:02

## 2020-02-14 RX ADMIN — HEPARIN SODIUM 5000 UNITS: 5000 INJECTION, SOLUTION INTRAVENOUS; SUBCUTANEOUS at 10:02

## 2020-02-14 RX ADMIN — CALCITRIOL CAPSULES 0.25 MCG 0.25 MCG: 0.25 CAPSULE ORAL at 10:02

## 2020-02-14 RX ADMIN — SODIUM BICARBONATE 650 MG TABLET 650 MG: at 06:02

## 2020-02-14 RX ADMIN — POLYETHYLENE GLYCOL 3350 17 G: 17 POWDER, FOR SOLUTION ORAL at 10:02

## 2020-02-14 RX ADMIN — HEPARIN SODIUM 5000 UNITS: 5000 INJECTION, SOLUTION INTRAVENOUS; SUBCUTANEOUS at 02:02

## 2020-02-14 RX ADMIN — ACETAMINOPHEN 650 MG: 325 TABLET ORAL at 10:02

## 2020-02-14 RX ADMIN — HEPARIN SODIUM 5000 UNITS: 5000 INJECTION, SOLUTION INTRAVENOUS; SUBCUTANEOUS at 05:02

## 2020-02-14 RX ADMIN — FERROUS SULFATE TAB EC 325 MG (65 MG FE EQUIVALENT) 325 MG: 325 (65 FE) TABLET DELAYED RESPONSE at 10:02

## 2020-02-14 RX ADMIN — GUAIFENESIN 200 MG: 200 SOLUTION ORAL at 10:02

## 2020-02-14 RX ADMIN — INSULIN DETEMIR 6 UNITS: 100 INJECTION, SOLUTION SUBCUTANEOUS at 10:02

## 2020-02-14 RX ADMIN — DOCUSATE SODIUM 50 MG: 50 CAPSULE, LIQUID FILLED ORAL at 10:02

## 2020-02-14 RX ADMIN — PANTOPRAZOLE SODIUM 40 MG: 40 TABLET, DELAYED RELEASE ORAL at 09:02

## 2020-02-14 RX ADMIN — FUROSEMIDE 20 MG/HR: 10 INJECTION, SOLUTION INTRAMUSCULAR; INTRAVENOUS at 04:02

## 2020-02-14 RX ADMIN — CEFEPIME 2 G: 2 INJECTION, POWDER, FOR SOLUTION INTRAVENOUS at 05:02

## 2020-02-14 NOTE — ASSESSMENT & PLAN NOTE
Patient is a 65 yo F who we have been asked to help with heart failure management.   Acute decompensated systolic and diastolic heart failure.   Pt with EF of 10% unknown if ischemic or non-ischemic.    -On exam, she has no LE edema nor JVD appreciated. However, she has rales noted throughout.  She has also endorsed a progressive dry cough.  Concern for underlying pulm issue: pneumonia vs bronchitis?      Plan:  -Recommend repeat CXR and BNP  -Consider starting therapeutic heparin ggt for anticoagulation of atrial fibrillation given [id number] of 4.   -Discontinue lasix gtt and diuril.  Recommend 1x dose of 80mg IV lasix tonight.  -Strict I&Os, Mcdaniel  -Would not pursue surgery until euvolemic

## 2020-02-14 NOTE — CONSULTS
[facility]  Nephrology  Consult Note    Patient Name: Bina Daniels  MRN: [id number]  Admission Date: 2/10/2020  Hospital Length of Stay: 4 days  Attending Provider: Tracy Peralta MD   Primary Care Physician: Tho Haro MD  Principal Problem:Sepsis    Inpatient consult to Nephrology  Consult performed by: Navarro Tate MD  Consult ordered by: Tracy Peralta MD  Reason for consult: JENNI on CKD w/ significant anasarca, CHF (10%), proteinuria        Subjective:     HPI: Bina Daniels is a 63 yo WF w/ hx of HTN, T2DM, CHF (EF 10%), CAD, CKD IV (baseline Scr 2.3-2.5), LAI, neuropathy, MDD, and breast cancer who was recently diagnoses w/ a R ankle fracture s/p ORIF on 12/29/19 at [facility] who was admitted on 2/10 w/ concern for infected hardware and sepsis. Patient's ORIF was originally attempted on 12/27, however, procedure was aborted 2/2 bleeding. ORIF was completed 2 days later and the remainder of her hospital stay was prolonged 2/2 JENNI on CKD. Patient elected to be discharged Home w/ Home Health on 1/8 (despite SNF recommendations) w/ a Scr of 2.8. Since that time, patients surgical site/wound has progressively worsened despite home health care. Patient was scheduled to follow up with her orthopedist this week, however, was unable to make her appointment and was rescheduled for next week. Family first noticed poor wound healing earlier this week w/ exposed hardware. On arrival to [facility] patient was noted to be dyspneic w/ profuse edema and AMS. She endorsed chills and appeared septic. BS abx (vanc and zosyn) were initiated and patient was also given lasix and tdap in ED. On arrival, Scr was 3.0 and has remained fairly stable since that time w/ good UOP. Patient found to have GNR bacteremia w/ pan sensitive strep pneumo and enterobacter cloacae. Transitioned to Cefepime. Lasix gtt initiated for volume overload in setting of CHF exacerbation (BNP 2174). Patient has had no episodes of  profuse hypotension since admission, but had an episode of hypothermia to 95.1 F yesterday. New onset Afib on EKG 2/11, currently rate controlled. On exam today, patient notably distressed w/ increased work of breathing currently receiving a nebulizer treatment. She endorses nausea, SOB, and cough. Denies fever, CP, palp, abd pain, or dysuria. Nephrology was consulted for JENNI on CKD IV w/ anasarca and CHF (EF 10%) w/ proteinuria.    Past Medical History:   Diagnosis Date    Breast cancer     CAD (coronary artery disease)     Cancer     CKD (chronic kidney disease)     Diabetes mellitus     HTN (hypertension)     Peripheral neuropathy        Past Surgical History:   Procedure Laterality Date    BREAST SURGERY Left     lumpectomy    CHOLECYSTECTOMY      CLOSURE OF WOUND Right 12/29/2019    Procedure: CLOSURE, WOUND;  Surgeon: Cooper Medina MD;  Location: [facility] OR;  Service: Orthopedics;  Laterality: Right;    HYSTERECTOMY      OPEN REDUCTION AND INTERNAL FIXATION (ORIF) OF INJURY OF ANKLE Right 12/29/2019    Procedure: OPEN REDUCTION INTERNAL FIXATION-ANKLE;  Surgeon: Cooper Medina MD;  Location: [facility] OR;  Service: Orthopedics;  Laterality: Right;  C-arm, Synthes small frag set, cannulated screws notified confirmed with [employer]   Arthrex, tightrope anchors/ Ansley notified and confirmed /xray notified KB    OPEN REDUCTION AND INTERNAL FIXATION (ORIF) OF INJURY OF ANKLE Right 12/27/2019    Procedure: OPEN REDUCTION INTERNAL FIXATION-ANKLE;  Surgeon: Cooper Medina MD;  Location: [facility] OR;  Service: Orthopedics;  Laterality: Right;  C-arm, Synthes small frag set, cannulated screws, C-arm    TONSILLECTOMY         Review of patient's allergies indicates:   Allergen Reactions    Sulfa (sulfonamide antibiotics) Hives     Current Facility-Administered Medications   Medication Frequency    acetaminophen tablet 650 mg Q6H PRN    albuterol-ipratropium 2.5 mg-0.5 mg/3 mL nebulizer  solution 3 mL Q4H PRN    atorvastatin tablet 10 mg Daily    busPIRone tablet 5 mg BID    calcitRIOL capsule 0.25 mcg Daily    ceFEPIme injection 2 g Q24H    chlorothiazide (DIURIL) 500 mg in dextrose 5 % 50 mL IVPB Q12H    dextrose 10% (D10W) Bolus PRN    dextrose 10% (D10W) Bolus PRN    dextrose 50% injection 12.5 g PRN    dextrose 50% injection 25 g PRN    docusate sodium capsule 50 mg Daily    escitalopram oxalate tablet 10 mg Daily    ferrous sulfate EC tablet 325 mg Daily    fluticasone propionate 50 mcg/actuation nasal spray 100 mcg Daily    furosemide (LASIX) 2 mg/mL in sodium chloride 0.9% 100 mL continuous infusion (conc: 2 mg/mL) Continuous    glucagon (human recombinant) injection 1 mg PRN    glucagon (human recombinant) injection 1 mg PRN    glucose chewable tablet 16 g PRN    glucose chewable tablet 16 g PRN    glucose chewable tablet 24 g PRN    glucose chewable tablet 24 g PRN    guaifenesin 100 mg/5 ml syrup 200 mg Q4H PRN    heparin (porcine) injection 5,000 Units Q8H    insulin aspart U-100 pen 0-5 Units QID (AC + HS) PRN    insulin detemir U-100 pen 6 Units QHS    melatonin tablet 6 mg Nightly PRN    metoprolol succinate (TOPROL-XL) 24 hr split tablet 12.5 mg Daily    ondansetron injection 4 mg Q8H PRN    pantoprazole EC tablet 40 mg Daily    polyethylene glycol packet 17 g Daily    sodium bicarbonate tablet 650 mg QID    sodium chloride 0.9% flush 10 mL PRN    sodium chloride 0.9% flush 10 mL PRN     Family History     Problem Relation (Age of Onset)    Breast cancer Maternal Grandmother        Tobacco Use    Smoking status: Former Smoker     Start date: 7/8/2003    Smokeless tobacco: Never Used   Substance and Sexual Activity    Alcohol use: No    Drug use: No    Sexual activity: Not Currently     Review of Systems   Constitutional: Positive for chills and fatigue. Negative for diaphoresis and fever.   HENT: Negative for congestion and hearing loss.    Eyes:  Negative for visual disturbance.   Respiratory: Positive for cough and shortness of breath.    Cardiovascular: Negative for chest pain and palpitations.   Gastrointestinal: Positive for nausea. Negative for abdominal pain, diarrhea and vomiting.   Genitourinary: Negative for difficulty urinating, dysuria and hematuria.   Musculoskeletal: Negative for neck pain.        RLE swelling and pain   Skin: Positive for wound. Negative for color change.   Neurological: Negative for dizziness, light-headedness and headaches.   Psychiatric/Behavioral: Negative for agitation and behavioral problems.     Objective:     Vital Signs (Most Recent):  Temp: 97 °F (36.1 °C) (02/14/20 1131)  Pulse: 62 (02/14/20 1131)  Resp: 18 (02/14/20 1131)  BP: (!) 118/56 (02/14/20 1131)  SpO2: (!) 92 % (02/14/20 1131)  O2 Device (Oxygen Therapy): nasal cannula (02/14/20 1103) Vital Signs (24h Range):  Temp:  [95.1 °F (35.1 °C)-97 °F (36.1 °C)] 97 °F (36.1 °C)  Pulse:  [62-89] 62  Resp:  [16-20] 18  SpO2:  [92 %-98 %] 92 %  BP: (110-129)/(52-63) 118/56     Weight: 62.6 kg (138 lb) (02/12/20 1609)  Body mass index is 23.69 kg/m².  Body surface area is 1.68 meters squared.    I/O last 3 completed shifts:  In: 480 [P.O.:480]  Out: 2725 [Urine:2725]    Physical Exam   Constitutional: She is oriented to person, place, and time. She appears well-developed and well-nourished. She appears distressed.   HENT:   Head: Normocephalic and atraumatic.   Eyes: Pupils are equal, round, and reactive to light. EOM are normal.   Neck: Normal range of motion. Neck supple. JVD present.   Cardiovascular: Normal rate and intact distal pulses. An irregularly irregular rhythm present.   Pulmonary/Chest: She is in respiratory distress. She has rales.   Abdominal: Soft. Bowel sounds are normal. She exhibits no distension. There is no tenderness.   Musculoskeletal: She exhibits edema (1+ pitting BL LE) and tenderness.   Neurological: She is alert and oriented to person, place,  and time.   Patient follows commands and answers questions appropriately   Skin: Skin is warm and dry.   Nursing note and vitals reviewed.      Significant Labs:  CBC:   Recent Labs   Lab 02/14/20  0422   WBC 13.89*   RBC 3.54*   HGB 9.3*   HCT 31.6*   *   MCV 89   MCH 26.3*   MCHC 29.4*     CMP:   Recent Labs   Lab 02/14/20  0841   GLU 92   CALCIUM 7.9*   ALBUMIN 1.3*   PROT 5.3*      K 3.4*   CO2 23      BUN 80*   CREATININE 3.1*   ALKPHOS 93   ALT <5*   AST 7*   BILITOT 0.2     Coagulation:   Recent Labs   Lab 02/10/20  2110  02/12/20  0542   INR 1.3*  --   --    APTT 28.2   < > 27.7    < > = values in this interval not displayed.     Recent Labs   Lab 02/12/20  1106   COLORU Yellow   SPECGRAV 1.005   PHUR 5.0   PROTEINUA 1+*   BACTERIA Few*   NITRITE Negative   LEUKOCYTESUR Negative   HYALINECASTS 0     All labs within the past 24 hours have been reviewed.    Significant Imaging:  Reviewed    Assessment/Plan:     Acute kidney injury superimposed on CKD  Bina Daniels is a 63 yo WF w/ hx of HTN, T2DM, CHF (EF 10%), CAD, CKD IV (baseline Scr 2.3-2.5), LAI, neuropathy, MDD, and breast cancer who was recently diagnoses w/ a R ankle fracture s/p ORIF on 12/29/19 at [facility] who was admitted on 2/10 w/ concern for infected hardware and sepsis  - Hospital stay was prolonged 2/2 JENNI on CKD, discharged home on 1/8 w/ a Scr of 2.8  - On arrival, patient dyspneic, AMS, chills and appeared septic; started on BS abx (vanc and zosyn) and lasix  - Scr was 3.0 and has remained fairly stable since that time w/ good UOP  - GNR bacteremia w/ pan sensitive strep pneumo and enterobacter cloacae -> transitioned to Cefepime  - Lasix gtt initiated for volume overload in setting of CHF exacerbation (BNP 2174)  - Nephrology was consulted for JENNI on CKD IV w/ anasarca and CHF (EF 10%) w/ proteinuria.    Plan:  - Scr elevated from baseline (2.5), however, has been stable since admission (3.0 -> 3.0 -> 2.9 -> 3.1 ->  3.1)  - BUN also elevated, but largely stable (74 -> 72 -> 76 -> 77 -> 80)  - good UOP overnight (I/O 240/1875 = -1635 (NET -2844))  - Urine protein/creatinine elevated to 2.37, Urine sodium 56  - Ordered US Retroperitoneal complete  - Collect urine sample for microscopic sedimentation evaluation  - Continue lasix gtt  - Leukocytosis stable on CBC, continue abx per primary to treat bacteremia  - Source control per ortho recs  - Trend RFP daily  - Replete electrolytes PRN  - Renal diet, low Na+ and low K+  - Strict I/Os  - Daily weights  - Avoid nephrotoxic medications, NSAIDS, IV contrast, ACE/ARBs  - Renally dose all medications  - Discontinue nephrotoxic home meds prior to discharge  - MAP >65  - Hgb >7  - Monitor closely        Thank you for your consult. I will follow-up with patient. Please contact us if you have any additional questions.    Navarro Tate MD  Nephrology  [facility]

## 2020-02-14 NOTE — ASSESSMENT & PLAN NOTE
Pt is a 64 y.o. F w/ a hx of type 2 DM, stage 4 CKD, HTN , hx of R ankle fracture after fall 12/2019 s/p ORIF 12/29/19 presents for open RLE wound.    Blood cultures on admit +Enterobacter and Strep pneumon. CXR with bilateral consolidations. Ortho plans to do I&D and hardware removal, pending cardiology clearance. Afebrile. Leukocytosis resolved.     Plan  -Continue on Cefepime 2g q24h (renally dosed) which will cover E. Cloacae and Strep Pneumo  -Ortho sx plans on hold due to pt's decompensated heart failure.  Please obtain cxs during sx- gram stain, aerobic, anaerobic, afb and fungal  -Anticipate pt will need 6 weeks of abx from date of surgery   - consider repeat CXR if SOB/cough not improved and send for respiratory cultures.   -ID will continue to follow.

## 2020-02-14 NOTE — SUBJECTIVE & OBJECTIVE
Past Medical History:   Diagnosis Date    Breast cancer     CAD (coronary artery disease)     Cancer     CKD (chronic kidney disease)     Diabetes mellitus     HTN (hypertension)     Peripheral neuropathy        Past Surgical History:   Procedure Laterality Date    BREAST SURGERY Left     lumpectomy    CHOLECYSTECTOMY      CLOSURE OF WOUND Right 12/29/2019    Procedure: CLOSURE, WOUND;  Surgeon: Cooper Medina MD;  Location: [facility] OR;  Service: Orthopedics;  Laterality: Right;    HYSTERECTOMY      OPEN REDUCTION AND INTERNAL FIXATION (ORIF) OF INJURY OF ANKLE Right 12/29/2019    Procedure: OPEN REDUCTION INTERNAL FIXATION-ANKLE;  Surgeon: Cooper Medina MD;  Location: [facility] OR;  Service: Orthopedics;  Laterality: Right;  C-arm, Synthes small frag set, cannulated screws notified confirmed with [employer]   Arthrex, tightrope anchors/ Ansley notified and confirmed /xray notified KB    OPEN REDUCTION AND INTERNAL FIXATION (ORIF) OF INJURY OF ANKLE Right 12/27/2019    Procedure: OPEN REDUCTION INTERNAL FIXATION-ANKLE;  Surgeon: Cooper Medina MD;  Location: [facility] OR;  Service: Orthopedics;  Laterality: Right;  C-arm, Synthes small frag set, cannulated screws, C-arm    TONSILLECTOMY         Review of patient's allergies indicates:   Allergen Reactions    Sulfa (sulfonamide antibiotics) Hives     Current Facility-Administered Medications   Medication Frequency    acetaminophen tablet 650 mg Q6H PRN    albuterol-ipratropium 2.5 mg-0.5 mg/3 mL nebulizer solution 3 mL Q4H PRN    atorvastatin tablet 10 mg Daily    busPIRone tablet 5 mg BID    calcitRIOL capsule 0.25 mcg Daily    ceFEPIme injection 2 g Q24H    chlorothiazide (DIURIL) 500 mg in dextrose 5 % 50 mL IVPB Q12H    dextrose 10% (D10W) Bolus PRN    dextrose 10% (D10W) Bolus PRN    dextrose 50% injection 12.5 g PRN    dextrose 50% injection 25 g PRN    docusate sodium capsule 50 mg Daily    escitalopram oxalate tablet 10  mg Daily    ferrous sulfate EC tablet 325 mg Daily    fluticasone propionate 50 mcg/actuation nasal spray 100 mcg Daily    furosemide (LASIX) 2 mg/mL in sodium chloride 0.9% 100 mL continuous infusion (conc: 2 mg/mL) Continuous    glucagon (human recombinant) injection 1 mg PRN    glucagon (human recombinant) injection 1 mg PRN    glucose chewable tablet 16 g PRN    glucose chewable tablet 16 g PRN    glucose chewable tablet 24 g PRN    glucose chewable tablet 24 g PRN    guaifenesin 100 mg/5 ml syrup 200 mg Q4H PRN    heparin (porcine) injection 5,000 Units Q8H    insulin aspart U-100 pen 0-5 Units QID (AC + HS) PRN    insulin detemir U-100 pen 6 Units QHS    melatonin tablet 6 mg Nightly PRN    metoprolol succinate (TOPROL-XL) 24 hr split tablet 12.5 mg Daily    ondansetron injection 4 mg Q8H PRN    pantoprazole EC tablet 40 mg Daily    polyethylene glycol packet 17 g Daily    sodium bicarbonate tablet 650 mg QID    sodium chloride 0.9% flush 10 mL PRN    sodium chloride 0.9% flush 10 mL PRN     Family History     Problem Relation (Age of Onset)    Breast cancer Maternal Grandmother        Tobacco Use    Smoking status: Former Smoker     Start date: 7/8/2003    Smokeless tobacco: Never Used   Substance and Sexual Activity    Alcohol use: No    Drug use: No    Sexual activity: Not Currently     Review of Systems   Constitutional: Positive for chills and fatigue. Negative for diaphoresis and fever.   HENT: Negative for congestion and hearing loss.    Eyes: Negative for visual disturbance.   Respiratory: Positive for cough and shortness of breath.    Cardiovascular: Negative for chest pain and palpitations.   Gastrointestinal: Positive for nausea. Negative for abdominal pain, diarrhea and vomiting.   Genitourinary: Negative for difficulty urinating, dysuria and hematuria.   Musculoskeletal: Negative for neck pain.        RLE swelling and pain   Skin: Positive for wound. Negative for color  change.   Neurological: Negative for dizziness, light-headedness and headaches.   Psychiatric/Behavioral: Negative for agitation and behavioral problems.     Objective:     Vital Signs (Most Recent):  Temp: 97 °F (36.1 °C) (02/14/20 1131)  Pulse: 62 (02/14/20 1131)  Resp: 18 (02/14/20 1131)  BP: (!) 118/56 (02/14/20 1131)  SpO2: (!) 92 % (02/14/20 1131)  O2 Device (Oxygen Therapy): nasal cannula (02/14/20 1103) Vital Signs (24h Range):  Temp:  [95.1 °F (35.1 °C)-97 °F (36.1 °C)] 97 °F (36.1 °C)  Pulse:  [62-89] 62  Resp:  [16-20] 18  SpO2:  [92 %-98 %] 92 %  BP: (110-129)/(52-63) 118/56     Weight: 62.6 kg (138 lb) (02/12/20 1609)  Body mass index is 23.69 kg/m².  Body surface area is 1.68 meters squared.    I/O last 3 completed shifts:  In: 480 [P.O.:480]  Out: 2725 [Urine:2725]    Physical Exam   Constitutional: She is oriented to person, place, and time. She appears well-developed and well-nourished. She appears distressed.   HENT:   Head: Normocephalic and atraumatic.   Eyes: Pupils are equal, round, and reactive to light. EOM are normal.   Neck: Normal range of motion. Neck supple. JVD present.   Cardiovascular: Normal rate and intact distal pulses. An irregularly irregular rhythm present.   Pulmonary/Chest: She is in respiratory distress. She has rales.   Abdominal: Soft. Bowel sounds are normal. She exhibits no distension. There is no tenderness.   Musculoskeletal: She exhibits edema (1+ pitting BL LE) and tenderness.   Neurological: She is alert and oriented to person, place, and time.   Patient follows commands and answers questions appropriately   Skin: Skin is warm and dry.   Nursing note and vitals reviewed.      Significant Labs:  CBC:   Recent Labs   Lab 02/14/20  0422   WBC 13.89*   RBC 3.54*   HGB 9.3*   HCT 31.6*   *   MCV 89   MCH 26.3*   MCHC 29.4*     CMP:   Recent Labs   Lab 02/14/20  0841   GLU 92   CALCIUM 7.9*   ALBUMIN 1.3*   PROT 5.3*      K 3.4*   CO2 23      BUN 80*    CREATININE 3.1*   ALKPHOS 93   ALT <5*   AST 7*   BILITOT 0.2     Coagulation:   Recent Labs   Lab 02/10/20  2110  02/12/20  0542   INR 1.3*  --   --    APTT 28.2   < > 27.7    < > = values in this interval not displayed.     Recent Labs   Lab 02/12/20  1106   COLORU Yellow   SPECGRAV 1.005   PHUR 5.0   PROTEINUA 1+*   BACTERIA Few*   NITRITE Negative   LEUKOCYTESUR Negative   HYALINECASTS 0     All labs within the past 24 hours have been reviewed.    Significant Imaging:  Reviewed

## 2020-02-14 NOTE — ASSESSMENT & PLAN NOTE
Bina Daniels is a 65 yo WF w/ hx of HTN, T2DM, CHF (EF 10%), CAD, CKD IV (baseline Scr 2.3-2.5), LAI, neuropathy, MDD, and breast cancer who was recently diagnoses w/ a R ankle fracture s/p ORIF on 12/29/19 at [facility] who was admitted on 2/10 w/ concern for infected hardware and sepsis  - Hospital stay was prolonged 2/2 JENNI on CKD, discharged home on 1/8 w/ a Scr of 2.8  - On arrival, patient dyspneic, AMS, chills and appeared septic; started on BS abx (vanc and zosyn) and lasix  - Scr was 3.0 and has remained fairly stable since that time w/ good UOP  - GNR bacteremia w/ pan sensitive strep pneumo and enterobacter cloacae -> transitioned to Cefepime  - Lasix gtt initiated for volume overload in setting of CHF exacerbation (BNP 2174)  - Nephrology was consulted for JENNI on CKD IV w/ anasarca and CHF (EF 10%) w/ proteinuria.    Plan:  - Scr elevated from baseline (2.5), however, has been stable since admission (3.0 -> 3.0 -> 2.9 -> 3.1 -> 3.1)  - BUN also elevated, but largely stable (74 -> 72 -> 76 -> 77 -> 80)  - good UOP overnight (I/O 240/1875 = -1635 (NET -2844))  - Urine protein/creatinine elevated to 2.37, Urine sodium 56  - Ordered US Retroperitoneal complete  - Collect urine sample for microscopic sedimentation evaluation  - Continue lasix gtt  - Leukocytosis stable on CBC, continue abx per primary to treat bacteremia  - Source control per ortho recs  - Trend RFP daily  - Replete electrolytes PRN  - Renal diet, low Na+ and low K+  - Strict I/Os  - Daily weights  - Avoid nephrotoxic medications, NSAIDS, IV contrast, ACE/ARBs  - Renally dose all medications  - Discontinue nephrotoxic home meds prior to discharge  - MAP >65  - Hgb >7  - Monitor closely

## 2020-02-14 NOTE — SUBJECTIVE & OBJECTIVE
Interval History: Patient endorses a persistent dry cough.  She also notes chest pain with deep inspiration and shortness of breath.  She put out 1.8L of UOP yesterday but is only net negative of 2.8L since admission.     Review of Systems   Constitution: Negative for chills and fever.   Cardiovascular: Positive for chest pain (with inspiration and deep breathing). Negative for leg swelling and palpitations.   Respiratory: Positive for cough and shortness of breath. Negative for wheezing.    Gastrointestinal: Negative for abdominal pain.     Objective:     Vital Signs (Most Recent):  Temp: 97 °F (36.1 °C) (02/14/20 1131)  Pulse: 62 (02/14/20 1131)  Resp: 18 (02/14/20 1131)  BP: (!) 118/56 (02/14/20 1131)  SpO2: (!) 92 % (02/14/20 1131) Vital Signs (24h Range):  Temp:  [95.1 °F (35.1 °C)-97 °F (36.1 °C)] 97 °F (36.1 °C)  Pulse:  [62-89] 62  Resp:  [16-20] 18  SpO2:  [92 %-98 %] 92 %  BP: (110-129)/(52-63) 118/56     Weight: 62.6 kg (138 lb)  Body mass index is 23.69 kg/m².     SpO2: (!) 92 %  O2 Device (Oxygen Therapy): nasal cannula      Intake/Output Summary (Last 24 hours) at 2/14/2020 1324  Last data filed at 2/14/2020 1200  Gross per 24 hour   Intake 480 ml   Output 875 ml   Net -395 ml       Lines/Drains/Airways     Drain                 Urethral Catheter 02/12/20 1100 Straight-tip 2 days          Peripheral Intravenous Line                 Peripheral IV - Single Lumen 02/14/20 0837 22 G Anterior;Left Forearm less than 1 day                Physical Exam   Constitutional: She is oriented to person, place, and time.   Patient is a 65 yo F who appears older than stated age, alert and moderately distressed.      HENT:   Head: Normocephalic and atraumatic.   Eyes: Pupils are equal, round, and reactive to light. Conjunctivae are normal.   Neck: Normal range of motion. No JVD present.   Cardiovascular: Normal rate and regular rhythm.   No murmur heard.  Pulmonary/Chest: Effort normal. She has rales (Biltateral. Worse  R>L).   Abdominal: Soft. She exhibits no distension. There is no tenderness.   Musculoskeletal: She exhibits no edema.   SCDs in place   Neurological: She is oriented to person, place, and time.   Skin: Skin is warm and dry.   Psychiatric: She has a normal mood and affect.       Significant Labs: All pertinent labs within the past 24 hours have been reviewed.     Recent Labs   Lab 02/12/20  0542 02/13/20  0435 02/13/20  0820 02/13/20  1924 02/14/20  0422 02/14/20  0841   WBC 13.34* 15.70*  --   --  13.89*  --    HGB 7.9* 8.3*  --   --  9.3*  --    HCT 26.7* 28.1*  --   --  31.6*  --    * 424*  --   --  406*  --    MCV 89 90  --   --  89  --    RDW 17.9* 17.8*  --   --  17.7*  --    NA  --  136 138 138  --  139   K  --  3.5 3.5 3.7  --  3.4*   CL  --  106 106 105  --  106   CO2  --  20* 22* 23  --  23   BUN  --  75* 76* 77*  --  80*   CREATININE  --  3.0* 3.0* 3.1*  --  3.1*   GLU  --  156* 114* 98  --  92   PROT  --  5.1* 5.2* 5.3*  --  5.3*   ALBUMIN  --  1.4* 1.4* 1.4*  --  1.3*   BILITOT  --  0.2 0.2 0.2  --  0.2   AST  --  6* 5* 8*  --  7*   ALKPHOS  --  112 99 100  --  93   ALT  --  5* <5* <5*  --  <5*       Significant Imaging: I have reviewed all pertinent imaging results/findings within the past 24 hours.

## 2020-02-14 NOTE — SUBJECTIVE & OBJECTIVE
Principal Problem:Sepsis    Principal Orthopedic Problem: same    Interval History: Patient seen and examined at bedside.  Here for non healing wounds over left medial and lateral malleolus.  Cardiology consult place yesterday, she was placed on IV Lasix drip for her decompensated heart failure.  Saturating 92% on 3 L last night.  Per primary team, cardiology may take several days for her to diurese to adequate level to make her safe for surgery. States that she is ready for surgery would like to have it as soon as possible.      Review of patient's allergies indicates:   Allergen Reactions    Sulfa (sulfonamide antibiotics) Hives       Current Facility-Administered Medications   Medication    acetaminophen tablet 650 mg    albuterol-ipratropium 2.5 mg-0.5 mg/3 mL nebulizer solution 3 mL    atorvastatin tablet 10 mg    busPIRone tablet 5 mg    calcitRIOL capsule 0.25 mcg    ceFEPIme injection 2 g    chlorothiazide (DIURIL) 500 mg in dextrose 5 % 50 mL IVPB    dextrose 10% (D10W) Bolus    dextrose 10% (D10W) Bolus    dextrose 50% injection 12.5 g    dextrose 50% injection 25 g    docusate sodium capsule 50 mg    escitalopram oxalate tablet 10 mg    ferrous sulfate EC tablet 325 mg    fluticasone propionate 50 mcg/actuation nasal spray 100 mcg    furosemide (LASIX) 2 mg/mL in sodium chloride 0.9% 100 mL continuous infusion (conc: 2 mg/mL)    glucagon (human recombinant) injection 1 mg    glucagon (human recombinant) injection 1 mg    glucose chewable tablet 16 g    glucose chewable tablet 16 g    glucose chewable tablet 24 g    glucose chewable tablet 24 g    guaifenesin 100 mg/5 ml syrup 200 mg    heparin (porcine) injection 5,000 Units    insulin aspart U-100 pen 0-5 Units    insulin detemir U-100 pen 6 Units    melatonin tablet 6 mg    metoprolol succinate (TOPROL-XL) 24 hr split tablet 12.5 mg    ondansetron injection 4 mg    pantoprazole EC tablet 40 mg    polyethylene glycol  "packet 17 g    sodium bicarbonate tablet 650 mg    sodium chloride 0.9% flush 10 mL    sodium chloride 0.9% flush 10 mL    vancomycin - pharmacy to dose     Objective:     Vital Signs (Most Recent):  Temp: 96.4 °F (35.8 °C) (02/13/20 2005)  Pulse: 77 (02/13/20 2351)  Resp: 16 (02/13/20 2351)  BP: (!) 110/52 (02/13/20 2351)  SpO2: 95 % (02/13/20 2351) Vital Signs (24h Range):  Temp:  [95.1 °F (35.1 °C)-98.1 °F (36.7 °C)] 96.4 °F (35.8 °C)  Pulse:  [71-82] 77  Resp:  [16-20] 16  SpO2:  [94 %-96 %] 95 %  BP: (110-134)/(52-65) 110/52     Weight: 62.6 kg (138 lb)  Height: 5' 4" (162.6 cm)  Body mass index is 23.69 kg/m².      Intake/Output Summary (Last 24 hours) at 2/14/2020 0547  Last data filed at 2/13/2020 2045  Gross per 24 hour   Intake 240 ml   Output 1875 ml   Net -1635 ml       Ortho/SPM Exam    Physical Exam:  NAD, A/O x 3.  Wound dressed with 4 x 4 cast padding, Ace wrap. No visible drainage.  No focal motor or sensory deficits noted.      Significant Labs: All pertinent labs within the past 24 hours have been reviewed.    Significant Imaging: I have reviewed and interpreted all pertinent imaging results/findings.  "

## 2020-02-14 NOTE — PLAN OF CARE
Pt AAOx4. VS measured. Skin assessed, wound care completed. HAPI protocol maintained. Neurovascular intact. O2 via NC humidified. Frequent rounds made for pain, potty and position. Bed at lowest point, call light within reach with side rails up x3.

## 2020-02-14 NOTE — PROGRESS NOTES
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 F w/ a hx of type 2 DM, stage 4 CKD, HTN , hx of R ankle fracture after fall 12/2019 s/p ORIF 12/29/19 presents for open RLE wound.    Blood cultures on admit +Enterobacter and Strep pneumon. CXR with bilateral consolidations. Ortho plans to do I&D and hardware removal, pending cardiology clearance. Afebrile. Leukocytosis resolved.     Plan  -Continue on Cefepime 2g q24h (renally dosed) which will cover E. Cloacae and Strep Pneumo  -Ortho sx plans on hold due to pt's decompensated heart failure.  Please obtain cxs during sx- gram stain, aerobic, anaerobic, afb and fungal  -Anticipate pt will need 6 weeks of abx from date of surgery   - consider repeat CXR if SOB/cough not improved and send for respiratory cultures.   -ID will continue to follow.      Thank you for your consult. ID will follow. If you have any questions over the weekend please contact Emelyn De Santiago or ID on call Dr. Jordon Ureña PA-C  Infectious Disease  [facility]      Subjective:     Principal Problem:Sepsis    HPI: Pt is a 64 y.o.  F w / a hx of type 2 DM, stage 4 CKD, HTN who presents for open leg wound.  Pt w/ fall before christmas and dx a fx in the right ankle.  She went for attempted ORIF on 12/27 with Dr Medina with [facility] ortho who was unable to complete procedure due to intraOp bleeding in screw holes, placed in splint and procedure aborted, further completed on 12/29.  Pt refused SNF and she was getting home health to surgical site as well as PT/OT.  Pt reported that wound care had not come for the past five days.  Once wound visualized noticed opening  with clear drainage and hardware exposure.  They report subjective chills and confusion.      On exam she reports some SOB and denies fever,chills. Reports some mild leg pain now. Patient sleepy on exam but can answer basic questions. Family is unsnure if she missed home meds but they think a few days possibly, edie records report being on lasix 40 BID at home.     Pt's blood cxs positive for GNR in 2 of 4 bottles and GPC resembling strep in 1 of 4 bottles.  Ortho consulted with plans for I and D w/ hardware removal.  Interval History: Pt white count trended down 13K. Afebrile. Repeat blood cultures NGTD. Complaining of cough today. OR postponed as need diuresis before surgery.  Cardiology following.  Blood cxs w/ E. Cloacae and Strep Pneumo both pan sensitive.    Review of Systems   Constitutional: Negative for activity change, appetite change, chills, fatigue and fever.   Respiratory: Positive for cough and shortness of breath. Negative for wheezing and stridor.    Cardiovascular: Negative for chest pain, palpitations and leg swelling.   Gastrointestinal: Negative for abdominal distention, abdominal pain, diarrhea, nausea and vomiting.   Genitourinary: Negative for difficulty urinating and dysuria.   Musculoskeletal: Negative for arthralgias, back pain and myalgias.   Skin: Positive for wound. Negative for color change and rash.   Psychiatric/Behavioral: Negative for agitation and confusion.     Objective:     Vital Signs (Most Recent):  Temp: 97 °F (36.1 °C) (02/14/20 1131)  Pulse: 62 (02/14/20 1131)  Resp: 18 (02/14/20 1131)  BP: (!) 118/56 (02/14/20 1131)  SpO2: (!) 92 % (02/14/20 1131) Vital Signs (24h Range):  Temp:  [95.1 °F (35.1 °C)-97 °F (36.1 °C)] 97 °F (36.1 °C)  Pulse:  [62-89] 62  Resp:  [16-20] 18  SpO2:  [92 %-98 %] 92 %  BP: (110-129)/(52-63) 118/56     Weight: 62.6 kg (138 lb)  Body mass index is 23.69 kg/m².    Estimated Creatinine Clearance: 16.2 mL/min (A) (based on SCr of 3.1 mg/dL  (H)).    Physical Exam   Constitutional: She is oriented to person, place, and time. She appears well-developed and well-nourished.   HENT:   Head: Normocephalic and atraumatic.   Cardiovascular: Normal rate, regular rhythm and normal heart sounds.   No murmur heard.  Pulmonary/Chest: Effort normal and breath sounds normal. She has no wheezes. She has no rales.   Abdominal: Soft. Bowel sounds are normal. She exhibits no distension and no mass. There is no tenderness.   Musculoskeletal: She exhibits edema and deformity.   Neurological: She is alert and oriented to person, place, and time.   Skin: There is erythema.   See media tab for wound pictures  Pt R leg wrapped       Significant Labs: All pertinent labs within the past 24 hours have been reviewed.    Significant Imaging: I have reviewed all pertinent imaging results/findings within the past 24 hours.

## 2020-02-14 NOTE — SUBJECTIVE & OBJECTIVE
Interval History: Pt white count trended down 13K. Afebrile. Repeat blood cultures NGTD. Complaining of cough today. OR postponed as need diuresis before surgery.  Cardiology following.  Blood cxs w/ E. Cloacae and Strep Pneumo both pan sensitive.    Review of Systems   Constitutional: Negative for activity change, appetite change, chills, fatigue and fever.   Respiratory: Positive for cough and shortness of breath. Negative for wheezing and stridor.    Cardiovascular: Negative for chest pain, palpitations and leg swelling.   Gastrointestinal: Negative for abdominal distention, abdominal pain, diarrhea, nausea and vomiting.   Genitourinary: Negative for difficulty urinating and dysuria.   Musculoskeletal: Negative for arthralgias, back pain and myalgias.   Skin: Positive for wound. Negative for color change and rash.   Psychiatric/Behavioral: Negative for agitation and confusion.     Objective:     Vital Signs (Most Recent):  Temp: 97 °F (36.1 °C) (02/14/20 1131)  Pulse: 62 (02/14/20 1131)  Resp: 18 (02/14/20 1131)  BP: (!) 118/56 (02/14/20 1131)  SpO2: (!) 92 % (02/14/20 1131) Vital Signs (24h Range):  Temp:  [95.1 °F (35.1 °C)-97 °F (36.1 °C)] 97 °F (36.1 °C)  Pulse:  [62-89] 62  Resp:  [16-20] 18  SpO2:  [92 %-98 %] 92 %  BP: (110-129)/(52-63) 118/56     Weight: 62.6 kg (138 lb)  Body mass index is 23.69 kg/m².    Estimated Creatinine Clearance: 16.2 mL/min (A) (based on SCr of 3.1 mg/dL (H)).    Physical Exam   Constitutional: She is oriented to person, place, and time. She appears well-developed and well-nourished.   HENT:   Head: Normocephalic and atraumatic.   Cardiovascular: Normal rate, regular rhythm and normal heart sounds.   No murmur heard.  Pulmonary/Chest: Effort normal and breath sounds normal. She has no wheezes. She has no rales.   Abdominal: Soft. Bowel sounds are normal. She exhibits no distension and no mass. There is no tenderness.   Musculoskeletal: She exhibits edema and deformity.    Neurological: She is alert and oriented to person, place, and time.   Skin: There is erythema.   See media tab for wound pictures  Pt R leg wrapped       Significant Labs: All pertinent labs within the past 24 hours have been reviewed.    Significant Imaging: I have reviewed all pertinent imaging results/findings within the past 24 hours.

## 2020-02-14 NOTE — PROGRESS NOTES
[facility]  Orthopedics  Progress Note    Patient Name: Bina Daniels  MRN: [id number]  Admission Date: 2/10/2020  Hospital Length of Stay: 4 days  Attending Provider: Tracy Peralta MD  Primary Care Provider: Tho Haro MD  Follow-up For: Procedure(s) (LRB):  REMOVAL, HARDWARE, LOWER EXTREMITY, Irrigation and debridement Right ankle, synthes removal screw , Large C arm clock side (Right)    Post-Operative Day: 2 Days Post-Op  Subjective:     Principal Problem:Sepsis    Principal Orthopedic Problem: same    Interval History: Patient seen and examined at bedside.  Here for non healing wounds over left medial and lateral malleolus.  Cardiology consult place yesterday, she was placed on IV Lasix drip for her decompensated heart failure.  Saturating 92% on 3 L last night.  Per primary team, cardiology may take several days for her to diurese to adequate level to make her safe for surgery. States that she is ready for surgery would like to have it as soon as possible.      Review of patient's allergies indicates:   Allergen Reactions    Sulfa (sulfonamide antibiotics) Hives       Current Facility-Administered Medications   Medication    acetaminophen tablet 650 mg    albuterol-ipratropium 2.5 mg-0.5 mg/3 mL nebulizer solution 3 mL    atorvastatin tablet 10 mg    busPIRone tablet 5 mg    calcitRIOL capsule 0.25 mcg    ceFEPIme injection 2 g    chlorothiazide (DIURIL) 500 mg in dextrose 5 % 50 mL IVPB    dextrose 10% (D10W) Bolus    dextrose 10% (D10W) Bolus    dextrose 50% injection 12.5 g    dextrose 50% injection 25 g    docusate sodium capsule 50 mg    escitalopram oxalate tablet 10 mg    ferrous sulfate EC tablet 325 mg    fluticasone propionate 50 mcg/actuation nasal spray 100 mcg    furosemide (LASIX) 2 mg/mL in sodium chloride 0.9% 100 mL continuous infusion (conc: 2 mg/mL)    glucagon (human recombinant) injection 1 mg    glucagon (human recombinant) injection 1  "mg    glucose chewable tablet 16 g    glucose chewable tablet 16 g    glucose chewable tablet 24 g    glucose chewable tablet 24 g    guaifenesin 100 mg/5 ml syrup 200 mg    heparin (porcine) injection 5,000 Units    insulin aspart U-100 pen 0-5 Units    insulin detemir U-100 pen 6 Units    melatonin tablet 6 mg    metoprolol succinate (TOPROL-XL) 24 hr split tablet 12.5 mg    ondansetron injection 4 mg    pantoprazole EC tablet 40 mg    polyethylene glycol packet 17 g    sodium bicarbonate tablet 650 mg    sodium chloride 0.9% flush 10 mL    sodium chloride 0.9% flush 10 mL    vancomycin - pharmacy to dose     Objective:     Vital Signs (Most Recent):  Temp: 96.4 °F (35.8 °C) (02/13/20 2005)  Pulse: 77 (02/13/20 2351)  Resp: 16 (02/13/20 2351)  BP: (!) 110/52 (02/13/20 2351)  SpO2: 95 % (02/13/20 2351) Vital Signs (24h Range):  Temp:  [95.1 °F (35.1 °C)-98.1 °F (36.7 °C)] 96.4 °F (35.8 °C)  Pulse:  [71-82] 77  Resp:  [16-20] 16  SpO2:  [94 %-96 %] 95 %  BP: (110-134)/(52-65) 110/52     Weight: 62.6 kg (138 lb)  Height: 5' 4" (162.6 cm)  Body mass index is 23.69 kg/m².      Intake/Output Summary (Last 24 hours) at 2/14/2020 0547  Last data filed at 2/13/2020 2045  Gross per 24 hour   Intake 240 ml   Output 1875 ml   Net -1635 ml       Ortho/SPM Exam    Physical Exam:  NAD, A/O x 3.  Wound dressed with 4 x 4 cast padding, Ace wrap. No visible drainage.  No focal motor or sensory deficits noted.      Significant Labs: All pertinent labs within the past 24 hours have been reviewed.    Significant Imaging: I have reviewed and interpreted all pertinent imaging results/findings.    Assessment/Plan:     * Sepsis  Bina Daniels is a 64 y.o. female who scheduled today for irrigation debridement left ankle, removal of hardware.  Despite original plans to go to the operating room today is become clear that her heart failure remains decompensated and she would be at very high risk for cardiopulmonary " compromise intraoperatively.      - Weight bearing status:  Nonweightbearing  - Pain control: Per APS  - Antibiotics:  Preop ordered  - DVT Prophylaxis:  Heparin held preop , SCD's at all times when not ambulating.  - PT/OT  - Lines/Drains:  None    - Dispo:  Will discuss with staff as we had original plans to take the patient to the operating room today however is become clear that up her decompensated heart failure may prevent this and she can be better optimized with several additional days for diuresis.                Tho Reinoso MD  Orthopedics  [facility]

## 2020-02-14 NOTE — PROGRESS NOTES
"Progress Note  Hospital Medicine       Patient Name: Bina Daniels  MRN:  [id number]  Hospital Medicine Team: [facility] HOSP MED K Tracy Peralta MD  Date of Admission:  2/10/2020     Principal Problem:  Sepsis   Primary Care Physician: Tho Haro MD      History of Present Illness:     Interval History:    Patient with increase in urine output with diuril and lasix gtt.  Decision to postpone surgery now as the risk is there to get source control with ankle but as the sepsis seems to have improved (repeat cx neg, no fevers, vitals stable ie not on pressors, etc) that the surgery while is semi urgent, can be delayed until the respiratory status is better once more diuresis is achieved. May require continued diuresis over the weekend as significant residual volume overload currently present. Discussed cardiac function and renal impairment with family. Extensive talk with patient alone in room on 2/14--we discussed the severity of her cardiac disease and how this will ultimately progress and that it puts her at very high risk for surgical intervention.     HPI:   Ms. Bina Daniels is a 64 y.o. female with a history of type 2 DM, stage 4 CKD, HTN, LAI, neuropathy, LAI, depressionn who was in her usual state of health until the week or so before gracy this year when she fell and thought she just sprained her ankle, per ortho notes she "didn't think it ws that bad", she then saw a Dr Sotomayor who was unable to reduce the ankle and dx a fx in the Right ankle and sent to [facility] for treatment on 12/26, she went for attempted ORIF on 12/27 with Dr [facility] who was unable to complete procedure due to intraOp bleeding in screw holes, placed in splint and procedure aborted/2U given, on 12/27 ORIF completed to R ankle fx with recs for nonweight bearing at that time, SNF was recommended. She had prolonged hospital stay for JENNI on stage 4 CKD issues- nephro Dr Saenz and hosp med in [facility] for med " "issues, she decided to go home and not SNF on 1/8. Per her family at bedside, she was getting home health to surgical site and was getting home PT/OT, they didn't seem thrilled with her choice to not do SNF, their brother who is her primary caregiver was not present in ER (daughter and son present in ER now and dont know all of med hx). In interim they didn't know she was having wound issues and dont report that they knew about it until this weekend that the site had opened up as well as worsening dyspnea and fluid/pitting edema in legs, arms as well. They report subjective chills at home but no known fevers, as well as her being slightly "off" and not herself completely now. They brought her for evlauation for both of above, and as seen in ER pictures the wound has opened and hardware visible. Per ER notes, [facility] ortho was attempted called from ER but "no answer"' reported so they admitted to hospital medicine here, ortho has been called here, family reports not seen yet. Given vanc, zosyn, lasix, tdap in ER. SHe missed her ortho f/u last week and they had rescheduled for this week but when they saw the wound they brought her to the ER.     Family is unsnure if she missed home meds but they think a few days possibly, edie records report being on lasix 40 BID at home. Family to bring home meds (her son who manages this), but most recent nephro MAR before discharge showed the following:  norvasc 10, asa81, calcitriol .25, colace, lexapro 10 iron 325, fluticasone, levemir 10, toprol 50 BID, protonix, nabicarb 650 4 times day. At one point was on also glyburide/metformin in [place] and at home and lasix 40 BID, unsure if held on discharge or not as dc summary has no MAR, prolia injections, neurontin.    Med Hx:  Type 2 DM- A1C 9.9, stage 4 CKD- Cr baseline 2.3-2.5, iron deficiency/acute blood loss anemia, ankle fx- ORIF 12/29, neuropathy, breast ca s/p lumpectomy, depression, ?CAD, chronic metabolic acidosis, secondary " hyperparathyroidism from CKD, HTN, hypoalbuminemia    Social: son not at bedside is primary caregiver, mutiple children assist in ADLS, Walker use after fx, more bedbound now after surgery, SNF was rec but was at home. Smoker, stopped 10 years ago. No alcohol or drugs.    Review of Systems   Constitutional: positive for chills, fatigue, fever.   HENT: Negative for sore throat, trouble swallowing.    Eyes: Negative for photophobia, visual disturbance.   Respiratory: positive for cough, shortness of breath.    Cardiovascular: Negative for chest pain, palpitations, positive leg swelling.   Gastrointestinal: Negative for abdominal pain, constipation, diarrhea, nausea, vomiting.   Endocrine: Negative for cold intolerance, heat intolerance.   Genitourinary: Negative for dysuria, frequency.   Musculoskeletal: positive for arthralgias, myalgias.   Skin positive for rash, wound, erythema   Neurological: Negative for dizziness, syncope, weakness, light-headedness.   Psychiatric/Behavioral: Negative for confusion, hallucinations, anxiety  All other systems reviewed and are negative.      Past Medical History: Patient has a past medical history of Breast cancer, CAD (coronary artery disease), Cancer, CKD (chronic kidney disease), Diabetes mellitus, HTN (hypertension), and Peripheral neuropathy.    Past Surgical History: Patient has a past surgical history that includes Breast surgery (Left); Cholecystectomy; Tonsillectomy; Hysterectomy; Open reduction and internal fixation (ORIF) of injury of ankle (Right, 12/29/2019); Closure of wound (Right, 12/29/2019); and Open reduction and internal fixation (ORIF) of injury of ankle (Right, 12/27/2019).    Social History: Patient reports that she has quit smoking. She started smoking about 16 years ago. She has never used smokeless tobacco. She reports that she does not drink alcohol or use drugs.    Family History: family history includes Breast cancer in her maternal  grandmother.    Medications: Scheduled Meds:   atorvastatin  10 mg Oral Daily    busPIRone  5 mg Oral BID    calcitRIOL  0.25 mcg Oral Daily    ceFEPime (MAXIPIME) IVPB  2 g Intravenous Q24H    chlorothiazide (DIURIL) IVPB  500 mg Intravenous Q12H    docusate sodium  50 mg Oral Daily    escitalopram oxalate  10 mg Oral Daily    ferrous sulfate  325 mg Oral Daily    fluticasone propionate  2 spray Each Nostril Daily    heparin (porcine)  5,000 Units Subcutaneous Q8H    insulin detemir U-100  6 Units Subcutaneous QHS    metoprolol succinate  12.5 mg Oral Daily    pantoprazole  40 mg Oral Daily    polyethylene glycol  17 g Oral Daily    sodium bicarbonate  650 mg Oral QID     Continuous Infusions:   furosemide (LASIX) 2 mg/mL continuous infusion (non-titrating) 20 mg/hr (02/14/20 0221)     PRN Meds:.acetaminophen, albuterol-ipratropium, Dextrose 10% Bolus, Dextrose 10% Bolus, dextrose 50%, dextrose 50%, glucagon (human recombinant), glucagon (human recombinant), glucose, glucose, glucose, glucose, guaifenesin 100 mg/5 ml, insulin aspart U-100, melatonin, ondansetron, sodium chloride 0.9%, sodium chloride 0.9%    Allergies: Patient is allergic to sulfa (sulfonamide antibiotics).    Physical Exam:     Vital Signs (Most Recent):  Temp: 97 °F (36.1 °C) (02/14/20 1131)  Pulse: 62 (02/14/20 1131)  Resp: 18 (02/14/20 1131)  BP: (!) 118/56 (02/14/20 1131)  SpO2: (!) 92 % (02/14/20 1131) Vital Signs Range (Last 24H):  Temp:  [95.1 °F (35.1 °C)-97 °F (36.1 °C)]   Pulse:  [62-89]   Resp:  [16-20]   BP: (110-129)/(52-63)   SpO2:  [92 %-98 %]    Body mass index is 23.69 kg/m².     Physical Exam:  Constitutional:  Alert, family at bedside. answers questions appropriately. On NC. - 3L.   Head: Normocephalic and atraumatic.   Mouth/Throat: Oropharynx is clear and moist.   Eyes: EOM are normal. Pupils are equal, round, and reactive to light. No scleral icterus.   Neck: Normal range of motion. Neck supple. JVP  elevated.  Cardiovascular: irregularly irregular rhythm, rate of 85.  No murmur heard.  Pulmonary/Chest: increased WOB, sitting at 60 degree ankle but mild improvement since yesterday, less crackles, minimal use of accessory muscles, no tripoding,  RR 18, on NC  Abdominal: Soft. Bowel sounds are normal.  mild distension, anasarca.  Musculoskeletal: open R ankle wound with hardware visible (see ER note), wrapped now. Pulses present bialterally, warm to touch.   Neurological: Alert and oriented to person, place, and time.   Skin: Skin is warm and dry. Diffuse pitting edema to the hips bilaterally, 3+ pitting edema in legs, arms with edema RUE >LUE, mild tenderness with pressing on edema in extremities. Wound to RLE. Anasarca.  Psychiatric: Normal mood and affect. Behavior is normal. reports some depression from this, occasional tearful to nursing.  Vitals reviewed.    Recent Labs   Lab 02/12/20  0542 02/13/20 0435 02/14/20 0422   WBC 13.34* 15.70* 13.89*   HGB 7.9* 8.3* 9.3*   HCT 26.7* 28.1* 31.6*   * 424* 406*       Recent Labs   Lab 02/12/20  0541 02/13/20 0435 02/13/20 0820 02/13/20 1924 02/14/20 0422 02/14/20  0841   * 136 138 138  --  139   K 4.0 3.5 3.5 3.7  --  3.4*    106 106 105  --  106   CO2 19* 20* 22* 23  --  23   BUN 76* 75* 76* 77*  --  80*   CREATININE 2.9* 3.0* 3.0* 3.1*  --  3.1*    156* 114* 98  --  92   CALCIUM 7.5* 7.5* 7.6* 7.9*  --  7.9*   MG 1.9 1.9  --   --  1.9  --    PHOS 5.7* 5.6*  --   --  6.1*  --      Recent Labs   Lab 02/10/20  1741 02/10/20  2110  02/13/20  0820 02/13/20 1924 02/14/20  0841   ALKPHOS  --   --    < > 99 100 93   ALT  --   --    < > <5* <5* <5*   AST  --   --    < > 5* 8* 7*   ALBUMIN  --   --    < > 1.4* 1.4* 1.3*   PROT  --   --    < > 5.2* 5.3* 5.3*   BILITOT  --   --    < > 0.2 0.2 0.2   INR 1.3* 1.3*  --   --   --   --     < > = values in this interval not displayed.      Recent Labs   Lab 02/13/20  0742 02/13/20  1108  02/13/20  1648 02/13/20 2047 02/14/20  0556 02/14/20  0828   POCTGLUCOSE 104 100 206* 109 91 93         Assessment and Plan:     Ms. Bina Daniels is a 64 y.o. 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 [T84.7XXA]  Yes    Volume overload [E87.70]  Yes    Anasarca associated with disorder of kidney [N04.9]  Yes    Iron deficiency anemia [D50.9]  Yes    Vitamin D deficiency [E55.9]  Yes    Metabolic acidosis [E87.2]  Yes    Secondary hyperparathyroidism [N25.81]  Yes    Acute metabolic encephalopathy [G93.41]  Yes    Hypertension [I10]  Yes    Hypoalbuminemia [E88.09]  Yes    Acute hypoxemic respiratory failure [J96.01]  Yes    Acute on chronic combined systolic and diastolic heart failure [I50.43]  Yes    Breast cancer [C50.919]  Yes    S/P ORIF (open reduction internal fixation) fracture [Z98.890, Z87.81]  Not Applicable     Ankle, 12/29      Depression [F32.9]  Yes    Neuropathy due to secondary diabetes mellitus [E13.40]  Yes     Chronic    Type 2 diabetes mellitus with neurologic complication, without long-term current use of insulin [E11.49]  Yes     Chronic    Anemia due to stage 3 chronic kidney disease [N18.3, D63.1]  Yes      Resolved Hospital Problems    Diagnosis Date Resolved POA    CKD (chronic kidney disease), stage IV [N18.4] 02/12/2020 Yes     Chronic     Sepsis secondary to infected hardware in right leg/wound infection Right ankle  Strep bacteremia. Gram negative bacteremia  S/p ORIF, s/p R ankle fracture  -patient with open surgical wound,  hardware exposed on R ankle, lactate 1.3, procal mildly up at .91, , ESR 68, unsure timing of when the wound started becoming clearly infected and open to air  -s/p ORIF 12/29 with [facility] ortho in [place], aborted surgery 12/27 due to intraOp bleeding, walked on the fx for at least a week before that per Dr Medina notes. nonweight bearing to RLE, continue this NWB status to RLE now, bedrest for now until ortho assessment. Bandaged now.   -wbc 17 on admit, toxic granulations seen on diff--> 13 today  -Xray on admit showing widened tibotalar interval, cortical irregularity, edema, post op changes vs site infections  -blood CX on admit 2/10 with Strep in 1/4 bottles, GNR 2/4 bottles. ID consulted to assist, vanc/cefepime now. Repeat Cx 2/11 NGTD  -plan for washout in future with ortho, date TBD, too high risk currently given active acute decompensated heart failure per discussions today 2/12 as didn't diurese as well as expected overnight and if euvolemic is needed for this then we likely have days before that is going to be obtained. As her sepsis/bacteremia seems to be stable (not on pressors, cultures are clearning in blood, afebrile) the risk of delay seems to be acceptable. If she was having florid sepsis issues and source control was immediately necessary then the benefit of this sooner would be tricky, but she appears to be responding to antibiotics for the infection from the ankle, so delay to get her respiratory status better in the interim seems to be prudent now. When she is ready for surgery, she is high risk- RCRI is high at 3 which gives a 15% 30 day risk of death, cardiac event, MI.      Anasarca  Acute on chronic systolic/ LV/RV diastolic heart failure (EF 10%)  Volume overload  Acute hypoxemic respiratory failure  -massive volume overload on admit,   -strict I/O, daily weight, 2 grams Na, 1.5L fluid restrict  -pitting everywhere on exam, arms, legs, chest. Fluid probably worsened the surg site to  open up also  -albumin very low, started boost  -heaton placed 2/12.   - 2/11 with echo showing EF 10%, diastolic dysfunction- LV, RV, LAE, increased LAP, LVP, PAP elevated, MR.   -2/12: between 3-6L oxygen, diuresis not documented well, lasix IV in AM and onto lasix drip in PM per cards recs to assist with diuresis further. Started low dose toprol XL for afib 2/11 (was already on at home but was on short acting)  - Will need ace/arb long term. Will need consideration of life vest given depressed EF also  -RCRI 3, 15% risk of 30 day mortality post op. Optimizing with diuresis in interim     Acute kidney injury on Stage 4 CKD  -baseline Cr 2.3 to 2.8, trended up to 2.8 in January in [place], seen by nephro, sees [place] with nephro per [place] notes  -UA with protein, few bacteria. CK wnl. urine na, urine pr/cr, urine cr, urine bun collected and pending.  -avoid nephrotoxins  -likely element of cardiorenal worsening baseline, diuresis needed  2/13: 3.0 still,     Type 2 dm  -A1C 9.9, glucose 313 on CMP, on levemir 10 U at [place] recently, unsure what she went home on, Toujeo on MAR, metformin and glyburide also there, clearly those are not appropriate with this CKD and need holding now and likely forever  -SSI, decrease levemir 6 to start as had hypoglycemia to glucose 30s reviewing [place] records also, titrate up PRN  -hold asa   -neuropathy at baseline    Paroxysmal Atrial fibrillation  -new finding 2/11 on ekg and echo, NSR on admit, likely stress of sepsis and depressed ef contributors, suspect will go in and out given LAE, EF probably wont stay out of it  -[id number] of at least 4, will need long term anticoagulation, eliquis will be only noac given ckd option. Pending initiation of NOAC until surgical plan finalized.     Upper extremity superficial thrombosis  -right cephalic, left basilic  -superficial, low risk to embolize  -can do ppx heparin pre-op, long term after all surgeries done, options are eliquis vs  coumadin, prefer eliquis.    Metabolic acidosis  -secondary to CKD, resume nabicarb, improving from 14 at admit to 19 now    Anemia of chronic renal disease  Iron deficiency anemia  -tx recently in [place] irizarry low at 57, continue daily iron  -hg 9.9-->7.9    Hypoalbuminemia  -boost with meals  -pr/cr ratio 2.37.    Secondary hyperparathyroidism  -, daily phos  -renal diet  -cacitriol daily    Vitamin D deficiency  Osteoporosis  -on prolia outpatient  -vit D low at 5 on check in January, dont see replacement on MAR. Will consider long term once out of acute illness    Depression  -cont home lexapro  -endorses depression symptoms worse now with acute medical issues. Watch closely for worsenign now    Acute metabolic encephalopathy  -likely from sepsis, mild, monitor closely  -improved 2/11, at baseline now    Diet:  Low na, 1.5L, renal/dm      DVT PPx:  Heparin ppx     End of life discussion. Patient expressed that she would not want to be placed on life support but would like to discuss with her family who is not present at this time. DNR would be appropriate in clinical setting of end stage heart failure. Patient understands overall cardiac prognosis is not great and per family her functional status and quality of life have been declining in the past few months. Will discuss further with family and ask if palliative care consult could visit in future.     Disposition:  Diuresis, surgery date TBD pending diuresis    Tracy Peralta MD

## 2020-02-14 NOTE — PHYSICIAN QUERY
PT Name: Bina Daniels  MR #: [id number]    Physician Query Form -Systemic Infectious Process Clarification     CDS/: ROOSEVELT Talavera, RN, CDS               Contact information:[email or web address]  This form is a permanent document in the medical record.     Query Date: February 14, 2020     By submitting this query, we are merely seeking further clarification of documentation. Please utilize your independent clinical judgment when addressing the question(s) below.    The Medical record contains the following:     Indicators   Supporting Clinical Findings   Location in Medical Record   X HR RR BP Temp  HR 78, RR 18, /60, Temp. 97.7, SpO2 94%    ED provider note, Dr. Pineda, [facility]/Dr. Espinoza, 2/10   X Lactic Acid             Procalcitonin  Lactate 1.3, procal mildly up at .91  H&P, Dr. Palomino, 2/10     X WBC                Bands                     CRP     2/10 2/11 2/12   WBC 16.00 (H) 14.47 (H) 13.34 (H)      , ESR 68      Lab 2/10- 2/12       H&P, Dr. Palomino, 2/10   X Culture(s)  Strep bacteremia. Gram negative bacteremia      Dr. Fabian VASQUEZ, 2/13   X AMS, Confusion, LOC, etc.  Acute metabolic encephalopathy    H&P, Dr. Palomino, 2/10   X Organ Dysfunction / Failure  Acute hypoxemic respiratory failure     Acute kidney injury      Acute metabolic encephalopathy-likely from sepsis    H&P, Dr. Palomino, 2/10   X Bacteremia or Sepsis / Septic  Sepsis    H&P, Dr. Palomino, 2/10   X Known or Suspected Source of Infection documented  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/wound infection Right ankle  S/p ORIF, s/p R ankle fracture    H&P, Dr. Palomino, 2/10    (Failed) Outpatient Treatment      Medication     X Treatment  IV Zosyn, Vanc     Plan for washout in future with ortho     IV vanc/cefepime   H&P, Dr. Palomino, 2/10     Dr. Peralta, 2/13    Other       Provider, please specify the sepsis diagnosis  associated with above clinical findings.      [   ] Sepsis     [ xx  ] Severe Sepsis with  Acute Organ Dysfunction/Failure (please specify  organ dysfunction/failure): ___________________     [   ] Other Infectious Disease (please specify): _________________________________     [   ] Other: __________________________________     [  ]  Clinically Undetermined         Please document in your progress notes daily for the duration of treatment until resolved and include in your discharge summary.

## 2020-02-14 NOTE — HPI
Bina Daniels is a 65 yo WF w/ hx of HTN, T2DM, CHF (EF 10%), CAD, CKD IV (baseline Scr 2.3-2.5), LAI, neuropathy, MDD, and breast cancer who was recently diagnoses w/ a R ankle fracture s/p [place] [facility] [place] was admitted on 2/10 w/ concern for infected hardware and sepsis. Patient's ORIF was originally attempted on 12/27, however, procedure was aborted 2/2 bleeding. ORIF was completed 2 days later and the remainder of her hospital stay was prolonged 2/2 JENNI on CKD. Patient elected to be discharged Home w/ Home Health on 1/8 (despite SNF recommendations) w/ a Scr of 2.8. Since that time, patients surgical site/wound has progressively worsened despite home health care. Patient was scheduled to follow up with her orthopedist this week, however, was unable to make her appointment and was rescheduled for next week. Family first noticed poor wound healing earlier this week w/ exposed hardware. [place] [facility] [place] noted to be dyspneic w/ profuse edema and AMS. She endorsed chills and appeared septic. BS abx (vanc and zosyn) were initiated and patient was also given lasix and tdap in ED. On arrival, Scr was 3.0 and has remained fairly stable since that time w/ good UOP. Patient found to have GNR bacteremia w/ pan sensitive strep pneumo and enterobacter cloacae. Transitioned to Cefepime. Lasix gtt initiated for volume overload in setting of CHF exacerbation (BNP 2174). Patient has had no episodes of profuse hypotension since admission, but had an episode of hypothermia to 95.1 F yesterday. New onset Afib on EKG 2/11, currently rate controlled. On exam today, patient notably distressed w/ increased work of breathing currently receiving a nebulizer treatment. She endorses nausea, SOB, and cough. Denies fever, CP, palp, abd pain, or dysuria. Nephrology was consulted for JENNI on CKD IV w/ anasarca and CHF (EF 10%) w/ proteinuria.

## 2020-02-14 NOTE — PROGRESS NOTES
[facility]  Cardiology  Progress Note    Patient Name: Bina Daniels  MRN: [id number]  Admission Date: 2/10/2020  Hospital Length of Stay: 4 days  Code Status: Full Code   Attending Physician: Tracy Peralta MD   Primary Care Physician: Tho Haro MD  Expected Discharge Date: 2/17/2020  Principal Problem:Sepsis    Subjective:     Hospital Course:   No notes on file    Interval History: Patient endorses a persistent dry cough.  She also notes chest pain with deep inspiration and shortness of breath.  She put out 1.8L of UOP yesterday but is only net negative of 2.8L since admission.     Review of Systems   Constitution: Negative for chills and fever.   Cardiovascular: Positive for chest pain (with inspiration and deep breathing). Negative for leg swelling and palpitations.   Respiratory: Positive for cough and shortness of breath. Negative for wheezing.    Gastrointestinal: Negative for abdominal pain.     Objective:     Vital Signs (Most Recent):  Temp: 97 °F (36.1 °C) (02/14/20 1131)  Pulse: 62 (02/14/20 1131)  Resp: 18 (02/14/20 1131)  BP: (!) 118/56 (02/14/20 1131)  SpO2: (!) 92 % (02/14/20 1131) Vital Signs (24h Range):  Temp:  [95.1 °F (35.1 °C)-97 °F (36.1 °C)] 97 °F (36.1 °C)  Pulse:  [62-89] 62  Resp:  [16-20] 18  SpO2:  [92 %-98 %] 92 %  BP: (110-129)/(52-63) 118/56     Weight: 62.6 kg (138 lb)  Body mass index is 23.69 kg/m².     SpO2: (!) 92 %  O2 Device (Oxygen Therapy): nasal cannula      Intake/Output Summary (Last 24 hours) at 2/14/2020 1324  Last data filed at 2/14/2020 1200  Gross per 24 hour   Intake 480 ml   Output 875 ml   Net -395 ml       Lines/Drains/Airways     Drain                 Urethral Catheter 02/12/20 1100 Straight-tip 2 days          Peripheral Intravenous Line                 Peripheral IV - Single Lumen 02/14/20 0837 22 G Anterior;Left Forearm less than 1 day                Physical Exam   Constitutional: She is oriented to person, place, and time.    Patient is a 63 yo F who appears older than stated age, alert and moderately distressed.      HENT:   Head: Normocephalic and atraumatic.   Eyes: Pupils are equal, round, and reactive to light. Conjunctivae are normal.   Neck: Normal range of motion. No JVD present.   Cardiovascular: Normal rate and regular rhythm.   No murmur heard.  Pulmonary/Chest: Effort normal. She has rales (Biltateral. Worse R>L).   Abdominal: Soft. She exhibits no distension. There is no tenderness.   Musculoskeletal: She exhibits no edema.   SCDs in place   Neurological: She is oriented to person, place, and time.   Skin: Skin is warm and dry.   Psychiatric: She has a normal mood and affect.       Significant Labs: All pertinent labs within the past 24 hours have been reviewed.     Recent Labs   Lab 02/12/20  0542 02/13/20  0435 02/13/20  0820 02/13/20  1924 02/14/20  0422 02/14/20  0841   WBC 13.34* 15.70*  --   --  13.89*  --    HGB 7.9* 8.3*  --   --  9.3*  --    HCT 26.7* 28.1*  --   --  31.6*  --    * 424*  --   --  406*  --    MCV 89 90  --   --  89  --    RDW 17.9* 17.8*  --   --  17.7*  --    NA  --  136 138 138  --  139   K  --  3.5 3.5 3.7  --  3.4*   CL  --  106 106 105  --  106   CO2  --  20* 22* 23  --  23   BUN  --  75* 76* 77*  --  80*   CREATININE  --  3.0* 3.0* 3.1*  --  3.1*   GLU  --  156* 114* 98  --  92   PROT  --  5.1* 5.2* 5.3*  --  5.3*   ALBUMIN  --  1.4* 1.4* 1.4*  --  1.3*   BILITOT  --  0.2 0.2 0.2  --  0.2   AST  --  6* 5* 8*  --  7*   ALKPHOS  --  112 99 100  --  93   ALT  --  5* <5* <5*  --  <5*       Significant Imaging: I have reviewed all pertinent imaging results/findings within the past 24 hours.       Assessment and Plan:       Acute on chronic combined systolic and diastolic heart failure  Patient is a 63 yo F who we have been asked to help with heart failure management.   Acute decompensated systolic and diastolic heart failure.   Pt with EF of 10% unknown if ischemic or non-ischemic.    -On  exam, she has no LE edema nor JVD appreciated. However, she has rales noted throughout.  She has also endorsed a progressive dry cough.  Concern for underlying pulm issue: pneumonia vs bronchitis?      Plan:  -Recommend repeat CXR and BNP  -Consider starting therapeutic heparin ggt for anticoagulation of atrial fibrillation given gitou0irgh of 4.   -Discontinue lasix gtt and diuril.  Recommend 1x dose of 80mg IV lasix tonight.  -Strict I&Os, Mcdnaiel  -Would not pursue surgery until euvolemic            VTE Risk Mitigation (From admission, onward)         Ordered     heparin (porcine) injection 5,000 Units  Every 8 hours      02/12/20 1318     Place SKYLAR hose  Until discontinued      02/12/20 1037     IP VTE HIGH RISK PATIENT  Once      02/10/20 1527                Venancio Kelsey MD  Cardiology  [facility]

## 2020-02-15 LAB
ALBUMIN SERPL BCP-MCNC: 1.4 G/DL (ref 3.5–5.2)
ALBUMIN SERPL BCP-MCNC: 1.4 G/DL (ref 3.5–5.2)
ALP SERPL-CCNC: 101 U/L (ref 55–135)
ALP SERPL-CCNC: 91 U/L (ref 55–135)
ALT SERPL W/O P-5'-P-CCNC: 5 U/L (ref 10–44)
ALT SERPL W/O P-5'-P-CCNC: <5 U/L (ref 10–44)
ANION GAP SERPL CALC-SCNC: 14 MMOL/L (ref 8–16)
ANION GAP SERPL CALC-SCNC: 17 MMOL/L (ref 8–16)
AST SERPL-CCNC: 8 U/L (ref 10–40)
AST SERPL-CCNC: 9 U/L (ref 10–40)
BASOPHILS # BLD AUTO: 0.04 K/UL (ref 0–0.2)
BASOPHILS NFR BLD: 0.3 % (ref 0–1.9)
BILIRUB SERPL-MCNC: 0.2 MG/DL (ref 0.1–1)
BILIRUB SERPL-MCNC: 0.2 MG/DL (ref 0.1–1)
BUN SERPL-MCNC: 81 MG/DL (ref 8–23)
BUN SERPL-MCNC: 83 MG/DL (ref 8–23)
CALCIUM SERPL-MCNC: 7.6 MG/DL (ref 8.7–10.5)
CALCIUM SERPL-MCNC: 7.9 MG/DL (ref 8.7–10.5)
CHLORIDE SERPL-SCNC: 103 MMOL/L (ref 95–110)
CHLORIDE SERPL-SCNC: 104 MMOL/L (ref 95–110)
CO2 SERPL-SCNC: 20 MMOL/L (ref 23–29)
CO2 SERPL-SCNC: 22 MMOL/L (ref 23–29)
CREAT SERPL-MCNC: 3.2 MG/DL (ref 0.5–1.4)
CREAT SERPL-MCNC: 3.2 MG/DL (ref 0.5–1.4)
DIFFERENTIAL METHOD: ABNORMAL
EOSINOPHIL # BLD AUTO: 0.2 K/UL (ref 0–0.5)
EOSINOPHIL NFR BLD: 1.3 % (ref 0–8)
ERYTHROCYTE [DISTWIDTH] IN BLOOD BY AUTOMATED COUNT: 17.6 % (ref 11.5–14.5)
EST. GFR  (AFRICAN AMERICAN): 17.1 ML/MIN/1.73 M^2
EST. GFR  (AFRICAN AMERICAN): 17.1 ML/MIN/1.73 M^2
EST. GFR  (NON AFRICAN AMERICAN): 14.8 ML/MIN/1.73 M^2
EST. GFR  (NON AFRICAN AMERICAN): 14.8 ML/MIN/1.73 M^2
GLUCOSE SERPL-MCNC: 106 MG/DL (ref 70–110)
GLUCOSE SERPL-MCNC: 115 MG/DL (ref 70–110)
HCT VFR BLD AUTO: 32.1 % (ref 37–48.5)
HGB BLD-MCNC: 9.7 G/DL (ref 12–16)
IMM GRANULOCYTES # BLD AUTO: 0.27 K/UL (ref 0–0.04)
IMM GRANULOCYTES NFR BLD AUTO: 1.8 % (ref 0–0.5)
LYMPHOCYTES # BLD AUTO: 0.6 K/UL (ref 1–4.8)
LYMPHOCYTES NFR BLD: 3.8 % (ref 18–48)
MAGNESIUM SERPL-MCNC: 1.9 MG/DL (ref 1.6–2.6)
MCH RBC QN AUTO: 26.7 PG (ref 27–31)
MCHC RBC AUTO-ENTMCNC: 30.2 G/DL (ref 32–36)
MCV RBC AUTO: 88 FL (ref 82–98)
MONOCYTES # BLD AUTO: 0.9 K/UL (ref 0.3–1)
MONOCYTES NFR BLD: 5.9 % (ref 4–15)
NEUTROPHILS # BLD AUTO: 13.3 K/UL (ref 1.8–7.7)
NEUTROPHILS NFR BLD: 86.9 % (ref 38–73)
NRBC BLD-RTO: 0 /100 WBC
PHOSPHATE SERPL-MCNC: 6.5 MG/DL (ref 2.7–4.5)
PLATELET # BLD AUTO: 355 K/UL (ref 150–350)
PMV BLD AUTO: 9.4 FL (ref 9.2–12.9)
POCT GLUCOSE: 112 MG/DL (ref 70–110)
POCT GLUCOSE: 113 MG/DL (ref 70–110)
POCT GLUCOSE: 123 MG/DL (ref 70–110)
POCT GLUCOSE: 130 MG/DL (ref 70–110)
POTASSIUM SERPL-SCNC: 3.2 MMOL/L (ref 3.5–5.1)
POTASSIUM SERPL-SCNC: 4.1 MMOL/L (ref 3.5–5.1)
PROT SERPL-MCNC: 5.1 G/DL (ref 6–8.4)
PROT SERPL-MCNC: 5.3 G/DL (ref 6–8.4)
RBC # BLD AUTO: 3.63 M/UL (ref 4–5.4)
SODIUM SERPL-SCNC: 140 MMOL/L (ref 136–145)
SODIUM SERPL-SCNC: 140 MMOL/L (ref 136–145)
WBC # BLD AUTO: 15.31 K/UL (ref 3.9–12.7)

## 2020-02-15 PROCEDURE — 25000242 PHARM REV CODE 250 ALT 637 W/ HCPCS: Performed by: HOSPITALIST

## 2020-02-15 PROCEDURE — 36415 COLL VENOUS BLD VENIPUNCTURE: CPT

## 2020-02-15 PROCEDURE — 63600175 PHARM REV CODE 636 W HCPCS: Performed by: HOSPITALIST

## 2020-02-15 PROCEDURE — 99232 SBSQ HOSP IP/OBS MODERATE 35: CPT | Mod: ,,, | Performed by: INTERNAL MEDICINE

## 2020-02-15 PROCEDURE — 99232 PR SUBSEQUENT HOSPITAL CARE,LEVL II: ICD-10-PCS | Mod: ,,, | Performed by: HOSPITALIST

## 2020-02-15 PROCEDURE — 94799 UNLISTED PULMONARY SVC/PX: CPT

## 2020-02-15 PROCEDURE — 63600175 PHARM REV CODE 636 W HCPCS: Performed by: PHYSICIAN ASSISTANT

## 2020-02-15 PROCEDURE — 63600175 PHARM REV CODE 636 W HCPCS: Performed by: STUDENT IN AN ORGANIZED HEALTH CARE EDUCATION/TRAINING PROGRAM

## 2020-02-15 PROCEDURE — 85025 COMPLETE CBC W/AUTO DIFF WBC: CPT

## 2020-02-15 PROCEDURE — 25000003 PHARM REV CODE 250: Performed by: HOSPITALIST

## 2020-02-15 PROCEDURE — 99900035 HC TECH TIME PER 15 MIN (STAT)

## 2020-02-15 PROCEDURE — 94760 N-INVAS EAR/PLS OXIMETRY 1: CPT

## 2020-02-15 PROCEDURE — 99232 SBSQ HOSP IP/OBS MODERATE 35: CPT | Mod: ,,, | Performed by: HOSPITALIST

## 2020-02-15 PROCEDURE — 80053 COMPREHEN METABOLIC PANEL: CPT | Mod: 91

## 2020-02-15 PROCEDURE — 94761 N-INVAS EAR/PLS OXIMETRY MLT: CPT

## 2020-02-15 PROCEDURE — 84100 ASSAY OF PHOSPHORUS: CPT

## 2020-02-15 PROCEDURE — 99232 PR SUBSEQUENT HOSPITAL CARE,LEVL II: ICD-10-PCS | Mod: ,,, | Performed by: INTERNAL MEDICINE

## 2020-02-15 PROCEDURE — 11000001 HC ACUTE MED/SURG PRIVATE ROOM

## 2020-02-15 PROCEDURE — 94640 AIRWAY INHALATION TREATMENT: CPT

## 2020-02-15 PROCEDURE — 83735 ASSAY OF MAGNESIUM: CPT

## 2020-02-15 PROCEDURE — 27000221 HC OXYGEN, UP TO 24 HOURS

## 2020-02-15 RX ORDER — FUROSEMIDE 10 MG/ML
80 INJECTION INTRAMUSCULAR; INTRAVENOUS ONCE
Status: COMPLETED | OUTPATIENT
Start: 2020-02-15 | End: 2020-02-15

## 2020-02-15 RX ORDER — IPRATROPIUM BROMIDE AND ALBUTEROL SULFATE 2.5; .5 MG/3ML; MG/3ML
3 SOLUTION RESPIRATORY (INHALATION) EVERY 4 HOURS
Status: DISCONTINUED | OUTPATIENT
Start: 2020-02-15 | End: 2020-02-16 | Stop reason: HOSPADM

## 2020-02-15 RX ADMIN — FERROUS SULFATE TAB EC 325 MG (65 MG FE EQUIVALENT) 325 MG: 325 (65 FE) TABLET DELAYED RESPONSE at 09:02

## 2020-02-15 RX ADMIN — SODIUM BICARBONATE 650 MG TABLET 650 MG: at 02:02

## 2020-02-15 RX ADMIN — CHLOROTHIAZIDE SODIUM 500 MG: 500 INJECTION, POWDER, LYOPHILIZED, FOR SOLUTION INTRAVENOUS at 09:02

## 2020-02-15 RX ADMIN — ONDANSETRON 4 MG: 2 INJECTION INTRAMUSCULAR; INTRAVENOUS at 11:02

## 2020-02-15 RX ADMIN — HEPARIN SODIUM 5000 UNITS: 5000 INJECTION, SOLUTION INTRAVENOUS; SUBCUTANEOUS at 11:02

## 2020-02-15 RX ADMIN — PANTOPRAZOLE SODIUM 40 MG: 40 TABLET, DELAYED RELEASE ORAL at 09:02

## 2020-02-15 RX ADMIN — IPRATROPIUM BROMIDE AND ALBUTEROL SULFATE 3 ML: .5; 3 SOLUTION RESPIRATORY (INHALATION) at 03:02

## 2020-02-15 RX ADMIN — BUSPIRONE HYDROCHLORIDE 5 MG: 5 TABLET ORAL at 09:02

## 2020-02-15 RX ADMIN — FUROSEMIDE 20 MG/HR: 10 INJECTION, SOLUTION INTRAMUSCULAR; INTRAVENOUS at 02:02

## 2020-02-15 RX ADMIN — CHLOROTHIAZIDE SODIUM 500 MG: 500 INJECTION, POWDER, LYOPHILIZED, FOR SOLUTION INTRAVENOUS at 12:02

## 2020-02-15 RX ADMIN — SODIUM BICARBONATE 650 MG TABLET 650 MG: at 01:02

## 2020-02-15 RX ADMIN — IPRATROPIUM BROMIDE AND ALBUTEROL SULFATE 3 ML: .5; 3 SOLUTION RESPIRATORY (INHALATION) at 07:02

## 2020-02-15 RX ADMIN — CEFEPIME 2 G: 2 INJECTION, POWDER, FOR SOLUTION INTRAVENOUS at 06:02

## 2020-02-15 RX ADMIN — HEPARIN SODIUM 5000 UNITS: 5000 INJECTION, SOLUTION INTRAVENOUS; SUBCUTANEOUS at 06:02

## 2020-02-15 RX ADMIN — POTASSIUM BICARBONATE 50 MEQ: 978 TABLET, EFFERVESCENT ORAL at 05:02

## 2020-02-15 RX ADMIN — CALCITRIOL CAPSULES 0.25 MCG 0.25 MCG: 0.25 CAPSULE ORAL at 09:02

## 2020-02-15 RX ADMIN — SODIUM BICARBONATE 650 MG TABLET 650 MG: at 11:02

## 2020-02-15 RX ADMIN — ATORVASTATIN CALCIUM 10 MG: 10 TABLET, FILM COATED ORAL at 09:02

## 2020-02-15 RX ADMIN — FLUTICASONE PROPIONATE 100 MCG: 50 SPRAY, METERED NASAL at 09:02

## 2020-02-15 RX ADMIN — ESCITALOPRAM OXALATE 10 MG: 10 TABLET ORAL at 09:02

## 2020-02-15 RX ADMIN — FUROSEMIDE 80 MG: 10 INJECTION, SOLUTION INTRAMUSCULAR; INTRAVENOUS at 12:02

## 2020-02-15 RX ADMIN — METOPROLOL SUCCINATE 12.5 MG: 25 TABLET, EXTENDED RELEASE ORAL at 09:02

## 2020-02-15 RX ADMIN — HEPARIN SODIUM 5000 UNITS: 5000 INJECTION, SOLUTION INTRAVENOUS; SUBCUTANEOUS at 02:02

## 2020-02-15 RX ADMIN — POTASSIUM BICARBONATE 50 MEQ: 978 TABLET, EFFERVESCENT ORAL at 11:02

## 2020-02-15 RX ADMIN — SODIUM BICARBONATE 650 MG TABLET 650 MG: at 09:02

## 2020-02-15 RX ADMIN — SODIUM BICARBONATE 650 MG TABLET 650 MG: at 05:02

## 2020-02-15 RX ADMIN — BUSPIRONE HYDROCHLORIDE 5 MG: 5 TABLET ORAL at 11:02

## 2020-02-15 NOTE — PLAN OF CARE
Pt AAOx4, VSS, No falls noted, fall precautions remain. Pain assessed, no pain noted. Pt laying comfortable, turned Q2, frequent rounds made for safety and patient care. Call light within reach, bed wheels locked, bed in lowest position, side rails ^x2, safety maintained. NADN, Will continue monitor.

## 2020-02-15 NOTE — PROGRESS NOTES
"Progress Note  Hospital Medicine       Patient Name: Bina Daniels  MRN:  [id number]  Hospital Medicine Team: [facility] Tracy Peralta MD  Date of Admission:  2/10/2020     Principal Problem:  Sepsis   Primary Care Physician: Tho Haro MD      History of Present Illness:     Interval History:    Patient with increase in urine output with diuril and lasix gtt. Transition to IV lasix interval push. Patient sleeps most of the day. No issues per family overnight.      Decision to postpone surgery now as the risk is there to get source control with ankle but as the sepsis seems to have improved (repeat cx neg, no fevers, vitals stable ie not on pressors, etc) that the surgery while is semi urgent, can be delayed until the respiratory status is better once more diuresis is achieved. May require continued diuresis over the weekend as significant residual volume overload currently present. Discussed cardiac function and renal impairment with family. Extensive talk with patient alone in room on 2/14--we discussed the severity of her cardiac disease and how this will ultimately progress and that it puts her at very high risk for surgical intervention.     HPI:   Ms. Bina Daniels is a 64 y.o. female with a history of type 2 DM, stage 4 CKD, HTN, LAI, neuropathy, LAI, depressionn who was in her usual state of health until the week or so before gracy this year when she fell and thought she just sprained her ankle, per ortho notes she "didn't think it ws that bad", she then saw a Dr Sotomayor who was unable to reduce the ankle and dx a fx in the Right ankle and sent to [facility] for treatment on 12/26, she went for attempted ORIF on 12/27 with Dr Medina with [facility] orth[facility] who was unable to complete procedure due to intraOp bleeding in screw holes, placed in splint and procedure aborted/2U given, on 12/27 ORIF completed to R ankle fx with recs for nonweight bearing at that time, SNF was recommended. She had prolonged " "hospital stay for JENNI on stage 4 CKD issues- nephro Dr Saenz and hosp med in [place] followed for med issues, she decided to go home and not SNF on 1/8. Per her family at bedside, she was getting home health to surgical site and was getting home PT/OT, they didn't seem thrilled with her choice to not do SNF, their brother who is her primary caregiver was not present in ER (daughter and son present in ER now and dont know all of med hx). In interim they didn't know she was having wound issues and dont report that they knew about it until this weekend that the site had opened up as well as worsening dyspnea and fluid/pitting edema in legs, arms as well. They report subjective chills at home but no known fevers, as well as her being slightly "off" and not herself completely now. They brought her for evlauation for both of above, and as seen in ER pictures the wound has opened and hardware visible. Per ER notes, [facility] ortho was attempted called from ER but "no answer"' reported so they admitted to hospital medicine here, ortho has been called here, family reports not seen yet. Given vanc, zosyn, lasix, tdap in ER. SHe missed her ortho f/u last week and they had rescheduled for this week but when they saw the wound they brought her to the ER.     Family is unsnure if she missed home meds but they think a few days possibly, [place] records report being on lasix 40 BID at home. Family to bring home meds (her son who manages this), but most recent nephro MAR before discharge showed the following:  norvasc 10, asa81, calcitriol .25, colace, lexapro 10 iron 325, fluticasone, levemir 10, toprol 50 BID, protonix, nabicarb 650 4 times day. At one point was on also glyburide/metformin in [place] and at home and lasix 40 BID, unsure if held on discharge or not as dc summary has no MAR, prolia injections, neurontin.    Med Hx:  Type 2 DM- A1C 9.9, stage 4 CKD- Cr baseline 2.3-2.5, iron deficiency/acute blood loss anemia, ankle fx- " ORIF 12/29, neuropathy, breast ca s/p lumpectomy, depression, ?CAD, chronic metabolic acidosis, secondary hyperparathyroidism from CKD, HTN, hypoalbuminemia    Social: son not at bedside is primary caregiver, mutiple children assist in ADLS, Walker use after fx, more bedbound now after surgery, SNF was rec but was at home. Smoker, stopped 10 years ago. No alcohol or drugs.    Review of Systems   Constitutional: positive for chills, fatigue, fever.   HENT: Negative for sore throat, trouble swallowing.    Eyes: Negative for photophobia, visual disturbance.   Respiratory: positive for cough, shortness of breath.    Cardiovascular: Negative for chest pain, palpitations, positive leg swelling.   Gastrointestinal: Negative for abdominal pain, constipation, diarrhea, nausea, vomiting.   Endocrine: Negative for cold intolerance, heat intolerance.   Genitourinary: Negative for dysuria, frequency.   Musculoskeletal: positive for arthralgias, myalgias.   Skin positive for rash, wound, erythema   Neurological: Negative for dizziness, syncope, weakness, light-headedness.   Psychiatric/Behavioral: Negative for confusion, hallucinations, anxiety  All other systems reviewed and are negative.      Past Medical History: Patient has a past medical history of Breast cancer, CAD (coronary artery disease), Cancer, CKD (chronic kidney disease), Diabetes mellitus, HTN (hypertension), and Peripheral neuropathy.    Past Surgical History: Patient has a past surgical history that includes Breast surgery (Left); Cholecystectomy; Tonsillectomy; Hysterectomy; Open reduction and internal fixation (ORIF) of injury of ankle (Right, 12/29/2019); Closure of wound (Right, 12/29/2019); and Open reduction and internal fixation (ORIF) of injury of ankle (Right, 12/27/2019).    Social History: Patient reports that she has quit smoking. She started smoking about 16 years ago. She has never used smokeless tobacco. She reports that she does not drink alcohol  or use drugs.    Family History: family history includes Breast cancer in her maternal grandmother.    Medications: Scheduled Meds:   albuterol-ipratropium  3 mL Nebulization Q4H    atorvastatin  10 mg Oral Daily    busPIRone  5 mg Oral BID    calcitRIOL  0.25 mcg Oral Daily    ceFEPime (MAXIPIME) IVPB  2 g Intravenous Q24H    docusate sodium  50 mg Oral Daily    escitalopram oxalate  10 mg Oral Daily    ferrous sulfate  325 mg Oral Daily    fluticasone propionate  2 spray Each Nostril Daily    heparin (porcine)  5,000 Units Subcutaneous Q8H    insulin detemir U-100  6 Units Subcutaneous QHS    metoprolol succinate  12.5 mg Oral Daily    pantoprazole  40 mg Oral Daily    polyethylene glycol  17 g Oral Daily    sodium bicarbonate  650 mg Oral QID     Continuous Infusions:    PRN Meds:.acetaminophen, albuterol-ipratropium, Dextrose 10% Bolus, Dextrose 10% Bolus, dextrose 50%, dextrose 50%, glucagon (human recombinant), glucagon (human recombinant), glucose, glucose, glucose, glucose, guaifenesin 100 mg/5 ml, insulin aspart U-100, melatonin, ondansetron, sodium chloride 0.9%, sodium chloride 0.9%    Allergies: Patient is allergic to sulfa (sulfonamide antibiotics).    Physical Exam:     Vital Signs (Most Recent):  Temp: 97 °F (36.1 °C) (02/15/20 1100)  Pulse: 82 (02/15/20 1202)  Resp: 18 (02/15/20 1202)  BP: (!) 152/70 (02/15/20 1100)  SpO2: 95 % (02/15/20 1202) Vital Signs Range (Last 24H):  Temp:  [96.3 °F (35.7 °C)-97.8 °F (36.6 °C)]   Pulse:  [78-86]   Resp:  [18]   BP: (115-152)/(56-70)   SpO2:  [90 %-96 %]    Body mass index is 26.11 kg/m².     Physical Exam:  Constitutional:  Alert, family at bedside. answers questions appropriately. On NC. - 3L.   Head: Normocephalic and atraumatic.   Mouth/Throat: Oropharynx is clear and moist.   Eyes: EOM are normal. Pupils are equal, round, and reactive to light. No scleral icterus.   Neck: Normal range of motion. Neck supple. JVP elevated.  Cardiovascular:  irregularly irregular rhythm, rate of 85.  No murmur heard.  Pulmonary/Chest: increased WOB, sitting at 60 degree ankle but mild improvement since yesterday, less crackles, minimal use of accessory muscles, no tripoding,  RR 18, on NC  Abdominal: Soft. Bowel sounds are normal.  mild distension, anasarca.  Musculoskeletal: open R ankle wound with hardware visible (see ER note), wrapped now. Pulses present bialterally, warm to touch.   Neurological: Alert and oriented to person, place, and time.   Skin: Skin is warm and dry. Diffuse pitting edema to the hips bilaterally, 3+ pitting edema in legs, arms with edema RUE >LUE, mild tenderness with pressing on edema in extremities. Wound to RLE. Anasarca.  Psychiatric: Normal mood and affect. Behavior is normal. reports some depression from this, occasional tearful to nursing.  Vitals reviewed.    Recent Labs   Lab 02/13/20  0435 02/14/20  0422 02/15/20  0410   WBC 15.70* 13.89* 15.31*   HGB 8.3* 9.3* 9.7*   HCT 28.1* 31.6* 32.1*   * 406* 355*       Recent Labs   Lab 02/13/20  0435  02/14/20  0422 02/14/20  0841 02/14/20  1957/20  0409 02/15/20  0943      < >  --  139 139  --  140   K 3.5   < >  --  3.4* 3.5  --  3.2*      < >  --  106 105  --  104   CO2 20*   < >  --  23 23  --  22*   BUN 75*   < >  --  80* 82*  --  83*   CREATININE 3.0*   < >  --  3.1* 3.2*  --  3.2*   *   < >  --  92 124*  --  106   CALCIUM 7.5*   < >  --  7.9* 7.6*  --  7.6*   MG 1.9  --  1.9  --   --  1.9  --    PHOS 5.6*  --  6.1*  --   --  6.5*  --     < > = values in this interval not displayed.     Recent Labs   Lab 02/10/20  1741 02/10/20  2110  02/14/20  0841 02/14/20  1957/20  0943   ALKPHOS  --   --    < > 93 92 91   ALT  --   --    < > <5* <5* 5*   AST  --   --    < > 7* 8* 8*   ALBUMIN  --   --    < > 1.3* 1.4* 1.4*   PROT  --   --    < > 5.3* 5.0* 5.1*   BILITOT  --   --    < > 0.2 0.2 0.2   INR 1.3* 1.3*  --   --   --   --     < > = values in this  interval not displayed.      Recent Labs   Lab 02/14/20  0556 02/14/20  0828 02/14/20  1621 02/14/20  2230 02/15/20  0157 02/15/20  0916   POCTGLUCOSE 91 93 100 131* 130* 112*         Assessment and Plan:     Ms. Bina Daniels is a 64 y.o. 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 [T84.7XXA]  Yes    Volume overload [E87.70]  Yes    Anasarca associated with disorder of kidney [N04.9]  Yes    Iron deficiency anemia [D50.9]  Yes    Vitamin D deficiency [E55.9]  Yes    Metabolic acidosis [E87.2]  Yes    Secondary hyperparathyroidism [N25.81]  Yes    Acute metabolic encephalopathy [G93.41]  Yes    Hypertension [I10]  Yes    Hypoalbuminemia [E88.09]  Yes    Acute hypoxemic respiratory failure [J96.01]  Yes    Acute on chronic combined systolic and diastolic heart failure [I50.43]  Yes    Breast cancer [C50.919]  Yes    S/P ORIF (open reduction internal fixation) fracture [Z98.890, Z87.81]  Not Applicable     Ankle, 12/29      Depression [F32.9]  Yes    Neuropathy due to secondary diabetes mellitus [E13.40]  Yes     Chronic    Type 2 diabetes mellitus with neurologic complication, without long-term current use of insulin [E11.49]  Yes     Chronic    Anemia due to stage 3 chronic kidney disease [N18.3, D63.1]  Yes      Resolved Hospital Problems    Diagnosis Date Resolved POA    CKD (chronic kidney disease), stage IV [N18.4] 02/12/2020 Yes     Chronic     Sepsis secondary to infected hardware in right leg/wound infection Right ankle  Strep bacteremia. Gram negative  bacteremia  S/p ORIF, s/p R ankle fracture  -patient with open surgical wound, hardware exposed on R ankle, lactate 1.3, procal mildly up at .91, , ESR 68, unsure timing of when the wound started becoming clearly infected and open to air  -s/p ORIF 12/29 with [facility] ortho in [place], aborted surgery 12/27 due to intraOp bleeding, walked on the fx for at least a week before that per Dr Medina notes. nonweight bearing to RLE, continue this NWB status to RLE now, bedrest for now until ortho assessment. Bandaged now.   -Xray on admit showing widened tibotalar interval, cortical irregularity, edema, post op changes vs site infections  -blood CX on admit 2/10 with Strep in 1/4 bottles, GNR 2/4 bottles. ID consulted to assist, vanc/cefepime now. Repeat Cx 2/11 NGTD  -plan for washout in future with ortho, date TBD, too high risk currently given active acute decompensated heart failure per discussions today 2/15. As her sepsis/bacteremia seems to be stable (not on pressors, cultures are clearning in blood, afebrile) the risk of delay seems to be acceptable. If she was having florid sepsis issues and source control was immediately necessary then the benefit of this sooner would be tricky, but she appears to be responding to antibiotics for the infection from the ankle, so delay to get her respiratory status better in the interim seems to be prudent now. When she is ready for surgery, she is high risk- RCRI is high at 3 which gives a 15% 30 day risk of death, cardiac event, MI.      Anasarca  Acute on chronic systolic/ LV/RV diastolic heart failure (EF 10%)  Volume overload  Acute hypoxemic respiratory failure  -massive volume overload on admit,   -strict I/O, daily weight, 2 grams Na, 1.5L fluid restrict  -pitting edema has greatly improved  -albumin very low, started boost but very poor oral intake  -heaton placed 2/12.   - 2/11 with echo showing EF 10%, diastolic dysfunction- LV, RV, LAE, increased LAP, LVP, PAP  elevated, MR.   -2/12: between 3-6L oxygen, diuresis not documented well, lasix IV in AM and onto lasix drip in PM per cards recs to assist with diuresis further. Started low dose toprol XL for afib 2/11 (was already on at home but was on short acting)  - Will need ace/arb long term. Will need consideration of life vest given depressed EF also  -RCRI 3, 15% risk of 30 day mortality post op. Optimizing with diuresis in interim     Acute kidney injury on Stage 4 CKD  -baseline Cr 2.3 to 3.3, trended up to 2.8 in January in [place], seen by nephro, tae [place] with nephro per [place] notes  -UA with protein, few bacteria. CK wnl. urine na, urine pr/cr, urine cr, urine bun collected and pending.  -avoid nephrotoxins  -likely element of cardiorenal worsening baseline, diuresis needed      Type 2 dm  -A1C 9.9, glucose 313 on admission   -unsure what she went home on, [place] on MAR, metformin and glyburide also there, clearly those are not appropriate with this CKD and need holding now and likely forever  -SSI only as poor oral intake, increase in creatinine and lower glucoses  -hold asa   -neuropathy at baseline    Paroxysmal Atrial fibrillation  -new finding 2/11 on ekg and echo, NSR on admit, likely stress of sepsis and depressed ef contributors, suspect will go in and out given LAE, EF probably wont stay out of it  -[id number] of at least 4, will need long term anticoagulation, eliquis will be only noac given ckd option.  - Pending initiation of NOAC until surgical plan finalized.     Upper extremity superficial thrombosis  -right cephalic, left basilic  -superficial, low risk to embolize  -can do ppx heparin pre-op, long term after all surgeries done, options are eliquis vs coumadin, prefer eliquis.    Metabolic acidosis  -secondary to CKD, resume nabicarb, improving from 14 at admit to 19 now    Anemia of chronic renal disease  Iron deficiency anemia  -tx recently in [place], sharri low at 57, continue daily iron  -hg  9.9-->7.9    Hypoalbuminemia  -boost with meals  -pr/cr ratio 2.37.    Secondary hyperparathyroidism  -, daily phos  -renal diet  -cacitriol daily    Vitamin D deficiency  Osteoporosis  -on prolia outpatient  -vit D low at 5 on check in January, dont see replacement on MAR. Will consider long term once out of acute illness    Depression  -cont home lexapro  -endorses depression symptoms worse now with acute medical issues. Watch closely for worsenign now    Acute metabolic encephalopathy  -likely from sepsis, mild, monitor closely  -improved 2/11, at baseline now    Diet:  Low na, 1.5L, renal/dm      DVT PPx:  Heparin ppx     End of life discussion. Patient expressed that she would not want to be placed on life support but would like to discuss with her family who is not present at this time. DNR would be appropriate in clinical setting of end stage heart failure. Patient understands overall cardiac prognosis is not great and per family her functional status and quality of life have been declining in the past few months. Family in agreement and emphasized that DNR is patients wishes. They inquired about possibility of home hospice service at time of discharge. We extensively discussed high risk procedure due to cardiac and renal disease.     Disposition:  Diuresis, surgery date TBD     Tracy Peralta MD

## 2020-02-15 NOTE — CONSULTS
Chart reviewed. Discussed with Dr. Peralta.  Will see on Monday. Thanks for consult.  If urgent need arises, feel free to call me.  Rosita Watkins MD  Palliative Medicine  [phone] cell

## 2020-02-15 NOTE — PROGRESS NOTES
Bina Daniels is a 64 y.o. female patient.  No new issues reported  Scheduled Meds:   atorvastatin  10 mg Oral Daily    busPIRone  5 mg Oral BID    calcitRIOL  0.25 mcg Oral Daily    ceFEPime (MAXIPIME) IVPB  2 g Intravenous Q24H    docusate sodium  50 mg Oral Daily    escitalopram oxalate  10 mg Oral Daily    ferrous sulfate  325 mg Oral Daily    fluticasone propionate  2 spray Each Nostril Daily    furosemide  80 mg Intravenous Once    heparin (porcine)  5,000 Units Subcutaneous Q8H    insulin detemir U-100  6 Units Subcutaneous QHS    metoprolol succinate  12.5 mg Oral Daily    pantoprazole  40 mg Oral Daily    polyethylene glycol  17 g Oral Daily    sodium bicarbonate  650 mg Oral QID       Review of patient's allergies indicates:   Allergen Reactions    Sulfa (sulfonamide antibiotics) Hives       Past Medical History:   Diagnosis Date    Breast cancer     CAD (coronary artery disease)     Cancer     CKD (chronic kidney disease)     Diabetes mellitus     HTN (hypertension)     Peripheral neuropathy      Past Surgical History:   Procedure Laterality Date    BREAST SURGERY Left     lumpectomy    CHOLECYSTECTOMY      CLOSURE OF WOUND Right 12/29/2019    Procedure: CLOSURE, WOUND;  Surgeon: Cooper Medina MD;  Location: [facility] OR;  Service: Orthopedics;  Laterality: Right;    HYSTERECTOMY      OPEN REDUCTION AND INTERNAL FIXATION (ORIF) OF INJURY OF ANKLE Right 12/29/2019    Procedure: OPEN REDUCTION INTERNAL FIXATION-ANKLE;  Surgeon: Cooper Medina MD;  Location: [facility] OR;  Service: Orthopedics;  Laterality: Right;  C-arm, Synthes small frag set, cannulated screws notified confirmed with Mohit /synthesis   Arthrex, tightrope anchors/ Ansley notified and confirmed /xray notified KB    OPEN REDUCTION AND INTERNAL FIXATION (ORIF) OF INJURY OF ANKLE Right 12/27/2019    Procedure: OPEN REDUCTION INTERNAL FIXATION-ANKLE;  Surgeon: Cooper Medina MD;  Location: [facility] OR;   Service: Orthopedics;  Laterality: Right;  C-arm, Synthes small frag set, cannulated screws, C-arm    TONSILLECTOMY        reports that she has quit smoking. She started smoking about 16 years ago. She has never used smokeless tobacco. She reports that she does not drink alcohol or use drugs.   Family History   Problem Relation Age of Onset    Breast cancer Maternal Grandmother           Vital Signs Range (Last 24H):  Temp:  [96.3 °F (35.7 °C)-97.8 °F (36.6 °C)]   Pulse:  [62-86]   Resp:  [18]   BP: (115-152)/(56-70)   SpO2:  [90 %-96 %]     I & O (Last 24H):    Intake/Output Summary (Last 24 hours) at 2/15/2020 1103  Last data filed at 2/15/2020 0916  Gross per 24 hour   Intake 480 ml   Output 2375 ml   Net -1895 ml           Physical Exam:  Constitutional: She is oriented to person, place, and time. She appears well-developed and well-nourished.   HENT:   Head: Normocephalic and atraumatic.   Eyes: Pupils are equal, round, and reactive to light. EOM are normal.   Neck: Normal range of motion. Neck supple. JVD present.   Cardiovascular: Normal rate and intact distal pulses. An irregularly irregular rhythm present.   Pulmonary/Chest: She is in respiratory distress. She has rales.   Abdominal: Soft. Bowel sounds are normal. She exhibits no distension. There is no tenderness.   Musculoskeletal: She exhibits edema (1+ pitting BL LE) and tenderness.   Neurological: She is alert and oriented to person, place, and time.   Patient follows commands and answers questions appropriately   Skin: Skin is warm and dry.   Nursing note and vitals reviewed.  Laboratory:  CBC:   Recent Labs   Lab 02/15/20  0410   WBC 15.31*   RBC 3.63*   HGB 9.7*   HCT 32.1*   *   MCV 88   MCH 26.7*   MCHC 30.2*     BMP:   Recent Labs   Lab 02/15/20  0409 02/15/20  0943   GLU  --  106   NA  --  140   K  --  3.2*   CL  --  104   CO2  --  22*   BUN  --  83*   CREATININE  --  3.2*   CALCIUM  --  7.6*   MG 1.9  --          Diagnostic  Results:    Acute kidney injury superimposed on CKD  Bina Daniels is a 65 yo WF w/ hx of HTN, T2DM, CHF (EF 10%), CAD, CKD IV (baseline Scr 2.3-2.5), LAI, neuropathy, MDD, and breast cancer who was recently diagnoses w/ a R ankle fracture s/p ORIF on 12/29/19 at [facility] who was admitted on 2/10 w/ concern for infected hardware and sepsis  - Hospital stay was prolonged 2/2 JENNI on CKD, discharged home on 1/8 w/ a Scr of 2.8  - On arrival, patient dyspneic, AMS, chills and appeared septic; started on BS abx (vanc and zosyn) and lasix  - Scr was 3.0 and has remained fairly stable since that time w/ good UOP  - GNR bacteremia w/ pan sensitive strep pneumo and enterobacter cloacae -> transitioned to Cefepime  - Lasix gtt initiated for volume overload in setting of CHF exacerbation (BNP 2174)  - Nephrology was consulted for JENNI on CKD IV w/ anasarca and CHF (EF 10%) w/ proteinuria.     Plan:  - Scr elevated from baseline (2.5), however, has been stable since admission (3.0 -> 3.0 -> 2.9 -> 3.1 -> 3.1)  - BUN also elevated, but largely stable (74 -> 72 -> 76 -> 77 ->     Labs stable overnight  Good UOP  Titrate lasix as needed  Will follow labs in am      Iveth He  2/15/2020

## 2020-02-16 VITALS
HEART RATE: 92 BPM | DIASTOLIC BLOOD PRESSURE: 60 MMHG | WEIGHT: 152.13 LBS | BODY MASS INDEX: 25.97 KG/M2 | HEIGHT: 64 IN | SYSTOLIC BLOOD PRESSURE: 128 MMHG | OXYGEN SATURATION: 92 % | RESPIRATION RATE: 18 BRPM | TEMPERATURE: 98 F

## 2020-02-16 LAB
ALBUMIN SERPL BCP-MCNC: 1.4 G/DL (ref 3.5–5.2)
ALP SERPL-CCNC: 93 U/L (ref 55–135)
ALT SERPL W/O P-5'-P-CCNC: <5 U/L (ref 10–44)
ANION GAP SERPL CALC-SCNC: 14 MMOL/L (ref 8–16)
AST SERPL-CCNC: 9 U/L (ref 10–40)
BACTERIA BLD CULT: NORMAL
BACTERIA BLD CULT: NORMAL
BASOPHILS # BLD AUTO: 0.02 K/UL (ref 0–0.2)
BASOPHILS NFR BLD: 0.1 % (ref 0–1.9)
BILIRUB SERPL-MCNC: 0.2 MG/DL (ref 0.1–1)
BUN SERPL-MCNC: 83 MG/DL (ref 8–23)
CALCIUM SERPL-MCNC: 7.6 MG/DL (ref 8.7–10.5)
CHLORIDE SERPL-SCNC: 104 MMOL/L (ref 95–110)
CO2 SERPL-SCNC: 23 MMOL/L (ref 23–29)
CREAT SERPL-MCNC: 3.4 MG/DL (ref 0.5–1.4)
DIFFERENTIAL METHOD: ABNORMAL
EOSINOPHIL # BLD AUTO: 0.1 K/UL (ref 0–0.5)
EOSINOPHIL NFR BLD: 0.7 % (ref 0–8)
ERYTHROCYTE [DISTWIDTH] IN BLOOD BY AUTOMATED COUNT: 17.8 % (ref 11.5–14.5)
EST. GFR  (AFRICAN AMERICAN): 15.9 ML/MIN/1.73 M^2
EST. GFR  (NON AFRICAN AMERICAN): 13.8 ML/MIN/1.73 M^2
GLUCOSE SERPL-MCNC: 116 MG/DL (ref 70–110)
HCT VFR BLD AUTO: 29.7 % (ref 37–48.5)
HGB BLD-MCNC: 8.9 G/DL (ref 12–16)
IMM GRANULOCYTES # BLD AUTO: 0.18 K/UL (ref 0–0.04)
IMM GRANULOCYTES NFR BLD AUTO: 1.3 % (ref 0–0.5)
LYMPHOCYTES # BLD AUTO: 0.6 K/UL (ref 1–4.8)
LYMPHOCYTES NFR BLD: 4.6 % (ref 18–48)
MAGNESIUM SERPL-MCNC: 1.9 MG/DL (ref 1.6–2.6)
MCH RBC QN AUTO: 26.9 PG (ref 27–31)
MCHC RBC AUTO-ENTMCNC: 30 G/DL (ref 32–36)
MCV RBC AUTO: 90 FL (ref 82–98)
MONOCYTES # BLD AUTO: 1 K/UL (ref 0.3–1)
MONOCYTES NFR BLD: 7.4 % (ref 4–15)
NEUTROPHILS # BLD AUTO: 11.6 K/UL (ref 1.8–7.7)
NEUTROPHILS NFR BLD: 85.9 % (ref 38–73)
NRBC BLD-RTO: 0 /100 WBC
PHOSPHATE SERPL-MCNC: 6.6 MG/DL (ref 2.7–4.5)
PLATELET # BLD AUTO: 317 K/UL (ref 150–350)
PMV BLD AUTO: 10 FL (ref 9.2–12.9)
POCT GLUCOSE: 118 MG/DL (ref 70–110)
POTASSIUM SERPL-SCNC: 4 MMOL/L (ref 3.5–5.1)
PROT SERPL-MCNC: 5.1 G/DL (ref 6–8.4)
RBC # BLD AUTO: 3.31 M/UL (ref 4–5.4)
SODIUM SERPL-SCNC: 141 MMOL/L (ref 136–145)
WBC # BLD AUTO: 13.56 K/UL (ref 3.9–12.7)

## 2020-02-16 PROCEDURE — 25000003 PHARM REV CODE 250: Performed by: HOSPITALIST

## 2020-02-16 PROCEDURE — 80053 COMPREHEN METABOLIC PANEL: CPT

## 2020-02-16 PROCEDURE — 99232 SBSQ HOSP IP/OBS MODERATE 35: CPT | Mod: ,,, | Performed by: INTERNAL MEDICINE

## 2020-02-16 PROCEDURE — 99232 PR SUBSEQUENT HOSPITAL CARE,LEVL II: ICD-10-PCS | Mod: ,,, | Performed by: INTERNAL MEDICINE

## 2020-02-16 PROCEDURE — 85025 COMPLETE CBC W/AUTO DIFF WBC: CPT

## 2020-02-16 PROCEDURE — 94761 N-INVAS EAR/PLS OXIMETRY MLT: CPT

## 2020-02-16 PROCEDURE — 25000242 PHARM REV CODE 250 ALT 637 W/ HCPCS: Performed by: HOSPITALIST

## 2020-02-16 PROCEDURE — 63600175 PHARM REV CODE 636 W HCPCS: Performed by: INTERNAL MEDICINE

## 2020-02-16 PROCEDURE — 99232 PR SUBSEQUENT HOSPITAL CARE,LEVL II: ICD-10-PCS | Mod: ,,, | Performed by: HOSPITALIST

## 2020-02-16 PROCEDURE — 63600175 PHARM REV CODE 636 W HCPCS: Performed by: HOSPITALIST

## 2020-02-16 PROCEDURE — 84100 ASSAY OF PHOSPHORUS: CPT

## 2020-02-16 PROCEDURE — 63600175 PHARM REV CODE 636 W HCPCS: Performed by: STUDENT IN AN ORGANIZED HEALTH CARE EDUCATION/TRAINING PROGRAM

## 2020-02-16 PROCEDURE — 36415 COLL VENOUS BLD VENIPUNCTURE: CPT

## 2020-02-16 PROCEDURE — 94640 AIRWAY INHALATION TREATMENT: CPT

## 2020-02-16 PROCEDURE — 99900035 HC TECH TIME PER 15 MIN (STAT)

## 2020-02-16 PROCEDURE — 83735 ASSAY OF MAGNESIUM: CPT

## 2020-02-16 PROCEDURE — 27000221 HC OXYGEN, UP TO 24 HOURS

## 2020-02-16 PROCEDURE — 99232 SBSQ HOSP IP/OBS MODERATE 35: CPT | Mod: ,,, | Performed by: HOSPITALIST

## 2020-02-16 RX ORDER — MORPHINE SULFATE 20 MG/ML
10 SOLUTION ORAL EVERY 4 HOURS PRN
Qty: 30 ML | Refills: 0 | Status: SHIPPED | OUTPATIENT
Start: 2020-02-16

## 2020-02-16 RX ORDER — FUROSEMIDE 10 MG/ML
80 INJECTION INTRAMUSCULAR; INTRAVENOUS ONCE
Status: COMPLETED | OUTPATIENT
Start: 2020-02-16 | End: 2020-02-16

## 2020-02-16 RX ORDER — CALCITRIOL 0.25 UG/1
0.25 CAPSULE ORAL DAILY
Qty: 30 CAPSULE | Refills: 1 | Status: SHIPPED | OUTPATIENT
Start: 2020-02-17

## 2020-02-16 RX ORDER — ACETAMINOPHEN 10 MG/ML
1000 INJECTION, SOLUTION INTRAVENOUS ONCE
Status: COMPLETED | OUTPATIENT
Start: 2020-02-16 | End: 2020-02-16

## 2020-02-16 RX ORDER — CIPROFLOXACIN 250 MG/5ML
500 KIT ORAL DAILY
Qty: 100 ML | Refills: 2 | Status: SHIPPED | OUTPATIENT
Start: 2020-02-16 | End: 2020-03-01

## 2020-02-16 RX ORDER — HEPARIN SODIUM 5000 [USP'U]/ML
5000 INJECTION, SOLUTION INTRAVENOUS; SUBCUTANEOUS EVERY 8 HOURS
Status: DISCONTINUED | OUTPATIENT
Start: 2020-02-16 | End: 2020-02-16 | Stop reason: HOSPADM

## 2020-02-16 RX ORDER — METOPROLOL SUCCINATE 25 MG/1
12.5 TABLET, EXTENDED RELEASE ORAL DAILY
Qty: 15 TABLET | Refills: 11 | Status: SHIPPED | OUTPATIENT
Start: 2020-02-17 | End: 2021-02-16

## 2020-02-16 RX ADMIN — FUROSEMIDE 80 MG: 10 INJECTION, SOLUTION INTRAMUSCULAR; INTRAVENOUS at 12:02

## 2020-02-16 RX ADMIN — ACETAMINOPHEN 1000 MG: 10 INJECTION, SOLUTION INTRAVENOUS at 12:02

## 2020-02-16 RX ADMIN — IPRATROPIUM BROMIDE AND ALBUTEROL SULFATE 3 ML: .5; 3 SOLUTION RESPIRATORY (INHALATION) at 03:02

## 2020-02-16 RX ADMIN — IPRATROPIUM BROMIDE AND ALBUTEROL SULFATE 3 ML: .5; 3 SOLUTION RESPIRATORY (INHALATION) at 11:02

## 2020-02-16 RX ADMIN — HEPARIN SODIUM 5000 UNITS: 5000 INJECTION, SOLUTION INTRAVENOUS; SUBCUTANEOUS at 02:02

## 2020-02-16 RX ADMIN — PANTOPRAZOLE SODIUM 40 MG: 40 TABLET, DELAYED RELEASE ORAL at 09:02

## 2020-02-16 RX ADMIN — IPRATROPIUM BROMIDE AND ALBUTEROL SULFATE 3 ML: .5; 3 SOLUTION RESPIRATORY (INHALATION) at 04:02

## 2020-02-16 RX ADMIN — IPRATROPIUM BROMIDE AND ALBUTEROL SULFATE 3 ML: .5; 3 SOLUTION RESPIRATORY (INHALATION) at 08:02

## 2020-02-16 RX ADMIN — ESCITALOPRAM OXALATE 10 MG: 10 TABLET ORAL at 09:02

## 2020-02-16 RX ADMIN — IPRATROPIUM BROMIDE AND ALBUTEROL SULFATE 3 ML: .5; 3 SOLUTION RESPIRATORY (INHALATION) at 12:02

## 2020-02-16 RX ADMIN — FLUTICASONE PROPIONATE 100 MCG: 50 SPRAY, METERED NASAL at 09:02

## 2020-02-16 RX ADMIN — HEPARIN SODIUM 5000 UNITS: 5000 INJECTION, SOLUTION INTRAVENOUS; SUBCUTANEOUS at 05:02

## 2020-02-16 RX ADMIN — CALCITRIOL CAPSULES 0.25 MCG 0.25 MCG: 0.25 CAPSULE ORAL at 09:02

## 2020-02-16 RX ADMIN — BUSPIRONE HYDROCHLORIDE 5 MG: 5 TABLET ORAL at 09:02

## 2020-02-16 NOTE — ASSESSMENT & PLAN NOTE
Patient is a 65 yo F who we have been asked to help with heart failure management.   Acute decompensated systolic and diastolic heart failure.   Pt with EF of 10% unknown if ischemic or non-ischemic.    -On exam, she has no LE edema nor JVD appreciated. However, she has rales noted throughout.  She has also endorsed a progressive dry cough.  Concern for underlying pulm issue: pneumonia vs bronchitis?      Plan:  -Recommend repeat CXR and BNP  -Consider starting therapeutic heparin ggt for anticoagulation of atrial fibrillation given [id number] of 4.   -Discontinue lasix gtt and diuril.  Recommend 1x dose of 80mg IV lasix tonight.  -Strict I&Os, Mcdaniel  -Would not pursue surgery until euvolemic

## 2020-02-16 NOTE — PLAN OF CARE
"Pt arouses to voice but more somnolent than on previous shifts, one word answers to questions: pt states "no" to answer everything, edema increased from +2 to +3 in arms, O2 sats continue to be low and max in low 90s (91-92%) with 3L + of humidified O2. Refuses meds and food, family at bedside, MD alerted to change in mental status. / called per pt request yesterday, will arrive on floor later this afternoon. Call light within reach.   "

## 2020-02-16 NOTE — PLAN OF CARE
Pt being discharged home today.  CM put in call to [facility], spoke with Tessa on call, who is making arrangements to deliver DME at the house, brother is there.  She will meet family there to sign paperwork.  After delivery, CM will put in for ambulance to bring patient home.      Pt's daughter is Chandni, [phone].  Awaiting to hear from on call as to when DME is being delivered.     DARREN [facility], and she stated [facility] was taking the [place] and this would be the [place].  DARREN did speak to Lo who stated that the delivery of the equipment would be at between 2 and 4 pm.   CM will put in for the ambulance at 6 pm. Pt ready to discharge to home hospice when ambulance arrives.

## 2020-02-16 NOTE — ASSESSMENT & PLAN NOTE
Bina Daniels is a 64 y.o. female with medial and lateral wound dehiscence sp fixation right ankle.  - nonweightbearing right lower extremity  - abx: cefepime  - cx: strep pneumo, enterobacter  - pain controlled  - DVT ppx: heparin -hold at midnight    Patient has been diuresed for many days now and Hospital Medicine believe she is as optimized for surgery as she will be.  Patient and family understand that she is very high risk for surgery. They still wished to proceed.  Plan for OR tomorrow.  She has marked, booked, and consented for surgery. Hold anticoagulation at midnight.

## 2020-02-16 NOTE — SUBJECTIVE & OBJECTIVE
Interval History: no acute events overnight. UOP 1350. She is somnolent on exam today she is arousable but then quickly closes her eyes. She answers questions with one word answers. She had not received any sedating medications or pain medications. Notified primary team of change in mental status.     Review of Systems   Constitution: Negative for chills and fever.   Cardiovascular: Positive for chest pain (with inspiration and deep breathing). Negative for leg swelling and palpitations.   Respiratory: Positive for cough and shortness of breath. Negative for wheezing.    Gastrointestinal: Negative for abdominal pain.     Objective:     Vital Signs (Most Recent):  Temp: 98.2 °F (36.8 °C) (02/16/20 0810)  Pulse: 89 (02/16/20 0900)  Resp: 13 (02/16/20 0900)  BP: 128/60 (02/16/20 0810)  SpO2: (!) 91 % (02/16/20 0900) Vital Signs (24h Range):  Temp:  [96.2 °F (35.7 °C)-98.2 °F (36.8 °C)] 98.2 °F (36.8 °C)  Pulse:  [75-90] 89  Resp:  [13-20] 13  SpO2:  [90 %-95 %] 91 %  BP: (108-128)/(53-60) 128/60     Weight: 69 kg (152 lb 1.6 oz)  Body mass index is 26.11 kg/m².     SpO2: (!) 91 %  O2 Device (Oxygen Therapy): nasal cannula w/ humidification      Intake/Output Summary (Last 24 hours) at 2/16/2020 1128  Last data filed at 2/16/2020 0811  Gross per 24 hour   Intake 440 ml   Output 2200 ml   Net -1760 ml       Lines/Drains/Airways     Drain                 Urethral Catheter 02/12/20 1100 Straight-tip 4 days          Peripheral Intravenous Line                 Peripheral IV - Single Lumen 02/14/20 0837 22 G Anterior;Left Forearm 2 days                Physical Exam   Constitutional: She is oriented to person, place, and time.   HENT:   Head: Normocephalic and atraumatic.   Eyes: Pupils are equal, round, and reactive to light. Conjunctivae are normal.   Neck: Normal range of motion. No JVD present.   Cardiovascular: Normal rate and regular rhythm.   No murmur heard.  Pulmonary/Chest: Effort normal. She has rales (Biltateral.  Worse R>L  ).   Abdominal: Soft. She exhibits no distension. There is no tenderness.   Musculoskeletal: She exhibits no edema.   SCDs in place   Neurological: She is oriented to person, place, and time.   Skin: Skin is warm and dry.   Psychiatric: She has a normal mood and affect.       Significant Labs:   BMP:   Recent Labs   Lab 02/15/20  0409 02/15/20  0943 02/15/20  2012 02/16/20  0450 02/16/20  0911   GLU  --  106 115*  --  116*   NA  --  140 140  --  141   K  --  3.2* 4.1  --  4.0   CL  --  104 103  --  104   CO2  --  22* 20*  --  23   BUN  --  83* 81*  --  83*   CREATININE  --  3.2* 3.2*  --  3.4*   CALCIUM  --  7.6* 7.9*  --  7.6*   MG 1.9  --   --  1.9  --     and CMP   Recent Labs   Lab 02/15/20  0943 02/15/20  2012 02/16/20  0911    140 141   K 3.2* 4.1 4.0    103 104   CO2 22* 20* 23    115* 116*   BUN 83* 81* 83*   CREATININE 3.2* 3.2* 3.4*   CALCIUM 7.6* 7.9* 7.6*   PROT 5.1* 5.3* 5.1*   ALBUMIN 1.4* 1.4* 1.4*   BILITOT 0.2 0.2 0.2   ALKPHOS 91 101 93   AST 8* 9* 9*   ALT 5* <5* <5*   ANIONGAP 14 17* 14   ESTGFRAFRICA 17.1* 17.1* 15.9*   EGFRNONAA 14.8* 14.8* 13.8*       Significant Imaging: all imaging in the last 24 hours reviewed

## 2020-02-16 NOTE — SUBJECTIVE & OBJECTIVE
Principal Problem:Sepsis    Principal Orthopedic Problem: same    Interval History: Patient seen and examined at bedside.  No acute events overnight.  Patient has been diuresed for many days now.  Hospital Medicine believes that she is now as optimized as she can be for surgery. Patient and family understand that she is high risk for surgery at baseline.  Plan for OR tomorrow.  NPO at midnight.  Hold anticoagulation at midnight.    Review of patient's allergies indicates:   Allergen Reactions    Sulfa (sulfonamide antibiotics) Hives       Current Facility-Administered Medications   Medication    acetaminophen tablet 650 mg    albuterol-ipratropium 2.5 mg-0.5 mg/3 mL nebulizer solution 3 mL    albuterol-ipratropium 2.5 mg-0.5 mg/3 mL nebulizer solution 3 mL    atorvastatin tablet 10 mg    busPIRone tablet 5 mg    calcitRIOL capsule 0.25 mcg    ceFEPIme injection 2 g    dextrose 10% (D10W) Bolus    dextrose 10% (D10W) Bolus    dextrose 50% injection 12.5 g    dextrose 50% injection 25 g    docusate sodium capsule 50 mg    escitalopram oxalate tablet 10 mg    ferrous sulfate EC tablet 325 mg    fluticasone propionate 50 mcg/actuation nasal spray 100 mcg    glucagon (human recombinant) injection 1 mg    glucagon (human recombinant) injection 1 mg    glucose chewable tablet 16 g    glucose chewable tablet 16 g    glucose chewable tablet 24 g    glucose chewable tablet 24 g    guaifenesin 100 mg/5 ml syrup 200 mg    heparin (porcine) injection 5,000 Units    insulin aspart U-100 pen 0-5 Units    melatonin tablet 6 mg    metoprolol succinate (TOPROL-XL) 24 hr split tablet 12.5 mg    ondansetron injection 4 mg    pantoprazole EC tablet 40 mg    polyethylene glycol packet 17 g    sodium bicarbonate tablet 650 mg    sodium chloride 0.9% flush 10 mL    sodium chloride 0.9% flush 10 mL     Objective:     Vital Signs (Most Recent):  Temp: 97.8 °F (36.6 °C) (02/15/20 1958)  Pulse: 90 (02/16/20  "0341)  Resp: 20 (02/16/20 0341)  BP: (!) 108/55 (02/15/20 1958)  SpO2: (!) 92 % (02/16/20 0341) Vital Signs (24h Range):  Temp:  [96.2 °F (35.7 °C)-97.8 °F (36.6 °C)] 97.8 °F (36.6 °C)  Pulse:  [75-90] 90  Resp:  [14-20] 20  SpO2:  [92 %-96 %] 92 %  BP: (108-152)/(53-70) 108/55     Weight: 69 kg (152 lb 1.6 oz)  Height: 5' 4" (162.6 cm)  Body mass index is 26.11 kg/m².      Intake/Output Summary (Last 24 hours) at 2/16/2020 0717  Last data filed at 2/16/2020 0450  Gross per 24 hour   Intake 680 ml   Output 2100 ml   Net -1420 ml       Ortho/SPM Exam    Physical Exam:  NAD, A/O x 3.  Wound dressed with 4 x 4 cast padding, Ace wrap. No visible drainage.  No focal motor or sensory deficits noted.      Significant Labs: All pertinent labs within the past 24 hours have been reviewed.    Significant Imaging: I have reviewed and interpreted all pertinent imaging results/findings.  "

## 2020-02-16 NOTE — PLAN OF CARE
Plan of care reviewed with pt. Pt aox4, VS as charted. Purposeful rounding for pt care and safety. Pt turned q2 hrs. Munroe boot on R leg, telfa island dressing CDI on L leg. No reports of NV. No falls/injury reported this shift. Skin integrity intact. SCD in place. Safety precautions maintained - bed in low position, call light in reach, side rails up x2.

## 2020-02-16 NOTE — PROGRESS NOTES
[facility]  Orthopedics  Progress Note    Patient Name: Bina Daniels  MRN: [id number]  Admission Date: 2/10/2020  Hospital Length of Stay: 6 days  Attending Provider: Tracy Peralta MD  Primary Care Provider: Tho Haro MD  Follow-up For: Procedure(s) (LRB):  REMOVAL, HARDWARE, LOWER EXTREMITY, Irrigation and debridement Right ankle, synthes removal screw , Large C arm clock side (Right)    Post-Operative Day: 4 Days Post-Op  Subjective:     Principal Problem:Sepsis    Principal Orthopedic Problem: same    Interval History: Patient seen and examined at bedside.  No acute events overnight.  Patient has been diuresed for many days now.  Hospital Medicine believes that she is now as optimized as she can be for surgery. Patient and family understand that she is high risk for surgery at baseline.  Plan for OR tomorrow.  NPO at midnight.  Hold anticoagulation at midnight.    Review of patient's allergies indicates:   Allergen Reactions    Sulfa (sulfonamide antibiotics) Hives       Current Facility-Administered Medications   Medication    acetaminophen tablet 650 mg    albuterol-ipratropium 2.5 mg-0.5 mg/3 mL nebulizer solution 3 mL    albuterol-ipratropium 2.5 mg-0.5 mg/3 mL nebulizer solution 3 mL    atorvastatin tablet 10 mg    busPIRone tablet 5 mg    calcitRIOL capsule 0.25 mcg    ceFEPIme injection 2 g    dextrose 10% (D10W) Bolus    dextrose 10% (D10W) Bolus    dextrose 50% injection 12.5 g    dextrose 50% injection 25 g    docusate sodium capsule 50 mg    escitalopram oxalate tablet 10 mg    ferrous sulfate EC tablet 325 mg    fluticasone propionate 50 mcg/actuation nasal spray 100 mcg    glucagon (human recombinant) injection 1 mg    glucagon (human recombinant) injection 1 mg    glucose chewable tablet 16 g    glucose chewable tablet 16 g    glucose chewable tablet 24 g    glucose chewable tablet 24 g    guaifenesin 100 mg/5 ml syrup 200 mg    heparin  "(porcine) injection 5,000 Units    insulin aspart U-100 pen 0-5 Units    melatonin tablet 6 mg    metoprolol succinate (TOPROL-XL) 24 hr split tablet 12.5 mg    ondansetron injection 4 mg    pantoprazole EC tablet 40 mg    polyethylene glycol packet 17 g    sodium bicarbonate tablet 650 mg    sodium chloride 0.9% flush 10 mL    sodium chloride 0.9% flush 10 mL     Objective:     Vital Signs (Most Recent):  Temp: 97.8 °F (36.6 °C) (02/15/20 1958)  Pulse: 90 (02/16/20 0341)  Resp: 20 (02/16/20 0341)  BP: (!) 108/55 (02/15/20 1958)  SpO2: (!) 92 % (02/16/20 0341) Vital Signs (24h Range):  Temp:  [96.2 °F (35.7 °C)-97.8 °F (36.6 °C)] 97.8 °F (36.6 °C)  Pulse:  [75-90] 90  Resp:  [14-20] 20  SpO2:  [92 %-96 %] 92 %  BP: (108-152)/(53-70) 108/55     Weight: 69 kg (152 lb 1.6 oz)  Height: 5' 4" (162.6 cm)  Body mass index is 26.11 kg/m².      Intake/Output Summary (Last 24 hours) at 2/16/2020 0717  Last data filed at 2/16/2020 0450  Gross per 24 hour   Intake 680 ml   Output 2100 ml   Net -1420 ml       Ortho/SPM Exam    Physical Exam:  NAD, A/O x 3.  Wound dressed with 4 x 4 cast padding, Ace wrap. No visible drainage.  No focal motor or sensory deficits noted.      Significant Labs: All pertinent labs within the past 24 hours have been reviewed.    Significant Imaging: I have reviewed and interpreted all pertinent imaging results/findings.    Assessment/Plan:     * Sepsis  Bina Daniels is a 64 y.o. female who scheduled today for irrigation debridement left ankle, removal of hardware.  Despite original plans to go to the operating room today is become clear that her heart failure remains decompensated and she would be at very high risk for cardiopulmonary compromise intraoperatively.      - Weight bearing status:  Nonweightbearing  - Pain control: Per APS  - Antibiotics:  Preop ordered  - DVT Prophylaxis:  Heparin held preop , SCD's at all times when not ambulating.  - PT/OT  - Lines/Drains:  None    - Dispo:  " Will discuss with staff as we had original plans to take the patient to the operating room today however is become clear that up her decompensated heart failure may prevent this and she can be better optimized with several additional days for diuresis.          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  Bina Daniels is a 64 y.o. female with medial and lateral wound dehiscence sp fixation right ankle.  - nonweightbearing right lower extremity  - abx: cefepime  - cx: strep pneumo, enterobacter  - pain controlled  - DVT ppx: heparin -hold at midnight    Patient has been diuresed for many days now and Hospital Medicine believe she is as optimized for surgery as she will be.  Patient and family understand that she is very high risk for surgery. They still wished to proceed.  Plan for OR tomorrow.  She has marked, booked, and consented for surgery. Hold anticoagulation at midnight.          Carlos Stern MD  Orthopedics  [facility]

## 2020-02-16 NOTE — PROGRESS NOTES
[facility]  Cardiology  Progress Note    Patient Name: Bina Daniels  MRN: [id number]  Admission Date: 2/10/2020  Hospital Length of Stay: 5 days  Code Status: DNR   Attending Physician: Tracy Peralta MD   Primary Care Physician: Tho Haro MD  Expected Discharge Date: 2/17/2020  Principal Problem:Sepsis    Subjective:     Interval History: no acute events overnight. Pt diuresed 1.72L (net - 1.4). VS wnl    Review of Systems   Constitution: Negative for chills and fever.   Cardiovascular: Negative for chest pain, leg swelling and palpitations.   Respiratory: Positive for cough and shortness of breath. Negative for wheezing.    Gastrointestinal: Negative for abdominal pain.     Objective:     Vital Signs (Most Recent):  Temp: 96.2 °F (35.7 °C) (02/15/20 1541)  Pulse: 78 (02/15/20 1541)  Resp: 16 (02/15/20 1541)  BP: (!) 113/53 (02/15/20 1541)  SpO2: (!) 94 % (02/15/20 1541) Vital Signs (24h Range):  Temp:  [96.2 °F (35.7 °C)-97.8 °F (36.6 °C)] 96.2 °F (35.7 °C)  Pulse:  [75-86] 78  Resp:  [16-18] 16  SpO2:  [90 %-96 %] 94 %  BP: (113-152)/(53-70) 113/53     Weight: 69 kg (152 lb 1.6 oz)  Body mass index is 26.11 kg/m².     SpO2: (!) 94 %  O2 Device (Oxygen Therapy): nasal cannula w/ humidification      Intake/Output Summary (Last 24 hours) at 2/15/2020 1905  Last data filed at 2/15/2020 1600  Gross per 24 hour   Intake 590 ml   Output 2450 ml   Net -1860 ml       Lines/Drains/Airways     Drain                 Urethral Catheter 02/12/20 1100 Straight-tip 3 days          Peripheral Intravenous Line                 Peripheral IV - Single Lumen 02/14/20 0837 22 G Anterior;Left Forearm 1 day                Physical Exam   Constitutional: She is oriented to person, place, and time.   HENT:   Head: Normocephalic and atraumatic.   Eyes: Pupils are equal, round, and reactive to light. Conjunctivae are normal.   Neck: Normal range of motion. No JVD present.   Cardiovascular: Normal rate and regular  rhythm.   No murmur heard.  Pulmonary/Chest: Effort normal. She has rales (Biltateral. Worse R>L  ).   Abdominal: Soft. She exhibits no distension. There is no tenderness.   Musculoskeletal: She exhibits no edema.   SCDs in place   Neurological: She is oriented to person, place, and time.   Skin: Skin is warm and dry.   Psychiatric: She has a normal mood and affect.       Significant Labs:   BMP:   Recent Labs   Lab 02/14/20  0422 02/14/20  0841 02/14/20  1957/20  0409 02/15/20  0943   GLU  --  92 124*  --  106   NA  --  139 139  --  140   K  --  3.4* 3.5  --  3.2*   CL  --  106 105  --  104   CO2  --  23 23  --  22*   BUN  --  80* 82*  --  83*   CREATININE  --  3.1* 3.2*  --  3.2*   CALCIUM  --  7.9* 7.6*  --  7.6*   MG 1.9  --   --  1.9  --     and CMP   Recent Labs   Lab 02/14/20  0841 02/14/20  1957/20  0943    139 140   K 3.4* 3.5 3.2*    105 104   CO2 23 23 22*   GLU 92 124* 106   BUN 80* 82* 83*   CREATININE 3.1* 3.2* 3.2*   CALCIUM 7.9* 7.6* 7.6*   PROT 5.3* 5.0* 5.1*   ALBUMIN 1.3* 1.4* 1.4*   BILITOT 0.2 0.2 0.2   ALKPHOS 93 92 91   AST 7* 8* 8*   ALT <5* <5* 5*   ANIONGAP 10 11 14   ESTGFRAFRICA 17.8* 17.1* 17.1*   EGFRNONAA 15.4* 14.8* 14.8*       Significant Imaging: all imaging in the last 24 hours reviewed    Assessment and Plan:     Brief HPI: Bina Daniels is a 64 y.o. F with T2DM, HF (EF 10%), HTN, LAI, depression who was admitted with chrnoic osteomyelitis and we are helpnig with heart failure management    Acute on chronic combined systolic and diastolic heart failure  - EF 10%; unclear if ischemic or NICM  - Rales bilaterally R > L associated with productive (?) cough. Recommend repeat CXR. Concern for bronchitis or pneumonia as differentials for pulmonary edema and heart failure   - s/p diuresis with IV lasix. Currently received IV lasix 80mg x 1 on 2/15 at 11am. D/c lasix and diuril. May stop aggressive diuresis at this time and resume home lasix tomorrow.   - Strict  I&Os, Mcdaniel   - Would not pursue surgery until euvolemic     Atrial fibrillation  - rate control: metoprolol succinate 12.5mg daily  - CHADsVASC 4 pts (female, CHF, HTN, diabetes); will need long term anticoagulation       VTE Risk Mitigation (From admission, onward)         Ordered     heparin (porcine) injection 5,000 Units  Every 8 hours      02/12/20 1318     Place SKYLAR hose  Until discontinued      02/12/20 1037     IP VTE HIGH RISK PATIENT  Once      02/10/20 1527              Patient seen and plan of care discussed with Dr. Nola He MD  Cardiology  [facility]

## 2020-02-16 NOTE — PROGRESS NOTES
"Progress Note  Hospital Medicine       Patient Name: Bina Daniels  MRN:  [id number]  Hospital Medicine Team: [facility] K Tracy Peralta MD  Date of Admission:  2/10/2020     Principal Problem:  Sepsis   Primary Care Physician: Tho Haro MD      History of Present Illness:     Interval History:    Patient with increase in urine output with diuril and lasix gtt. Transition to IV lasix interval push. Patient sleeps most of the day. No issues per family overnight.      Decision to postpone surgery now as the risk is there to get source control with ankle but as the sepsis seems to have improved (repeat cx neg, no fevers, vitals stable ie not on pressors, etc) that the surgery while is semi urgent, can be delayed until the respiratory status is better once more diuresis is achieved. May require continued diuresis over the weekend as significant residual volume overload currently present. Discussed cardiac function and renal impairment with family. Extensive talk with patient alone in room on 2/14--we discussed the severity of her cardiac disease and how this will ultimately progress and that it puts her at very high risk for surgical intervention.     HPI:   Ms. Bina Daniels is a 64 y.o. female with a history of type 2 DM, stage 4 CKD, HTN, LAI, neuropathy, LAI, depressionn who was in her usual state of health until the week or so before gracy this year when she fell and thought she just sprained her ankle, per ortho notes she "didn't think it ws that bad", she then saw a Dr Sotomayor who was unable to reduce the ankle and dx a fx in the Right ankle and sent to [place] [facility] for treatment on 12/26, she went for attempted ORIF on 12/27 with Dr Medina with [facility] who was unable to complete procedure due to intraOp bleeding in screw holes, placed in splint and procedure aborted/2U given, on 12/27 ORIF completed to R ankle fx with recs for nonweight bearing at that time, SNF was recommended. She had prolonged " "hospital stay for JENNI on stage 4 CKD issues- nephro Dr Saenz and hosp med in [place] followed for med issues, she decided to go home and not SNF on 1/8. Per her family at bedside, she was getting home health to surgical site and was getting home PT/OT, they didn't seem thrilled with her choice to not do SNF, their brother who is her primary caregiver was not present in ER (daughter and son present in ER now and dont know all of med hx). In interim they didn't know she was having wound issues and dont report that they knew about it until this weekend that the site had opened up as well as worsening dyspnea and fluid/pitting edema in legs, arms as well. They report subjective chills at home but no known fevers, as well as her being slightly "off" and not herself completely now. They brought her for evlauation for both of above, and as seen in ER pictures the wound has opened and hardware visible. Per ER notes, [facility] orth[facility] was attempted called from ER but "no answer"' reported so they admitted to hospital medicine here, ortho has been called here, family reports not seen yet. Given vanc, zosyn, lasix, tdap in ER. SHe missed her ortho f/u last week and they had rescheduled for this week but when they saw the wound they brought her to the ER.     Family is unsnure if she missed home meds but they think a few days possibly, [place] records report being on lasix 40 BID at home. Family to bring home meds (her son who manages this), but most recent nephro MAR before discharge showed the following:  norvasc 10, asa81, calcitriol .25, colace, lexapro 10 iron 325, fluticasone, levemir 10, toprol 50 BID, protonix, nabicarb 650 4 times day. At one point was on also glyburide/metformin in [place] and at home and lasix 40 BID, unsure if held on discharge or not as dc summary has no MAR, prolia injections, neurontin.    Med Hx:  Type 2 DM- A1C 9.9, stage 4 CKD- Cr baseline 2.3-2.5, iron deficiency/acute blood loss anemia, ankle fx- " ORIF 12/29, neuropathy, breast ca s/p lumpectomy, depression, ?CAD, chronic metabolic acidosis, secondary hyperparathyroidism from CKD, HTN, hypoalbuminemia    Social: son not at bedside is primary caregiver, mutiple children assist in ADLS, Walker use after fx, more bedbound now after surgery, SNF was rec but was at home. Smoker, stopped 10 years ago. No alcohol or drugs.    Review of Systems   Constitutional: positive for chills, fatigue, fever.   HENT: Negative for sore throat, trouble swallowing.    Eyes: Negative for photophobia, visual disturbance.   Respiratory: positive for cough, shortness of breath.    Cardiovascular: Negative for chest pain, palpitations, positive leg swelling.   Gastrointestinal: Negative for abdominal pain, constipation, diarrhea, nausea, vomiting.   Endocrine: Negative for cold intolerance, heat intolerance.   Genitourinary: Negative for dysuria, frequency.   Musculoskeletal: positive for arthralgias, myalgias.   Skin positive for rash, wound, erythema   Neurological: Negative for dizziness, syncope, weakness, light-headedness.   Psychiatric/Behavioral: Negative for confusion, hallucinations, anxiety  All other systems reviewed and are negative.      Past Medical History: Patient has a past medical history of Breast cancer, CAD (coronary artery disease), Cancer, CKD (chronic kidney disease), Diabetes mellitus, HTN (hypertension), and Peripheral neuropathy.    Past Surgical History: Patient has a past surgical history that includes Breast surgery (Left); Cholecystectomy; Tonsillectomy; Hysterectomy; Open reduction and internal fixation (ORIF) of injury of ankle (Right, 12/29/2019); Closure of wound (Right, 12/29/2019); and Open reduction and internal fixation (ORIF) of injury of ankle (Right, 12/27/2019).    Social History: Patient reports that she has quit smoking. She started smoking about 16 years ago. She has never used smokeless tobacco. She reports that she does not drink alcohol  or use drugs.    Family History: family history includes Breast cancer in her maternal grandmother.    Medications: Scheduled Meds:   albuterol-ipratropium  3 mL Nebulization Q4H    atorvastatin  10 mg Oral Daily    busPIRone  5 mg Oral BID    calcitRIOL  0.25 mcg Oral Daily    ceFEPime (MAXIPIME) IVPB  2 g Intravenous Q24H    docusate sodium  50 mg Oral Daily    escitalopram oxalate  10 mg Oral Daily    ferrous sulfate  325 mg Oral Daily    fluticasone propionate  2 spray Each Nostril Daily    furosemide  80 mg Intravenous Once    heparin (porcine)  5,000 Units Subcutaneous Q8H    metoprolol succinate  12.5 mg Oral Daily    pantoprazole  40 mg Oral Daily    polyethylene glycol  17 g Oral Daily    sodium bicarbonate  650 mg Oral QID     Continuous Infusions:    PRN Meds:.acetaminophen, albuterol-ipratropium, Dextrose 10% Bolus, Dextrose 10% Bolus, dextrose 50%, dextrose 50%, glucagon (human recombinant), glucagon (human recombinant), glucose, glucose, glucose, glucose, guaifenesin 100 mg/5 ml, insulin aspart U-100, melatonin, ondansetron, sodium chloride 0.9%, sodium chloride 0.9%    Allergies: Patient is allergic to sulfa (sulfonamide antibiotics).    Physical Exam:     Vital Signs (Most Recent):  Temp: 98.2 °F (36.8 °C) (02/16/20 0810)  Pulse: 89 (02/16/20 0900)  Resp: 13 (02/16/20 0900)  BP: 128/60 (02/16/20 0810)  SpO2: (!) 91 % (02/16/20 0900) Vital Signs Range (Last 24H):  Temp:  [96.2 °F (35.7 °C)-98.2 °F (36.8 °C)]   Pulse:  [75-90]   Resp:  [13-20]   BP: (108-128)/(53-60)   SpO2:  [90 %-95 %]    Body mass index is 26.11 kg/m².     Physical Exam:  Constitutional:  Alert, family at bedside. answers questions appropriately. On NC. - 3L.   Head: Normocephalic and atraumatic.   Mouth/Throat: Oropharynx is clear and moist.   Eyes: EOM are normal. Pupils are equal, round, and reactive to light. No scleral icterus.   Neck: Normal range of motion. Neck supple. JVP elevated.  Cardiovascular:  irregularly irregular rhythm, rate of 85.  No murmur heard.  Pulmonary/Chest: increased WOB, sitting at 60 degree ankle but mild improvement since yesterday, less crackles, minimal use of accessory muscles, no tripoding,  RR 18, on NC  Abdominal: Soft. Bowel sounds are normal.  mild distension, anasarca.  Musculoskeletal: open R ankle wound with hardware visible (see ER note), wrapped now. Pulses present bialterally, warm to touch.   Neurological: Alert and oriented to person, place, and time.   Skin: Skin is warm and dry. Diffuse pitting edema to the hips bilaterally, 3+ pitting edema in legs, arms with edema RUE >LUE, mild tenderness with pressing on edema in extremities. Wound to RLE. Anasarca.  Psychiatric: Normal mood and affect. Behavior is normal. reports some depression from this, occasional tearful to nursing.  Vitals reviewed.    Recent Labs   Lab 02/14/20  0422 02/15/20  0410 02/16/20  0450   WBC 13.89* 15.31* 13.56*   HGB 9.3* 9.7* 8.9*   HCT 31.6* 32.1* 29.7*   * 355* 317       Recent Labs   Lab 02/14/20  0422  02/15/20  0409 02/15/20  0943 02/15/20  2012 02/16/20  0450 02/16/20  0911   NA  --    < >  --  140 140  --  141   K  --    < >  --  3.2* 4.1  --  4.0   CL  --    < >  --  104 103  --  104   CO2  --    < >  --  22* 20*  --  23   BUN  --    < >  --  83* 81*  --  83*   CREATININE  --    < >  --  3.2* 3.2*  --  3.4*   GLU  --    < >  --  106 115*  --  116*   CALCIUM  --    < >  --  7.6* 7.9*  --  7.6*   MG 1.9  --  1.9  --   --  1.9  --    PHOS 6.1*  --  6.5*  --   --  6.6*  --     < > = values in this interval not displayed.     Recent Labs   Lab 02/10/20  1741 02/10/20  2110  02/15/20  0943 02/15/20  2012 02/16/20  0911   ALKPHOS  --   --    < > 91 101 93   ALT  --   --    < > 5* <5* <5*   AST  --   --    < > 8* 9* 9*   ALBUMIN  --   --    < > 1.4* 1.4* 1.4*   PROT  --   --    < > 5.1* 5.3* 5.1*   BILITOT  --   --    < > 0.2 0.2 0.2   INR 1.3* 1.3*  --   --   --   --     < > = values in  this interval not displayed.      Recent Labs   Lab 02/14/20  2230 02/15/20  0157 02/15/20  0916 02/15/20  1250 02/15/20  2324 02/16/20  0816   POCTGLUCOSE 131* 130* 112* 113* 123* 118*         Assessment and Plan:     Ms. Bina Daniels is a 64 y.o. female who presented to [facility] on 2/10/2020 with     Active Hospital Problems    Diagnosis  POA    *Sepsis [A41.9]  Yes    Acute kidney injury superimposed on CKD [N17.9, N18.9]  Yes    Acute systolic (congestive) heart failure [I50.21]  Yes    Bacteremia [R78.81]  Yes    Acute thrombosis of superficial veins of both upper extremities [I82.613]  Yes    Paroxysmal atrial fibrillation [I48.0]  Yes    Postoperative infection [T81.40XA]  Yes    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 [T84.7XXA]  Yes    Volume overload [E87.70]  Yes    Anasarca associated with disorder of kidney [N04.9]  Yes    Iron deficiency anemia [D50.9]  Yes    Vitamin D deficiency [E55.9]  Yes    Metabolic acidosis [E87.2]  Yes    Secondary hyperparathyroidism [N25.81]  Yes    Acute metabolic encephalopathy [G93.41]  Yes    Hypertension [I10]  Yes    Hypoalbuminemia [E88.09]  Yes    Acute hypoxemic respiratory failure [J96.01]  Yes    Acute on chronic combined systolic and diastolic heart failure [I50.43]  Yes    Breast cancer [C50.919]  Yes    S/P ORIF (open reduction internal fixation) fracture [Z98.890, Z87.81]  Not Applicable     Ankle, 12/29      Depression [F32.9]  Yes    Neuropathy due to secondary diabetes mellitus [E13.40]  Yes     Chronic    Type 2 diabetes mellitus with neurologic complication, without long-term current use of insulin [E11.49]  Yes     Chronic    Anemia due to stage 3 chronic kidney disease [N18.3, D63.1]  Yes      Resolved Hospital Problems    Diagnosis Date Resolved POA    CKD (chronic kidney disease), stage IV [N18.4] 02/12/2020 Yes     Chronic     Sepsis secondary to infected hardware in right leg/wound infection Right ankle  Strep bacteremia. Gram  negative bacteremia  S/p ORIF, s/p R ankle fracture  -patient with open surgical wound, hardware exposed on R ankle, lactate 1.3, procal mildly up at .91, , ESR 68, unsure timing of when the wound started becoming clearly infected and open to air  -s/p ORIF 12/29 with [facility] ortho in [place], aborted surgery 12/27 due to intraOp bleeding, walked on the fx for at least a week before that per Dr Medina notes. nonweight bearing to RLE, continue this NWB status to RLE now, bedrest for now until ortho assessment. Bandaged now.   -Xray on admit showing widened tibotalar interval, cortical irregularity, edema, post op changes vs site infections  -blood CX on admit 2/10 with Strep in 1/4 bottles, GNR 2/4 bottles. ID consulted to assist, vanc/cefepime now. Repeat Cx 2/11 NGTD  -plan for washout in future with ortho, date TBD, too high risk currently given active acute decompensated heart failure per discussions today 2/15. As her sepsis/bacteremia seems to be stable (not on pressors, cultures are clearning in blood, afebrile) the risk of delay seems to be acceptable. If she was having florid sepsis issues and source control was immediately necessary then the benefit of this sooner would be tricky, but she appears to be responding to antibiotics for the infection from the ankle, so delay to get her respiratory status better in the interim seems to be prudent now. When she is ready for surgery, she is high risk- RCRI is high at 3 which gives a 15% 30 day risk of death, cardiac event, MI.      Anasarca  Acute on chronic systolic/ LV/RV diastolic heart failure (EF 10%)  Volume overload  Acute hypoxemic respiratory failure  -massive volume overload on admit,   -strict I/O, daily weight, 2 grams Na, 1.5L fluid restrict  -pitting edema has greatly improved  -albumin very low, started boost but very poor oral intake  -heaton placed 2/12.   - 2/11 with echo showing EF 10%, diastolic dysfunction- LV, RV, LAE, increased LAP, LVP,  PAP elevated, MR.   -2/12: between 3-6L oxygen, diuresis not documented well, lasix IV in AM and onto lasix drip in PM per cards recs to assist with diuresis further. Started low dose toprol XL for afib 2/11 (was already on at home but was on short acting)  - Will need ace/arb long term. Will need consideration of life vest given depressed EF also  -RCRI 3, 15% risk of 30 day mortality post op. Optimizing with diuresis in interim     Acute kidney injury on Stage 4 CKD  -baseline Cr 2.3 to 3.3, trended up to 2.8 in January in [place], seen by nephro, sees [place] with nephro per [place] notes  -UA with protein, few bacteria. CK wnl. urine na, urine pr/cr, urine cr, urine bun collected and pending.  -avoid nephrotoxins  -likely element of cardiorenal worsening baseline, diuresis needed      Type 2 dm  -A1C 9.9, glucose 313 on admission   -unsure what she went home on, Toujeo on MAR, metformin and glyburide also there, clearly those are not appropriate with this CKD and need holding now and likely forever  -SSI only as poor oral intake, increase in creatinine and lower glucoses  -hold asa   -neuropathy at baseline    Paroxysmal Atrial fibrillation  -new finding 2/11 on ekg and echo, NSR on admit, likely stress of sepsis and depressed ef contributors, suspect will go in and out given LAE, EF probably wont stay out of it  -[id number] of at least 4, will need long term anticoagulation, eliquis will be only noac given ckd option.  - Pending initiation of NOAC until surgical plan finalized.     Upper extremity superficial thrombosis  -right cephalic, left basilic  -superficial, low risk to embolize  -can do ppx heparin pre-op, long term after all surgeries done, options are eliquis vs coumadin, prefer eliquis.    Metabolic acidosis  -secondary to CKD, resume nabicarb, improving from 14 at admit to 19 now    Anemia of chronic renal disease  Iron deficiency anemia  -tx recently [place] at [place], continue daily  iron  -hg 9.9-->7.9    Hypoalbuminemia  -boost with meals  -pr/cr ratio 2.37.    Secondary hyperparathyroidism  -, daily phos  -renal diet  -cacitriol daily    Vitamin D deficiency  Osteoporosis  -on prolia outpatient  -vit D low at 5 on check in January, dont see replacement on MAR. Will consider long term once out of acute illness    Depression  -cont home lexapro  -endorses depression symptoms worse now with acute medical issues. Watch closely for worsenign now    Acute metabolic encephalopathy  -likely from sepsis, mild, monitor closely  -improved 2/11, at baseline now    Diet:  Low na, 1.5L, renal/dm      DVT PPx:  Heparin ppx     End of life discussion. Patient expressed that she would not want to be placed on life support. Family confirmed patient desire to be DNR. Patient would not want to be intubated even for surgery. Patient notes that she would like to go home. Family in agreement and would like to go home as soon as possible for home hospice service. They fully understand her medical condition and are aware that her infection is not cured and may worsen at home. They are aware that she has a limited life expectancy. Patient reports some pain in her leg and back today at sacral area. Family will confer and give me finalized decision about surgery but as of now they do not want procedure.       Tracy Peralta MD

## 2020-02-16 NOTE — ASSESSMENT & PLAN NOTE
Patient is a 63 yo F who we have been asked to help with heart failure management.   Acute decompensated systolic and diastolic heart failure.   Pt with EF of 10% unknown if ischemic or non-ischemic.    -On exam, she has no LE edema. She had  JVD to ear. However, she has rales noted throughout.  She has also endorsed a progressive dry cough.  Concern for underlying pulm issue: pneumonia vs bronchitis?      Plan:  -Consider starting therapeutic heparin ggt for anticoagulation of atrial fibrillation given [id number] of 4.   - Recommend repeat Lasix 80mg IVP today  -Strict I&Os, Mcdaniel  -Would not pursue surgery until euvolemic   - She remains high risk patient for a low risk procedure.

## 2020-02-16 NOTE — PLAN OF CARE
[facility]     Department of Hospital Medicine     [address] [place] [address]     [phone] after hours  [phone] fax                                   HOSPICE  ORDERS     Patient Name: Bina Daniels  YOB: [birth year]    Admit to Hospice:  Home Service     Diagnoses:  Active Hospital Problems    Diagnosis  POA    *Sepsis [A41.9]  Yes    Acute kidney injury superimposed on CKD [N17.9, N18.9]  Yes    Acute systolic (congestive) heart failure [I50.21]  Yes    Bacteremia [R78.81]  Yes    Acute thrombosis of superficial veins of both upper extremities [I82.613]  Yes    Paroxysmal atrial fibrillation [I48.0]  Yes    Postoperative infection [T81.40XA]  Yes    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 [T84.7XXA]  Yes    Volume overload [E87.70]  Yes    Anasarca associated with disorder of kidney [N04.9]  Yes    Iron deficiency anemia [D50.9]  Yes    Vitamin D deficiency [E55.9]  Yes    Metabolic acidosis [E87.2]  Yes    Secondary hyperparathyroidism [N25.81]  Yes    Acute metabolic encephalopathy [G93.41]  Yes    Hypertension [I10]  Yes    Hypoalbuminemia [E88.09]  Yes    Acute hypoxemic respiratory failure [J96.01]  Yes    Acute on chronic combined systolic and diastolic heart failure [I50.43]  Yes    Breast cancer [C50.919]  Yes    S/P ORIF (open reduction internal fixation) fracture [Z98.890, Z87.81]  Not Applicable     Ankle, 12/29      Depression [F32.9]  Yes    Neuropathy due to secondary diabetes mellitus [E13.40]  Yes     Chronic    Type 2 diabetes mellitus with neurologic complication, without long-term current use of insulin [E11.49]  Yes     Chronic    Anemia due to stage 3 chronic kidney disease [N18.3, D63.1]  Yes      Resolved Hospital Problems    Diagnosis Date Resolved POA    CKD (chronic kidney disease), stage IV [N18.4] 02/12/2020 Yes     Chronic       Hospice Qualifying Diagnoses: End stage congestive  heart failure, sepsis with open leg wound, chronic renal disease         Patient has a life expectancy < 6 months due to these conditions.    Vital Signs: Routine per Hospice Protocol.    Allergies:  Review of patient's allergies indicates:   Allergen Reactions    Sulfa (sulfonamide antibiotics) Hives       Diet: regular as tolerated    Activities: bed rest     Nursing: Per Hospice Routine    Future Orders:  Hospice Medical Director may dictate new orders for comfortable care measures & sign death certificate.    Medications:            Bina Daniels   Home Medication Instructions MORRIS:[id number]    Printed on:02/16/20 9485   Medication Information                      acetaminophen (TYLENOL) 325 MG tablet  Take 2 tablets (650 mg total) by mouth every 6 (six) hours as needed for Pain.             aspirin 81 MG Chew  Take 81 mg by mouth once daily.             atorvastatin (LIPITOR) 10 MG tablet               busPIRone (BUSPAR) 5 MG Tab  Take 5 mg by mouth 2 (two) times daily.             calcitRIOL (ROCALTROL) 0.25 MCG Cap  Take 1 capsule (0.25 mcg total) by mouth once daily.             escitalopram oxalate (LEXAPRO) 10 MG tablet  Take 10 mg by mouth once daily.             furosemide (LASIX) 40 MG tablet  Take 40 mg by mouth 2 (two) times daily.             gabapentin (NEURONTIN) 300 MG capsule po Qpm for leg pain.              metoprolol succinate (TOPROL-XL) 25 MG 24 hr tablet  Take 0.5 tablets (12.5 mg total) by mouth once daily.                         DIABETES CARE:     Nurse to perform and educate diabetic management with blood glucose monitoring:         Fingerstick blood sugar AC and HS       Report CBG < 60 or > 350 to physician.         Insulin Sliding Scale         Glucose  Novolog Insulin Subcutaneous        0 - 60   Orange juice or glucose tablet      No insulin   201-250  2 units   251-300  4 units   301-350  6 units   351-400  8 units   >400   10 units then call  physician        _________________________________  Tracy Peralta MD  02/16/2020

## 2020-02-16 NOTE — PROGRESS NOTES
[facility]  Cardiology  Progress Note    Patient Name: Bina Daniels  MRN: [id number]  Admission Date: 2/10/2020  Hospital Length of Stay: 6 days  Code Status: DNR   Attending Physician: Tracy Peralta MD   Primary Care Physician: Tho Haro MD  Expected Discharge Date: 2/17/2020  Principal Problem:Sepsis    Subjective:     Hospital Course:   No notes on file    Interval History: no acute events overnight. UOP 1350. She is somnolent on exam today she is arousable but then quickly closes her eyes. She answers questions with one word answers. She had not received any sedating medications or pain medications. Notified primary team of change in mental status.     Review of Systems   Constitution: Negative for chills and fever.   Cardiovascular: Positive for chest pain (with inspiration and deep breathing). Negative for leg swelling and palpitations.   Respiratory: Positive for cough and shortness of breath. Negative for wheezing.    Gastrointestinal: Negative for abdominal pain.     Objective:     Vital Signs (Most Recent):  Temp: 98.2 °F (36.8 °C) (02/16/20 0810)  Pulse: 89 (02/16/20 0900)  Resp: 13 (02/16/20 0900)  BP: 128/60 (02/16/20 0810)  SpO2: (!) 91 % (02/16/20 0900) Vital Signs (24h Range):  Temp:  [96.2 °F (35.7 °C)-98.2 °F (36.8 °C)] 98.2 °F (36.8 °C)  Pulse:  [75-90] 89  Resp:  [13-20] 13  SpO2:  [90 %-95 %] 91 %  BP: (108-128)/(53-60) 128/60     Weight: 69 kg (152 lb 1.6 oz)  Body mass index is 26.11 kg/m².     SpO2: (!) 91 %  O2 Device (Oxygen Therapy): nasal cannula w/ humidification      Intake/Output Summary (Last 24 hours) at 2/16/2020 1128  Last data filed at 2/16/2020 0811  Gross per 24 hour   Intake 440 ml   Output 2200 ml   Net -1760 ml       Lines/Drains/Airways     Drain                 Urethral Catheter 02/12/20 1100 Straight-tip 4 days          Peripheral Intravenous Line                 Peripheral IV - Single Lumen 02/14/20 0837 22 G Anterior;Left Forearm 2 days                 Physical Exam   Constitutional: She is oriented to person, place, and time.   HENT:   Head: Normocephalic and atraumatic.   Eyes: Pupils are equal, round, and reactive to light. Conjunctivae are normal.   Neck: Normal range of motion. No JVD present.   Cardiovascular: Normal rate and regular rhythm.   No murmur heard.  Pulmonary/Chest: Effort normal. She has rales (Biltateral. Worse R>L  ).   Abdominal: Soft. She exhibits no distension. There is no tenderness.   Musculoskeletal: She exhibits no edema.   SCDs in place   Neurological: She is oriented to person, place, and time.   Skin: Skin is warm and dry.   Psychiatric: She has a normal mood and affect.       Significant Labs:   BMP:   Recent Labs   Lab 02/15/20  0409 02/15/20  0943 02/15/20  2012 02/16/20  0450 02/16/20  0911   GLU  --  106 115*  --  116*   NA  --  140 140  --  141   K  --  3.2* 4.1  --  4.0   CL  --  104 103  --  104   CO2  --  22* 20*  --  23   BUN  --  83* 81*  --  83*   CREATININE  --  3.2* 3.2*  --  3.4*   CALCIUM  --  7.6* 7.9*  --  7.6*   MG 1.9  --   --  1.9  --     and CMP   Recent Labs   Lab 02/15/20  0943 02/15/20  2012 02/16/20  0911    140 141   K 3.2* 4.1 4.0    103 104   CO2 22* 20* 23    115* 116*   BUN 83* 81* 83*   CREATININE 3.2* 3.2* 3.4*   CALCIUM 7.6* 7.9* 7.6*   PROT 5.1* 5.3* 5.1*   ALBUMIN 1.4* 1.4* 1.4*   BILITOT 0.2 0.2 0.2   ALKPHOS 91 101 93   AST 8* 9* 9*   ALT 5* <5* <5*   ANIONGAP 14 17* 14   ESTGFRAFRICA 17.1* 17.1* 15.9*   EGFRNONAA 14.8* 14.8* 13.8*       Significant Imaging: all imaging in the last 24 hours reviewed    Assessment and Plan:       Acute on chronic combined systolic and diastolic heart failure  Patient is a 63 yo F who we have been asked to help with heart failure management.   Acute decompensated systolic and diastolic heart failure.   Pt with EF of 10% unknown if ischemic or non-ischemic.    -On exam, she has no LE edema. She had  JVD to ear. However, she has rales  noted throughout.  She has also endorsed a progressive dry cough.  Concern for underlying pulm issue: pneumonia vs bronchitis?      Plan:  -Consider starting therapeutic heparin ggt for anticoagulation of atrial fibrillation given [id number] of 4.   - Recommend repeat Lasix 80mg IVP today  -Strict I&Os, Mcdaniel  -Would not pursue surgery until euvolemic   - She remains high risk patient for a low risk procedure.          VTE Risk Mitigation (From admission, onward)         Ordered     heparin (porcine) injection 5,000 Units  Every 8 hours      02/16/20 0716     Place SKYLAR hose  Until discontinued      02/12/20 1037     IP VTE HIGH RISK PATIENT  Once      02/10/20 1527                Gerald Mason MD  Cardiology  [facility]

## 2020-02-16 NOTE — SUBJECTIVE & OBJECTIVE
Interval History: no acute events overnight. Pt diuresed 1.72L (net - 1.4)    Review of Systems   Constitution: Negative for chills and fever.   Cardiovascular: Positive for chest pain (with inspiration and deep breathing). Negative for leg swelling and palpitations.   Respiratory: Positive for cough and shortness of breath. Negative for wheezing.    Gastrointestinal: Negative for abdominal pain.     Objective:     Vital Signs (Most Recent):  Temp: 96.2 °F (35.7 °C) (02/15/20 1541)  Pulse: 78 (02/15/20 1541)  Resp: 16 (02/15/20 1541)  BP: (!) 113/53 (02/15/20 1541)  SpO2: (!) 94 % (02/15/20 1541) Vital Signs (24h Range):  Temp:  [96.2 °F (35.7 °C)-97.8 °F (36.6 °C)] 96.2 °F (35.7 °C)  Pulse:  [75-86] 78  Resp:  [16-18] 16  SpO2:  [90 %-96 %] 94 %  BP: (113-152)/(53-70) 113/53     Weight: 69 kg (152 lb 1.6 oz)  Body mass index is 26.11 kg/m².     SpO2: (!) 94 %  O2 Device (Oxygen Therapy): nasal cannula w/ humidification      Intake/Output Summary (Last 24 hours) at 2/15/2020 1905  Last data filed at 2/15/2020 1600  Gross per 24 hour   Intake 590 ml   Output 2450 ml   Net -1860 ml       Lines/Drains/Airways     Drain                 Urethral Catheter 02/12/20 1100 Straight-tip 3 days          Peripheral Intravenous Line                 Peripheral IV - Single Lumen 02/14/20 0837 22 G Anterior;Left Forearm 1 day                Physical Exam   Constitutional: She is oriented to person, place, and time.   HENT:   Head: Normocephalic and atraumatic.   Eyes: Pupils are equal, round, and reactive to light. Conjunctivae are normal.   Neck: Normal range of motion. No JVD present.   Cardiovascular: Normal rate and regular rhythm.   No murmur heard.  Pulmonary/Chest: Effort normal. She has rales (Biltateral. Worse R>L  ).   Abdominal: Soft. She exhibits no distension. There is no tenderness.   Musculoskeletal: She exhibits no edema.   SCDs in place   Neurological: She is oriented to person, place, and time.   Skin: Skin is warm  and dry.   Psychiatric: She has a normal mood and affect.       Significant Labs:   BMP:   Recent Labs   Lab 02/14/20  0422 02/14/20  0841 02/14/20  1957/20  0409 02/15/20  0943   GLU  --  92 124*  --  106   NA  --  139 139  --  140   K  --  3.4* 3.5  --  3.2*   CL  --  106 105  --  104   CO2  --  23 23  --  22*   BUN  --  80* 82*  --  83*   CREATININE  --  3.1* 3.2*  --  3.2*   CALCIUM  --  7.9* 7.6*  --  7.6*   MG 1.9  --   --  1.9  --     and CMP   Recent Labs   Lab 02/14/20  0841 02/14/20  1957/20  0943    139 140   K 3.4* 3.5 3.2*    105 104   CO2 23 23 22*   GLU 92 124* 106   BUN 80* 82* 83*   CREATININE 3.1* 3.2* 3.2*   CALCIUM 7.9* 7.6* 7.6*   PROT 5.3* 5.0* 5.1*   ALBUMIN 1.3* 1.4* 1.4*   BILITOT 0.2 0.2 0.2   ALKPHOS 93 92 91   AST 7* 8* 8*   ALT <5* <5* 5*   ANIONGAP 10 11 14   ESTGFRAFRICA 17.8* 17.1* 17.1*   EGFRNONAA 15.4* 14.8* 14.8*       Significant Imaging: all imaging in the last 24 hours reviewed

## 2020-02-17 NOTE — PLAN OF CARE
Patient discharged home to [facility] [place] [facility] on 2/16/20.     02/17/20 1627   Final Note   Assessment Type Final Discharge Note   Anticipated Discharge Disposition HospiceHome   What phone number can be called within the next 1-3 days to see how you are doing after discharge?   ([phone])   Hospital Follow Up  Appt(s) scheduled? Yes   Discharge plans and expectations educations in teach back method with documentation complete? Yes   Right Care Referral Info   Post Acute Recommendation Other   Referral Type Home Hospice   Facility Name [facility]

## 2020-02-17 NOTE — DISCHARGE SUMMARY
DISCHARGE SUMMARY  Hospital Medicine    [facility]: [facility]    Patient Name: Bina Daniels  YOB: 1955    Admit Date: 2/10/2020    Discharge Date: 2/16/2020    Discharge Attending Physician: Tracy Peralta MD    Diagnoses:  Active Hospital Problems    Diagnosis  POA    *Sepsis [A41.9]  Yes    Acute kidney injury superimposed on CKD [N17.9, N18.9]  Yes    Acute systolic (congestive) heart failure [I50.21]  Yes    Bacteremia [R78.81]  Yes    Acute thrombosis of superficial veins of both upper extremities [I82.613]  Yes    Paroxysmal atrial fibrillation [I48.0]  Yes    Postoperative infection [T81.40XA]  Yes    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 [T84.7XXA]  Yes    Volume overload [E87.70]  Yes    Anasarca associated with disorder of kidney [N04.9]  Yes    Iron deficiency anemia [D50.9]  Yes    Vitamin D deficiency [E55.9]  Yes    Metabolic acidosis [E87.2]  Yes    Secondary hyperparathyroidism [N25.81]  Yes    Acute metabolic encephalopathy [G93.41]  Yes    Hypertension [I10]  Yes    Hypoalbuminemia [E88.09]  Yes    Acute hypoxemic respiratory failure [J96.01]  Yes    Acute on chronic combined systolic and diastolic heart failure [I50.43]  Yes    Breast cancer [C50.919]  Yes    S/P ORIF (open reduction internal fixation) fracture [Z98.890, Z87.81]  Not Applicable     Ankle, 12/29      Depression [F32.9]  Yes    Neuropathy due to secondary diabetes mellitus [E13.40]  Yes     Chronic    Type 2 diabetes mellitus with neurologic complication, without long-term current use of insulin [E11.49]  Yes     Chronic    Anemia due to stage 3 chronic kidney disease [N18.3, D63.1]  Yes      Resolved Hospital Problems    Diagnosis Date Resolved POA    CKD (chronic kidney disease), stage IV [N18.4] 02/12/2020 Yes     Chronic       Discharged Condition: admit problems have stabilized       HOSPITAL COURSE:      Initial Presentation:    Ms. Bina Daniels is a 64 y.o. female with a history of type 2  "DM, stage 4 CKD, HTN, LAI, neuropathy, LAI, depressionn who was in her usual state of health until the week or so before gracy this year when she fell and thought she just sprained her ankle, per ortho notes she "didn't think it ws that bad", she then saw a Dr Sotomayor who was unable to reduce the ankle and dx a fx in the Right ankle and sent to [place] ER for treatment on 12/26, she went for attempted ORIF on 12/27 with Dr Medina with [facility] ortho who was unable to complete procedure due to intraOp bleeding in screw holes, placed in splint and procedure aborted/2U given, on 12/27 ORIF completed to R ankle fx with recs for nonweight bearing at that time, SNF was recommended. She had prolonged hospital stay for JENNI on stage 4 CKD issues- nephro Dr Saenz and hosp med in [place] followed for med issues, she decided to go home and not SNF on 1/8. Per her family at bedside, she was getting home health to surgical site and was getting home PT/OT, they didn't seem thrilled with her choice to not do SNF, their brother who is her primary caregiver was not present in ER (daughter and son present in ER now and dont know all of med hx). In interim they didn't know she was having wound issues and dont report that they knew about it until this weekend that the site had opened up as well as worsening dyspnea and fluid/pitting edema in legs, arms as well. They report subjective chills at home but no known fevers, as well as her being slightly "off" and not herself completely now. They brought her for evlauation for both of above, and as seen in ER pictures the wound has opened and hardware visible. Per ER notes, [facility] ortho was attempted called from ER but "no answer"' reported so they admitted to hospital medicine here, ortho has been called here, family reports not seen yet. Given vanc, zosyn, lasix, tdap in ER. SHe missed her ortho f/u last week and they had rescheduled for this week but when they saw the wound they brought her " to the ER.       Course of Principle Problem for Admission:    Sepsis secondary to infected hardware in right leg/wound infection Right ankle  Strep bacteremia. Gram negative bacteremia  S/p ORIF, s/p R ankle fracture  -patient with open surgical wound of the ankle. Blood cultures positive for Enterobacter cloacae and Strep Pneumonia. Treated with IV ceftriaxone with infectious disease recs. Patient with tentative plans for surgical removal of hardware for source management however surgery repeatedly postponed to severe end stage congestive heart failure with acute decompensation.     Acute on chronic systolic/ LV/RV diastolic heart failure (EF 10%)  Acute hypoxemic respiratory failure  -massive volume overload on admission. 2/11 with echo showing EF 10%, diastolic dysfunction- LV, RV, LAE, increased LAP, LVP, PAP elevated, MR. Patient required 5 days of IV diuresis with lasix gtt and diuril. Seen By cardiology during hospitalization who assisted with treatment course.     Palliative Care/Hospice:   Patient baseline level of functioning very poor and is bedbound at baseline. Clinical decompensation during hospitalization in setting of heart failure, renal failure and sepsis unable to obtain source control. Per patient and family wishes they declined surgery and transitioned home with home hospice services on 2/16/2020.       Other Medical Problems Addressed in the Hospital:    Paroxysmal Atrial fibrillation  -new finding 2/11 on ekg and echo, NSR on admit, likely stress of sepsis and depressed ef contributors. Was maintained on metoprolol. Family decision to not initiated NOAC due to transition to hospice.     Upper extremity superficial thrombosis  -right cephalic, left basilic  -superficial, low risk to embolize    Acute kidney injury on Stage 4 CKD  - peaked at 3.4 at time of discharge. Most likely chronic with cardiorenal component.     DM2  -patient with very poor oral intake and mild hypoglycemia. Stopped all  antihyperglycemic agents.     CONSULTS: infectious disease, cardiology, orthopedics, nephrology .     Last CBC/BMP/HgbA1c (if applicable):  Recent Results (from the past 336 hour(s))   CBC with Automated Differential    Collection Time: 02/16/20  4:50 AM   Result Value Ref Range    WBC 13.56 (H) 3.90 - 12.70 K/uL    Hemoglobin 8.9 (L) 12.0 - 16.0 g/dL    Hematocrit 29.7 (L) 37.0 - 48.5 %    Platelets 317 150 - 350 K/uL   CBC with Automated Differential    Collection Time: 02/15/20  4:10 AM   Result Value Ref Range    WBC 15.31 (H) 3.90 - 12.70 K/uL    Hemoglobin 9.7 (L) 12.0 - 16.0 g/dL    Hematocrit 32.1 (L) 37.0 - 48.5 %    Platelets 355 (H) 150 - 350 K/uL   CBC with Automated Differential    Collection Time: 02/14/20  4:22 AM   Result Value Ref Range    WBC 13.89 (H) 3.90 - 12.70 K/uL    Hemoglobin 9.3 (L) 12.0 - 16.0 g/dL    Hematocrit 31.6 (L) 37.0 - 48.5 %    Platelets 406 (H) 150 - 350 K/uL     No results found for this or any previous visit (from the past 336 hour(s)).  Lab Results   Component Value Date    HGBA1C 10.0 (H) 02/11/2020       Disposition:    Hospice      Future Scheduled Appointments:  No future appointments.    Follow-up Plans from This Hospitalization:  Patient with transition to home hospice.     Discharge Medication List:       Bina aDniels   Home Medication Instructions MORRIS:[id number]    Printed on:02/17/20 1302   Medication Information                      acetaminophen (TYLENOL) 325 MG tablet  Take 2 tablets (650 mg total) by mouth every 6 (six) hours as needed for Pain.             acetaminophen (TYLENOL) 80 MG Supp  Place 1.5 suppositories (120 mg total) rectally every 6 (six) hours as needed.             aspirin 81 MG Chew  Take 81 mg by mouth once daily.             atorvastatin (LIPITOR) 10 MG tablet               busPIRone (BUSPAR) 5 MG Tab  Take 5 mg by mouth 2 (two) times daily.             calcitRIOL (ROCALTROL) 0.25 MCG Cap  Take 1 capsule (0.25 mcg total) by mouth once  daily.             ciprofloxacin 250 mg/5 ml (CIPRO)  Take 10 mLs (500 mg total) by mouth once daily. for 14 days             escitalopram oxalate (LEXAPRO) 10 MG tablet  Take 10 mg by mouth once daily.             furosemide (LASIX) 80 MG tablet  Take 80 mg by mouth 2 (two) times daily.             gabapentin (NEURONTIN) 300 MG capsule               losartan (COZAAR) 50 MG tablet               metoprolol 10 mg/mL oral solution  Take 3.45 mLs (34.5 mg total) by mouth 2 (two) times daily.             metoprolol succinate (TOPROL-XL) 25 MG 24 hr tablet  Take 0.5 tablets (12.5 mg total) by mouth once daily.             morphine 100 mg/5 mL (20 mg/mL) concentrated solution  Take 0.5 mLs (10 mg total) by mouth every 4 (four) hours as needed for Pain.                 Patient Instructions:  No discharge procedures on file.    Signing Physician:  Tracy Peralta MD

## 2021-04-21 NOTE — PLAN OF CARE
Patient AAOx4, VSS patient c/o of pain after surgery, PRN tylenol given and pain subsided. Patient tolerating diet, no nausea or vomiting. Free from falls. Call bell in reach. Bed alarm in place. Safety maintained. Will continue to monitor.   Problem: Fall Injury Risk  Goal: Absence of Fall and Fall-Related Injury  Outcome: Ongoing, Progressing     Problem: Adult Inpatient Plan of Care  Goal: Plan of Care Review  Outcome: Ongoing, Progressing  Goal: Patient-Specific Goal (Individualization)  Outcome: Ongoing, Progressing  Goal: Absence of Hospital-Acquired Illness or Injury  Outcome: Ongoing, Progressing  Goal: Optimal Comfort and Wellbeing  Outcome: Ongoing, Progressing  Goal: Readiness for Transition of Care  Outcome: Ongoing, Progressing  Goal: Rounds/Family Conference  Outcome: Ongoing, Progressing      Medical clearance needed before surgery.

## 2021-12-20 NOTE — PLAN OF CARE
Problem: Occupational Therapy Goal  Goal: Occupational Therapy Goal  Description  Goals to be met by: 01/29/20     Patient will increase functional independence with ADLs by performing:    UE Dressing with Modified [place].  LE Dressing with Modified [place].  Toileting from bedside commode with Contact Guard Assistance for hygiene and clothing management.   Toilet transfer to bedside commode with Minimal Assistance while maintaining NWB to R LE.  Upper extremity exercise program 3 x12 reps per handout, with supervision to improve UB strength/endurance for improved transfers.     Outcome: Ongoing, Progressing     Patient self-limits 2/2 fear and panic during standing attempts. Patient will need continued OT/PT to address functional deficits and improve strength and endurance for ADL tasks. Cont OT.    Initiate Treatment: TAC cream bidx 2 weeks Detail Level: Zone Otc Regimen: Fragrance free soap, use sparingly\\nCerave cream daily

## 2023-10-09 NOTE — ASSESSMENT & PLAN NOTE
Pt home meds are metformin and glyburide  I will recommend stopping them while inpatient and pt getting to be NPO,  I will place pt on ISS     Patient is critically ill, requiring critical care services.

## (undated) DEVICE — PAD CAST SPECIALIST STRL 6

## (undated) DEVICE — DRAPE STERI INSTRUMENT 1018

## (undated) DEVICE — KIT DISPOSABLE INSTRUMENT MINI

## (undated) DEVICE — KWIRE NTHRD 1.25X150MM
Type: IMPLANTABLE DEVICE | Site: ANKLE | Status: NON-FUNCTIONAL
Removed: 2019-12-27

## (undated) DEVICE — SEE MEDLINE ITEM 157131

## (undated) DEVICE — SKINMARKER W/RULER DEVON

## (undated) DEVICE — PAD CAST SPECIALIST STRL 4

## (undated) DEVICE — APPLICATOR CHLORAPREP ORN 26ML

## (undated) DEVICE — SPONGE LAP 18X18 PREWASHED

## (undated) DEVICE — SEE MEDLINE ITEM 146270

## (undated) DEVICE — TOURNIQUET SB QC DP 44X4IN

## (undated) DEVICE — DRESSING XEROFORM FOIL PK 1X8

## (undated) DEVICE — STAPLER SKIN ROTATING HEAD

## (undated) DEVICE — SUT ETHILON 3/0 18IN PS-1

## (undated) DEVICE — CONTAINER SPECIMEN STRL 4OZ

## (undated) DEVICE — DRESSING XEROFORM 1X8IN

## (undated) DEVICE — TOURNIQUET SB QC SP 34X4IN

## (undated) DEVICE — INSTRUMENT SUCTION FRAZIER 12F

## (undated) DEVICE — BLADE SURG CARBON STEEL #10

## (undated) DEVICE — PAD CAST SPECIALIST STRL 3

## (undated) DEVICE — PENCIL ROCKER SWITCH 10FT CORD

## (undated) DEVICE — SUT ETHICON 3-0 BLK MONO PS

## (undated) DEVICE — SEE MEDLINE ITEM 146308

## (undated) DEVICE — ELECTRODE REM PLYHSV RETURN 9

## (undated) DEVICE — GLOVE 8 PROTEXIS PI ORTHO

## (undated) DEVICE — BIT DRILL 2.8 W/QC PERCU 200MM

## (undated) DEVICE — TRAY MINOR ORTHO

## (undated) DEVICE — DRAPE STERI U-SHAPED 47X51IN

## (undated) DEVICE — DRESSING N ADH OIL EMUL 3X3

## (undated) DEVICE — GLOVE PROTEXIS HYDROGEL SZ8

## (undated) DEVICE — BANDAGE ESMARK 6X12

## (undated) DEVICE — STOCKINET TUBULAR 1 PLY 6X60IN

## (undated) DEVICE — DRESSING N ADH OIL EMUL 3X8 3S

## (undated) DEVICE — SEE MEDLINE ITEM 152622

## (undated) DEVICE — PADDING CAST SPECIALIST 6X4YD

## (undated) DEVICE — SUT VICRYL CTD 2-0 GI 27 SH

## (undated) DEVICE — SUT MONOCRYL 3-0 PS-2 UND

## (undated) DEVICE — ALCOHOL 70% ISOP W/GREEN 16OZ

## (undated) DEVICE — SEE MEDLINE ITEM 152529

## (undated) DEVICE — SEE MEDLINE ITEM 157117

## (undated) DEVICE — TAPE SURG DURAPORE 2 X10YD

## (undated) DEVICE — SEE MEDLINE ITEM 146298

## (undated) DEVICE — SEE MEDLINE ITEM 152530

## (undated) DEVICE — TOURNIQUET SB QC DP 34X4IN

## (undated) DEVICE — PAD ABDOMINAL 5X9 STERILE

## (undated) DEVICE — PADDING CAST 4IN SPECIALIST

## (undated) DEVICE — GAUZE SPONGE 4X4 12PLY

## (undated) DEVICE — SUT CTD VICRYL CT-1 27

## (undated) DEVICE — SEE MEDLINE ITEM 146231

## (undated) DEVICE — BANDAGE ACE ELASTIC 6"

## (undated) DEVICE — COVER OVERHEAD SURG LT BLUE

## (undated) DEVICE — SEE MEDLINE ITEM 152523

## (undated) DEVICE — SEE MEDLINE ITEM 156953

## (undated) DEVICE — BIT BRILL 2.5